# Patient Record
Sex: MALE | Race: WHITE | NOT HISPANIC OR LATINO | URBAN - METROPOLITAN AREA
[De-identification: names, ages, dates, MRNs, and addresses within clinical notes are randomized per-mention and may not be internally consistent; named-entity substitution may affect disease eponyms.]

---

## 2022-10-10 ENCOUNTER — INPATIENT (INPATIENT)
Facility: HOSPITAL | Age: 67
LOS: 11 days | Discharge: NURSING, REHAB OR LONG-TERM CARE | DRG: 871 | End: 2022-10-22
Attending: HOSPITALIST | Admitting: STUDENT IN AN ORGANIZED HEALTH CARE EDUCATION/TRAINING PROGRAM
Payer: MEDICARE

## 2022-10-10 VITALS — HEIGHT: 69 IN | WEIGHT: 250 LBS

## 2022-10-10 DIAGNOSIS — A41.9 SEPSIS, UNSPECIFIED ORGANISM: ICD-10-CM

## 2022-10-10 DIAGNOSIS — N17.9 ACUTE KIDNEY FAILURE, UNSPECIFIED: ICD-10-CM

## 2022-10-10 DIAGNOSIS — Z96.652 PRESENCE OF LEFT ARTIFICIAL KNEE JOINT: Chronic | ICD-10-CM

## 2022-10-10 DIAGNOSIS — E87.29 OTHER ACIDOSIS: ICD-10-CM

## 2022-10-10 LAB
ALBUMIN SERPL ELPH-MCNC: 3.4 G/DL — SIGNIFICANT CHANGE UP (ref 3.3–5)
ALP SERPL-CCNC: 85 U/L — SIGNIFICANT CHANGE UP (ref 40–120)
ALT FLD-CCNC: 63 U/L — HIGH (ref 10–45)
ANION GAP SERPL CALC-SCNC: 21 MMOL/L — HIGH (ref 5–17)
ANION GAP SERPL CALC-SCNC: 26 MMOL/L — HIGH (ref 5–17)
APPEARANCE UR: CLEAR — SIGNIFICANT CHANGE UP
AST SERPL-CCNC: 93 U/L — HIGH (ref 10–40)
BACTERIA # UR AUTO: NEGATIVE — SIGNIFICANT CHANGE UP
BASE EXCESS BLDV CALC-SCNC: -12.3 MMOL/L — LOW (ref -2–3)
BASE EXCESS BLDV CALC-SCNC: -9.9 MMOL/L — LOW (ref -2–3)
BASOPHILS # BLD AUTO: 0.05 K/UL — SIGNIFICANT CHANGE UP (ref 0–0.2)
BASOPHILS NFR BLD AUTO: 0.3 % — SIGNIFICANT CHANGE UP (ref 0–2)
BILIRUB SERPL-MCNC: 1.2 MG/DL — SIGNIFICANT CHANGE UP (ref 0.2–1.2)
BILIRUB UR-MCNC: NEGATIVE — SIGNIFICANT CHANGE UP
BLOOD GAS VENOUS - CREATININE: SIGNIFICANT CHANGE UP MG/DL (ref 0.5–1.3)
BUN SERPL-MCNC: 114 MG/DL — HIGH (ref 7–23)
BUN SERPL-MCNC: 136 MG/DL — HIGH (ref 7–23)
CA-I SERPL-SCNC: 1.06 MMOL/L — LOW (ref 1.15–1.33)
CA-I SERPL-SCNC: 1.12 MMOL/L — LOW (ref 1.15–1.33)
CALCIUM SERPL-MCNC: 7.5 MG/DL — LOW (ref 8.4–10.5)
CALCIUM SERPL-MCNC: 8.9 MG/DL — SIGNIFICANT CHANGE UP (ref 8.4–10.5)
CHLORIDE BLDV-SCNC: 100 MMOL/L — SIGNIFICANT CHANGE UP (ref 96–108)
CHLORIDE BLDV-SCNC: 106 MMOL/L — SIGNIFICANT CHANGE UP (ref 96–108)
CHLORIDE SERPL-SCNC: 100 MMOL/L — SIGNIFICANT CHANGE UP (ref 96–108)
CHLORIDE SERPL-SCNC: 107 MMOL/L — SIGNIFICANT CHANGE UP (ref 96–108)
CO2 BLDV-SCNC: 15 MMOL/L — LOW (ref 22–26)
CO2 BLDV-SCNC: 17 MMOL/L — LOW (ref 22–26)
CO2 SERPL-SCNC: 12 MMOL/L — LOW (ref 22–31)
CO2 SERPL-SCNC: 13 MMOL/L — LOW (ref 22–31)
COLOR SPEC: YELLOW — SIGNIFICANT CHANGE UP
CREAT ?TM UR-MCNC: 112 MG/DL — SIGNIFICANT CHANGE UP
CREAT SERPL-MCNC: 5.25 MG/DL — HIGH (ref 0.5–1.3)
CREAT SERPL-MCNC: 6.5 MG/DL — HIGH (ref 0.5–1.3)
DIFF PNL FLD: ABNORMAL
EGFR: 11 ML/MIN/1.73M2 — LOW
EGFR: 9 ML/MIN/1.73M2 — LOW
EOSINOPHIL # BLD AUTO: 0.02 K/UL — SIGNIFICANT CHANGE UP (ref 0–0.5)
EOSINOPHIL NFR BLD AUTO: 0.1 % — SIGNIFICANT CHANGE UP (ref 0–6)
EPI CELLS # UR: 0 /HPF — SIGNIFICANT CHANGE UP
GAS PNL BLDV: 133 MMOL/L — LOW (ref 136–145)
GAS PNL BLDV: 134 MMOL/L — LOW (ref 136–145)
GAS PNL BLDV: SIGNIFICANT CHANGE UP
GLUCOSE BLDV-MCNC: 118 MG/DL — HIGH (ref 70–99)
GLUCOSE BLDV-MCNC: 92 MG/DL — SIGNIFICANT CHANGE UP (ref 70–99)
GLUCOSE SERPL-MCNC: 117 MG/DL — HIGH (ref 70–99)
GLUCOSE SERPL-MCNC: 93 MG/DL — SIGNIFICANT CHANGE UP (ref 70–99)
GLUCOSE UR QL: NEGATIVE — SIGNIFICANT CHANGE UP
HCO3 BLDV-SCNC: 14 MMOL/L — LOW (ref 22–29)
HCO3 BLDV-SCNC: 16 MMOL/L — LOW (ref 22–29)
HCT VFR BLD CALC: 38.6 % — LOW (ref 39–50)
HCT VFR BLDA CALC: 32 % — LOW (ref 39–51)
HCT VFR BLDA CALC: 37 % — LOW (ref 39–51)
HGB BLD CALC-MCNC: 10.7 G/DL — LOW (ref 12.6–17.4)
HGB BLD CALC-MCNC: 12.4 G/DL — LOW (ref 12.6–17.4)
HGB BLD-MCNC: 12 G/DL — LOW (ref 13–17)
HYALINE CASTS # UR AUTO: 0 /LPF — SIGNIFICANT CHANGE UP (ref 0–2)
IMM GRANULOCYTES NFR BLD AUTO: 2.7 % — HIGH (ref 0–0.9)
KETONES UR-MCNC: NEGATIVE — SIGNIFICANT CHANGE UP
LACTATE BLDV-MCNC: 3.7 MMOL/L — HIGH (ref 0.5–2)
LACTATE BLDV-MCNC: 4.2 MMOL/L — CRITICAL HIGH (ref 0.5–2)
LEUKOCYTE ESTERASE UR-ACNC: NEGATIVE — SIGNIFICANT CHANGE UP
LYMPHOCYTES # BLD AUTO: 1.17 K/UL — SIGNIFICANT CHANGE UP (ref 1–3.3)
LYMPHOCYTES # BLD AUTO: 6.2 % — LOW (ref 13–44)
MCHC RBC-ENTMCNC: 26.5 PG — LOW (ref 27–34)
MCHC RBC-ENTMCNC: 31.1 GM/DL — LOW (ref 32–36)
MCV RBC AUTO: 85.2 FL — SIGNIFICANT CHANGE UP (ref 80–100)
MONOCYTES # BLD AUTO: 0.88 K/UL — SIGNIFICANT CHANGE UP (ref 0–0.9)
MONOCYTES NFR BLD AUTO: 4.6 % — SIGNIFICANT CHANGE UP (ref 2–14)
NEUTROPHILS # BLD AUTO: 16.35 K/UL — HIGH (ref 1.8–7.4)
NEUTROPHILS NFR BLD AUTO: 86.1 % — HIGH (ref 43–77)
NITRITE UR-MCNC: NEGATIVE — SIGNIFICANT CHANGE UP
NRBC # BLD: 0 /100 WBCS — SIGNIFICANT CHANGE UP (ref 0–0)
OSMOLALITY UR: 478 MOS/KG — SIGNIFICANT CHANGE UP (ref 300–900)
PCO2 BLDV: 34 MMHG — LOW (ref 42–55)
PCO2 BLDV: 35 MMHG — LOW (ref 42–55)
PH BLDV: 7.23 — LOW (ref 7.32–7.43)
PH BLDV: 7.27 — LOW (ref 7.32–7.43)
PH UR: 5 — SIGNIFICANT CHANGE UP (ref 5–8)
PLATELET # BLD AUTO: 276 K/UL — SIGNIFICANT CHANGE UP (ref 150–400)
PO2 BLDV: 23 MMHG — LOW (ref 25–45)
PO2 BLDV: 29 MMHG — SIGNIFICANT CHANGE UP (ref 25–45)
POTASSIUM BLDV-SCNC: 4 MMOL/L — SIGNIFICANT CHANGE UP (ref 3.5–5.1)
POTASSIUM BLDV-SCNC: 4.7 MMOL/L — SIGNIFICANT CHANGE UP (ref 3.5–5.1)
POTASSIUM SERPL-MCNC: 4.1 MMOL/L — SIGNIFICANT CHANGE UP (ref 3.5–5.3)
POTASSIUM SERPL-MCNC: 4.8 MMOL/L — SIGNIFICANT CHANGE UP (ref 3.5–5.3)
POTASSIUM SERPL-SCNC: 4.1 MMOL/L — SIGNIFICANT CHANGE UP (ref 3.5–5.3)
POTASSIUM SERPL-SCNC: 4.8 MMOL/L — SIGNIFICANT CHANGE UP (ref 3.5–5.3)
PROT SERPL-MCNC: 6.3 G/DL — SIGNIFICANT CHANGE UP (ref 6–8.3)
PROT UR-MCNC: ABNORMAL
RAPID RVP RESULT: DETECTED
RBC # BLD: 4.53 M/UL — SIGNIFICANT CHANGE UP (ref 4.2–5.8)
RBC # FLD: 16.8 % — HIGH (ref 10.3–14.5)
RBC CASTS # UR COMP ASSIST: 1 /HPF — SIGNIFICANT CHANGE UP (ref 0–4)
SAO2 % BLDV: 24.4 % — LOW (ref 67–88)
SAO2 % BLDV: 36.3 % — LOW (ref 67–88)
SARS-COV-2 RNA SPEC QL NAA+PROBE: DETECTED
SODIUM SERPL-SCNC: 139 MMOL/L — SIGNIFICANT CHANGE UP (ref 135–145)
SODIUM SERPL-SCNC: 140 MMOL/L — SIGNIFICANT CHANGE UP (ref 135–145)
SODIUM UR-SCNC: 34 MMOL/L — SIGNIFICANT CHANGE UP
SP GR SPEC: 1.02 — SIGNIFICANT CHANGE UP (ref 1.01–1.02)
TROPONIN T, HIGH SENSITIVITY RESULT: 210 NG/L — HIGH (ref 0–51)
TROPONIN T, HIGH SENSITIVITY RESULT: 242 NG/L — HIGH (ref 0–51)
UROBILINOGEN FLD QL: NEGATIVE — SIGNIFICANT CHANGE UP
WBC # BLD: 18.99 K/UL — HIGH (ref 3.8–10.5)
WBC # FLD AUTO: 18.99 K/UL — HIGH (ref 3.8–10.5)
WBC UR QL: 3 /HPF — SIGNIFICANT CHANGE UP (ref 0–5)

## 2022-10-10 PROCEDURE — 70450 CT HEAD/BRAIN W/O DYE: CPT | Mod: 26,MA

## 2022-10-10 PROCEDURE — 71250 CT THORAX DX C-: CPT | Mod: 26,MA

## 2022-10-10 PROCEDURE — 74176 CT ABD & PELVIS W/O CONTRAST: CPT | Mod: 26,MA

## 2022-10-10 PROCEDURE — 93010 ELECTROCARDIOGRAM REPORT: CPT | Mod: 59

## 2022-10-10 PROCEDURE — 93010 ELECTROCARDIOGRAM REPORT: CPT

## 2022-10-10 PROCEDURE — 99291 CRITICAL CARE FIRST HOUR: CPT

## 2022-10-10 RX ORDER — SODIUM CHLORIDE 9 MG/ML
1000 INJECTION, SOLUTION INTRAVENOUS
Refills: 0 | Status: DISCONTINUED | OUTPATIENT
Start: 2022-10-10 | End: 2022-10-10

## 2022-10-10 RX ORDER — SODIUM BICARBONATE 1 MEQ/ML
0.13 SYRINGE (ML) INTRAVENOUS
Qty: 150 | Refills: 0 | Status: DISCONTINUED | OUTPATIENT
Start: 2022-10-10 | End: 2022-10-10

## 2022-10-10 RX ORDER — SODIUM CHLORIDE 9 MG/ML
1000 INJECTION INTRAMUSCULAR; INTRAVENOUS; SUBCUTANEOUS ONCE
Refills: 0 | Status: COMPLETED | OUTPATIENT
Start: 2022-10-10 | End: 2022-10-10

## 2022-10-10 RX ORDER — MIDODRINE HYDROCHLORIDE 2.5 MG/1
10 TABLET ORAL ONCE
Refills: 0 | Status: COMPLETED | OUTPATIENT
Start: 2022-10-10 | End: 2022-10-10

## 2022-10-10 RX ORDER — SODIUM BICARBONATE 1 MEQ/ML
0.13 SYRINGE (ML) INTRAVENOUS
Qty: 150 | Refills: 0 | Status: DISCONTINUED | OUTPATIENT
Start: 2022-10-10 | End: 2022-10-11

## 2022-10-10 RX ORDER — SODIUM CHLORIDE 9 MG/ML
500 INJECTION INTRAMUSCULAR; INTRAVENOUS; SUBCUTANEOUS ONCE
Refills: 0 | Status: COMPLETED | OUTPATIENT
Start: 2022-10-10 | End: 2022-10-10

## 2022-10-10 RX ORDER — PIPERACILLIN AND TAZOBACTAM 4; .5 G/20ML; G/20ML
3.38 INJECTION, POWDER, LYOPHILIZED, FOR SOLUTION INTRAVENOUS ONCE
Refills: 0 | Status: COMPLETED | OUTPATIENT
Start: 2022-10-10 | End: 2022-10-10

## 2022-10-10 RX ADMIN — PIPERACILLIN AND TAZOBACTAM 200 GRAM(S): 4; .5 INJECTION, POWDER, LYOPHILIZED, FOR SOLUTION INTRAVENOUS at 18:43

## 2022-10-10 RX ADMIN — MIDODRINE HYDROCHLORIDE 10 MILLIGRAM(S): 2.5 TABLET ORAL at 20:30

## 2022-10-10 RX ADMIN — Medication 100 MEQ/KG/HR: at 23:07

## 2022-10-10 RX ADMIN — SODIUM CHLORIDE 500 MILLILITER(S): 9 INJECTION INTRAMUSCULAR; INTRAVENOUS; SUBCUTANEOUS at 18:59

## 2022-10-10 RX ADMIN — SODIUM CHLORIDE 1000 MILLILITER(S): 9 INJECTION INTRAMUSCULAR; INTRAVENOUS; SUBCUTANEOUS at 17:30

## 2022-10-10 RX ADMIN — SODIUM CHLORIDE 1000 MILLILITER(S): 9 INJECTION INTRAMUSCULAR; INTRAVENOUS; SUBCUTANEOUS at 17:50

## 2022-10-10 RX ADMIN — SODIUM CHLORIDE 1000 MILLILITER(S): 9 INJECTION INTRAMUSCULAR; INTRAVENOUS; SUBCUTANEOUS at 20:31

## 2022-10-10 RX ADMIN — SODIUM CHLORIDE 100 MILLILITER(S): 9 INJECTION, SOLUTION INTRAVENOUS at 21:51

## 2022-10-10 NOTE — ED ADULT NURSE NOTE - OBJECTIVE STATEMENT
67y male BIBEMS complaining of AMS. PMHX HTN. EMS states he has been AMS for three day according to doctor. Pt is cool and clammy. Pt has swollen legs bilaterally. Labs was collected and sent to lab. 67y male BIBEMS complaining of AMS. PMHX HTN, HLD. EMS states he has been AMS for three day according to wife. Wife states he hasn't been himself. . Pt is cool and clammy. Pt has swollen legs bilaterally. Labs was collected and sent to lab. 67y male BIBEMS complaining of AMS. PMHX HTN, HLD. EMS states he has been AMS for three day according to wife. Wife states he hasn't been himself. . Pt is cool and clammy. Pt is altered. Pt has swollen legs bilaterally. Pt has redness at the sacrum. Labs was collected and sent to lab. Pt awaiting ct scan. Pt pending dispo.

## 2022-10-10 NOTE — ED PROVIDER NOTE - ATTENDING CONTRIBUTION TO CARE
attending Jewel: 67yM h/o HTN, HLD, DVT/PE on Xarelto BIBEMS from rehab for worsening AMS x several days. Reportedly being treated for UTI. In rehab after having had L knee arthroplasty several weeks ago. Exam as above. Will obtain ekg, place on tele, labs, rectal temp, IVF given hypotension on arrival, empiric abx for concern for sepsis, CTH for AMS on Xarelto, will require admission

## 2022-10-10 NOTE — CONSULT NOTE ADULT - ASSESSMENT
Patient presenting for probable SONAL unknown baseline creatinine improvd with foleyy insertion and IV fluids would continue fluid infusion.   Change saline to bicarbonate infusion.

## 2022-10-10 NOTE — CONSULT NOTE ADULT - PROBLEM SELECTOR RECOMMENDATION 9
unknown baseline creatinine, presenting from nursing home with creatinine of 6.5 which improved to 5.2, suspected multifactorial SONAL however likely predominately pre renal. Patient without evidence fo hydronephrosis on CT abdomen and pelvis  [] Patient UA bland with trace blood, Please send fr repeat however unlikely GN picture contributing to SONAL   [] Please send for Urine Electrolytes (sodium, Chloride, Urea, Creatinine)   []Please continue IV hydration, given persistence acidemia would switch Saline to Bicarbonate (3 amps)  in D5w, run at 100 cc hourly  [] please send for renal ultrasound for assessment for parenchymal disease  [] Will need to consider HD if renal failure continues to worsen. Monitor labs and urine output. Avoid NSAIDs, ACEI/ARBS, RCA and nephrotoxins. Dose medications as per eGFR.  [] Please continue to monitor I/Os (urine output)

## 2022-10-10 NOTE — ED PROVIDER NOTE - PHYSICAL EXAMINATION
Physical Exam:  Gen: tired appearing, awake alert   HEENT: ormal conjunctiva, oral mucosa moist  Lung: CTAB, no respiratory distress, no wheezes/rhonchi/rales B/L  CV: irregular, tachycardic  Abd: soft, NT, ND, no guarding, no rigidity, no rebound tenderness   MSK: no visible deformities, well healed L knee surgical scar  Skin: Warm, well perfused, no rash, b/l leg swelling  ~Thomas Ferris MD (PGY-3)

## 2022-10-10 NOTE — ED ADULT NURSE REASSESSMENT NOTE - NS ED NURSE REASSESS COMMENT FT1
Report taken from RN Pat, pt introduced to oncoming RN, A&Ox3, airway patent with spontaneous unlabored breathing.  Owusu draining dark zack/ brown urine. Pt currently at CT.

## 2022-10-10 NOTE — ED ADULT NURSE REASSESSMENT NOTE - NS ED NURSE REASSESS COMMENT FT1
2100 cc of urine drained from Owusu, family member at bedside. Pt A&Ox3, given warm blankets. No complaints at this time.

## 2022-10-10 NOTE — CONSULT NOTE ADULT - ATTENDING COMMENTS
pt seen at 10am 10/11/22  alla in setting of losartan/hctz/hypotension/covid19+  -?u retention component in ER  -cr decreased now uptrending but off IVF  -rec to continue IVF hydration with normal saline @100cc/hr   -hold off losartan/hctz  #met acidosis- start sodium bicarb tabs 1300mg po tid  #monitor bicarb trend and lactate and Ph- may need bicarb based ivf if downtrending bicarb  #CT: KIDNEYS/URETERS: Bilateral nonobstructive renal calculi measuring up to   1.4 cm on the left and 0.6 cm on the right. Bilateral renal cysts. No   hydronephrosis.  BLADDER: Decompressed with a Kennedy catheter.  #continue kennedy  #hypotension- IVF; monitor BP

## 2022-10-10 NOTE — ED ADULT NURSE NOTE - NSIMPLEMENTINTERV_GEN_ALL_ED
Implemented All Fall Risk Interventions:  [place] to call system. Call bell, personal items and telephone within reach. Instruct patient to call for assistance. Room bathroom lighting operational. Non-slip footwear when patient is off stretcher. Physically safe environment: no spills, clutter or unnecessary equipment. Stretcher in lowest position, wheels locked, appropriate side rails in place. Provide visual cue, wrist band, yellow gown, etc. Monitor gait and stability. Monitor for mental status changes and reorient to person, place, and time. Review medications for side effects contributing to fall risk. Reinforce activity limits and safety measures with patient and family.

## 2022-10-10 NOTE — ED ADULT NURSE REASSESSMENT NOTE - NS ED NURSE REASSESS COMMENT FT1
Sterile kennedy cath was performed with second RN Pat. Pt tolerated well. Pt output was 1000ml of urine dark yellow urine. Pt pending dispo.

## 2022-10-10 NOTE — ED PROVIDER NOTE - PROGRESS NOTE DETAILS
Barrington DAVIS (PGY-3)  nephro recs--no need for HD at this time. I spoke with hospitliast who accepted admission under non-tele bed. acute renal failure likely due to prerenal and postrenal etiologies

## 2022-10-10 NOTE — ED PROVIDER NOTE - CLINICAL SUMMARY MEDICAL DECISION MAKING FREE TEXT BOX
7M PMH HTN, HLD, DVT/PE on xarelto BIBEMS from the [facility] for AMS for the past 3 days with SOB. Tachycardic, hypotensive, rest of VSS. Lungs ctab, abd NT, B/L leg swelling  Likely infectious vs. metabolic etiology of symptoms. Will also eval for ACS as pt unable to give proper hx although less likely since ECG was nondiagnostic  Plan: sepsis/cardiac labs, pan CT scan, abx, reassess symptoms

## 2022-10-10 NOTE — ED ADULT NURSE NOTE - ED STAT RN HANDOFF DETAILS
Report given to NICK Hooper. MD aware of blood pressure 81/61- will give 500 cc fluid bolus. patient transported to CT scan; awaiting dispo.

## 2022-10-10 NOTE — CONSULT NOTE ADULT - SUBJECTIVE AND OBJECTIVE BOX
[facility] DIVISION OF KIDNEY DISEASES AND HYPERTENSION -- INITIAL CONSULT NOTE  --------------------------------------------------------------------------------    --------------------------------------------------------------------------------  HPI:    Patient had kennedy inserted with 2.1 liters of urine drained, received 2 liters of saline, creatinine improved from 6.5 to 5.25, bicarbonate maintained at 12 from 13 and urea down trended from 136 to 114. CT abdomen with no evidence of hydronephrosis.         PAST HISTORY  --------------------------------------------------------------------------------  PAST MEDICAL & SURGICAL HISTORY:  HTN (hypertension)      HLD (hyperlipidemia)      Pulmonary embolism      History of left knee replacement        FAMILY HISTORY:    PAST SOCIAL HISTORY:    ALLERGIES & MEDICATIONS  --------------------------------------------------------------------------------  Allergies    Allergy Status Unknown    Intolerances      Standing Inpatient Medications  lactated ringers. 1000 milliLiter(s) IV Continuous <Continuous>    PRN Inpatient Medications      REVIEW OF SYSTEMS  --------------------------------------------------------------------------------  Constitutional:No Fever,Chills , Fatigue , Weight change   HEENT:No Blurred vision,Eye Pain ,Headache, Runny nose,Sore Throat   Respiratory: No Cough, Wheezing ,Shortness of breath  Cardiovascular: No Chest Pain, Palpitations, DEY, PND, Orthopnea  Gastrointestinal:No Abdominal Pain, Diarrhea, Constipation, Hemorrhoids, Nausea,  Vomiting  Genitourinary: No Nocturia, Dysuria, Incontinence  Extremities: No Swelling, Joint Pain  Neurologic:No Focal deficit, Paresthesia,  Syncope  Lymphatic: No Swelling, Lymphadenopathy   Skin: No Rash, Ecchymoses, Wounds, Lesions  Psychiatry: No Depression ,  Suicidal/Homicidal Ideation, Anxiety, Sleep Disturbances        All other systems were reviewed and are negative, except as noted.    VITALS/PHYSICAL EXAM  --------------------------------------------------------------------------------  T(C): 36.7 (10-10-22 @ 21:00), Max: 36.7 (10-10-22 @ 21:00)  HR: 82 (10-10-22 @ 21:00) (82 - 104)  BP: 104/69 (10-10-22 @ 21:00) (75/55 - 120/86)  RR: 20 (10-10-22 @ 21:00) (19 - 25)  SpO2: 100% (10-10-22 @ 21:00) (99% - 100%)  Wt(kg): --    Height (cm): 175.3 (10-10-22 @ 17:05)  Weight (kg): 113.4 (10-10-22 @ 17:05)  BMI (kg/m2): 36.9 (10-10-22 @ 17:05)  BSA (m2): 2.27 (10-10-22 @ 17:05)  Daily Height in cm: 175.26 (10 Oct 2022 17:05)    Daily   I&O's Summary    10 Oct 2022 07:01  -  10 Oct 2022 21:41  --------------------------------------------------------  IN: 0 mL / OUT: 2100 mL / NET: -2100 mL          10-10-22 @ 07:01  -  10-10-22 @ 21:41  --------------------------------------------------------  IN: 0 mL / OUT: 2100 mL / NET: -2100 mL        Physical Exam:  Gen: NAD   HEENT: anicteric  Pulm: CTA B/L  CV: RRR, Normal S1 S2  Abd: soft, nontender, nondistended  CNS: AAO x 3, No asterixis  : No Umer  LE: Warm, no gabriel  Skin: Warm, without rashes  Vascular access: none        LABS/STUDIES  --------------------------------------------------------------------------------              12.0   18.99 >-----------<  276      [10-10-22 @ 18:08]              38.6     140  |  107  |  114  ----------------------------<  93      [10-10-22 @ 20:13]  4.1   |  12  |  5.25        Ca     7.5     [10-10-22 @ 20:13]    TPro  6.3  /  Alb  3.4  /  TBili  1.2  /  DBili  x   /  AST  93  /  ALT  63  /  AlkPhos  85  [10-10-22 @ 18:08]          Creatinine Trend:  SCr 5.25 [10-10 @ 20:13]  SCr 6.50 [10-10 @ 18:08]    Urinalysis - [10-10-22 @ 18:16]      Color Yellow / Appearance Clear / SG 1.017 / pH 5.0      Gluc Negative / Ketone Negative  / Bili Negative / Urobili Negative       Blood Trace / Protein Trace / Leuk Est Negative / Nitrite Negative      RBC 1 / WBC 3 / Hyaline 0 / Gran  / Sq Epi  / Non Sq Epi 0 / Bacteria Negative    Urine Creatinine 112      [10-10-22 @ 20:44]  Urine Sodium 34      [10-10-22 @ 20:44]  Urine Osmolality 478      [10-10-22 @ 20:44]            Radiology  --------------------------------------------------------------------------------    --------------------------------------------------------------------------------  Yoan Davis  [id number] [facility] DIVISION OF KIDNEY DISEASES AND HYPERTENSION -- INITIAL CONSULT NOTE  --------------------------------------------------------------------------------    --------------------------------------------------------------------------------  HPI:  THis is a 67 year old male with History of Hypertension, Hyperlipidemia, renal cancer s/p partial nephrectomy, DVT PE and a recent left knee arthroplasty in september of 2022. patient was brought in from [facility], as per patients wife patient has been more altered in the last 3 days . As per the wife due to his fluctuating mentation patient was diagnosed with a urinary tract infection. Patients denies chest pain, lightheadedness or fevers. Patient does however endorse feeling thirsty and feeling more fatigued than his baseline.       Patient had kennedy inserted with 2.1 liters of urine drained, received 2 liters of saline, creatinine improved from 6.5 to 5.25, bicarbonate maintained at 12 from 13 and urea down trended from 136 to 114. CT abdomen with no evidence of hydronephrosis. Patient was noted to be covid positive in the ED however he denies having dyspnea.       PAST HISTORY  --------------------------------------------------------------------------------  PAST MEDICAL & SURGICAL HISTORY:  HTN (hypertension)      HLD (hyperlipidemia)      Pulmonary embolism      History of left knee replacement        FAMILY HISTORY:    PAST SOCIAL HISTORY:    ALLERGIES & MEDICATIONS  --------------------------------------------------------------------------------  Allergies    Allergy Status Unknown    Intolerances      Standing Inpatient Medications  lactated ringers. 1000 milliLiter(s) IV Continuous <Continuous>    PRN Inpatient Medications      REVIEW OF SYSTEMS  --------------------------------------------------------------------------------          All other systems were reviewed and are negative, except as noted.    VITALS/PHYSICAL EXAM  --------------------------------------------------------------------------------  T(C): 36.7 (10-10-22 @ 21:00), Max: 36.7 (10-10-22 @ 21:00)  HR: 82 (10-10-22 @ 21:00) (82 - 104)  BP: 104/69 (10-10-22 @ 21:00) (75/55 - 120/86)  RR: 20 (10-10-22 @ 21:00) (19 - 25)  SpO2: 100% (10-10-22 @ 21:00) (99% - 100%)  Wt(kg): --    Height (cm): 175.3 (10-10-22 @ 17:05)  Weight (kg): 113.4 (10-10-22 @ 17:05)  BMI (kg/m2): 36.9 (10-10-22 @ 17:05)  BSA (m2): 2.27 (10-10-22 @ 17:05)  Daily Height in cm: 175.26 (10 Oct 2022 17:05)    Daily   I&O's Summary    10 Oct 2022 07:01  -  10 Oct 2022 21:41  --------------------------------------------------------  IN: 0 mL / OUT: 2100 mL / NET: -2100 mL          10-10-22 @ 07:01  -  10-10-22 @ 21:41  --------------------------------------------------------  IN: 0 mL / OUT: 2100 mL / NET: -2100 mL        Physical Exam:  Gen: NAD, Normocephalic    HEENT: anicteric  Pulm: CTA B/L, no respiratory distress   CV: RRR, Normal S1 S2  Abd: soft, nontender, nondistended  CNS: AAO x 3, No asterixis  : No Umer  LE: Warm, no edema  Skin: Warm, without rashes          LABS/STUDIES  --------------------------------------------------------------------------------              12.0   18.99 >-----------<  276      [10-10-22 @ 18:08]              38.6     140  |  107  |  114  ----------------------------<  93      [10-10-22 @ 20:13]  4.1   |  12  |  5.25        Ca     7.5     [10-10-22 @ 20:13]    TPro  6.3  /  Alb  3.4  /  TBili  1.2  /  DBili  x   /  AST  93  /  ALT  63  /  AlkPhos  85  [10-10-22 @ 18:08]          Creatinine Trend:  SCr 5.25 [10-10 @ 20:13]  SCr 6.50 [10-10 @ 18:08]    Urinalysis - [10-10-22 @ 18:16]      Color Yellow / Appearance Clear / SG 1.017 / pH 5.0      Gluc Negative / Ketone Negative  / Bili Negative / Urobili Negative       Blood Trace / Protein Trace / Leuk Est Negative / Nitrite Negative      RBC 1 / WBC 3 / Hyaline 0 / Gran  / Sq Epi  / Non Sq Epi 0 / Bacteria Negative    Urine Creatinine 112      [10-10-22 @ 20:44]  Urine Sodium 34      [10-10-22 @ 20:44]  Urine Osmolality 478      [10-10-22 @ 20:44]            Radiology  --------------------------------------------------------------------------------    --------------------------------------------------------------------------------  Yoan Davis  [phone]

## 2022-10-10 NOTE — ED PROVIDER NOTE - OBJECTIVE STATEMENT
67M PMH HTN, HLD, DVT/PE on xarelto BIBEMS from the [facility] for AMS for the past 3 days. Per wife she noticed pt has been getting more weak and confused. He also had a L knee arthroplasty a few weeks ago which has been healing well. Pt is awake but tired appearing and hypotensive. Pt currently being treated for UTI w/ unknown abx 67M PMH HTN, HLD, DVT/PE on xarelto BIBEMS from the [facility] for AMS for the past 3 days. Per wife she noticed pt has been getting more weak and confused. He also had a L knee arthroplasty a few weeks ago which has been healing well. Pt is awake but tired appearing and hypotensive. Pt currently being treated for UTI w/ unknown abx    pcp dr. raiza stanley

## 2022-10-10 NOTE — CONSULT NOTE ADULT - PROBLEM SELECTOR RECOMMENDATION 2
Patient Anion gap metabolic acidosis likely multifactorial, component of lactic acidosis which is improving, noted improvement in Anion gap following IV hydration. gap acidosis likely in the setting of SONAL  [] Please hold off on continued Saline infusion   [] Please Start D5 with Bicarbonate (3 ampules) and run at 100 cc hourly  [] Please Continue to monitor Renal function panel blood gas

## 2022-10-11 DIAGNOSIS — I49.9 CARDIAC ARRHYTHMIA, UNSPECIFIED: ICD-10-CM

## 2022-10-11 DIAGNOSIS — G93.49 OTHER ENCEPHALOPATHY: ICD-10-CM

## 2022-10-11 DIAGNOSIS — Z90.5 ACQUIRED ABSENCE OF KIDNEY: Chronic | ICD-10-CM

## 2022-10-11 DIAGNOSIS — E78.5 HYPERLIPIDEMIA, UNSPECIFIED: ICD-10-CM

## 2022-10-11 DIAGNOSIS — I10 ESSENTIAL (PRIMARY) HYPERTENSION: ICD-10-CM

## 2022-10-11 DIAGNOSIS — A41.9 SEPSIS, UNSPECIFIED ORGANISM: ICD-10-CM

## 2022-10-11 DIAGNOSIS — Z29.9 ENCOUNTER FOR PROPHYLACTIC MEASURES, UNSPECIFIED: ICD-10-CM

## 2022-10-11 LAB
ALBUMIN SERPL ELPH-MCNC: 2.6 G/DL — LOW (ref 3.3–5)
ALP SERPL-CCNC: 86 U/L — SIGNIFICANT CHANGE UP (ref 40–120)
ALT FLD-CCNC: 52 U/L — HIGH (ref 10–45)
ANION GAP SERPL CALC-SCNC: 20 MMOL/L — HIGH (ref 5–17)
ANION GAP SERPL CALC-SCNC: 26 MMOL/L — HIGH (ref 5–17)
AST SERPL-CCNC: 76 U/L — HIGH (ref 10–40)
BASE EXCESS BLDV CALC-SCNC: -9.8 MMOL/L — LOW (ref -2–3)
BILIRUB DIRECT SERPL-MCNC: 0.3 MG/DL — SIGNIFICANT CHANGE UP (ref 0–0.3)
BILIRUB INDIRECT FLD-MCNC: 0.8 MG/DL — SIGNIFICANT CHANGE UP (ref 0.2–1)
BILIRUB SERPL-MCNC: 1.1 MG/DL — SIGNIFICANT CHANGE UP (ref 0.2–1.2)
BUN SERPL-MCNC: 126 MG/DL — HIGH (ref 7–23)
BUN SERPL-MCNC: 131 MG/DL — HIGH (ref 7–23)
CA-I SERPL-SCNC: 1.02 MMOL/L — LOW (ref 1.15–1.33)
CALCIUM SERPL-MCNC: 8 MG/DL — LOW (ref 8.4–10.5)
CALCIUM SERPL-MCNC: 8 MG/DL — LOW (ref 8.4–10.5)
CHLORIDE BLDV-SCNC: 104 MMOL/L — SIGNIFICANT CHANGE UP (ref 96–108)
CHLORIDE SERPL-SCNC: 103 MMOL/L — SIGNIFICANT CHANGE UP (ref 96–108)
CHLORIDE SERPL-SCNC: 103 MMOL/L — SIGNIFICANT CHANGE UP (ref 96–108)
CO2 BLDV-SCNC: 15 MMOL/L — LOW (ref 22–26)
CO2 SERPL-SCNC: 10 MMOL/L — CRITICAL LOW (ref 22–31)
CO2 SERPL-SCNC: 16 MMOL/L — LOW (ref 22–31)
CREAT SERPL-MCNC: 5.71 MG/DL — HIGH (ref 0.5–1.3)
CREAT SERPL-MCNC: 6.26 MG/DL — HIGH (ref 0.5–1.3)
CULTURE RESULTS: NO GROWTH — SIGNIFICANT CHANGE UP
EGFR: 10 ML/MIN/1.73M2 — LOW
EGFR: 9 ML/MIN/1.73M2 — LOW
GAS PNL BLDV: 133 MMOL/L — LOW (ref 136–145)
GAS PNL BLDV: SIGNIFICANT CHANGE UP
GAS PNL BLDV: SIGNIFICANT CHANGE UP
GLUCOSE BLDV-MCNC: 82 MG/DL — SIGNIFICANT CHANGE UP (ref 70–99)
GLUCOSE SERPL-MCNC: 116 MG/DL — HIGH (ref 70–99)
GLUCOSE SERPL-MCNC: 71 MG/DL — SIGNIFICANT CHANGE UP (ref 70–99)
HCO3 BLDV-SCNC: 15 MMOL/L — LOW (ref 22–29)
HCT VFR BLDA CALC: 32 % — LOW (ref 39–51)
HGB BLD CALC-MCNC: 10.8 G/DL — LOW (ref 12.6–17.4)
INR BLD: 1.14 RATIO — SIGNIFICANT CHANGE UP (ref 0.88–1.16)
LACTATE BLDV-MCNC: 3.4 MMOL/L — HIGH (ref 0.5–2)
LACTATE SERPL-SCNC: 1.7 MMOL/L — SIGNIFICANT CHANGE UP (ref 0.5–2)
PCO2 BLDV: 27 MMHG — LOW (ref 42–55)
PH BLDV: 7.34 — SIGNIFICANT CHANGE UP (ref 7.32–7.43)
PO2 BLDV: 134 MMHG — HIGH (ref 25–45)
POTASSIUM BLDV-SCNC: 4.1 MMOL/L — SIGNIFICANT CHANGE UP (ref 3.5–5.1)
POTASSIUM SERPL-MCNC: 4.4 MMOL/L — SIGNIFICANT CHANGE UP (ref 3.5–5.3)
POTASSIUM SERPL-MCNC: 4.7 MMOL/L — SIGNIFICANT CHANGE UP (ref 3.5–5.3)
POTASSIUM SERPL-SCNC: 4.4 MMOL/L — SIGNIFICANT CHANGE UP (ref 3.5–5.3)
POTASSIUM SERPL-SCNC: 4.7 MMOL/L — SIGNIFICANT CHANGE UP (ref 3.5–5.3)
PROT SERPL-MCNC: 5.2 G/DL — LOW (ref 6–8.3)
PROTHROM AB SERPL-ACNC: 13.3 SEC — SIGNIFICANT CHANGE UP (ref 10.5–13.4)
SAO2 % BLDV: 99 % — HIGH (ref 67–88)
SODIUM SERPL-SCNC: 139 MMOL/L — SIGNIFICANT CHANGE UP (ref 135–145)
SODIUM SERPL-SCNC: 139 MMOL/L — SIGNIFICANT CHANGE UP (ref 135–145)
SPECIMEN SOURCE: SIGNIFICANT CHANGE UP
UUN UR-MCNC: 869 MG/DL — SIGNIFICANT CHANGE UP

## 2022-10-11 PROCEDURE — 99223 1ST HOSP IP/OBS HIGH 75: CPT | Mod: GC

## 2022-10-11 PROCEDURE — 99222 1ST HOSP IP/OBS MODERATE 55: CPT

## 2022-10-11 PROCEDURE — 76770 US EXAM ABDO BACK WALL COMP: CPT | Mod: 26

## 2022-10-11 PROCEDURE — 12345: CPT | Mod: NC,GC

## 2022-10-11 RX ORDER — AMLODIPINE BESYLATE 2.5 MG/1
10 TABLET ORAL DAILY
Refills: 0 | Status: DISCONTINUED | OUTPATIENT
Start: 2022-10-11 | End: 2022-10-11

## 2022-10-11 RX ORDER — LANOLIN ALCOHOL/MO/W.PET/CERES
3 CREAM (GRAM) TOPICAL AT BEDTIME
Refills: 0 | Status: DISCONTINUED | OUTPATIENT
Start: 2022-10-11 | End: 2022-10-22

## 2022-10-11 RX ORDER — INFLUENZA VIRUS VACCINE 15; 15; 15; 15 UG/.5ML; UG/.5ML; UG/.5ML; UG/.5ML
0.7 SUSPENSION INTRAMUSCULAR ONCE
Refills: 0 | Status: DISCONTINUED | OUTPATIENT
Start: 2022-10-11 | End: 2022-10-22

## 2022-10-11 RX ORDER — ALBUTEROL 90 UG/1
2 AEROSOL, METERED ORAL EVERY 6 HOURS
Refills: 0 | Status: DISCONTINUED | OUTPATIENT
Start: 2022-10-11 | End: 2022-10-22

## 2022-10-11 RX ORDER — RIVAROXABAN 15 MG-20MG
15 KIT ORAL
Refills: 0 | Status: DISCONTINUED | OUTPATIENT
Start: 2022-10-11 | End: 2022-10-12

## 2022-10-11 RX ORDER — REMDESIVIR 5 MG/ML
200 INJECTION INTRAVENOUS EVERY 24 HOURS
Refills: 0 | Status: DISCONTINUED | OUTPATIENT
Start: 2022-10-11 | End: 2022-10-11

## 2022-10-11 RX ORDER — ATENOLOL 25 MG/1
25 TABLET ORAL DAILY
Refills: 0 | Status: DISCONTINUED | OUTPATIENT
Start: 2022-10-11 | End: 2022-10-11

## 2022-10-11 RX ORDER — CEFTRIAXONE 500 MG/1
1000 INJECTION, POWDER, FOR SOLUTION INTRAMUSCULAR; INTRAVENOUS EVERY 24 HOURS
Refills: 0 | Status: COMPLETED | OUTPATIENT
Start: 2022-10-11 | End: 2022-10-13

## 2022-10-11 RX ORDER — LATANOPROST 0.05 MG/ML
1 SOLUTION/ DROPS OPHTHALMIC; TOPICAL AT BEDTIME
Refills: 0 | Status: DISCONTINUED | OUTPATIENT
Start: 2022-10-11 | End: 2022-10-22

## 2022-10-11 RX ORDER — FAMOTIDINE 10 MG/ML
20 INJECTION INTRAVENOUS DAILY
Refills: 0 | Status: DISCONTINUED | OUTPATIENT
Start: 2022-10-11 | End: 2022-10-11

## 2022-10-11 RX ORDER — SODIUM CHLORIDE 9 MG/ML
1000 INJECTION INTRAMUSCULAR; INTRAVENOUS; SUBCUTANEOUS
Refills: 0 | Status: DISCONTINUED | OUTPATIENT
Start: 2022-10-11 | End: 2022-10-11

## 2022-10-11 RX ORDER — TIOTROPIUM BROMIDE 18 UG/1
1 CAPSULE ORAL; RESPIRATORY (INHALATION) DAILY
Refills: 0 | Status: DISCONTINUED | OUTPATIENT
Start: 2022-10-11 | End: 2022-10-22

## 2022-10-11 RX ORDER — REMDESIVIR 5 MG/ML
INJECTION INTRAVENOUS
Refills: 0 | Status: DISCONTINUED | OUTPATIENT
Start: 2022-10-11 | End: 2022-10-11

## 2022-10-11 RX ORDER — CHLORHEXIDINE GLUCONATE 213 G/1000ML
1 SOLUTION TOPICAL DAILY
Refills: 0 | Status: DISCONTINUED | OUTPATIENT
Start: 2022-10-11 | End: 2022-10-22

## 2022-10-11 RX ORDER — TIMOLOL 0.5 %
1 DROPS OPHTHALMIC (EYE)
Refills: 0 | Status: DISCONTINUED | OUTPATIENT
Start: 2022-10-11 | End: 2022-10-22

## 2022-10-11 RX ORDER — HYDROCHLOROTHIAZIDE 25 MG
25 TABLET ORAL DAILY
Refills: 0 | Status: DISCONTINUED | OUTPATIENT
Start: 2022-10-11 | End: 2022-10-11

## 2022-10-11 RX ORDER — ACETAMINOPHEN 500 MG
650 TABLET ORAL EVERY 6 HOURS
Refills: 0 | Status: DISCONTINUED | OUTPATIENT
Start: 2022-10-11 | End: 2022-10-22

## 2022-10-11 RX ORDER — SODIUM CHLORIDE 9 MG/ML
1000 INJECTION, SOLUTION INTRAVENOUS
Refills: 0 | Status: DISCONTINUED | OUTPATIENT
Start: 2022-10-11 | End: 2022-10-12

## 2022-10-11 RX ORDER — SODIUM BICARBONATE 1 MEQ/ML
1300 SYRINGE (ML) INTRAVENOUS EVERY 8 HOURS
Refills: 0 | Status: DISCONTINUED | OUTPATIENT
Start: 2022-10-11 | End: 2022-10-11

## 2022-10-11 RX ADMIN — Medication 1 DROP(S): at 18:45

## 2022-10-11 RX ADMIN — CEFTRIAXONE 100 MILLIGRAM(S): 500 INJECTION, POWDER, FOR SOLUTION INTRAMUSCULAR; INTRAVENOUS at 18:45

## 2022-10-11 RX ADMIN — RIVAROXABAN 15 MILLIGRAM(S): KIT at 18:47

## 2022-10-11 RX ADMIN — CHLORHEXIDINE GLUCONATE 1 APPLICATION(S): 213 SOLUTION TOPICAL at 13:48

## 2022-10-11 RX ADMIN — SODIUM CHLORIDE 75 MILLILITER(S): 9 INJECTION, SOLUTION INTRAVENOUS at 13:47

## 2022-10-11 RX ADMIN — Medication 1 DROP(S): at 04:53

## 2022-10-11 NOTE — H&P ADULT - PROBLEM SELECTOR PLAN 5
- Per pt wife, h/o arrhythmia on Xarelto - does not know which arrhythmia  - Follows with Dr. Cristiano Botello (cardiology)  - Obtain collateral from OP cards in AM - Per pt wife, h/o arrhythmia on Xarelto - does not know which arrhythmia  - c/w Xarelto, however will decrease to 15 mg  - Follows with Dr. Cristiano Botello (cardiology)  - Obtain collateral from OP cards in AM

## 2022-10-11 NOTE — H&P ADULT - HISTORY OF PRESENT ILLNESS
67M PMH HTN, HLD, heart arrythmia, renal cancer s/p partial nephrectomy (~2017/18), DVT/PE iso renal cancer (~2017/18)on xarelto, s/p total L knee arthoplasty (9/16/22), asthma, BIBEMS from [facility] for AMS x3 days. Per wife, patient was d/c to [facility] after L knee arthroplasty, at which point patient went from being totally independent to totally dependent post-op (requiring lift in- and out-of-bed, was in diaper, etc.). States he had started to become very confused, was not feeling well, got UTI, became incoherent and arms started shaking, and appeared "as if he aged 30 years". At [facility] he is AAOx3. Patient currently being treated for UTI with unknown antibiotics. Patient reports tremors and feeling thirsty, otherwise denies F/C, N/V/D, abdominal pain, dysuria, hematuria, HA, dizziness, CP, SOB, sick contacts.    ED course: afebrile, , /86 initially, however later hypotensive to 70s/50s, initially tachypneic to 25 on NRB now on RA. WBC 19, Na 139, Cr 6.5, trops 242 --> 210, BNP 3661. UA negative. COVID+. CTH w/o hemorrhage or midline shift. CT chest w/o PNA.

## 2022-10-11 NOTE — H&P ADULT - PROBLEM SELECTOR PLAN 3
- Cr 6.50 on admission, unknown baseline improved to 5.25  - 3+ pitting BLE, per wife has been severe for ~4 years  - Follows Dr. Barrientos (Nephrology)  - On HCTZ at home, holding iso HoTN  - Owusu in place  - Nephro following, recs appreciated  >>suspected multifactorial SONAL however likely predominately pre renal. Patient without evidence fo hydronephrosis on CT abdomen and pelvis  - f/u repeat UA, however unlikely GN picture contributing to SONAL  - f/u urine lytes (sodium, Chloride, Urea, Creatinine)   >>UCr 112, Marta 34, Uosm 478  - s/p 3.5L NS  - per nephro recs, switched to Bicarbonate (3 amps) in D5W at 100 cc/hr   - f/u renal ultrasound  - Will need to consider HD if renal failure continues to worsen. Monitor labs and urine output.   - Avoid NSAIDs, ACEI/ARBS, RCA and nephrotoxins. Dose medications as per eGFR.  [] Please continue to monitor I/Os (urine output). - Cr 6.50 on admission, unknown baseline improved to 5.25  - 3+ pitting BLE, per wife has been severe for ~4 years  - Follows Dr. Barrientos (Nephrology)  - On HCTZ at home, holding iso HoTN  - Owusu in place  - Monitor UOP  - Nephro following, recs appreciated  >>suspected multifactorial SONAL however likely predominately pre renal. Patient without evidence fo hydronephrosis on CT abdomen and pelvis  - f/u repeat UA, however unlikely GN picture contributing to SONAL  - f/u urine lytes (sodium, Chloride, Urea, Creatinine)   >>UCr 112, Marta 34, Uosm 478  - s/p 3.5L NS  - per nephro recs, switched to Bicarbonate (3 amps) in D5W at 100 cc/hr   - f/u renal ultrasound  - Will need to consider HD if renal failure continues to worsen. Monitor labs and urine output.   - Avoid NSAIDs, ACEI/ARBS, RCA and nephrotoxins. Dose medications as per eGFR. - Cr 6.50 on admission, unknown baseline improved to 5.25  - 3+ pitting BLE, per wife has been severe for ~4 years  - Follows Dr. Barrientos (Nephrology)  - On HCTZ at home, holding iso HoTN  - Owusu in place  - Monitor UOP  - Nephro following, recs appreciated  >>suspected multifactorial SONAL however likely predominately pre renal. Patient without evidence fo hydronephrosis on CT abdomen and pelvis  - f/u repeat UA, however unlikely GN picture contributing to SONAL  - f/u urine lytes (sodium, Chloride, Urea, Creatinine)   >>UCr 112, Marta 34, Uosm 478  - s/p 3.5L NS  - per nephro recs, switched to Bicarbonate (3 amps) in D5W at 100 cc/hr   - f/u renal ultrasound  - Will need to consider HD if renal failure continues to worsen. Monitor labs and urine output.   - Avoid NSAIDs, ACEI/ARBS, RCA and nephrotoxins. Dose medications as per eGFR  - hold on diuresis while BPs soft  - Obtain collateral from OP nephro in AM

## 2022-10-11 NOTE — PROGRESS NOTE ADULT - PROBLEM SELECTOR PLAN 3
- Cr 6.50 on admission, unknown baseline improved to 5.25  - 3+ pitting BLE, per wife has been severe for ~4 years  - Follows Dr. Barrientos (Nephrology)  - On HCTZ at home, holding iso HoTN  - Owusu in place  - Monitor UOP  - Nephro following, recs appreciated  >>suspected multifactorial SONAL however likely predominately pre renal. Patient without evidence fo hydronephrosis on CT abdomen and pelvis  - f/u repeat UA, however unlikely GN picture contributing to SONAL  - f/u urine lytes (sodium, Chloride, Urea, Creatinine)   >>UCr 112, Marta 34, Uosm 478  - s/p 3.5L NS  - per nephro recs, switched to Bicarbonate (3 amps) in D5W at 100 cc/hr   - f/u renal ultrasound  - Will need to consider HD if renal failure continues to worsen. Monitor labs and urine output.   - Avoid NSAIDs, ACEI/ARBS, RCA and nephrotoxins. Dose medications as per eGFR  - hold on diuresis while BPs soft  - Obtain collateral from OP nephro in AM

## 2022-10-11 NOTE — PROGRESS NOTE ADULT - PROBLEM SELECTOR PLAN 5
- Per pt wife, h/o arrhythmia on Xarelto - does not know which arrhythmia  - c/w Xarelto, however will decrease to 15 mg for renal dosing (~CrCl 18)  - Follows with Dr. Cristiano Botello (cardiology)  - Obtain collateral from OP cards in AM

## 2022-10-11 NOTE — PROGRESS NOTE ADULT - PROBLEM SELECTOR PLAN 1
DDx includes toxic metabolic including uremic vs septic vs less likely structural  - 3 days AMS since hospital d/c to rehab following total L knee arthroplasty  - per pt wife, pt developed UTI at rehab, was started on abx (unknown which)  - AAOx3 at [facility]  - [facility] unremarkable  - Will treat sepsis as below  - Consider HD if worsening clinical status per nephro, which would improve uremia

## 2022-10-11 NOTE — PATIENT PROFILE ADULT - DO YOU LACK THE NECESSARY SUPPORT TO HELP YOU COPE WITH LIFE CHALLENGES?
"QUICK REFERENCE INFORMATION:  The ABCs of the Annual Wellness Visit    Welcome to Medicare Visit    HEALTH RISK ASSESSMENT    1957    Recent Hospitalizations:  No hospitalization(s) within the last year..      Current Medical Providers:  Patient Care Team:  Mckayla Hinton APRN as PCP - General (Nurse Practitioner)      Smoking Status:  Social History     Tobacco Use   Smoking Status Former Smoker   • Types: Cigarettes   • Start date:    • Last attempt to quit:    • Years since quittin.1   Smokeless Tobacco Never Used   Tobacco Comment    \"1 pack could last 2 weeks\"       Alcohol Consumption:  Social History     Substance and Sexual Activity   Alcohol Use No       Depression Screen:   PHQ-2/PHQ-9 Depression Screening 2019   Little interest or pleasure in doing things 1   Feeling down, depressed, or hopeless 0   Trouble falling or staying asleep, or sleeping too much 1   Feeling tired or having little energy 1   Poor appetite or overeating 1   Feeling bad about yourself - or that you are a failure or have let yourself or your family down 0   Trouble concentrating on things, such as reading the newspaper or watching television 2   Moving or speaking so slowly that other people could have noticed. Or the opposite - being so fidgety or restless that you have been moving around a lot more than usual 0   Thoughts that you would be better off dead, or of hurting yourself in some way 0   Total Score 6   If you checked off any problems, how difficult have these problems made it for you to do your work, take care of things at home, or get along with other people? Not difficult at all       Health Habits and Functional and Cognitive Screening:  Functional & Cognitive Status 2019   Do you have difficulty preparing food and eating? No   Do you have difficulty bathing yourself, getting dressed or grooming yourself? No   Do you have difficulty using the toilet? No   Do you have difficulty moving around " from place to place? Yes   Do you have trouble with steps or getting out of a bed or a chair? Yes   In the past year have you fallen or experienced a near fall? No   Current Diet Unhealthy Diet   Dental Exam Up to date   Eye Exam Up to date   Exercise (times per week) 3 times per week   Do you need help using the phone?  No   Are you deaf or do you have serious difficulty hearing?  No   Do you need help with transportation? No   Do you need help shopping? No   Do you need help preparing meals?  No   Do you need help with housework?  Yes   Do you need help with laundry? No   Do you need help taking your medications? No   Do you need help managing money? No   Do you ever drive or ride in a car without wearing a seat belt? No   Have you felt unusual stress, anger or loneliness in the last month? No   Who do you live with? Other   If you need help, do you have trouble finding someone available to you? No   Have you been bothered in the last four weeks by sexual problems? No   Do you have difficulty concentrating, remembering or making decisions? No           Does the patient have evidence of cognitive impairment? No    Aspirin use counseling? Taking ASA appropriately as indicated      Recent Lab Results:  CMP:  Lab Results   Component Value Date    BUN 13 09/21/2018    CREATININE 1.00 09/21/2018    EGFRIFAFRI 69 09/21/2018    BCR 13.0 09/21/2018     09/21/2018    K 4.3 09/21/2018    CO2 27.0 09/21/2018    CALCIUM 9.3 09/21/2018    ALBUMIN 4.38 09/21/2018    BILITOT 0.4 09/21/2018    ALKPHOS 83 09/21/2018    AST 30 09/21/2018    ALT 33 09/21/2018     Lipid Panel:  Lab Results   Component Value Date    CHOL 139 09/21/2018    TRIG 67 09/21/2018    HDL 61 (H) 09/21/2018     HbA1c:  Lab Results   Component Value Date    HGBA1C 7.00 (H) 09/21/2018       Visual Acuity:  No exam data present    Age-appropriate Screening Schedule:  Refer to the list below for future screening recommendations based on patient's age, sex  and/or medical conditions. Orders for these recommended tests are listed in the plan section. The patient has been provided with a written plan.    Health Maintenance   Topic Date Due   • URINE MICROALBUMIN  1957   • PNEUMOCOCCAL VACCINE (19-64 MEDIUM RISK) (1 of 1 - PPSV23) 09/23/1978   • ZOSTER VACCINE (1 of 2) 09/23/2009   • MAMMOGRAM  06/12/2017   • HEMOGLOBIN A1C  03/21/2019   • DIABETIC EYE EXAM  10/29/2019   • DIABETIC FOOT EXAM  02/05/2020   • PAP SMEAR  02/12/2022   • COLONOSCOPY  10/22/2028   • INFLUENZA VACCINE  Addressed   • TDAP/TD VACCINES  Discontinued        Subjective   History of Present Illness    Jayna Ambrocio is a 61 y.o. female an established patient presenting for a Welcome to Medicare Visit.     The following portions of the patient's history were reviewed and updated as appropriate: allergies, current medications, past family history, past medical history, past social history, past surgical history and problem list.    Outpatient Medications Prior to Visit   Medication Sig Dispense Refill   • amiodarone (PACERONE) 200 MG tablet Take 1 tablet by mouth Daily. 90 tablet 2   • aspirin 81 MG tablet Take 1 tablet by mouth Daily. 90 tablet 2   • atorvastatin (LIPITOR) 80 MG tablet Take 1 tablet by mouth Daily. 30 tablet 2   • B-D ULTRAFINE III SHORT PEN 31G X 8 MM misc Inject 1 unit marking on U-100 syringe as directed 2 (Two) Times a Day. 90 each 2   • clopidogrel (PLAVIX) 75 MG tablet Take 1 tablet by mouth Daily. 200001 90 tablet 2   • FREESTYLE LITE test strip Use as instructed 90 each 5   • furosemide (LASIX) 40 MG tablet Take 1 tablet by mouth Daily. 90 tablet 2   • Insulin Lispro Prot & Lispro (HUMALOG MIX 75/25 KWIKPEN) (75-25) 100 UNIT/ML suspension pen-injector Inject 20 Units under the skin into the appropriate area as directed 2 (Two) Times a Day. 5 pen 3   • isosorbide mononitrate (IMDUR) 60 MG 24 hr tablet Take 1 tablet by mouth Every Morning. 90 tablet 2   • methocarbamol  (ROBAXIN) 500 MG tablet TAKE 1 TABLET BY MOUTH THREE TIMES A DAY AS NEEDED FOR MUSCLE SPASM 90 tablet 0   • metoprolol tartrate (LOPRESSOR) 25 MG tablet Take 1 tablet by mouth Daily. Take 2 bid 60 tablet 0   • metoprolol tartrate (LOPRESSOR) 25 MG tablet TAKE 1 TABLET BY MOUTH TWO TIMES A DAY  60 tablet 0   • NOVOLOG MIX 70/30 FLEXPEN (70-30) 100 UNIT/ML suspension pen-injector injection 20 units subcutaneously before breakfast and dinner. Titrate as intructed, max of 50 units a day 3 mL 5   • pantoprazole (PROTONIX) 40 MG EC tablet TAKE 1 TABLET BY MOUTH ONE TIME A DAY  90 tablet 0   • sodium-potassium-magnesium sulfates (SUPREP BOWEL PREP KIT) 17.5-3.13-1.6 GM/180ML solution oral solution Dispense 1 kit. Follow instructions that were mailed to your home. If you didn't receive these call [phone]. 4 bottle 0   • traMADol (ULTRAM) 50 MG tablet Take 50 mg by mouth Every 4 (Four) Hours As Needed for Moderate Pain .     • traZODone (DESYREL) 50 MG tablet Take 1 tablet by mouth Every Night. 90 tablet 2     No facility-administered medications prior to visit.        Patient Active Problem List   Diagnosis   • Type 2 diabetes mellitus (CMS/[facility])   • Coronary artery disease   • Essential hypertension   • Acute renal insufficiency   • Postoperative anemia due to acute blood loss   • Insomnia   • Gastroesophageal reflux disease   • Moderate nonproliferative diabetic retinopathy without macular edema associated with type 2 diabetes mellitus (CMS/HCC)       Advance Care Planning:  has NO advance directive - information provided to the patient today    Identification of Risk Factors:  Risk factors include: weight  and unhealthy diet.    Review of Systems   Constitutional: Negative.    HENT: Negative.    Eyes: Negative.    Respiratory: Negative.    Cardiovascular: Negative.    Gastrointestinal: Negative.    Endocrine: Negative.    Genitourinary: Negative.    Musculoskeletal: Negative.    Skin: Negative.    Neurological:  Negative.    Psychiatric/Behavioral: Positive for sleep disturbance. Negative for confusion and decreased concentration. The patient is not nervous/anxious.        Compared to one year ago, the patient feels her physical health is the same.  Compared to one year ago, the patient feels her mental health is the same.    Objective    Physical Exam   Constitutional: She is oriented to person, place, and time. She appears well-developed and well-nourished. She is cooperative. No distress.   HENT:   Head: Normocephalic.   Right Ear: Tympanic membrane and external ear normal.   Left Ear: Tympanic membrane and external ear normal.   Nose: Nose normal. Right sinus exhibits no maxillary sinus tenderness and no frontal sinus tenderness. Left sinus exhibits no maxillary sinus tenderness and no frontal sinus tenderness.   Mouth/Throat: Oropharynx is clear and moist and mucous membranes are normal. No oropharyngeal exudate.   Eyes: Conjunctivae and EOM are normal. Pupils are equal, round, and reactive to light. No scleral icterus.   Neck: Normal range of motion. Neck supple. Carotid bruit is not present. No neck rigidity. No tracheal deviation present. No thyroid mass and no thyromegaly present.   Cardiovascular: Normal rate, regular rhythm, normal heart sounds and intact distal pulses. Exam reveals no gallop and no friction rub.   No murmur heard.  Pulmonary/Chest: Effort normal and breath sounds normal. No respiratory distress. She has no wheezes. She has no rales.   Abdominal: Soft. Normal appearance and bowel sounds are normal. She exhibits no distension and no mass. There is no hepatosplenomegaly. There is no tenderness. There is no rebound and no guarding. No hernia. Hernia confirmed negative in the right inguinal area and confirmed negative in the left inguinal area.   Genitourinary: No labial fusion. There is no rash, tenderness, lesion or injury on the right labia. There is no rash, tenderness, lesion or injury on the  "left labia. Cervix exhibits discharge. Cervix exhibits no motion tenderness and no friability. There is tenderness in the vagina. No erythema or bleeding in the vagina. No foreign body in the vagina.   Musculoskeletal: Normal range of motion. She exhibits no edema, tenderness or deformity.   ROM normal with all major joints   Lymphadenopathy:        Head (right side): No submental, no submandibular, no tonsillar, no preauricular, no posterior auricular and no occipital adenopathy present.        Head (left side): No submental, no submandibular, no tonsillar, no preauricular, no posterior auricular and no occipital adenopathy present.     She has no cervical adenopathy.        Right cervical: No superficial cervical, no deep cervical and no posterior cervical adenopathy present.       Left cervical: No superficial cervical, no deep cervical and no posterior cervical adenopathy present. No inguinal adenopathy noted on the right or left side.   Neurological: She is alert and oriented to person, place, and time. She displays no atrophy and no tremor. No cranial nerve deficit or sensory deficit. She exhibits normal muscle tone. Coordination and gait normal. GCS eye subscore is 4. GCS verbal subscore is 5. GCS motor subscore is 6.   Skin: Skin is warm and dry. Capillary refill takes 2 to 3 seconds. No rash noted. She is not diaphoretic. No cyanosis. Nails show no clubbing.   Psychiatric: She has a normal mood and affect. Her speech is normal and behavior is normal. Judgment and thought content normal. Cognition and memory are normal.   Nursing note and vitals reviewed.      Vitals:    02/12/19 0950   BP: 138/92   Pulse: 79   Resp: 18   SpO2: 100%   Weight: 97.5 kg (215 lb)   Height: 154.9 cm (60.98\")       Patient's Body mass index is 40.65 kg/m². BMI is above normal parameters. Recommendations include: educational material and nutrition counseling.      Procedure   Procedures       Assessment/Plan   Patient " Self-Management and Personalized Health Advice  The patient has been provided with information about: diet, exercise and weight management and preventive services including:   · Advance directive, Exercise counseling provided, Fall Risk assessment done, Nutrition counseling provided, Screening Pap smear and pelvic exam .    Visit Diagnoses:    ICD-10-CM ICD-9-CM   1. Medicare annual wellness visit, initial Z00.00 V70.0   2. Annual physical exam Z00.00 V70.0   3. Obesity, Class III, BMI 40-49.9 (morbid obesity) (CMS/[facility]) E66.01 278.01   4. Type 2 diabetes mellitus with other specified complication, with long-term current use of insulin (CMS/[facility]) E11.69 250.80    Z79.4 V58.67   5. Insomnia, unspecified type G47.00 780.52   6. Need for shingles vaccine Z23 V04.89   7. Encounter for Papanicolaou smear for cervical cancer screening Z12.4 V76.2   8. Need for pneumococcal vaccination Z23 V03.82     Mammogram scheduled for the 19th of this month.  UTD on colonoscopy and DM eye exam, foot exam. Pap today.    Reports trazodone does nothing for insomnia- even when she takes two.  Still not sleeping.    Counseled regarding: age-appropriate screening labs and tests, wearing seatbelt and sunscreen regularly  Discussed: regular exercise and diet changes to promote weight loss, seeing a dermatologist for annual skin exams      Orders Placed This Encounter   Procedures   • Pneumococcal Polysaccharide Vaccine 23-Valent (PPSV23) Greater Than or Equal To 1yo Subcutaneous / IM   • Microalbumin / Creatinine Urine Ratio - Urine, Clean Catch   • Comprehensive Metabolic Panel   • Hemoglobin A1c   • TSH   • Lipid Panel   • CBC Auto Differential   • Ambulatory Referral to Weight Management Program     Referral Priority:   Routine     Referral Type:   Health Education     Number of Visits Requested:   1   • CBC & Differential     Order Specific Question:   Manual Differential     Answer:   No       Outpatient Encounter Medications as of  2/12/2019   Medication Sig Dispense Refill   • amiodarone (PACERONE) 200 MG tablet Take 1 tablet by mouth Daily. 90 tablet 2   • aspirin 81 MG tablet Take 1 tablet by mouth Daily. 90 tablet 2   • atorvastatin (LIPITOR) 80 MG tablet Take 1 tablet by mouth Daily. 30 tablet 2   • B-D ULTRAFINE III SHORT PEN 31G X 8 MM misc Inject 1 unit marking on U-100 syringe as directed 2 (Two) Times a Day. 90 each 2   • clopidogrel (PLAVIX) 75 MG tablet Take 1 tablet by mouth Daily. 200001 90 tablet 2   • FREESTYLE LITE test strip Use as instructed 90 each 5   • furosemide (LASIX) 40 MG tablet Take 1 tablet by mouth Daily. 90 tablet 2   • hydrOXYzine (ATARAX) 25 MG tablet Take 1-2 tabs at night as needed for sleep 60 tablet 2   • Insulin Lispro Prot & Lispro (HUMALOG MIX 75/25 KWIKPEN) (75-25) 100 UNIT/ML suspension pen-injector Inject 20 Units under the skin into the appropriate area as directed 2 (Two) Times a Day. 5 pen 3   • isosorbide mononitrate (IMDUR) 60 MG 24 hr tablet Take 1 tablet by mouth Every Morning. 90 tablet 2   • methocarbamol (ROBAXIN) 500 MG tablet TAKE 1 TABLET BY MOUTH THREE TIMES A DAY AS NEEDED FOR MUSCLE SPASM 90 tablet 0   • metoprolol tartrate (LOPRESSOR) 25 MG tablet Take 1 tablet by mouth Daily. Take 2 bid 60 tablet 0   • metoprolol tartrate (LOPRESSOR) 25 MG tablet TAKE 1 TABLET BY MOUTH TWO TIMES A DAY  60 tablet 0   • NOVOLOG MIX 70/30 FLEXPEN (70-30) 100 UNIT/ML suspension pen-injector injection 20 units subcutaneously before breakfast and dinner. Titrate as intructed, max of 50 units a day 3 mL 5   • pantoprazole (PROTONIX) 40 MG EC tablet TAKE 1 TABLET BY MOUTH ONE TIME A DAY  90 tablet 0   • sodium-potassium-magnesium sulfates (SUPREP BOWEL PREP KIT) 17.5-3.13-1.6 GM/180ML solution oral solution Dispense 1 kit. Follow instructions that were mailed to your home. If you didn't receive these call [phone]. 2 bottle 0   • traMADol (ULTRAM) 50 MG tablet Take 50 mg by mouth Every 4 (Four) Hours  As Needed for Moderate Pain .     • Zoster Vac Recomb Adjuvanted (SHINGRIX) 50 MCG/0.5ML reconstituted suspension Inject 50 mcg into the appropriate muscle as directed by prescriber 1 (One) Time for 1 dose. 1 each 1   • [DISCONTINUED] traZODone (DESYREL) 50 MG tablet Take 1 tablet by mouth Every Night. 90 tablet 2     No facility-administered encounter medications on file as of 2/12/2019.        Reviewed use of high risk medication in the elderly: yes  Reviewed for potential of harmful drug interactions in the elderly: yes    Follow Up:  Return in about 3 months (around 5/12/2019) for Recheck.     An After Visit Summary and PPPS with all of these plans were given to the patient.          no

## 2022-10-11 NOTE — H&P ADULT - ATTENDING COMMENTS
67M PMH HTN, HLD, renal cancer s/p partial nephrectomy (~2017/18), DVT/PE iso renal cancer (~2017/18)on xarelto, s/p total L knee arthoplasty (9/16/22), asthma presenting with AMS. Pt currently being treated for UTI. Labs sig for BUN/Cr >20 with very elevated BUN noted. Also COViD+. Suspect Metabolic in s/o uremia vs infectious etiology in s/o COVID-19    -would start remdesivir as pt at high risk for severe COVID  -c/w ceftriaxone given treatment for UTI pending blood and urine cultures  -Renal following, recs appreciated  -urine lytes ordered, Renal US pending, if worsening Cr, renal to consider HD  -c/w D5w+bicarb at 100cc/hr per renal recs for metabolic acidosis  -can resume xarelto at original dose    Rest of plan per resident noted

## 2022-10-11 NOTE — H&P ADULT - NSHPLABSRESULTS_GEN_ALL_CORE
LABS:                12.0   18.99 )-----------( 276      ( 10 Oct 2022 18:08 )             38.6     10-10    140  |  107  |  114<H>  ----------------------------<  93  4.1   |  12<L>  |  5.25<H>    Ca    7.5<L>      10 Oct 2022 20:13    TPro  6.3  /  Alb  3.4  /  TBili  1.2  /  DBili  x   /  AST  93<H>  /  ALT  63<H>  /  AlkPhos  85  1010          Urinalysis Basic - ( 10 Oct 2022 18:16 )    Color: Yellow / Appearance: Clear / S.017 / pH: x  Gluc: x / Ketone: Negative  / Bili: Negative / Urobili: Negative   Blood: x / Protein: Trace / Nitrite: Negative   Leuk Esterase: Negative / RBC: 1 /hpf / WBC 3 /HPF   Sq Epi: x / Non Sq Epi: 0 /hpf / Bacteria: Negative    Creatinine, Random Urine: 112mg/dL (10.10.22 @ 20:44)  Sodium, Random Urine: 34mmol/L (10.10.22 @ 20:44)  Osmolality, Random Urine: 478 mos/kg (10.10.22 @ 20:44)     LIVER FUNCTIONS - ( 10 Oct 2022 18:08 )  Alb: 3.4 g/dL / Pro: 6.3 g/dL / ALK PHOS: 85 U/L / ALT: 63 U/L / AST: 93 U/L / GGT: x           Blood Gas Profile w/Lytes - Venous: Performed In Lab (10-10-22 @ 19:55)  Blood Gas Profile w/Lytes - Venous: Performed In Lab (10-10-22 @ 17:25)    Blood Gas Profile w/Lytes - Venous: Performed In Lab (10-10-22 @ 19:55)  Blood Gas Profile w/Lytes - Venous: Performed In Lab (10-10-22 @ 17:25)    I&O's Summary    10 Oct 2022 07:01  -  11 Oct 2022 01:28  --------------------------------------------------------  IN: 0 mL / OUT: 2100 mL / NET: -2100 mL     / Troponin T, High Sensitivity Result: 210 ng/L (10-10-22 @ 20:13)  Troponin T, High Sensitivity Result: 242 ng/L (10-10-22 @ 18:08)      CAPILLARY BLOOD GLUCOSE  POCT Blood Glucose.: 263 mg/dL (10 Oct 2022 17:03)    < from: CT Head No Cont (10.10.22 @ 19:37) >    IMPRESSION:    HEAD CT: Mild volume loss, microvascular disease, no acute hemorrhage or   midline shift.  Rounded periapical cystic focus adjacent to the right maxillary molar or   impacted wisdom tooth.    < end of copied text >      < from: CT Chest No Cont (10.10.22 @ 19:37) >    IMPRESSION:  No pneumonia  No evidence of acute intra-abdominal pathology.    < end of copied text >

## 2022-10-11 NOTE — CONSULT NOTE ADULT - SUBJECTIVE AND OBJECTIVE BOX
NEPHROLOGY CONSULTATION    CHIEF COMPLAINT: AMS    HPI:  Pt is 68 yo M w/PMH HTN, HLD, heart arrythmia, renal cancer s/p partial nephrectomy, DVT/PE on xarelto, s/p total L knee arthoplasty (22), asthma, [facility] 3 days. Patient was being treated for UTI with unknown antibiotics. No F/C, N/V/D, abdominal pain, dysuria, hematuria, HA, dizziness, CP, SOB, sick contacts. Noted to have SONAL/CKD. Was on Losartan and HCTZ prior to adm.    ROS:  as above    Allergies  NKDA    PAST MEDICAL & SURGICAL HISTORY:  HTN (hypertension)  HLD (hyperlipidemia)  Pulmonary embolism  Arrhythmia  Deep vein thrombosis (DVT)  Asthma  Renal cancer  s/p partial nephrectomy  History of left knee replacement  CKD    SOCIAL HISTORY:  negative    FAMILY HISTORY:  NC    MEDICATIONS  (STANDING):  cefTRIAXone   IVPB 1000 milliGRAM(s) IV Intermittent every 24 hours  chlorhexidine 2% Cloths 1 Application(s) Topical daily  dextrose 5% 1000 milliLiter(s) (75 mL/Hr) IV Continuous <Continuous>  influenza  Vaccine (HIGH DOSE) 0.7 milliLiter(s) IntraMuscular once  latanoprost 0.005% Ophthalmic Solution 1 Drop(s) Both EYES at bedtime  rivaroxaban 15 milliGRAM(s) Oral with dinner  timolol 0.5% Solution 1 Drop(s) Both EYES two times a day    Home Medications:  amLODIPine 10 mg oral tablet: 1 tab(s) orally once a day (11 Oct 2022 01:09)  atenolol 25 mg oral tablet: 1 tab(s) orally once a day (11 Oct 2022 01:)  Combivent Respimat 20 mcg-100 mcg/inh inhalation aerosol: 1 puff(s) inhaled 4 times a day, As Needed (11 Oct 2022 01:)  famotidine 20 mg oral tablet: 1 tab(s) orally once a day (11 Oct 2022 01:)  hydroCHLOROthiazide 25 mg oral tablet: 1 tab(s) orally once a day (11 Oct 2022 01:)  latanoprost 0.005% ophthalmic solution: 1 drop(s) to each affected eye once a day (in the evening) (11 Oct 2022 01:)  losartan 100 mg oral tablet: 1 tab(s) orally once a day (11 Oct 2022 01:09)  timolol hemihydrate 0.5% ophthalmic solution: 1 drop(s) to each affected eye 2 times a day to both eyes (11 Oct 2022 01:09)  Xarelto 20 mg oral tablet: 1 tab(s) orally once a day (in the evening) (11 Oct 2022 01:09)    Vital Signs Last 24 Hrs  T(C): 36.7 (10-11-22 @ 20:19), Max: 36.9 (10-11-22 @ 04:00)  T(F): 98 (10-11-22 @ 20:19), Max: 98.4 (10-11-22 @ 04:00)  HR: 86 (10-11-22 @ 20:19) (82 - 86)  BP: 100/61 (10-11-22 @ 20:19) (95/60 - 108/61)  RR: 20 (10-11-22 @ 20:19) (18 - 20)  SpO2: 97% (10-11-22 @ 20:19) (96% - 98%)    I&O's Detail    10 Oct 2022 07:01  -  11 Oct 2022 07:00  --------------------------------------------------------  OUT:    Voided (mL): 2100 mL  Total OUT: 2100 mL    s1s2  b/l air entry  soft, ND  tr edema    LABS:                        12.0   18.99 )-----------( 276      ( 10 Oct 2022 18:08 )             38.6     10-11    139  |  103  |  131<H>  ----------------------------<  116<H>  4.4   |  16<L>  |  5.71<H>    Ca    8.0<L>      11 Oct 2022 14:47    TPro  5.2<L>  /  Alb  2.6<L>  /  TBili  1.1  /  DBili  0.3  /  AST  76<H>  /  ALT  52<H>  /  AlkPhos  86  1011    Urinalysis Basic - ( 10 Oct 2022 18:16 )    Color: Yellow / Appearance: Clear / S.017 / pH: x  Gluc: x / Ketone: Negative  / Bili: Negative / Urobili: Negative   Blood: x / Protein: Trace / Nitrite: Negative   Leuk Esterase: Negative / RBC: 1 /hpf / WBC 3 /HPF   Sq Epi: x / Non Sq Epi: 0 /hpf / Bacteria: Negative    LIVER FUNCTIONS - ( 11 Oct 2022 07:29 )  Alb: 2.6 g/dL / Pro: 5.2 g/dL / ALK PHOS: 86 U/L / ALT: 52 U/L / AST: 76 U/L / GGT: x           Culture - Urine (collected 10 Oct 2022 18:16)  Source: Clean Catch Clean Catch (Midstream)  Final Report (11 Oct 2022 22:06):    No growth    Urinalysis Basic - ( 10 Oct 2022 18:16 )    Color: Yellow / Appearance: Clear / S.017 / pH: x  Gluc: x / Ketone: Negative  / Bili: Negative / Urobili: Negative   Blood: x / Protein: Trace / Nitrite: Negative   Leuk Esterase: Negative / RBC: 1 /hpf / WBC 3 /HPF   Sq Epi: x / Non Sq Epi: 0 /hpf / Bacteria: Negative    A/P:    S/p L TKA 22  Severe ATN on adm 10/10/22 (Cr 1.07 - 22)  Etiology not obvious (hypotensive/hemodynamic, was on ARB, HCTZ, may have gotten NSAID's)  Metab acidosis in setting of SONAL  Appears to be stabilizing (peak Cr 6.5 - 10/10/22)  IVF w/bicarb  Avoid nephrotoxins  Avoid hypotension  F/u BMP, UO  Will follow    [phone]

## 2022-10-11 NOTE — PROGRESS NOTE ADULT - SUBJECTIVE AND OBJECTIVE BOX
PROGRESS NOTE:     Patient is a 67y old  Male who presents with a chief complaint of AMS (11 Oct 2022 00:53)      SUBJECTIVE / OVERNIGHT EVENTS:  Admitted overnight. Seen and examined at bedside. Remains acidotic, bicarb 10.    ADDITIONAL REVIEW OF SYSTEMS:    MEDICATIONS  (STANDING):  cefTRIAXone   IVPB 1000 milliGRAM(s) IV Intermittent every 24 hours  chlorhexidine 2% Cloths 1 Application(s) Topical daily  dextrose 5% 1000 milliLiter(s) (75 mL/Hr) IV Continuous <Continuous>  influenza  Vaccine (HIGH DOSE) 0.7 milliLiter(s) IntraMuscular once  latanoprost 0.005% Ophthalmic Solution 1 Drop(s) Both EYES at bedtime  rivaroxaban 15 milliGRAM(s) Oral with dinner  timolol 0.5% Solution 1 Drop(s) Both EYES two times a day    MEDICATIONS  (PRN):  acetaminophen     Tablet .. 650 milliGRAM(s) Oral every 6 hours PRN Temp greater or equal to 38C (100.4F), Mild Pain (1 - 3)  ALBUTerol    90 MICROgram(s) HFA Inhaler 2 Puff(s) Inhalation every 6 hours PRN Shortness of Breath and/or Wheezing  melatonin 3 milliGRAM(s) Oral at bedtime PRN Insomnia  tiotropium 18 MICROgram(s) Capsule 1 Capsule(s) Inhalation daily PRN SOB/wheezing      CAPILLARY BLOOD GLUCOSE      POCT Blood Glucose.: 263 mg/dL (10 Oct 2022 17:03)    I&O's Summary    10 Oct 2022 07:01  -  11 Oct 2022 07:00  --------------------------------------------------------  IN: 0 mL / OUT: 2100 mL / NET: -2100 mL        PHYSICAL EXAM:  Vital Signs Last 24 Hrs  T(C): 36.9 (11 Oct 2022 12:36), Max: 36.9 (11 Oct 2022 04:00)  T(F): 98.4 (11 Oct 2022 12:36), Max: 98.4 (11 Oct 2022 04:00)  HR: 84 (11 Oct 2022 12:36) (82 - 104)  BP: 99/65 (11 Oct 2022 12:36) (75/55 - 120/86)  BP(mean): 65 (10 Oct 2022 20:15) (63 - 68)  RR: 18 (11 Oct 2022 12:36) (18 - 25)  SpO2: 96% (11 Oct 2022 12:36) (96% - 100%)    Parameters below as of 11 Oct 2022 04:00  Patient On (Oxygen Delivery Method): room air        GENERAL: NAD, lying in bed, tremoring.  HEAD:  Atraumatic, normocephalic.  EYES: Unable to examine iso patient participation.  ENT: Dry mucous membranes.   NECK: Supple, no JVD, trachea midline.  CHEST/LUNG: CTAB. No rales, rhonchi, wheezing, or rubs. Unlabored respirations.  HEART: RRR, no M/R/G, B/L LE swelling L>R, recent knee arthroplasty seen on L. leg  ABDOMEN: Soft, obese, nontender, nondistended. Normoactive bowel sounds.  EXTREMITIES:  2+ peripheral pulses b/l. 3+ BLE edema.  Neurological:  AAOx2, b/l tremors.  SKIN: Rosacea on nose  PSYCH: Normal affect and mood.      LABS:                        12.0   18.99 )-----------( 276      ( 10 Oct 2022 18:08 )             38.6     10-11    139  |  103  |  126<H>  ----------------------------<  71  4.7   |  10<LL>  |  6.26<H>    Ca    8.0<L>      11 Oct 2022 07:29    TPro  5.2<L>  /  Alb  2.6<L>  /  TBili  1.1  /  DBili  0.3  /  AST  76<H>  /  ALT  52<H>  /  AlkPhos  86  10-11    PT/INR - ( 11 Oct 2022 07:48 )   PT: 13.3 sec;   INR: 1.14 ratio               Urinalysis Basic - ( 10 Oct 2022 18:16 )    Color: Yellow / Appearance: Clear / S.017 / pH: x  Gluc: x / Ketone: Negative  / Bili: Negative / Urobili: Negative   Blood: x / Protein: Trace / Nitrite: Negative   Leuk Esterase: Negative / RBC: 1 /hpf / WBC 3 /HPF   Sq Epi: x / Non Sq Epi: 0 /hpf / Bacteria: Negative          RADIOLOGY & ADDITIONAL TESTS:  Results Reviewed:   Imaging Personally Reviewed:  Electrocardiogram Personally Reviewed:    COORDINATION OF CARE:  Care Discussed with Consultants/Other Providers [Y/N]:  Prior or Outpatient Records Reviewed [Y/N]:   PROGRESS NOTE:     Patient is a 67y old  Male who presents with a chief complaint of AMS (11 Oct 2022 00:53)      SUBJECTIVE / OVERNIGHT EVENTS:  Admitted overnight. Seen and examined at bedside. Remains acidotic, bicarb 10.    ADDITIONAL REVIEW OF SYSTEMS:    MEDICATIONS  (STANDING):  cefTRIAXone   IVPB 1000 milliGRAM(s) IV Intermittent every 24 hours  chlorhexidine 2% Cloths 1 Application(s) Topical daily  dextrose 5% 1000 milliLiter(s) (75 mL/Hr) IV Continuous <Continuous>  influenza  Vaccine (HIGH DOSE) 0.7 milliLiter(s) IntraMuscular once  latanoprost 0.005% Ophthalmic Solution 1 Drop(s) Both EYES at bedtime  rivaroxaban 15 milliGRAM(s) Oral with dinner  timolol 0.5% Solution 1 Drop(s) Both EYES two times a day    MEDICATIONS  (PRN):  acetaminophen     Tablet .. 650 milliGRAM(s) Oral every 6 hours PRN Temp greater or equal to 38C (100.4F), Mild Pain (1 - 3)  ALBUTerol    90 MICROgram(s) HFA Inhaler 2 Puff(s) Inhalation every 6 hours PRN Shortness of Breath and/or Wheezing  melatonin 3 milliGRAM(s) Oral at bedtime PRN Insomnia  tiotropium 18 MICROgram(s) Capsule 1 Capsule(s) Inhalation daily PRN SOB/wheezing      CAPILLARY BLOOD GLUCOSE      POCT Blood Glucose.: 263 mg/dL (10 Oct 2022 17:03)    I&O's Summary    10 Oct 2022 07:01  -  11 Oct 2022 07:00  --------------------------------------------------------  IN: 0 mL / OUT: 2100 mL / NET: -2100 mL        PHYSICAL EXAM:  Vital Signs Last 24 Hrs  T(C): 36.9 (11 Oct 2022 12:36), Max: 36.9 (11 Oct 2022 04:00)  T(F): 98.4 (11 Oct 2022 12:36), Max: 98.4 (11 Oct 2022 04:00)  HR: 84 (11 Oct 2022 12:36) (82 - 104)  BP: 99/65 (11 Oct 2022 12:36) (75/55 - 120/86)  BP(mean): 65 (10 Oct 2022 20:15) (63 - 68)  RR: 18 (11 Oct 2022 12:36) (18 - 25)  SpO2: 96% (11 Oct 2022 12:36) (96% - 100%)    Parameters below as of 11 Oct 2022 04:00  Patient On (Oxygen Delivery Method): room air        GENERAL: NAD, lying in bed, tremoring.  HEAD:  Atraumatic, normocephalic.  EYES: Unable to examine iso patient participation.  ENT: Dry mucous membranes.   NECK: Supple, no JVD, trachea midline.  CHEST/LUNG: CTAB. No rales, rhonchi, wheezing, or rubs. Unlabored respirations.  HEART: RRR, no M/R/G, B/L LE swelling L>R, recent knee arthroplasty seen on L. leg  ABDOMEN: Soft, obese, nontender, nondistended. Normoactive bowel sounds.  EXTREMITIES:  2+ peripheral pulses b/l. 3+ BLE edema.  Neurological:  AAOx2, b/l tremors.  SKIN: Rosacea on nose  PSYCH: Normal affect and mood.      LABS:                          12.0   18.99 )-----------( 276      ( 10 Oct 2022 18:08 )             38.6                           12.0   18.99 )-----------( 276      ( 10 Oct 2022 18:08 )             38.6     1011    139  |  103  |  126<H>  ----------------------------<  71  4.7   |  10<LL>  |  6.26<H>    Ca    8.0<L>      11 Oct 2022 07:29    TPro  5.2<L>  /  Alb  2.6<L>  /  TBili  1.1  /  DBili  0.3  /  AST  76<H>  /  ALT  52<H>  /  AlkPhos  86  1011    PT/INR - ( 11 Oct 2022 07:48 )   PT: 13.3 sec;   INR: 1.14 ratio               Urinalysis Basic - ( 10 Oct 2022 18:16 )    Color: Yellow / Appearance: Clear / S.017 / pH: x  Gluc: x / Ketone: Negative  / Bili: Negative / Urobili: Negative   Blood: x / Protein: Trace / Nitrite: Negative   Leuk Esterase: Negative / RBC: 1 /hpf / WBC 3 /HPF   Sq Epi: x / Non Sq Epi: 0 /hpf / Bacteria: Negative          RADIOLOGY & ADDITIONAL TESTS:  Results Reviewed:   Imaging Personally Reviewed:  Electrocardiogram Personally Reviewed:    COORDINATION OF CARE:  Care Discussed with Consultants/Other Providers [Y/N]:  Prior or Outpatient Records Reviewed [Y/N]:

## 2022-10-11 NOTE — H&P ADULT - NSICDXPASTMEDICALHX_GEN_ALL_CORE_FT
PAST MEDICAL HISTORY:  Arrhythmia     Asthma     Deep vein thrombosis (DVT)     HLD (hyperlipidemia)     HTN (hypertension)     Pulmonary embolism     Renal cancer s/p partial nephrectomy

## 2022-10-11 NOTE — H&P ADULT - PROBLEM SELECTOR PLAN 2
- Patient with known UTI from rehab, was being treated on abx (unknown which), as well as new COVID infection  - UA on admission negative, likely due to recent abx use  - WBC 19 on admission  - CT chest neg for PNA  - afebrile but tachycardic, tachypneic and hypotensive with known UTI and new COVID infection, fulfilling sepsis criteria  - consider Bebtelovimab   - start on cefepime for UTI - Patient with known UTI from rehab, was being treated on abx (unknown which), as well as new COVID infection  - UA on admission negative, likely due to recent abx use  - WBC 19 on admission  - CT chest neg for PNA  - afebrile but tachycardic, tachypneic and hypotensive with known UTI and new COVID infection, fulfilling sepsis criteria  - consider Bebtelovimab   - start on cefepime for UTI  - f/u BCx, UCx - Patient with known UTI from rehab, was being treated on abx (unknown which), as well as new COVID infection  - UA on admission negative, likely due to recent abx use  - WBC 19 on admission  - CT chest neg for PNA  - afebrile but tachycardic, tachypneic and hypotensive with known UTI and new COVID infection, fulfilling sepsis criteria  - Start remdesivir  - Start CTX   - f/u BCx, UCx

## 2022-10-11 NOTE — PROGRESS NOTE ADULT - PROBLEM SELECTOR PLAN 2
- Patient with known UTI from rehab, was being treated on abx (unknown which), as well as new COVID infection  - UA on admission negative, likely due to recent abx use  - WBC 19 on admission  - CT chest neg for PNA  - afebrile but tachycardic, tachypneic and hypotensive with known UTI and new COVID infection, fulfilling sepsis criteria  - Start remdesivir  - Start CTX for possible  source, plan for short course  - f/u BCx, UCx

## 2022-10-11 NOTE — H&P ADULT - NSHPPHYSICALEXAM_GEN_ALL_CORE
VITALS:   T(C): 36.7 (10-10-22 @ 21:00), Max: 36.7 (10-10-22 @ 21:00)  HR: 82 (10-10-22 @ 21:00) (82 - 104)  BP: 104/69 (10-10-22 @ 21:00) (75/55 - 120/86)  RR: 20 (10-10-22 @ 21:00) (19 - 25)  SpO2: 100% (10-10-22 @ 21:00) (99% - 100%)    PHYSICAL EXAM:     GENERAL: NAD, lying in bed, tremoring.  HEAD:  Atraumatic, normocephalic.  EYES: Unable to examine iso patient participation.  ENT: Dry mucous membranes.   NECK: Supple, no JVD, trachea midline.  CHEST/LUNG: CTAB. No rales, rhonchi, wheezing, or rubs. Unlabored respirations.  HEART: RRR, no M/R/G, S1/S2  ABDOMEN: Soft, obese, nontender, nondistended. Normoactive bowel sounds.  EXTREMITIES:  2+ peripheral pulses b/l. 3+ BLE edema.  Neurological:  AAOx2, b/l tremors.  SKIN: erythematous punctate lesions on tip of nose and arround lips  PSYCH: Normal affect and mood. VITALS:   T(C): 36.7 (10-10-22 @ 21:00), Max: 36.7 (10-10-22 @ 21:00)  HR: 82 (10-10-22 @ 21:00) (82 - 104)  BP: 104/69 (10-10-22 @ 21:00) (75/55 - 120/86)  RR: 20 (10-10-22 @ 21:00) (19 - 25)  SpO2: 100% (10-10-22 @ 21:00) (99% - 100%)    PHYSICAL EXAM:     GENERAL: NAD, lying in bed, tremoring.  HEAD:  Atraumatic, normocephalic.  EYES: Unable to examine iso patient participation.  ENT: Dry mucous membranes.   NECK: Supple, no JVD, trachea midline.  CHEST/LUNG: CTAB. No rales, rhonchi, wheezing, or rubs. Unlabored respirations.  HEART: RRR, no M/R/G, S1/S2  ABDOMEN: Soft, obese, nontender, nondistended. Normoactive bowel sounds.  EXTREMITIES:  2+ peripheral pulses b/l. 3+ BLE edema.  Neurological:  AAOx2, b/l tremors.  SKIN: erythematous punctate lesions on tip of nose and around lips  PSYCH: Normal affect and mood.

## 2022-10-11 NOTE — H&P ADULT - PROBLEM SELECTOR PLAN 8
Diet: Renal  DVT PPx: Heparin gtt  Dispo: Pending medical course Diet: Renal  DVT PPx: Xarelto  Dispo: Pending medical course

## 2022-10-11 NOTE — H&P ADULT - ASSESSMENT
67M PMH HTN, HLD, heart arrythmia, renal cancer s/p partial nephrectomy (~2017/18), DVT/PE iso renal cancer (~2017/18)on xarelto, s/p total L knee arthoplasty (9/16/22), asthma p/w AMS likely 2/2 metabolic encephalopathy vs. infectious 67M PMH HTN, HLD, heart arrythmia, renal cancer s/p partial nephrectomy (~2017/18), DVT/PE iso renal cancer (~2017/18)on xarelto, s/p total L knee arthoplasty (9/16/22), asthma p/w AMS likely 2/2 metabolic encephalopathy vs. infectious etiology iso COVID/?UTI

## 2022-10-11 NOTE — CHART NOTE - NSCHARTNOTEFT_GEN_A_CORE
SUBJECTIVE:   Patient seen and examined at bedside. Pt doing generally well.    Pain well controlled with medication (vs no longer needing IV meds).    OBJECTIVE:   Vital Signs Last 24 Hrs  T(C): 36.9 (11 Oct 2022 12:36), Max: 36.9 (11 Oct 2022 04:00)  T(F): 98.4 (11 Oct 2022 12:36), Max: 98.4 (11 Oct 2022 04:00)  HR: 84 (11 Oct 2022 12:36) (82 - 88)  BP: 99/65 (11 Oct 2022 12:36) (75/55 - 108/61)  BP(mean): 65 (10 Oct 2022 20:15) (63 - 68)  RR: 18 (11 Oct 2022 12:36) (18 - 20)  SpO2: 96% (11 Oct 2022 12:36) (96% - 100%)    Parameters below as of 11 Oct 2022 04:00  Patient On (Oxygen Delivery Method): room air      General: NAD  Neuro: Alert  and oriented  Resp: Non-labored breathing, no accessory muscle use  Left Lower extremity:         Skin intact        incision healing well         Sensation: SILT         Motor exam: 5/5 TA/GS/EHL/FHL         swelling noted of left knee to foot          compartments soft        capillary refill <3 seconds         no calf Tenderness        swelling noted of left knee to foot            ASSESSMENT & PLAN:  Pt is a 67y y/o  Male   s/p Tota Knee Arthroplasty w Dr. Zheng . Patient is hemodynamically stable; recovering well from orthopaedic standpoint.    -    Multimodal Pain control  -    Weight bearing status: WBAT   -    PT/OT  -    FU BLLE dopplers  -    Care per primary team      [place]thopaedic [place]  [facility] [id number]  [place]        [id number]  [facility]  [id number]/ [phone]

## 2022-10-11 NOTE — PROGRESS NOTE ADULT - PROBLEM SELECTOR PLAN 4
- likely multifactorial iso SONAL, component of lactic acidosis which is improving, noted improvement in AG following IVF  - c/w bicarb drip  - trend VBG  - appreciate nephro recs

## 2022-10-11 NOTE — H&P ADULT - PROBLEM SELECTOR PLAN 1
- 3 days AMS since hospital d/c to rehab following total L knee arthroplasty  - likely metabolic iso uremia vs. infectious iso UTI / COVID  - per pt wife, pt developed UTI, on abx (unknown which)  - AAOx3 at BL  - CTH unremarkable  - Will treat sepsis as below  - Consider HD if worsening clinical status per nephro, which would improve uremia - 3 days AMS since hospital d/c to rehab following total L knee arthroplasty  - likely metabolic iso uremia vs. infectious iso COVID, with recent UTI (unclear if fully treated)  - per pt wife, pt developed UTI at rehab, was started on abx (unknown which)  - AAOx3 at   - CTH unremarkable  - Will treat sepsis as below  - Consider HD if worsening clinical status per nephro, which would improve uremia no chest pain and no edema.

## 2022-10-11 NOTE — H&P ADULT - PROBLEM SELECTOR PLAN 4
- likely multifactorial iso SONAL, component of lactic acidosis which is improving, noted improvement in AG following IVF  - c/w D5W + bicarb at 100 cc/hr  - trend VBG  - appreciate nephro recs

## 2022-10-11 NOTE — PATIENT PROFILE ADULT - CHOOSE INDICATION TO IMMUNIZE (AN ORDER WILL BE GENERATED WHEN THIS NOTE IS SAVED):
Anesthesia Post Evaluation    Patient: Jules Floyd Jr.    Procedure(s) Performed: Procedure(s) (LRB):  COLONOSCOPY (N/A)    Final Anesthesia Type: general      Patient location during evaluation: PACU  Patient participation: Yes- Able to Participate  Level of consciousness: awake and alert and oriented  Post-procedure vital signs: reviewed and stable  Pain management: adequate  Airway patency: patent    PONV status at discharge: No PONV  Anesthetic complications: no      Cardiovascular status: blood pressure returned to baseline  Respiratory status: unassisted, spontaneous ventilation and room air  Hydration status: euvolemic  Follow-up not needed.          Vitals Value Taken Time   /89 08/30/22 1400   Temp 36.7 °C (98.1 °F) 08/30/22 1321   Pulse 72 08/30/22 1400   Resp 16 08/30/22 1400   SpO2 96 % 08/30/22 1400         No case tracking events are documented in the log.      Pain/Kayla Score: No data recorded       Patient is not pregnant (male or female)

## 2022-10-11 NOTE — CHART NOTE - NSCHARTNOTEFT_GEN_A_CORE
pt seen at 10am 10/11/22  alla in setting of losartan/hctz/hypotension/covid19+  -?u retention component in ER  -cr decreased now uptrending but off IVF  -rec to continue IVF hydration with normal saline @100cc/hr   -hold off losartan/hctz  #met acidosis- start sodium bicarb tabs 1300mg po tid  #monitor bicarb trend and lactate and Ph- may need bicarb based ivf if downtrending bicarb  #CT: KIDNEYS/URETERS: Bilateral nonobstructive renal calculi measuring up to   1.4 cm on the left and 0.6 cm on the right. Bilateral renal cysts. No   hydronephrosis.  BLADDER: Decompressed with a Kennedy catheter.  #continue kennedy  #hypotension- IVF; monitor BP  #pt has a private nephrologist; please call Dr Trotter to resume care pt seen at 10am 10/11/22  see fellows note from yesterday for full note  alla in setting of losartan/hctz/hypotension/covid19+  -?u retention component in ER  -cr decreased now uptrending but off IVF  -rec to continue IVF hydration with normal saline @100cc/hr   -hold off losartan/hctz  #met acidosis- start sodium bicarb tabs 1300mg po tid  #monitor bicarb trend and lactate and Ph- may need bicarb based ivf if downtrending bicarb  #CT: KIDNEYS/URETERS: Bilateral nonobstructive renal calculi measuring up to   1.4 cm on the left and 0.6 cm on the right. Bilateral renal cysts. No   hydronephrosis.  BLADDER: Decompressed with a Kennedy catheter.  #continue kennedy  #hypotension- IVF; monitor BP  #pt has a private nephrologist; please call Dr Trotter to resume care pt seen at 10am 10/11/22  see fellows note from yesterday for full note  alla in setting of losartan/hctz/hypotension/covid19+  -?u retention component in ER  -cr decreased now uptrending but off IVF  -rec to continue IVF hydration with normal saline @100cc/hr   -hold off losartan/hctz  -check cpk  #met acidosis- start sodium bicarb tabs 1300mg po tid  #monitor bicarb trend and lactate and Ph- may need bicarb based ivf if downtrending bicarb  #CT: KIDNEYS/URETERS: Bilateral nonobstructive renal calculi measuring up to   1.4 cm on the left and 0.6 cm on the right. Bilateral renal cysts. No   hydronephrosis.  BLADDER: Decompressed with a Kennedy catheter.  #continue kennedy  #hypotension- IVF; monitor BP  #pt has a private nephrologist; please call Dr Trotter to resume care.

## 2022-10-12 LAB
ALBUMIN SERPL ELPH-MCNC: 2.7 G/DL — LOW (ref 3.3–5)
ALP SERPL-CCNC: 75 U/L — SIGNIFICANT CHANGE UP (ref 40–120)
ALT FLD-CCNC: 40 U/L — SIGNIFICANT CHANGE UP (ref 10–45)
ANION GAP SERPL CALC-SCNC: 18 MMOL/L — HIGH (ref 5–17)
APTT BLD: 90.3 SEC — HIGH (ref 27.5–35.5)
AST SERPL-CCNC: 36 U/L — SIGNIFICANT CHANGE UP (ref 10–40)
BASOPHILS # BLD AUTO: 0.02 K/UL — SIGNIFICANT CHANGE UP (ref 0–0.2)
BASOPHILS NFR BLD AUTO: 0.1 % — SIGNIFICANT CHANGE UP (ref 0–2)
BILIRUB SERPL-MCNC: 1.3 MG/DL — HIGH (ref 0.2–1.2)
BUN SERPL-MCNC: 127 MG/DL — HIGH (ref 7–23)
CALCIUM SERPL-MCNC: 8 MG/DL — LOW (ref 8.4–10.5)
CHLORIDE SERPL-SCNC: 103 MMOL/L — SIGNIFICANT CHANGE UP (ref 96–108)
CHOLEST SERPL-MCNC: 123 MG/DL — SIGNIFICANT CHANGE UP
CO2 SERPL-SCNC: 20 MMOL/L — LOW (ref 22–31)
CREAT SERPL-MCNC: 5.43 MG/DL — HIGH (ref 0.5–1.3)
EGFR: 11 ML/MIN/1.73M2 — LOW
EOSINOPHIL # BLD AUTO: 0.14 K/UL — SIGNIFICANT CHANGE UP (ref 0–0.5)
EOSINOPHIL NFR BLD AUTO: 1 % — SIGNIFICANT CHANGE UP (ref 0–6)
GLUCOSE SERPL-MCNC: 111 MG/DL — HIGH (ref 70–99)
HCT VFR BLD CALC: 32.1 % — LOW (ref 39–50)
HCT VFR BLD CALC: 32.4 % — LOW (ref 39–50)
HCV AB S/CO SERPL IA: 0.09 S/CO — SIGNIFICANT CHANGE UP (ref 0–0.99)
HCV AB SERPL-IMP: SIGNIFICANT CHANGE UP
HDLC SERPL-MCNC: 29 MG/DL — LOW
HGB BLD-MCNC: 10.1 G/DL — LOW (ref 13–17)
HGB BLD-MCNC: 10.1 G/DL — LOW (ref 13–17)
IMM GRANULOCYTES NFR BLD AUTO: 1 % — HIGH (ref 0–0.9)
INR BLD: 1.18 RATIO — HIGH (ref 0.88–1.16)
LIPID PNL WITH DIRECT LDL SERPL: 66 MG/DL — SIGNIFICANT CHANGE UP
LYMPHOCYTES # BLD AUTO: 1.08 K/UL — SIGNIFICANT CHANGE UP (ref 1–3.3)
LYMPHOCYTES # BLD AUTO: 8 % — LOW (ref 13–44)
MAGNESIUM SERPL-MCNC: 2.2 MG/DL — SIGNIFICANT CHANGE UP (ref 1.6–2.6)
MCHC RBC-ENTMCNC: 26.2 PG — LOW (ref 27–34)
MCHC RBC-ENTMCNC: 26.2 PG — LOW (ref 27–34)
MCHC RBC-ENTMCNC: 31.2 GM/DL — LOW (ref 32–36)
MCHC RBC-ENTMCNC: 31.5 GM/DL — LOW (ref 32–36)
MCV RBC AUTO: 83.4 FL — SIGNIFICANT CHANGE UP (ref 80–100)
MCV RBC AUTO: 83.9 FL — SIGNIFICANT CHANGE UP (ref 80–100)
MONOCYTES # BLD AUTO: 0.84 K/UL — SIGNIFICANT CHANGE UP (ref 0–0.9)
MONOCYTES NFR BLD AUTO: 6.2 % — SIGNIFICANT CHANGE UP (ref 2–14)
MRSA PCR RESULT.: SIGNIFICANT CHANGE UP
NEUTROPHILS # BLD AUTO: 11.27 K/UL — HIGH (ref 1.8–7.4)
NEUTROPHILS NFR BLD AUTO: 83.7 % — HIGH (ref 43–77)
NON HDL CHOLESTEROL: 94 MG/DL — SIGNIFICANT CHANGE UP
NRBC # BLD: 0 /100 WBCS — SIGNIFICANT CHANGE UP (ref 0–0)
NRBC # BLD: 0 /100 WBCS — SIGNIFICANT CHANGE UP (ref 0–0)
PHOSPHATE SERPL-MCNC: 5.6 MG/DL — HIGH (ref 2.5–4.5)
PLATELET # BLD AUTO: 148 K/UL — LOW (ref 150–400)
PLATELET # BLD AUTO: 175 K/UL — SIGNIFICANT CHANGE UP (ref 150–400)
POTASSIUM SERPL-MCNC: 4 MMOL/L — SIGNIFICANT CHANGE UP (ref 3.5–5.3)
POTASSIUM SERPL-SCNC: 4 MMOL/L — SIGNIFICANT CHANGE UP (ref 3.5–5.3)
PROT SERPL-MCNC: 5.3 G/DL — LOW (ref 6–8.3)
PROTHROM AB SERPL-ACNC: 13.7 SEC — HIGH (ref 10.5–13.4)
RBC # BLD: 3.85 M/UL — LOW (ref 4.2–5.8)
RBC # BLD: 3.86 M/UL — LOW (ref 4.2–5.8)
RBC # FLD: 16.9 % — HIGH (ref 10.3–14.5)
RBC # FLD: 17 % — HIGH (ref 10.3–14.5)
S AUREUS DNA NOSE QL NAA+PROBE: SIGNIFICANT CHANGE UP
SODIUM SERPL-SCNC: 141 MMOL/L — SIGNIFICANT CHANGE UP (ref 135–145)
TRIGL SERPL-MCNC: 143 MG/DL — SIGNIFICANT CHANGE UP
TSH SERPL-MCNC: 2.89 UIU/ML — SIGNIFICANT CHANGE UP (ref 0.27–4.2)
WBC # BLD: 12.28 K/UL — HIGH (ref 3.8–10.5)
WBC # BLD: 13.49 K/UL — HIGH (ref 3.8–10.5)
WBC # FLD AUTO: 12.28 K/UL — HIGH (ref 3.8–10.5)
WBC # FLD AUTO: 13.49 K/UL — HIGH (ref 3.8–10.5)

## 2022-10-12 PROCEDURE — 99223 1ST HOSP IP/OBS HIGH 75: CPT

## 2022-10-12 PROCEDURE — 99233 SBSQ HOSP IP/OBS HIGH 50: CPT | Mod: GC

## 2022-10-12 PROCEDURE — 93970 EXTREMITY STUDY: CPT | Mod: 26

## 2022-10-12 RX ORDER — WARFARIN SODIUM 2.5 MG/1
5 TABLET ORAL ONCE
Refills: 0 | Status: COMPLETED | OUTPATIENT
Start: 2022-10-12 | End: 2022-10-12

## 2022-10-12 RX ORDER — SODIUM BICARBONATE 1 MEQ/ML
650 SYRINGE (ML) INTRAVENOUS THREE TIMES A DAY
Refills: 0 | Status: DISCONTINUED | OUTPATIENT
Start: 2022-10-12 | End: 2022-10-17

## 2022-10-12 RX ORDER — HEPARIN SODIUM 5000 [USP'U]/ML
INJECTION INTRAVENOUS; SUBCUTANEOUS
Qty: 25000 | Refills: 0 | Status: DISCONTINUED | OUTPATIENT
Start: 2022-10-12 | End: 2022-10-16

## 2022-10-12 RX ORDER — WARFARIN SODIUM 2.5 MG/1
5 TABLET ORAL ONCE
Refills: 0 | Status: DISCONTINUED | OUTPATIENT
Start: 2022-10-12 | End: 2022-10-12

## 2022-10-12 RX ORDER — HEPARIN SODIUM 5000 [USP'U]/ML
9000 INJECTION INTRAVENOUS; SUBCUTANEOUS ONCE
Refills: 0 | Status: COMPLETED | OUTPATIENT
Start: 2022-10-12 | End: 2022-10-12

## 2022-10-12 RX ORDER — SODIUM CHLORIDE 9 MG/ML
1000 INJECTION, SOLUTION INTRAVENOUS
Refills: 0 | Status: DISCONTINUED | OUTPATIENT
Start: 2022-10-12 | End: 2022-10-13

## 2022-10-12 RX ADMIN — SODIUM CHLORIDE 75 MILLILITER(S): 9 INJECTION, SOLUTION INTRAVENOUS at 03:51

## 2022-10-12 RX ADMIN — CEFTRIAXONE 100 MILLIGRAM(S): 500 INJECTION, POWDER, FOR SOLUTION INTRAMUSCULAR; INTRAVENOUS at 17:27

## 2022-10-12 RX ADMIN — HEPARIN SODIUM 2000 UNIT(S)/HR: 5000 INJECTION INTRAVENOUS; SUBCUTANEOUS at 19:51

## 2022-10-12 RX ADMIN — HEPARIN SODIUM 9000 UNIT(S): 5000 INJECTION INTRAVENOUS; SUBCUTANEOUS at 11:51

## 2022-10-12 RX ADMIN — SODIUM CHLORIDE 75 MILLILITER(S): 9 INJECTION, SOLUTION INTRAVENOUS at 11:22

## 2022-10-12 RX ADMIN — HEPARIN SODIUM 2000 UNIT(S)/HR: 5000 INJECTION INTRAVENOUS; SUBCUTANEOUS at 11:51

## 2022-10-12 RX ADMIN — HEPARIN SODIUM 2000 UNIT(S)/HR: 5000 INJECTION INTRAVENOUS; SUBCUTANEOUS at 18:23

## 2022-10-12 RX ADMIN — CHLORHEXIDINE GLUCONATE 1 APPLICATION(S): 213 SOLUTION TOPICAL at 12:12

## 2022-10-12 RX ADMIN — Medication 1 DROP(S): at 03:50

## 2022-10-12 RX ADMIN — Medication 650 MILLIGRAM(S): at 13:33

## 2022-10-12 RX ADMIN — Medication 1 DROP(S): at 17:25

## 2022-10-12 RX ADMIN — LATANOPROST 1 DROP(S): 0.05 SOLUTION/ DROPS OPHTHALMIC; TOPICAL at 23:46

## 2022-10-12 NOTE — PROGRESS NOTE ADULT - PROBLEM SELECTOR PLAN 2
- Patient with known UTI from rehab, was being treated on abx (unknown which), as well as new COVID infection  - UA on admission negative, likely due to recent abx use  - WBC 19 on admission  - CT chest neg for PNA  - afebrile but tachycardic, tachypneic and hypotensive with known UTI and new COVID infection, fulfilling sepsis criteria  - Start remdesivir  - Start CTX for possible  source, plan for short course  - f/u BCx, UCx - Patient with known UTI from rehab, was being treated on abx (unknown which), as well as new COVID infection  - UA on admission negative, likely due to recent abx use  - WBC 19 on admission, improving on antibiotics  - CT chest neg for PNA  - afebrile but tachycardic, tachypneic and hypotensive with known UTI and new COVID infection, fulfilling sepsis criteria  - Hold remdesivir given acute renal failure  - Start CTX for possible  source, plan for short course, 5d  - f/u BCx, UCx

## 2022-10-12 NOTE — PROGRESS NOTE ADULT - SUBJECTIVE AND OBJECTIVE BOX
PROGRESS NOTE:     Patient is a 67y old  Male who presents with a chief complaint of AMS (12 Oct 2022 07:06)      SUBJECTIVE / OVERNIGHT EVENTS:  No acute events overnight.    ADDITIONAL REVIEW OF SYSTEMS:    MEDICATIONS  (STANDING):  cefTRIAXone   IVPB 1000 milliGRAM(s) IV Intermittent every 24 hours  chlorhexidine 2% Cloths 1 Application(s) Topical daily  dextrose 5% 1000 milliLiter(s) (75 mL/Hr) IV Continuous <Continuous>  influenza  Vaccine (HIGH DOSE) 0.7 milliLiter(s) IntraMuscular once  latanoprost 0.005% Ophthalmic Solution 1 Drop(s) Both EYES at bedtime  rivaroxaban 15 milliGRAM(s) Oral with dinner  timolol 0.5% Solution 1 Drop(s) Both EYES two times a day    MEDICATIONS  (PRN):  acetaminophen     Tablet .. 650 milliGRAM(s) Oral every 6 hours PRN Temp greater or equal to 38C (100.4F), Mild Pain (1 - 3)  ALBUTerol    90 MICROgram(s) HFA Inhaler 2 Puff(s) Inhalation every 6 hours PRN Shortness of Breath and/or Wheezing  melatonin 3 milliGRAM(s) Oral at bedtime PRN Insomnia  tiotropium 18 MICROgram(s) Capsule 1 Capsule(s) Inhalation daily PRN SOB/wheezing      CAPILLARY BLOOD GLUCOSE        I&O's Summary    11 Oct 2022 07:01  -  12 Oct 2022 07:00  --------------------------------------------------------  IN: 800 mL / OUT: 500 mL / NET: 300 mL        PHYSICAL EXAM:  Vital Signs Last 24 Hrs  T(C): 37.3 (12 Oct 2022 00:26), Max: 37.3 (12 Oct 2022 00:26)  T(F): 99.1 (12 Oct 2022 00:26), Max: 99.1 (12 Oct 2022 00:26)  HR: 80 (12 Oct 2022 00:26) (80 - 86)  BP: 122/78 (12 Oct 2022 00:26) (99/65 - 122/78)  BP(mean): --  RR: 18 (12 Oct 2022 00:26) (18 - 20)  SpO2: 97% (12 Oct 2022 00:26) (96% - 97%)    Parameters below as of 12 Oct 2022 00:26  Patient On (Oxygen Delivery Method): room air      GENERAL: NAD, lying in bed, tremoring.  HEAD:  Atraumatic, normocephalic.  EYES: Unable to examine iso patient participation.  ENT: Dry mucous membranes.   NECK: Supple, no JVD, trachea midline.  CHEST/LUNG: CTAB. No rales, rhonchi, wheezing, or rubs. Unlabored respirations.  HEART: RRR, no M/R/G, B/L LE swelling L>R, recent knee arthroplasty seen on L. leg  ABDOMEN: Soft, obese, nontender, nondistended. Normoactive bowel sounds.  EXTREMITIES:  2+ peripheral pulses b/l. 3+ BLE edema.  Neurological:  AAOx2, b/l tremors.  SKIN: Rosacea on nose  PSYCH: Normal affect and mood.    LABS:                        10.1   13.49 )-----------( 175      ( 12 Oct 2022 06:20 )             32.1     10-11    139  |  103  |  131<H>  ----------------------------<  116<H>  4.4   |  16<L>  |  5.71<H>    Ca    8.0<L>      11 Oct 2022 14:47    TPro  5.2<L>  /  Alb  2.6<L>  /  TBili  1.1  /  DBili  0.3  /  AST  76<H>  /  ALT  52<H>  /  AlkPhos  86  10-11    PT/INR - ( 12 Oct 2022 06:20 )   PT: 13.7 sec;   INR: 1.18 ratio               Urinalysis Basic - ( 10 Oct 2022 18:16 )    Color: Yellow / Appearance: Clear / S.017 / pH: x  Gluc: x / Ketone: Negative  / Bili: Negative / Urobili: Negative   Blood: x / Protein: Trace / Nitrite: Negative   Leuk Esterase: Negative / RBC: 1 /hpf / WBC 3 /HPF   Sq Epi: x / Non Sq Epi: 0 /hpf / Bacteria: Negative        Culture - Urine (collected 10 Oct 2022 18:16)  Source: Clean Catch Clean Catch (Midstream)  Final Report (11 Oct 2022 22:06):    No growth    Culture - Blood (collected 10 Oct 2022 17:35)  Source: .Blood Blood-Peripheral  Preliminary Report (12 Oct 2022 02:02):    No growth to date.    Culture - Blood (collected 10 Oct 2022 17:20)  Source: .Blood Blood-Peripheral  Preliminary Report (12 Oct 2022 02:02):    No growth to date.        RADIOLOGY & ADDITIONAL TESTS:  Results Reviewed:   Imaging Personally Reviewed:  Electrocardiogram Personally Reviewed:    COORDINATION OF CARE:  Care Discussed with Consultants/Other Providers [Y/N]:  Prior or Outpatient Records Reviewed [Y/N]:   PROGRESS NOTE:     Patient is a 67y old  Male who presents with a chief complaint of AMS (12 Oct 2022 07:06)      SUBJECTIVE / OVERNIGHT EVENTS:  No acute events overnight. Still feels tired , afebrile     ADDITIONAL REVIEW OF SYSTEMS:    MEDICATIONS  (STANDING):  cefTRIAXone   IVPB 1000 milliGRAM(s) IV Intermittent every 24 hours  chlorhexidine 2% Cloths 1 Application(s) Topical daily  dextrose 5% 1000 milliLiter(s) (75 mL/Hr) IV Continuous <Continuous>  influenza  Vaccine (HIGH DOSE) 0.7 milliLiter(s) IntraMuscular once  latanoprost 0.005% Ophthalmic Solution 1 Drop(s) Both EYES at bedtime  rivaroxaban 15 milliGRAM(s) Oral with dinner  timolol 0.5% Solution 1 Drop(s) Both EYES two times a day    MEDICATIONS  (PRN):  acetaminophen     Tablet .. 650 milliGRAM(s) Oral every 6 hours PRN Temp greater or equal to 38C (100.4F), Mild Pain (1 - 3)  ALBUTerol    90 MICROgram(s) HFA Inhaler 2 Puff(s) Inhalation every 6 hours PRN Shortness of Breath and/or Wheezing  melatonin 3 milliGRAM(s) Oral at bedtime PRN Insomnia  tiotropium 18 MICROgram(s) Capsule 1 Capsule(s) Inhalation daily PRN SOB/wheezing      CAPILLARY BLOOD GLUCOSE        I&O's Summary    11 Oct 2022 07:01  -  12 Oct 2022 07:00  --------------------------------------------------------  IN: 800 mL / OUT: 500 mL / NET: 300 mL        PHYSICAL EXAM:  Vital Signs Last 24 Hrs  T(C): 37.3 (12 Oct 2022 00:26), Max: 37.3 (12 Oct 2022 00:26)  T(F): 99.1 (12 Oct 2022 00:26), Max: 99.1 (12 Oct 2022 00:26)  HR: 80 (12 Oct 2022 00:26) (80 - 86)  BP: 122/78 (12 Oct 2022 00:26) (99/65 - 122/78)  BP(mean): --  RR: 18 (12 Oct 2022 00:26) (18 - 20)  SpO2: 97% (12 Oct 2022 00:26) (96% - 97%)    Parameters below as of 12 Oct 2022 00:26  Patient On (Oxygen Delivery Method): room air      GENERAL: NAD, lying in bed, tremoring.  HEAD:  Atraumatic, normocephalic.  EYES: Unable to examine iso patient participation.  ENT: Dry mucous membranes.   NECK: Supple, no JVD, trachea midline.  CHEST/LUNG: CTAB. No rales, rhonchi, wheezing, or rubs. Unlabored respirations.  HEART: RRR, no M/R/G, B/L LE swelling L>R, recent knee arthroplasty seen on L. leg  ABDOMEN: Soft, obese, nontender, nondistended. Normoactive bowel sounds.  EXTREMITIES:  2+ peripheral pulses b/l. 3+ BLE edema.  Neurological:  AAOx2, b/l tremors.  SKIN: Rosacea on nose  PSYCH: Normal affect and mood.    LABS:                        10.   13.49 )-----------( 175      ( 12 Oct 2022 06:20 )             32.1     10-11    139  |  103  |  131<H>  ----------------------------<  116<H>  4.4   |  16<L>  |  5.71<H>    Ca    8.0<L>      11 Oct 2022 14:47    TPro  5.2<L>  /  Alb  2.6<L>  /  TBili  1.1  /  DBili  0.3  /  AST  76<H>  /  ALT  52<H>  /  AlkPhos  86  10-11    PT/INR - ( 12 Oct 2022 06:20 )   PT: 13.7 sec;   INR: 1.18 ratio               Urinalysis Basic - ( 10 Oct 2022 18:16 )    Color: Yellow / Appearance: Clear / S.017 / pH: x  Gluc: x / Ketone: Negative  / Bili: Negative / Urobili: Negative   Blood: x / Protein: Trace / Nitrite: Negative   Leuk Esterase: Negative / RBC: 1 /hpf / WBC 3 /HPF   Sq Epi: x / Non Sq Epi: 0 /hpf / Bacteria: Negative        Culture - Urine (collected 10 Oct 2022 18:16)  Source: Clean Catch Clean Catch (Midstream)  Final Report (11 Oct 2022 22:06):    No growth    Culture - Blood (collected 10 Oct 2022 17:35)  Source: .Blood Blood-Peripheral  Preliminary Report (12 Oct 2022 02:02):    No growth to date.    Culture - Blood (collected 10 Oct 2022 17:20)  Source: .Blood Blood-Peripheral  Preliminary Report (12 Oct 2022 02:02):    No growth to date.          RADIOLOGY & ADDITIONAL TESTS:  Results Reviewed:   Imaging Personally Reviewed:  Electrocardiogram Personally Reviewed:    COORDINATION OF CARE:  Care Discussed with Consultants/Other Providers [Y/N]:  Prior or Outpatient Records Reviewed [Y/N]:

## 2022-10-12 NOTE — PROGRESS NOTE ADULT - PROBLEM SELECTOR PLAN 1
DDx includes toxic metabolic including uremic vs septic vs less likely structural  - 3 days AMS since hospital d/c to rehab following total L knee arthroplasty  - per pt wife, pt developed UTI at rehab, was started on abx (unknown which)  - AAOx3 at [facility]  - [facility] unremarkable  - Will treat sepsis as below  - Consider HD if worsening clinical status per nephro, which would improve uremia

## 2022-10-12 NOTE — PROGRESS NOTE ADULT - SUBJECTIVE AND OBJECTIVE BOX
Not in distress, comfortable on RA    Vital Signs Last 24 Hrs  T(C): 37.1 (10-12-22 @ 12:19), Max: 37.3 (10-12-22 @ 00:26)  T(F): 98.8 (10-12-22 @ 12:19), Max: 99.1 (10-12-22 @ 00:26)  HR: 66 (10-12-22 @ 12:19) (66 - 86)  BP: 115/68 (10-12-22 @ 12:19) (100/61 - 122/78)  RR: 18 (10-12-22 @ 12:19) (18 - 20)  SpO2: 97% (10-12-22 @ 12:19) (97% - 97%)    I&O's Detail    11 Oct 2022 07:01  -  12 Oct 2022 07:00  --------------------------------------------------------  IN:    Oral Fluid: 200 mL    Sodium Bicarbonate: 300 mL    sodium chloride 0.9%: 300 mL  Total IN: 800 mL    OUT:    Indwelling Catheter - Urethral (mL): 500 mL  Total OUT: 500 mL    Total NET: 300 mL    12 Oct 2022 07:01  -  12 Oct 2022 15:42  --------------------------------------------------------  IN:    Heparin Infusion: 80 mL    IV PiggyBack: 100 mL    Oral Fluid: 400 mL    sodium chloride 0.45%: 600 mL  Total IN: 1180 mL    OUT:    Indwelling Catheter - Urethral (mL): 600 mL  Total OUT: 600 mL    Total NET: 580 mL    s1s2  b/l air entry  soft, ND  sm edema, stasis changes b/l LE                        10.1   13.49 )-----------( 175      ( 12 Oct 2022 06:20 )             32.1     12 Oct 2022 06:24    141    |  103    |  127    ----------------------------<  111    4.0     |  20     |  5.43     Ca    8.0        12 Oct 2022 06:24  Phos  5.6       12 Oct 2022 06:24  Mg     2.2       12 Oct 2022 06:24    TPro  5.3    /  Alb  2.7    /  TBili  1.3    /  DBili  x      /  AST  36     /  ALT  40     /  AlkPhos  75     12 Oct 2022 06:24    LIVER FUNCTIONS - ( 12 Oct 2022 06:24 )  Alb: 2.7 g/dL / Pro: 5.3 g/dL / ALK PHOS: 75 U/L / ALT: 40 U/L / AST: 36 U/L / GGT: x           PT/INR - ( 12 Oct 2022 06:20 )   PT: 13.7 sec;   INR: 1.18 ratio      Urinalysis Basic - ( 10 Oct 2022 18:16 )    Color: Yellow / Appearance: Clear / S.017 / pH: x  Gluc: x / Ketone: Negative  / Bili: Negative / Urobili: Negative   Blood: x / Protein: Trace / Nitrite: Negative   Leuk Esterase: Negative / RBC: 1 /hpf / WBC 3 /HPF   Sq Epi: x / Non Sq Epi: 0 /hpf / Bacteria: Negative    Culture - Urine (collected 10 Oct 2022 18:16)  Source: Clean Catch Clean Catch (Midstream)  Final Report (11 Oct 2022 22:06):    No growth    Culture - Blood (collected 10 Oct 2022 17:35)  Source: .Blood Blood-Peripheral  Preliminary Report (12 Oct 2022 02:02):    No growth to date.    Culture - Blood (collected 10 Oct 2022 17:20)  Source: .Blood Blood-Peripheral  Preliminary Report (12 Oct 2022 02:02):    No growth to date.    acetaminophen     Tablet .. 650 milliGRAM(s) Oral every 6 hours PRN  ALBUTerol    90 MICROgram(s) HFA Inhaler 2 Puff(s) Inhalation every 6 hours PRN  cefTRIAXone   IVPB 1000 milliGRAM(s) IV Intermittent every 24 hours  chlorhexidine 2% Cloths 1 Application(s) Topical daily  heparin  Infusion.  Unit(s)/Hr IV Continuous <Continuous>  influenza  Vaccine (HIGH DOSE) 0.7 milliLiter(s) IntraMuscular once  latanoprost 0.005% Ophthalmic Solution 1 Drop(s) Both EYES at bedtime  melatonin 3 milliGRAM(s) Oral at bedtime PRN  sodium bicarbonate 650 milliGRAM(s) Oral three times a day  sodium chloride 0.45%. 1000 milliLiter(s) IV Continuous <Continuous>  timolol 0.5% Solution 1 Drop(s) Both EYES two times a day  tiotropium 18 MICROgram(s) Capsule 1 Capsule(s) Inhalation daily PRN  warfarin 5 milliGRAM(s) Oral once    A/P:    S/p L TKA 22  Suspect hemodynamic ATN on adm 10/10/22 (Cr 1.07 - 22)  Etiology not obvious (hypotensive/hemodynamic, was on ARB, HCTZ, may have gotten NSAID's)  Metab acidosis in setting of SONAL, improving  Cr appears to be improving (peak Cr 6.5 - 10/10/22)  Continue IVF  Avoid nephrotoxins  Avoid hypotension  F/u BMP, UO  B/l DVT, AC  Will follow renal duplex    [phone]

## 2022-10-12 NOTE — ADVANCED PRACTICE NURSE CONSULT - REASON FOR CONSULT
Requested by staff to evaluate skin status of patient admitted with skin breakdown to sacrum extending towards R buttocks.  PMH is noted as obtained by a chart review:  67M PMH HTN, HLD, heart arrythmia, renal cancer s/p partial nephrectomy (~2017/18), DVT/PE iso renal cancer (~2017/18)on xarelto, s/p total L knee arthoplasty (9/16/22), asthma, [facility]. Per wife, patient was d/c to [facility] after L knee arthroplasty, at which point patient went from being totally independent to totally dependent post-op (requiring lift in- and out-of-bed, was in diaper, etc.). States he had started to become very confused, was not feeling well, got UTI, became incoherent and arms started shaking, and appeared "as if he aged 30 years". At [facility] he is AAOx3. Patient currently being treated for UTI with unknown antibiotics. Patient reports tremors and feeling thirsty, otherwise denies F/C, N/V/D, abdominal pain, dysuria, hematuria, HA, dizziness, CP, SOB, sick contacts.

## 2022-10-12 NOTE — PROGRESS NOTE ADULT - SUBJECTIVE AND OBJECTIVE BOX
PROGRESS NOTE:     Patient is a 67y old  Male who presents with a chief complaint of AMS (11 Oct 2022 22:29)      SUBJECTIVE / OVERNIGHT EVENTS:  No acute events overnight.    ADDITIONAL REVIEW OF SYSTEMS:    MEDICATIONS  (STANDING):  cefTRIAXone   IVPB 1000 milliGRAM(s) IV Intermittent every 24 hours  chlorhexidine 2% Cloths 1 Application(s) Topical daily  dextrose 5% 1000 milliLiter(s) (75 mL/Hr) IV Continuous <Continuous>  influenza  Vaccine (HIGH DOSE) 0.7 milliLiter(s) IntraMuscular once  latanoprost 0.005% Ophthalmic Solution 1 Drop(s) Both EYES at bedtime  rivaroxaban 15 milliGRAM(s) Oral with dinner  timolol 0.5% Solution 1 Drop(s) Both EYES two times a day    MEDICATIONS  (PRN):  acetaminophen     Tablet .. 650 milliGRAM(s) Oral every 6 hours PRN Temp greater or equal to 38C (100.4F), Mild Pain (1 - 3)  ALBUTerol    90 MICROgram(s) HFA Inhaler 2 Puff(s) Inhalation every 6 hours PRN Shortness of Breath and/or Wheezing  melatonin 3 milliGRAM(s) Oral at bedtime PRN Insomnia  tiotropium 18 MICROgram(s) Capsule 1 Capsule(s) Inhalation daily PRN SOB/wheezing      CAPILLARY BLOOD GLUCOSE        I&O's Summary    11 Oct 2022 07:01  -  12 Oct 2022 07:00  --------------------------------------------------------  IN: 800 mL / OUT: 500 mL / NET: 300 mL        PHYSICAL EXAM:  Vital Signs Last 24 Hrs  T(C): 37.3 (12 Oct 2022 00:26), Max: 37.3 (12 Oct 2022 00:26)  T(F): 99.1 (12 Oct 2022 00:26), Max: 99.1 (12 Oct 2022 00:26)  HR: 80 (12 Oct 2022 00:26) (80 - 86)  BP: 122/78 (12 Oct 2022 00:26) (99/65 - 122/78)  BP(mean): --  RR: 18 (12 Oct 2022 00:26) (18 - 20)  SpO2: 97% (12 Oct 2022 00:26) (96% - 97%)    Parameters below as of 12 Oct 2022 00:26  Patient On (Oxygen Delivery Method): room air        GENERAL: NAD, lying in bed comfortably  HEAD:  Atraumatic, Normocephalic  EYES: EOMI, PERRLA, conjunctiva and sclera clear  ENT: Moist mucous membranes  NECK: Supple, No JVD  CHEST/LUNG: Clear to auscultation bilaterally; No rales, rhonchi, wheezing, or rubs. Unlabored respirations  HEART: Regular rate and rhythm; No murmurs, rubs, or gallops  ABDOMEN: BSx4; Soft, nontender, nondistended  EXTREMITIES:  2+ Peripheral Pulses, brisk capillary refill. No clubbing, cyanosis, or edema  NERVOUS SYSTEM:  A&Ox3, no focal deficits   SKIN: No rashes or lesions    LABS:                        10.   13.49 )-----------( 175      ( 12 Oct 2022 06:20 )             32.1     10-    139  |  103  |  131<H>  ----------------------------<  116<H>  4.4   |  16<L>  |  5.71<H>    Ca    8.0<L>      11 Oct 2022 14:47    TPro  5.2<L>  /  Alb  2.6<L>  /  TBili  1.1  /  DBili  0.3  /  AST  76<H>  /  ALT  52<H>  /  AlkPhos  86  10-11    PT/INR - ( 12 Oct 2022 06:20 )   PT: 13.7 sec;   INR: 1.18 ratio               Urinalysis Basic - ( 10 Oct 2022 18:16 )    Color: Yellow / Appearance: Clear / S.017 / pH: x  Gluc: x / Ketone: Negative  / Bili: Negative / Urobili: Negative   Blood: x / Protein: Trace / Nitrite: Negative   Leuk Esterase: Negative / RBC: 1 /hpf / WBC 3 /HPF   Sq Epi: x / Non Sq Epi: 0 /hpf / Bacteria: Negative        Culture - Urine (collected 10 Oct 2022 18:16)  Source: Clean Catch Clean Catch (Midstream)  Final Report (11 Oct 2022 22:06):    No growth    Culture - Blood (collected 10 Oct 2022 17:35)  Source: .Blood Blood-Peripheral  Preliminary Report (12 Oct 2022 02:02):    No growth to date.    Culture - Blood (collected 10 Oct 2022 17:20)  Source: .Blood Blood-Peripheral  Preliminary Report (12 Oct 2022 02:02):    No growth to date.        RADIOLOGY & ADDITIONAL TESTS:  Results Reviewed:   Imaging Personally Reviewed:  Electrocardiogram Personally Reviewed:    COORDINATION OF CARE:  Care Discussed with Consultants/Other Providers [Y/N]:  Prior or Outpatient Records Reviewed [Y/N]:

## 2022-10-12 NOTE — PROGRESS NOTE ADULT - PROBLEM SELECTOR PLAN 3
- Cr 6.50 on admission, unknown baseline improved to 5.25  - 3+ pitting BLE, per wife has been severe for ~4 years  - Follows Dr. Barrientos (Nephrology)  - On HCTZ at home, holding iso HoTN  - Owusu in place  - Monitor UOP  - Nephro following, recs appreciated  >>suspected multifactorial SONAL however likely predominately pre renal. Patient without evidence fo hydronephrosis on CT abdomen and pelvis  - f/u repeat UA, however unlikely GN picture contributing to SONAL  - f/u urine lytes (sodium, Chloride, Urea, Creatinine)   >>UCr 112, Marta 34, Uosm 478  - s/p 3.5L NS  - per nephro recs, switched to Bicarbonate (3 amps) in D5W at 100 cc/hr   - f/u renal ultrasound  - Will need to consider HD if renal failure continues to worsen. Monitor labs and urine output.   - Avoid NSAIDs, ACEI/ARBS, RCA and nephrotoxins. Dose medications as per eGFR  - hold on diuresis while BPs soft  - Obtain collateral from OP nephro in AM - Cr 6.50 on admission, baseline 1.1 in HIE form 9/27/22  - 3+ pitting BLE, per wife has been severe for ~4 years  - Follows Dr. Barrientos (Nephrology)  - On HCTZ at home, holding iso HoTN  - Owusu in place, monitor UOP  - Nephro following, recs appreciated  - f/u repeat UA, however unlikely GN picture contributing to SONAL  - f/u urine lytes (sodium, Chloride, Urea, Creatinine)   - UCr 112, Marta 34, Uosm 478. FeNa 1.1% indicating intrinsic renal injury  - Nephro following (Dr. Brownlee)  - Renal sono unremarkable  - Will need to consider HD if renal failure continues to worsen. Monitor labs and urine output.   - Avoid NSAIDs, ACEI/ARBS, RCA and nephrotoxins. Dose medications as per eGFR  - F/U renal doppler for evaluation of renal vein thrombosis

## 2022-10-12 NOTE — PROGRESS NOTE ADULT - PROBLEM SELECTOR PLAN 1
DDx includes toxic metabolic including uremic vs septic vs less likely structural  - 3 days AMS since hospital d/c to rehab following total L knee arthroplasty  - per pt wife, pt developed UTI at rehab, was started on abx (unknown which)  - AAOx3 at [facility]  - [facility] unremarkable  - Will treat sepsis as below  - Consider HD if worsening clinical status per nephro, which would improve uremia

## 2022-10-12 NOTE — ADVANCED PRACTICE NURSE CONSULT - ASSESSMENT
Consult received & events noted to date. The pt was encountered [facility]- Mr. Merritt is alert but lethargic w/flat affect. Introduced self & role of CWOCN to pt and his son.  Pt is Covid + & airborne & contact precautions maintained. Pt is on an alternating air with low air loss support surface. Pt kennedy catheter in place & incontinent of stool (pericare provided earlier by staff RN). Pt requires total assist w/turning & repositioning & staff RN, [facility] at bedside to assist. On exam noted to sacrum extending to R buttocks is a wound with different skin changes noted.  To the sacral/bilateral buttocks is 6cm L x 7cm W x 0 deep area of intact, dusky maroon discoloration noted to skin suggesting that there may be a component of deep tissue injury. Within this area to R buttocks is a 2 cm L x 1 cm W x 0.1 D wound with 100% pale yellow fibrinous slough & scant serosanguineous drainage noted. No malodor, erythema, fluctuance noted. Findings suggest that this skin alteration appears to be an unstageable pressure injury present on admission. Will recommend  to continue to monitor & apply  Triad paste daily to lay down a protective coating to skin & to manage low levels of exudate while promoting autolytic debridement .  Skin warm, dry with increased moisture in intertriginous folds, and scattered areas of hyperpigmentation to b/l groin, scrotum & b/l ischium which can be MAD/IAD (moisture/incontinent associative dermatitis) with some component on deep tissue injury to b/l ischium. Will recommend use of Triad to groin, scrotum & b/l ischium.  Interdry to pannus to wick away moisture. Also noted LLE with hemosiderin staining, blanchable erythema & callous  to bilateral heels. Fungal elongated toe nails noted & will recommend Wound Care Podiatry to f/u. As pt was admitted with a pressure injury, will recommend a dietician consult.  Education was provided to pt/son regarding interventions to prevent pressure injuries including turning & repositioning every 2hr, use of seat cushion when OOB to ch, limiting time when in chair, mobility (as able), & nutrition. Pt & son express understanding using verbal teach back at this time.       Consult received & events noted to date. The pt was encountered [facility]- Mr. Merritt is alert but lethargic w/flat affect. Introduced self & role of CWOCN to pt and his son.  Pt is Covid + & airborne & contact precautions maintained. Pt is on an alternating air with low air loss support surface. Pt kennedy catheter in place & incontinent of stool (pericare provided earlier by staff RN). Pt requires total assist w/turning & repositioning & staff RN, [facility] at bedside to assist. On exam noted to sacrum extending to R buttocks is a wound with different skin changes noted.  To the sacral/bilateral buttocks is 6cm L x 7cm W x 0 deep area of intact, dusky maroon discoloration noted to skin suggesting that there may be a component of deep tissue injury. Within this area to R buttocks is a 2 cm L x 1.7 cm W x 0.1 D wound with 100% pale yellow fibrinous slough & scant serosanguineous drainage noted. No malodor, erythema, fluctuance noted. Findings suggest that this skin alteration appears to be an unstageable pressure injury present on admission. Will recommend  to continue to monitor & apply  Triad paste daily to lay down a protective coating to skin & to manage low levels of exudate while promoting autolytic debridement .  Skin warm, dry with increased moisture in intertriginous folds, and scattered areas of hyperpigmentation to b/l groin, scrotum & b/l ischium which can be MAD/IAD (moisture/incontinent associative dermatitis) with some component on deep tissue injury to b/l ischium. Will recommend use of Triad to groin, scrotum & b/l ischium.  Interdry to pannus to wick away moisture. Also noted LLE with hemosiderin staining, blanchable erythema & callous  to bilateral heels. Fungal elongated toe nails noted & will recommend Wound Care Podiatry to f/u. As pt was admitted with a pressure injury, will recommend a dietician consult.  Education was provided to pt/son regarding interventions to prevent pressure injuries including turning & repositioning every 2hr, use of seat cushion when OOB to ch, limiting time when in chair, mobility (as able), & nutrition. Pt & son express understanding using verbal teach back at this time.

## 2022-10-12 NOTE — CONSULT NOTE ADULT - NS ATTEND AMEND GEN_ALL_CORE FT
10/12/2022    Seen and examined.  The left leg is edematous, but he states his foot has always been swollen for a long time.  Motion and sensation are intact.    Duplex showed extensive proximal DVT  Creatinine prohibits use of IV contrast to evaluate further    No signs of phlegmasia.    Leg was wrapped with 2 layers of ace wrap    [place] [phone]

## 2022-10-12 NOTE — PROGRESS NOTE ADULT - SUBJECTIVE AND OBJECTIVE BOX
Patient is a 67y old  Male who presents with a chief complaint of AMS (12 Oct 2022 07:10)      HPI:  67M PMH HTN, HLD, heart arrythmia, renal cancer s/p partial nephrectomy (~2017/18), DVT/PE iso renal cancer (~2017/18)on xarelto, s/p total L knee arthoplasty (9/16/22), asthma, BIBEMS from [facility] for AMS x3 days. Per wife, patient was d/c to [facility] after L knee arthroplasty, at which point patient went from being totally independent to totally dependent post-op (requiring lift in- and out-of-bed, was in diaper, etc.). States he had started to become very confused, was not feeling well, got UTI, became incoherent and arms started shaking, and appeared "as if he aged 30 years". At [facility] he is AAOx3. Patient currently being treated for UTI with unknown antibiotics. Patient reports tremors and feeling thirsty, otherwise denies F/C, N/V/D, abdominal pain, dysuria, hematuria, HA, dizziness, CP, SOB, sick contacts.    ED course: afebrile, , /86 initially, however later hypotensive to 70s/50s, initially tachypneic to 25 on NRB now on RA. WBC 19, Na 139, Cr 6.5, trops 242 --> 210, BNP 3661. UA negative. COVID+. CTH w/o hemorrhage or midline shift. CT chest w/o PNA. (11 Oct 2022 00:53)      PAST MEDICAL & SURGICAL HISTORY:  HTN (hypertension)      HLD (hyperlipidemia)      Pulmonary embolism      Arrhythmia      Deep vein thrombosis (DVT)      Asthma      Renal cancer  s/p partial nephrectomy      History of left knee replacement      History of partial nephrectomy          MEDICATIONS  (STANDING):  cefTRIAXone   IVPB 1000 milliGRAM(s) IV Intermittent every 24 hours  chlorhexidine 2% Cloths 1 Application(s) Topical daily  heparin  Infusion.  Unit(s)/Hr (20 mL/Hr) IV Continuous <Continuous>  influenza  Vaccine (HIGH DOSE) 0.7 milliLiter(s) IntraMuscular once  latanoprost 0.005% Ophthalmic Solution 1 Drop(s) Both EYES at bedtime  sodium bicarbonate 650 milliGRAM(s) Oral three times a day  sodium chloride 0.45%. 1000 milliLiter(s) (75 mL/Hr) IV Continuous <Continuous>  timolol 0.5% Solution 1 Drop(s) Both EYES two times a day    MEDICATIONS  (PRN):  acetaminophen     Tablet .. 650 milliGRAM(s) Oral every 6 hours PRN Temp greater or equal to 38C (100.4F), Mild Pain (1 - 3)  ALBUTerol    90 MICROgram(s) HFA Inhaler 2 Puff(s) Inhalation every 6 hours PRN Shortness of Breath and/or Wheezing  melatonin 3 milliGRAM(s) Oral at bedtime PRN Insomnia  tiotropium 18 MICROgram(s) Capsule 1 Capsule(s) Inhalation daily PRN SOB/wheezing      Allergies    Allergy Status Unknown    Intolerances        VITALS:    Vital Signs Last 24 Hrs  T(C): 37.1 (12 Oct 2022 12:19), Max: 37.3 (12 Oct 2022 00:26)  T(F): 98.8 (12 Oct 2022 12:19), Max: 99.1 (12 Oct 2022 00:26)  HR: 66 (12 Oct 2022 12:19) (66 - 86)  BP: 115/68 (12 Oct 2022 12:19) (100/61 - 122/78)  BP(mean): --  RR: 18 (12 Oct 2022 12:19) (18 - 20)  SpO2: 97% (12 Oct 2022 12:19) (97% - 97%)    Parameters below as of 12 Oct 2022 00:26  Patient On (Oxygen Delivery Method): room air        LABS:                          10.1   13.49 )-----------( 175      ( 12 Oct 2022 06:20 )             32.1       10-12    141  |  103  |  127<H>  ----------------------------<  111<H>  4.0   |  20<L>  |  5.43<H>    Ca    8.0<L>      12 Oct 2022 06:24  Phos  5.6     10-12  Mg     2.2     10-12    TPro  5.3<L>  /  Alb  2.7<L>  /  TBili  1.3<H>  /  DBili  x   /  AST  36  /  ALT  40  /  AlkPhos  75  10-12      CAPILLARY BLOOD GLUCOSE          PT/INR - ( 12 Oct 2022 06:20 )   PT: 13.7 sec;   INR: 1.18 ratio             LOWER EXTREMITY PHYSICAL EXAM:    Vascular: DP/PT 0/4, B/L, CFT <3 seconds B/L, Temperature gradient warm, B/L.   Neuro: Epicritic sensation hypersensitive to the level of toes, B/L.  Musculoskeletal/Ortho: contracted hammer toes 2,3,4,5 both feet  Skin: thick, mycotic dystrophic discolored nails with pain both feet

## 2022-10-12 NOTE — ADVANCED PRACTICE NURSE CONSULT - RECOMMEDATIONS
Will recommend:  1. Topical therapy- sacral/bilateral buttocks- cleanse w/incontinent cleanser, pat dry & apply Triad paste twice daily & prn soiling episodes; With episodes of incontinence only remove soiled layer of Triad, then reinforce with thin layer.  2. Incontinent management - incontinent cleanser, pads, pericare BID, apply Triad to b/l groin, scrotum & b/l ishium  3. Interdry AG- to pannus (skin fold) change every 3 days and prn if soiled  4. Maintain on an alternating air with low air loss surface  5. Turn & reposition every 2 hr; Use positioning pillow to turn and reposition, soft pillow between bony prominences; continue measures to decrease friction/shear/pressure  6. Elevate heels; continue with Complete Cair air fluidized boots; ensure that the soles of the feet are not resting on the foot board of the bed  7. Nutrition optimization.  8. Apply "Sween 24 once a day moisturizer" daily to b/l lower extremities & heels, avoid areas in between toes.  9 Podiatry Wound Care to evaluate toe nails.  Discussed treatment plan w/staff RNGenesis at bedside, pt & son.

## 2022-10-12 NOTE — CONSULT NOTE ADULT - SUBJECTIVE AND OBJECTIVE BOX
Vascular Cardiology Consult Note    DIRECT PROVIDER SERVICE LINE/CONSULT #: [phone]             OFFICE [phone]    CC:  AMS    HPI:  66 y/o M with PMHX HTN, HLD, Renal CA s/p partial nephrectomy, h/o DVT/PE on Xarelto, s/p total L knee arthoplasty (9/16/22), Asthma, who presented from Rehab with AMS. Being treated for UTI. Noted to have SONAL to Cr. 6.5. Troponin T 242 to 210. Currently hemodynamically stable on RA. LE venous duplex showed  acute, occlusive DVT affecting the right external iliac, common femoral, deep femoral, femoral and popliteal veins. The right great saphenous vein is thrombosed. There is acute, occlusive DVT affecting the left external iliac, common femoral, deep femoral, femoral and popliteal veins above the knee and the left posterior tibial peroneal trunk. Patient currently without C/P or SOB. L LE edema more than R LE. Motor and sensation intact. Vascular cardiology consulted.     Allergies  Allergy Status Unknown    MEDICATIONS:  heparin  Infusion.  Unit(s)/Hr IV Continuous <Continuous>  warfarin 5 milliGRAM(s) Oral once  cefTRIAXone   IVPB 1000 milliGRAM(s) IV Intermittent every 24 hours  ALBUTerol    90 MICROgram(s) HFA Inhaler 2 Puff(s) Inhalation every 6 hours PRN  tiotropium 18 MICROgram(s) Capsule 1 Capsule(s) Inhalation daily PRN  acetaminophen     Tablet .. 650 milliGRAM(s) Oral every 6 hours PRN  melatonin 3 milliGRAM(s) Oral at bedtime PRN  chlorhexidine 2% Cloths 1 Application(s) Topical daily  influenza  Vaccine (HIGH DOSE) 0.7 milliLiter(s) IntraMuscular once  latanoprost 0.005% Ophthalmic Solution 1 Drop(s) Both EYES at bedtime  sodium bicarbonate 650 milliGRAM(s) Oral three times a day  sodium chloride 0.45%. 1000 milliLiter(s) IV Continuous <Continuous>  timolol 0.5% Solution 1 Drop(s) Both EYES two times a day    PAST MEDICAL & SURGICAL HISTORY:  HTN (hypertension)  HLD (hyperlipidemia)  Pulmonary embolism  Arrhythmia  Deep vein thrombosis (DVT)  Asthma  Renal cancer  s/p partial nephrectomy  History of left knee replacement  History of partial nephrectomy    FAMILY HISTORY: see HPI      SOCIAL HISTORY:  unchanged    REVIEW OF SYSTEMS:  CONSTITUTIONAL: No fever  EYES: No eye pain  ENT:  No throat pain  NECK: No pain   RESPIRATORY: No current SOB  CARDIOVASCULAR: No current C/P  GASTROINTESTINAL: No abdominal pain  GENITOURINARY: No hematuria  NEUROLOGICAL: History of AMS  SKIN: No LE ulcers  LYMPH Nodes: No enlarged glands noted  ENDOCRINE: No heat or cold intolerance noted  MUSCULOSKELETAL: LE edema  PSYCHIATRIC: No depression, anxiety  HEME/LYMPH: No  bleeding gums  ALLERGY AND IMMUNOLOGIC: No hives    [ x] All others negative	    PHYSICAL EXAM:  T(C): 37.1 (10-12-22 @ 12:19), Max: 37.3 (10-12-22 @ 00:26)  HR: 66 (10-12-22 @ 12:19) (66 - 86)  BP: 115/68 (10-12-22 @ 12:19) (100/61 - 122/78)  RR: 18 (10-12-22 @ 12:19) (18 - 20)  SpO2: 97% (10-12-22 @ 12:19) (97% - 97%)  I&O's Summary    11 Oct 2022 07:01  -  12 Oct 2022 07:00  --------------------------------------------------------  IN: 800 mL / OUT: 500 mL / NET: 300 mL    12 Oct 2022 07:01  -  12 Oct 2022 17:09  --------------------------------------------------------  IN: 1180 mL / OUT: 600 mL / NET: 580 m    Appearance: NAD	  HEENT:  NC/AT  Cardiovascular: RRR, S1 and S2  Respiratory: CTA B/L   Psychiatry:  Alert and Awake  Gastrointestinal:  Soft, Non-tender, + BS	  Skin: No rashes, No ecchymoses, No cyanosis	  Neurologic: no focal deficit noted    Extremities: L greater than R LE edema  motor strength and sensation intact  no LE wounds     LABS:	 	    CBC Full  -  ( 12 Oct 2022 06:20 )  WBC Count : 13.49 K/uL  Hemoglobin : 10.1 g/dL  Hematocrit : 32.1 %  Platelet Count - Automated : 175 K/uL  Mean Cell Volume : 83.4 fl  Mean Cell Hemoglobin : 26.2 pg  Mean Cell Hemoglobin Concentration : 31.5 gm/dL  Auto Neutrophil # : 11.27 K/uL  Auto Lymphocyte # : 1.08 K/uL  Auto Monocyte # : 0.84 K/uL  Auto Eosinophil # : 0.14 K/uL  Auto Basophil # : 0.02 K/uL  Auto Neutrophil % : 83.7 %  Auto Lymphocyte % : 8.0 %  Auto Monocyte % : 6.2 %  Auto Eosinophil % : 1.0 %  Auto Basophil % : 0.1 %    10-12    141  |  103  |  127<H>  ----------------------------<  111<H>  4.0   |  20<L>  |  5.43<H>  10-11    139  |  103  |  131<H>  ----------------------------<  116<H>  4.4   |  16<L>  |  5.71<H>    Ca    8.0<L>      12 Oct 2022 06:24  Ca    8.0<L>      11 Oct 2022 14:47  Phos  5.6     10-12  Mg     2.2     10-12    TPro  5.3<L>  /  Alb  2.7<L>  /  TBili  1.3<H>  /  DBili  x   /  AST  36  /  ALT  40  /  AlkPhos  75  10-12  TPro  5.2<L>  /  Alb  2.6<L>  /  TBili  1.1  /  DBili  0.3  /  AST  76<H>  /  ALT  52<H>  /  AlkPhos  86  10-11      Assessment:  1. Bilateral Proximal DVT  - Currently hemodynamically stable  2. History of prior VTE  3. Renal CA s/p partial nephrectomy  4. SONAL  5. HTN / HLD  6. COVID-19     Plan:  1. Recommend Heparin gtt to Coumadin bridge in setting of renal disease / SONAL and COVID-19.  2. No evidence of phlegmasia. Motor strength and sensation intact.  3. Routine echocardiogram to assess RV.   4. Left lower extremity wrapped in ace wrap. Left lower extremity elevation.   5. Appreciate excellent care by Dr. Wesley. Appreciate Renal.      Thank you  BETTINA Pendleton, MS, [facility]  Vascular Cardiology Service    Please call with any questions:   DIRECT SERVICE NUMBER: [phone]  Office [phone]

## 2022-10-13 DIAGNOSIS — I82.409 ACUTE EMBOLISM AND THROMBOSIS OF UNSPECIFIED DEEP VEINS OF UNSPECIFIED LOWER EXTREMITY: ICD-10-CM

## 2022-10-13 LAB
ALBUMIN SERPL ELPH-MCNC: 2.4 G/DL — LOW (ref 3.3–5)
ALP SERPL-CCNC: 75 U/L — SIGNIFICANT CHANGE UP (ref 40–120)
ALT FLD-CCNC: 29 U/L — SIGNIFICANT CHANGE UP (ref 10–45)
ANION GAP SERPL CALC-SCNC: 17 MMOL/L — SIGNIFICANT CHANGE UP (ref 5–17)
APTT BLD: 63.4 SEC — HIGH (ref 27.5–35.5)
APTT BLD: 65.9 SEC — HIGH (ref 27.5–35.5)
AST SERPL-CCNC: 20 U/L — SIGNIFICANT CHANGE UP (ref 10–40)
BASOPHILS # BLD AUTO: 0.02 K/UL — SIGNIFICANT CHANGE UP (ref 0–0.2)
BASOPHILS NFR BLD AUTO: 0.2 % — SIGNIFICANT CHANGE UP (ref 0–2)
BILIRUB SERPL-MCNC: 1 MG/DL — SIGNIFICANT CHANGE UP (ref 0.2–1.2)
BUN SERPL-MCNC: 118 MG/DL — HIGH (ref 7–23)
CALCIUM SERPL-MCNC: 7.5 MG/DL — LOW (ref 8.4–10.5)
CHLORIDE SERPL-SCNC: 102 MMOL/L — SIGNIFICANT CHANGE UP (ref 96–108)
CK SERPL-CCNC: 62 U/L — SIGNIFICANT CHANGE UP (ref 30–200)
CO2 SERPL-SCNC: 21 MMOL/L — LOW (ref 22–31)
CREAT SERPL-MCNC: 4.05 MG/DL — HIGH (ref 0.5–1.3)
EGFR: 16 ML/MIN/1.73M2 — LOW
EOSINOPHIL # BLD AUTO: 0.25 K/UL — SIGNIFICANT CHANGE UP (ref 0–0.5)
EOSINOPHIL NFR BLD AUTO: 2.3 % — SIGNIFICANT CHANGE UP (ref 0–6)
GLUCOSE SERPL-MCNC: 106 MG/DL — HIGH (ref 70–99)
HCT VFR BLD CALC: 31.6 % — LOW (ref 39–50)
HGB BLD-MCNC: 9.9 G/DL — LOW (ref 13–17)
IMM GRANULOCYTES NFR BLD AUTO: 0.9 % — SIGNIFICANT CHANGE UP (ref 0–0.9)
INR BLD: 1.13 RATIO — SIGNIFICANT CHANGE UP (ref 0.88–1.16)
LDH SERPL L TO P-CCNC: 330 U/L — HIGH (ref 50–242)
LYMPHOCYTES # BLD AUTO: 1.13 K/UL — SIGNIFICANT CHANGE UP (ref 1–3.3)
LYMPHOCYTES # BLD AUTO: 10.5 % — LOW (ref 13–44)
MAGNESIUM SERPL-MCNC: 2 MG/DL — SIGNIFICANT CHANGE UP (ref 1.6–2.6)
MCHC RBC-ENTMCNC: 26.3 PG — LOW (ref 27–34)
MCHC RBC-ENTMCNC: 31.3 GM/DL — LOW (ref 32–36)
MCV RBC AUTO: 83.8 FL — SIGNIFICANT CHANGE UP (ref 80–100)
MONOCYTES # BLD AUTO: 0.76 K/UL — SIGNIFICANT CHANGE UP (ref 0–0.9)
MONOCYTES NFR BLD AUTO: 7 % — SIGNIFICANT CHANGE UP (ref 2–14)
NEUTROPHILS # BLD AUTO: 8.53 K/UL — HIGH (ref 1.8–7.4)
NEUTROPHILS NFR BLD AUTO: 79.1 % — HIGH (ref 43–77)
NRBC # BLD: 0 /100 WBCS — SIGNIFICANT CHANGE UP (ref 0–0)
PHOSPHATE SERPL-MCNC: 4.7 MG/DL — HIGH (ref 2.5–4.5)
PLATELET # BLD AUTO: 172 K/UL — SIGNIFICANT CHANGE UP (ref 150–400)
POTASSIUM SERPL-MCNC: 3.6 MMOL/L — SIGNIFICANT CHANGE UP (ref 3.5–5.3)
POTASSIUM SERPL-SCNC: 3.6 MMOL/L — SIGNIFICANT CHANGE UP (ref 3.5–5.3)
PROT SERPL-MCNC: 5 G/DL — LOW (ref 6–8.3)
PROTHROM AB SERPL-ACNC: 13 SEC — SIGNIFICANT CHANGE UP (ref 10.5–13.4)
RBC # BLD: 3.77 M/UL — LOW (ref 4.2–5.8)
RBC # FLD: 16.9 % — HIGH (ref 10.3–14.5)
SODIUM SERPL-SCNC: 140 MMOL/L — SIGNIFICANT CHANGE UP (ref 135–145)
URATE SERPL-MCNC: 13.3 MG/DL — HIGH (ref 3.4–8.8)
WBC # BLD: 10.79 K/UL — HIGH (ref 3.8–10.5)
WBC # FLD AUTO: 10.79 K/UL — HIGH (ref 3.8–10.5)

## 2022-10-13 PROCEDURE — 93975 VASCULAR STUDY: CPT | Mod: 26

## 2022-10-13 PROCEDURE — 99233 SBSQ HOSP IP/OBS HIGH 50: CPT | Mod: GC

## 2022-10-13 RX ORDER — WARFARIN SODIUM 2.5 MG/1
5 TABLET ORAL ONCE
Refills: 0 | Status: DISCONTINUED | OUTPATIENT
Start: 2022-10-13 | End: 2022-10-13

## 2022-10-13 RX ORDER — SODIUM CHLORIDE 9 MG/ML
1000 INJECTION, SOLUTION INTRAVENOUS
Refills: 0 | Status: DISCONTINUED | OUTPATIENT
Start: 2022-10-13 | End: 2022-10-15

## 2022-10-13 RX ORDER — OXYCODONE HYDROCHLORIDE 5 MG/1
2.5 TABLET ORAL EVERY 6 HOURS
Refills: 0 | Status: DISCONTINUED | OUTPATIENT
Start: 2022-10-13 | End: 2022-10-14

## 2022-10-13 RX ORDER — ALLOPURINOL 300 MG
100 TABLET ORAL DAILY
Refills: 0 | Status: DISCONTINUED | OUTPATIENT
Start: 2022-10-13 | End: 2022-10-22

## 2022-10-13 RX ORDER — WARFARIN SODIUM 2.5 MG/1
5 TABLET ORAL ONCE
Refills: 0 | Status: DISCONTINUED | OUTPATIENT
Start: 2022-10-13 | End: 2022-10-14

## 2022-10-13 RX ORDER — POTASSIUM CHLORIDE 20 MEQ
20 PACKET (EA) ORAL ONCE
Refills: 0 | Status: COMPLETED | OUTPATIENT
Start: 2022-10-13 | End: 2022-10-13

## 2022-10-13 RX ORDER — NYSTATIN CREAM 100000 [USP'U]/G
1 CREAM TOPICAL
Refills: 0 | Status: DISCONTINUED | OUTPATIENT
Start: 2022-10-13 | End: 2022-10-22

## 2022-10-13 RX ORDER — OXYCODONE HYDROCHLORIDE 5 MG/1
2.5 TABLET ORAL ONCE
Refills: 0 | Status: DISCONTINUED | OUTPATIENT
Start: 2022-10-13 | End: 2022-10-19

## 2022-10-13 RX ADMIN — LATANOPROST 1 DROP(S): 0.05 SOLUTION/ DROPS OPHTHALMIC; TOPICAL at 22:58

## 2022-10-13 RX ADMIN — WARFARIN SODIUM 5 MILLIGRAM(S): 2.5 TABLET ORAL at 00:34

## 2022-10-13 RX ADMIN — Medication 650 MILLIGRAM(S): at 07:04

## 2022-10-13 RX ADMIN — CHLORHEXIDINE GLUCONATE 1 APPLICATION(S): 213 SOLUTION TOPICAL at 13:17

## 2022-10-13 RX ADMIN — Medication 650 MILLIGRAM(S): at 23:41

## 2022-10-13 RX ADMIN — Medication 650 MILLIGRAM(S): at 15:36

## 2022-10-13 RX ADMIN — NYSTATIN CREAM 1 APPLICATION(S): 100000 CREAM TOPICAL at 19:15

## 2022-10-13 RX ADMIN — Medication 650 MILLIGRAM(S): at 13:12

## 2022-10-13 RX ADMIN — HEPARIN SODIUM 2000 UNIT(S)/HR: 5000 INJECTION INTRAVENOUS; SUBCUTANEOUS at 00:34

## 2022-10-13 RX ADMIN — Medication 650 MILLIGRAM(S): at 16:06

## 2022-10-13 RX ADMIN — Medication 1 DROP(S): at 19:15

## 2022-10-13 RX ADMIN — OXYCODONE HYDROCHLORIDE 2.5 MILLIGRAM(S): 5 TABLET ORAL at 19:19

## 2022-10-13 RX ADMIN — Medication 650 MILLIGRAM(S): at 00:33

## 2022-10-13 RX ADMIN — HEPARIN SODIUM 2000 UNIT(S)/HR: 5000 INJECTION INTRAVENOUS; SUBCUTANEOUS at 19:58

## 2022-10-13 RX ADMIN — Medication 20 MILLIEQUIVALENT(S): at 10:57

## 2022-10-13 RX ADMIN — CEFTRIAXONE 100 MILLIGRAM(S): 500 INJECTION, POWDER, FOR SOLUTION INTRAMUSCULAR; INTRAVENOUS at 19:14

## 2022-10-13 RX ADMIN — HEPARIN SODIUM 2000 UNIT(S)/HR: 5000 INJECTION INTRAVENOUS; SUBCUTANEOUS at 02:32

## 2022-10-13 RX ADMIN — WARFARIN SODIUM 5 MILLIGRAM(S): 2.5 TABLET ORAL at 23:41

## 2022-10-13 RX ADMIN — Medication 1 DROP(S): at 06:58

## 2022-10-13 RX ADMIN — HEPARIN SODIUM 2000 UNIT(S)/HR: 5000 INJECTION INTRAVENOUS; SUBCUTANEOUS at 13:51

## 2022-10-13 NOTE — PROVIDER CONTACT NOTE (OTHER) - RECOMMENDATIONS
as per provider , pt needs to drink water  , if pt is swallowing , provider asked to encourage the pt to drink water

## 2022-10-13 NOTE — PROGRESS NOTE ADULT - PROBLEM SELECTOR PLAN 5
- likely multifactorial iso SONAL, component of lactic acidosis which is improving, noted improvement in AG following IVF  - c/w bicarb tabs and IV hydration  - appreciate nephro recs

## 2022-10-13 NOTE — PROGRESS NOTE ADULT - SUBJECTIVE AND OBJECTIVE BOX
PROGRESS NOTE:     Patient is a 67y old  Male who presents with a chief complaint of AMS (12 Oct 2022 17:09)      SUBJECTIVE / OVERNIGHT EVENTS:  No acute events overnight. Extensive B/L DVTs found on duplex yesterday. Pending renal doppler. Continues to make good urine via kennedy.    ADDITIONAL REVIEW OF SYSTEMS:    MEDICATIONS  (STANDING):  cefTRIAXone   IVPB 1000 milliGRAM(s) IV Intermittent every 24 hours  chlorhexidine 2% Cloths 1 Application(s) Topical daily  heparin  Infusion.  Unit(s)/Hr (20 mL/Hr) IV Continuous <Continuous>  influenza  Vaccine (HIGH DOSE) 0.7 milliLiter(s) IntraMuscular once  latanoprost 0.005% Ophthalmic Solution 1 Drop(s) Both EYES at bedtime  sodium bicarbonate 650 milliGRAM(s) Oral three times a day  sodium chloride 0.45%. 1000 milliLiter(s) (75 mL/Hr) IV Continuous <Continuous>  timolol 0.5% Solution 1 Drop(s) Both EYES two times a day    MEDICATIONS  (PRN):  acetaminophen     Tablet .. 650 milliGRAM(s) Oral every 6 hours PRN Temp greater or equal to 38C (100.4F), Mild Pain (1 - 3)  ALBUTerol    90 MICROgram(s) HFA Inhaler 2 Puff(s) Inhalation every 6 hours PRN Shortness of Breath and/or Wheezing  melatonin 3 milliGRAM(s) Oral at bedtime PRN Insomnia  tiotropium 18 MICROgram(s) Capsule 1 Capsule(s) Inhalation daily PRN SOB/wheezing      CAPILLARY BLOOD GLUCOSE        I&O's Summary    11 Oct 2022 07:01  -  12 Oct 2022 07:00  --------------------------------------------------------  IN: 800 mL / OUT: 500 mL / NET: 300 mL    12 Oct 2022 07:01  -  13 Oct 2022 06:51  --------------------------------------------------------  IN: 2925 mL / OUT: 1200 mL / NET: 1725 mL        PHYSICAL EXAM:  Vital Signs Last 24 Hrs  T(C): 37.3 (13 Oct 2022 01:47), Max: 37.3 (13 Oct 2022 01:47)  T(F): 99.1 (13 Oct 2022 01:47), Max: 99.1 (13 Oct 2022 01:47)  HR: 83 (13 Oct 2022 01:47) (66 - 83)  BP: 112/68 (13 Oct 2022 01:47) (106/65 - 116/70)  BP(mean): --  RR: 18 (13 Oct 2022 01:47) (18 - 18)  SpO2: 97% (13 Oct 2022 01:47) (97% - 97%)    Parameters below as of 13 Oct 2022 01:47  Patient On (Oxygen Delivery Method): room air    GENERAL: NAD, lying in bed, tremoring.  HEAD:  Atraumatic, normocephalic.  EYES: Unable to examine iso patient participation.  ENT: Dry mucous membranes.   NECK: Supple, no JVD, trachea midline.  CHEST/LUNG: CTAB. No rales, rhonchi, wheezing, or rubs. Unlabored respirations.  HEART: RRR, no M/R/G, B/L LE swelling L>R, recent knee arthroplasty seen on L. leg  ABDOMEN: Soft, obese, nontender, nondistended. Normoactive bowel sounds.  EXTREMITIES:  2+ peripheral pulses b/l. 3+ BLE edema.  Neurological:  AAOx2, b/l tremors.  SKIN: Rosacea on nose  PSYCH: Normal affect and mood.    LABS:                        10.1   12.28 )-----------( 148      ( 12 Oct 2022 18:03 )             32.4     10-12    141  |  103  |  127<H>  ----------------------------<  111<H>  4.0   |  20<L>  |  5.43<H>    Ca    8.0<L>      12 Oct 2022 06:24  Phos  5.6     10-12  Mg     2.2     10-12    TPro  5.3<L>  /  Alb  2.7<L>  /  TBili  1.3<H>  /  DBili  x   /  AST  36  /  ALT  40  /  AlkPhos  75  10-12    PT/INR - ( 12 Oct 2022 06:20 )   PT: 13.7 sec;   INR: 1.18 ratio         PTT - ( 13 Oct 2022 01:51 )  PTT:65.9 sec          Culture - Urine (collected 10 Oct 2022 18:16)  Source: Clean Catch Clean Catch (Midstream)  Final Report (11 Oct 2022 22:06):    No growth    Culture - Blood (collected 10 Oct 2022 17:35)  Source: .Blood Blood-Peripheral  Preliminary Report (12 Oct 2022 02:02):    No growth to date.    Culture - Blood (collected 10 Oct 2022 17:20)  Source: .Blood Blood-Peripheral  Preliminary Report (12 Oct 2022 02:02):    No growth to date.        RADIOLOGY & ADDITIONAL TESTS:  Results Reviewed:   Imaging Personally Reviewed:  Electrocardiogram Personally Reviewed:    COORDINATION OF CARE:  Care Discussed with Consultants/Other Providers [Y/N]:  Prior or Outpatient Records Reviewed [Y/N]:   PROGRESS NOTE:     Patient is a 67y old  Male who presents with a chief complaint of AMS (12 Oct 2022 17:09)      SUBJECTIVE / OVERNIGHT EVENTS:  No acute events overnight. Extensive B/L DVTs found on duplex yesterday. Pending renal doppler. Continues to make good urine via kennedy.    ADDITIONAL REVIEW OF SYSTEMS:    MEDICATIONS  (STANDING):  cefTRIAXone   IVPB 1000 milliGRAM(s) IV Intermittent every 24 hours  chlorhexidine 2% Cloths 1 Application(s) Topical daily  heparin  Infusion.  Unit(s)/Hr (20 mL/Hr) IV Continuous <Continuous>  influenza  Vaccine (HIGH DOSE) 0.7 milliLiter(s) IntraMuscular once  latanoprost 0.005% Ophthalmic Solution 1 Drop(s) Both EYES at bedtime  sodium bicarbonate 650 milliGRAM(s) Oral three times a day  sodium chloride 0.45%. 1000 milliLiter(s) (75 mL/Hr) IV Continuous <Continuous>  timolol 0.5% Solution 1 Drop(s) Both EYES two times a day    MEDICATIONS  (PRN):  acetaminophen     Tablet .. 650 milliGRAM(s) Oral every 6 hours PRN Temp greater or equal to 38C (100.4F), Mild Pain (1 - 3)  ALBUTerol    90 MICROgram(s) HFA Inhaler 2 Puff(s) Inhalation every 6 hours PRN Shortness of Breath and/or Wheezing  melatonin 3 milliGRAM(s) Oral at bedtime PRN Insomnia  tiotropium 18 MICROgram(s) Capsule 1 Capsule(s) Inhalation daily PRN SOB/wheezing      CAPILLARY BLOOD GLUCOSE        I&O's Summary    11 Oct 2022 07:01  -  12 Oct 2022 07:00  --------------------------------------------------------  IN: 800 mL / OUT: 500 mL / NET: 300 mL    12 Oct 2022 07:01  -  13 Oct 2022 06:51  --------------------------------------------------------  IN: 2925 mL / OUT: 1200 mL / NET: 1725 mL        PHYSICAL EXAM:  Vital Signs Last 24 Hrs  T(C): 37.3 (13 Oct 2022 01:47), Max: 37.3 (13 Oct 2022 01:47)  T(F): 99.1 (13 Oct 2022 01:47), Max: 99.1 (13 Oct 2022 01:47)  HR: 83 (13 Oct 2022 01:47) (66 - 83)  BP: 112/68 (13 Oct 2022 01:47) (106/65 - 116/70)  BP(mean): --  RR: 18 (13 Oct 2022 01:47) (18 - 18)  SpO2: 97% (13 Oct 2022 01:47) (97% - 97%)    Parameters below as of 13 Oct 2022 01:47  Patient On (Oxygen Delivery Method): room air    GENERAL: NAD  EYES: Unable to examine iso patient participation.  ENT: Dry mucous membranes.   NECK: Supple, no JVD, trachea midline.  CHEST/LUNG: CTAB. No rales, rhonchi, wheezing, or rubs. Unlabored respirations.  HEART: RRR, no M/R/G, B/L LE swelling L>R, recent knee arthroplasty seen on L. leg  ABDOMEN: Soft, obese, nontender, nondistended. Normoactive bowel sounds.  EXTREMITIES:  2+ peripheral pulses b/l. 3+ BLE edema.  Neurological:  AAOx2, b/l tremors.  SKIN: Rosacea on nose  PSYCH: Normal affect and mood.    LABS:                        10.1   12.28 )-----------( 148      ( 12 Oct 2022 18:03 )             32.4     10-12    141  |  103  |  127<H>  ----------------------------<  111<H>  4.0   |  20<L>  |  5.43<H>    Ca    8.0<L>      12 Oct 2022 06:24  Phos  5.6     10-12  Mg     2.2     10-12    TPro  5.3<L>  /  Alb  2.7<L>  /  TBili  1.3<H>  /  DBili  x   /  AST  36  /  ALT  40  /  AlkPhos  75  10-12    PT/INR - ( 12 Oct 2022 06:20 )   PT: 13.7 sec;   INR: 1.18 ratio         PTT - ( 13 Oct 2022 01:51 )  PTT:65.9 sec          Culture - Urine (collected 10 Oct 2022 18:16)  Source: Clean Catch Clean Catch (Midstream)  Final Report (11 Oct 2022 22:06):    No growth    Culture - Blood (collected 10 Oct 2022 17:35)  Source: .Blood Blood-Peripheral  Preliminary Report (12 Oct 2022 02:02):    No growth to date.    Culture - Blood (collected 10 Oct 2022 17:20)  Source: .Blood Blood-Peripheral  Preliminary Report (12 Oct 2022 02:02):    No growth to date.        RADIOLOGY & ADDITIONAL TESTS:  Results Reviewed:   Imaging Personally Reviewed:  Electrocardiogram Personally Reviewed:    COORDINATION OF CARE:  Care Discussed with Consultants/Other Providers [Y/N]:  Prior or Outpatient Records Reviewed [Y/N]:

## 2022-10-13 NOTE — PROGRESS NOTE ADULT - PROBLEM SELECTOR PLAN 1
DDx includes toxic metabolic including uremic vs septic vs less likely structural  - 3 days AMS since hospital d/c to rehab following total L knee arthroplasty  - per pt wife, pt developed UTI at rehab, was started on abx (unknown which)  - AAOx3 at [facility]  - [facility] unremarkable  - Will treat sepsis as below  - Consider HD if worsening clinical status per nephro, which would improve uremia

## 2022-10-13 NOTE — PROGRESS NOTE ADULT - SUBJECTIVE AND OBJECTIVE BOX
Not in distress, comfortable on RA, denies CP, SOB, N/V, good UO    Vital Signs Last 24 Hrs  T(C): 36.6 (10-13-22 @ 11:57), Max: 37.3 (10-13-22 @ 01:47)  T(F): 97.9 (10-13-22 @ 11:57), Max: 99.1 (10-13-22 @ 01:47)  HR: 76 (10-13-22 @ 11:57) (60 - 83)  BP: 91/61 (10-13-22 @ 11:57) (91/61 - 112/68)  RR: 18 (10-13-22 @ 11:57) (18 - 18)  SpO2: 99% (10-13-22 @ 11:57) (96% - 99%)    I&O's Detail    12 Oct 2022 07:01  -  13 Oct 2022 07:00  --------------------------------------------------------  IN:    Heparin Infusion: 380 mL    IV PiggyBack: 150 mL    Oral Fluid: 670 mL    sodium chloride 0.45%: 1725 mL  Total IN: 2925 mL    OUT:    Indwelling Catheter - Urethral (mL): 1200 mL  Total OUT: 1200 mL    Total NET: 1725 mL    13 Oct 2022 07:01  -  13 Oct 2022 16:10  --------------------------------------------------------  IN:    Oral Fluid: 400 mL  Total IN: 400 mL    OUT:    Indwelling Catheter - Urethral (mL): 1650 mL  Total OUT: 1650 mL    Total NET: -1250 mL    s1s2  b/l air entry  soft, ND  sm edema, stasis changes b/l LE                                 9.9    10.79 )-----------( 172      ( 13 Oct 2022 07:04 )             31.6     13 Oct 2022 06:59    140    |  102    |  118    ----------------------------<  106    3.6     |  21     |  4.05     Ca    7.5        13 Oct 2022 06:59  Phos  4.7       13 Oct 2022 06:59  Mg     2.0       13 Oct 2022 06:59    TPro  5.0    /  Alb  2.4    /  TBili  1.0    /  DBili  x      /  AST  20     /  ALT  29     /  AlkPhos  75     13 Oct 2022 06:59    LIVER FUNCTIONS - ( 13 Oct 2022 06:59 )  Alb: 2.4 g/dL / Pro: 5.0 g/dL / ALK PHOS: 75 U/L / ALT: 29 U/L / AST: 20 U/L / GGT: x           PT/INR - ( 13 Oct 2022 07:05 )   PT: 13.0 sec;   INR: 1.13 ratio      Culture - Urine (collected 10 Oct 2022 18:16)  Source: Clean Catch Clean Catch (Midstream)  Final Report (11 Oct 2022 22:06):    No growth    Culture - Blood (collected 10 Oct 2022 17:35)  Source: .Blood Blood-Peripheral  Preliminary Report (12 Oct 2022 02:02):    No growth to date.    Culture - Blood (collected 10 Oct 2022 17:20)  Source: .Blood Blood-Peripheral  Preliminary Report (12 Oct 2022 02:02):    No growth to date.    acetaminophen     Tablet .. 650 milliGRAM(s) Oral every 6 hours PRN  ALBUTerol    90 MICROgram(s) HFA Inhaler 2 Puff(s) Inhalation every 6 hours PRN  allopurinol 100 milliGRAM(s) Oral daily  cefTRIAXone   IVPB 1000 milliGRAM(s) IV Intermittent every 24 hours  chlorhexidine 2% Cloths 1 Application(s) Topical daily  heparin  Infusion.  Unit(s)/Hr IV Continuous <Continuous>  influenza  Vaccine (HIGH DOSE) 0.7 milliLiter(s) IntraMuscular once  latanoprost 0.005% Ophthalmic Solution 1 Drop(s) Both EYES at bedtime  melatonin 3 milliGRAM(s) Oral at bedtime PRN  nystatin Powder 1 Application(s) Topical two times a day  sodium bicarbonate 650 milliGRAM(s) Oral three times a day  sodium chloride 0.45%. 1000 milliLiter(s) IV Continuous <Continuous>  timolol 0.5% Solution 1 Drop(s) Both EYES two times a day  tiotropium 18 MICROgram(s) Capsule 1 Capsule(s) Inhalation daily PRN  warfarin 5 milliGRAM(s) Oral once    A/P:    S/p L TKA 9/16/22  Severe ATN on adm 10/10/22 (Cr 1.07 - 9/27/22)  Etiology not obvious (hypotensive/hemodynamic, was on ARB, HCTZ, may have gotten NSAID's)  Cr is improving (peak Cr 6.5 - 10/10/22)  Continue IVF  Avoid nephrotoxins  Avoid hypotension  F/u BMP, UO  B/l LE DVT, AC  Will follow    [phone]

## 2022-10-13 NOTE — PROVIDER CONTACT NOTE (OTHER) - ASSESSMENT
PT A&Ox3, pt denies any acute distress, pt denies any SOB , pt denies any chest pain, pt able to verbalize name, , place , BP 89/61, Temp 98.2 HR 82 RR 17 Oxygen 95 % on room air

## 2022-10-13 NOTE — PROVIDER CONTACT NOTE (OTHER) - ACTION/TREATMENT ORDERED:
provider aware. as per provider no fluid therapy maintenance indicated for a pt with sepsis, continue 0.45% NS as ordered, blood cx repeat not indicated as pt is afebrile, continue to monitor pt

## 2022-10-13 NOTE — PROGRESS NOTE ADULT - PROBLEM SELECTOR PLAN 3
Duplex 10/12 with acute bilateral DVTs in lower extremity. Likely provoked in setting of recent surgery and venous stasis being in bed. Surprisingly developed on Xarelto.  -AC to heparin gtt with plans for warfarin bridge as outpt  -Appreciate vascular cardiology evaluation  -Check renal dopplers to r/o renal vein thrombosis  -Elevate extremities

## 2022-10-13 NOTE — PROGRESS NOTE ADULT - PROBLEM SELECTOR PLAN 2
- Patient with known UTI from rehab, was being treated on abx (unknown which), as well as new COVID infection  - UA on admission negative, likely due to recent abx use  - WBC 19 on admission, improving on antibiotics  - CT chest neg for PNA  - afebrile but tachycardic, tachypneic and hypotensive with known UTI and new COVID infection, fulfilling sepsis criteria  - Hold remdesivir given acute renal failure  - Start CTX for possible  source, plan for short course, 5d  - f/u BCx, UCx

## 2022-10-13 NOTE — PROGRESS NOTE ADULT - PROBLEM SELECTOR PLAN 4
- Cr 6.50 on admission, baseline 1.1 in HIE form 9/27/22  - 3+ pitting BLE, per wife has been severe for ~4 years  - Follows Dr. Barrientos (Nephrology)  - On HCTZ at home, holding iso HoTN  - Owusu in place, monitor UOP  - Nephro following, recs appreciated  - f/u repeat UA, however unlikely GN picture contributing to SONAL  - UCr 112, Marta 34, Uosm 478. FeNa 1.1% indicating intrinsic renal injury  - Nephro following (Dr. Brownlee)  - Renal sono unremarkable  - Will need to consider HD if renal failure continues to worsen. Monitor labs and urine output.   - Avoid NSAIDs, ACEI/ARBS, RCA and nephrotoxins. Dose medications as per eGFR  - F/U renal doppler for evaluation of renal vein thrombosis

## 2022-10-14 LAB
ALBUMIN SERPL ELPH-MCNC: 2.3 G/DL — LOW (ref 3.3–5)
ALP SERPL-CCNC: 74 U/L — SIGNIFICANT CHANGE UP (ref 40–120)
ALT FLD-CCNC: 23 U/L — SIGNIFICANT CHANGE UP (ref 10–45)
ANION GAP SERPL CALC-SCNC: 18 MMOL/L — HIGH (ref 5–17)
APTT BLD: 58.9 SEC — HIGH (ref 27.5–35.5)
AST SERPL-CCNC: 20 U/L — SIGNIFICANT CHANGE UP (ref 10–40)
BASOPHILS # BLD AUTO: 0.04 K/UL — SIGNIFICANT CHANGE UP (ref 0–0.2)
BASOPHILS NFR BLD AUTO: 0.4 % — SIGNIFICANT CHANGE UP (ref 0–2)
BILIRUB SERPL-MCNC: 1 MG/DL — SIGNIFICANT CHANGE UP (ref 0.2–1.2)
BUN SERPL-MCNC: 104 MG/DL — HIGH (ref 7–23)
CALCIUM SERPL-MCNC: 7.8 MG/DL — LOW (ref 8.4–10.5)
CHLORIDE SERPL-SCNC: 102 MMOL/L — SIGNIFICANT CHANGE UP (ref 96–108)
CO2 SERPL-SCNC: 20 MMOL/L — LOW (ref 22–31)
CREAT SERPL-MCNC: 3.07 MG/DL — HIGH (ref 0.5–1.3)
EGFR: 22 ML/MIN/1.73M2 — LOW
EOSINOPHIL # BLD AUTO: 0.35 K/UL — SIGNIFICANT CHANGE UP (ref 0–0.5)
EOSINOPHIL NFR BLD AUTO: 3.3 % — SIGNIFICANT CHANGE UP (ref 0–6)
GLUCOSE SERPL-MCNC: 94 MG/DL — SIGNIFICANT CHANGE UP (ref 70–99)
HCT VFR BLD CALC: 33.4 % — LOW (ref 39–50)
HGB BLD-MCNC: 10.4 G/DL — LOW (ref 13–17)
IMM GRANULOCYTES NFR BLD AUTO: 0.9 % — SIGNIFICANT CHANGE UP (ref 0–0.9)
INR BLD: 1.13 RATIO — SIGNIFICANT CHANGE UP (ref 0.88–1.16)
LYMPHOCYTES # BLD AUTO: 1.41 K/UL — SIGNIFICANT CHANGE UP (ref 1–3.3)
LYMPHOCYTES # BLD AUTO: 13.4 % — SIGNIFICANT CHANGE UP (ref 13–44)
MAGNESIUM SERPL-MCNC: 1.9 MG/DL — SIGNIFICANT CHANGE UP (ref 1.6–2.6)
MCHC RBC-ENTMCNC: 26.8 PG — LOW (ref 27–34)
MCHC RBC-ENTMCNC: 31.1 GM/DL — LOW (ref 32–36)
MCV RBC AUTO: 86.1 FL — SIGNIFICANT CHANGE UP (ref 80–100)
MONOCYTES # BLD AUTO: 0.72 K/UL — SIGNIFICANT CHANGE UP (ref 0–0.9)
MONOCYTES NFR BLD AUTO: 6.8 % — SIGNIFICANT CHANGE UP (ref 2–14)
NEUTROPHILS # BLD AUTO: 7.92 K/UL — HIGH (ref 1.8–7.4)
NEUTROPHILS NFR BLD AUTO: 75.2 % — SIGNIFICANT CHANGE UP (ref 43–77)
NRBC # BLD: 0 /100 WBCS — SIGNIFICANT CHANGE UP (ref 0–0)
PHOSPHATE SERPL-MCNC: 4.4 MG/DL — SIGNIFICANT CHANGE UP (ref 2.5–4.5)
PLATELET # BLD AUTO: 173 K/UL — SIGNIFICANT CHANGE UP (ref 150–400)
POTASSIUM SERPL-MCNC: 3.7 MMOL/L — SIGNIFICANT CHANGE UP (ref 3.5–5.3)
POTASSIUM SERPL-SCNC: 3.7 MMOL/L — SIGNIFICANT CHANGE UP (ref 3.5–5.3)
PROT SERPL-MCNC: 5.2 G/DL — LOW (ref 6–8.3)
PROTHROM AB SERPL-ACNC: 13 SEC — SIGNIFICANT CHANGE UP (ref 10.5–13.4)
RBC # BLD: 3.88 M/UL — LOW (ref 4.2–5.8)
RBC # FLD: 17.2 % — HIGH (ref 10.3–14.5)
SODIUM SERPL-SCNC: 140 MMOL/L — SIGNIFICANT CHANGE UP (ref 135–145)
URATE SERPL-MCNC: 12.1 MG/DL — HIGH (ref 3.4–8.8)
WBC # BLD: 10.54 K/UL — HIGH (ref 3.8–10.5)
WBC # FLD AUTO: 10.54 K/UL — HIGH (ref 3.8–10.5)

## 2022-10-14 PROCEDURE — 99233 SBSQ HOSP IP/OBS HIGH 50: CPT | Mod: GC

## 2022-10-14 RX ORDER — WARFARIN SODIUM 2.5 MG/1
5 TABLET ORAL ONCE
Refills: 0 | Status: DISCONTINUED | OUTPATIENT
Start: 2022-10-14 | End: 2022-10-14

## 2022-10-14 RX ORDER — WARFARIN SODIUM 2.5 MG/1
7.5 TABLET ORAL ONCE
Refills: 0 | Status: COMPLETED | OUTPATIENT
Start: 2022-10-14 | End: 2022-10-14

## 2022-10-14 RX ORDER — OXYCODONE HYDROCHLORIDE 5 MG/1
5 TABLET ORAL ONCE
Refills: 0 | Status: DISCONTINUED | OUTPATIENT
Start: 2022-10-14 | End: 2022-10-14

## 2022-10-14 RX ORDER — POTASSIUM CHLORIDE 20 MEQ
40 PACKET (EA) ORAL ONCE
Refills: 0 | Status: COMPLETED | OUTPATIENT
Start: 2022-10-14 | End: 2022-10-14

## 2022-10-14 RX ORDER — OXYCODONE HYDROCHLORIDE 5 MG/1
5 TABLET ORAL EVERY 8 HOURS
Refills: 0 | Status: DISCONTINUED | OUTPATIENT
Start: 2022-10-14 | End: 2022-10-17

## 2022-10-14 RX ORDER — MIDODRINE HYDROCHLORIDE 2.5 MG/1
5 TABLET ORAL THREE TIMES A DAY
Refills: 0 | Status: DISCONTINUED | OUTPATIENT
Start: 2022-10-14 | End: 2022-10-20

## 2022-10-14 RX ORDER — CEFTRIAXONE 500 MG/1
1000 INJECTION, POWDER, FOR SOLUTION INTRAMUSCULAR; INTRAVENOUS ONCE
Refills: 0 | Status: COMPLETED | OUTPATIENT
Start: 2022-10-14 | End: 2022-10-14

## 2022-10-14 RX ADMIN — Medication 650 MILLIGRAM(S): at 13:41

## 2022-10-14 RX ADMIN — Medication 1 DROP(S): at 17:20

## 2022-10-14 RX ADMIN — OXYCODONE HYDROCHLORIDE 5 MILLIGRAM(S): 5 TABLET ORAL at 17:37

## 2022-10-14 RX ADMIN — Medication 1 TABLET(S): at 16:33

## 2022-10-14 RX ADMIN — HEPARIN SODIUM 2000 UNIT(S)/HR: 5000 INJECTION INTRAVENOUS; SUBCUTANEOUS at 16:33

## 2022-10-14 RX ADMIN — LATANOPROST 1 DROP(S): 0.05 SOLUTION/ DROPS OPHTHALMIC; TOPICAL at 22:21

## 2022-10-14 RX ADMIN — WARFARIN SODIUM 7.5 MILLIGRAM(S): 2.5 TABLET ORAL at 23:24

## 2022-10-14 RX ADMIN — OXYCODONE HYDROCHLORIDE 5 MILLIGRAM(S): 5 TABLET ORAL at 16:34

## 2022-10-14 RX ADMIN — Medication 40 MILLIEQUIVALENT(S): at 09:50

## 2022-10-14 RX ADMIN — OXYCODONE HYDROCHLORIDE 2.5 MILLIGRAM(S): 5 TABLET ORAL at 10:49

## 2022-10-14 RX ADMIN — Medication 650 MILLIGRAM(S): at 07:07

## 2022-10-14 RX ADMIN — Medication 1 DROP(S): at 06:58

## 2022-10-14 RX ADMIN — OXYCODONE HYDROCHLORIDE 2.5 MILLIGRAM(S): 5 TABLET ORAL at 09:49

## 2022-10-14 RX ADMIN — CEFTRIAXONE 100 MILLIGRAM(S): 500 INJECTION, POWDER, FOR SOLUTION INTRAMUSCULAR; INTRAVENOUS at 16:33

## 2022-10-14 RX ADMIN — NYSTATIN CREAM 1 APPLICATION(S): 100000 CREAM TOPICAL at 17:20

## 2022-10-14 RX ADMIN — CHLORHEXIDINE GLUCONATE 1 APPLICATION(S): 213 SOLUTION TOPICAL at 12:15

## 2022-10-14 RX ADMIN — NYSTATIN CREAM 1 APPLICATION(S): 100000 CREAM TOPICAL at 06:58

## 2022-10-14 RX ADMIN — MIDODRINE HYDROCHLORIDE 5 MILLIGRAM(S): 2.5 TABLET ORAL at 16:33

## 2022-10-14 RX ADMIN — OXYCODONE HYDROCHLORIDE 5 MILLIGRAM(S): 5 TABLET ORAL at 13:41

## 2022-10-14 RX ADMIN — OXYCODONE HYDROCHLORIDE 5 MILLIGRAM(S): 5 TABLET ORAL at 14:41

## 2022-10-14 RX ADMIN — HEPARIN SODIUM 2000 UNIT(S)/HR: 5000 INJECTION INTRAVENOUS; SUBCUTANEOUS at 08:18

## 2022-10-14 RX ADMIN — Medication 650 MILLIGRAM(S): at 23:24

## 2022-10-14 RX ADMIN — HEPARIN SODIUM 2000 UNIT(S)/HR: 5000 INJECTION INTRAVENOUS; SUBCUTANEOUS at 00:34

## 2022-10-14 NOTE — PROGRESS NOTE ADULT - SUBJECTIVE AND OBJECTIVE BOX
PROGRESS NOTE:     Patient is a 67y old  Male who presents with a chief complaint of AMS (13 Oct 2022 16:09)      SUBJECTIVE / OVERNIGHT EVENTS:  No acute events overnight. Reports LLE pain improved with oxycodone. Last BM yesterday. No signs or symptoms of bleeding. + thirsty    ADDITIONAL REVIEW OF SYSTEMS:    MEDICATIONS  (STANDING):  allopurinol 100 milliGRAM(s) Oral daily  chlorhexidine 2% Cloths 1 Application(s) Topical daily  heparin  Infusion.  Unit(s)/Hr (20 mL/Hr) IV Continuous <Continuous>  influenza  Vaccine (HIGH DOSE) 0.7 milliLiter(s) IntraMuscular once  latanoprost 0.005% Ophthalmic Solution 1 Drop(s) Both EYES at bedtime  nystatin Powder 1 Application(s) Topical two times a day  oxyCODONE    IR 2.5 milliGRAM(s) Oral once  potassium chloride    Tablet ER 40 milliEquivalent(s) Oral once  sodium bicarbonate 650 milliGRAM(s) Oral three times a day  sodium chloride 0.45%. 1000 milliLiter(s) (100 mL/Hr) IV Continuous <Continuous>  timolol 0.5% Solution 1 Drop(s) Both EYES two times a day  warfarin 5 milliGRAM(s) Oral once  warfarin 5 milliGRAM(s) Oral once    MEDICATIONS  (PRN):  acetaminophen     Tablet .. 650 milliGRAM(s) Oral every 6 hours PRN Temp greater or equal to 38C (100.4F), Mild Pain (1 - 3)  ALBUTerol    90 MICROgram(s) HFA Inhaler 2 Puff(s) Inhalation every 6 hours PRN Shortness of Breath and/or Wheezing  melatonin 3 milliGRAM(s) Oral at bedtime PRN Insomnia  oxyCODONE    IR 2.5 milliGRAM(s) Oral every 6 hours PRN Moderate Pain (4 - 6)  tiotropium 18 MICROgram(s) Capsule 1 Capsule(s) Inhalation daily PRN SOB/wheezing      CAPILLARY BLOOD GLUCOSE        I&O's Summary    13 Oct 2022 07:01  -  14 Oct 2022 07:00  --------------------------------------------------------  IN: 790 mL / OUT: 2050 mL / NET: -1260 mL        PHYSICAL EXAM:  Vital Signs Last 24 Hrs  T(C): 36.8 (14 Oct 2022 06:57), Max: 36.9 (14 Oct 2022 00:04)  T(F): 98.3 (14 Oct 2022 06:57), Max: 98.4 (14 Oct 2022 00:04)  HR: 81 (14 Oct 2022 06:57) (72 - 97)  BP: 109/76 (14 Oct 2022 06:57) (89/61 - 113/78)  BP(mean): --  RR: 18 (14 Oct 2022 06:57) (17 - 19)  SpO2: 97% (14 Oct 2022 06:57) (95% - 99%)    Parameters below as of 14 Oct 2022 06:57  Patient On (Oxygen Delivery Method): room air        GENERAL: NAD  ENT: Dry mucous membranes.   NECK: Supple, no JVD, trachea midline.  CHEST/LUNG: CTAB. No rales, rhonchi, wheezing, or rubs. Unlabored respirations.  HEART: RRR, no M/R/G, B/L LE swelling L>R, recent knee arthroplasty seen on L. leg  ABDOMEN: Soft, obese, nontender, nondistended. Normoactive bowel sounds.  EXTREMITIES:  2+ peripheral pulses b/l. B/L edema L>R significantly improved from admission  Neurological:  AAOx3, b/l tremors.  SKIN: Rosacea on nose  PSYCH: Normal affect and mood.      LABS:                        10.4   10.54 )-----------( 173      ( 14 Oct 2022 07:37 )             33.4     10-14    140  |  102  |  104<H>  ----------------------------<  94  3.7   |  20<L>  |  3.07<H>    Ca    7.8<L>      14 Oct 2022 07:40  Phos  4.4     10-14  Mg     1.9     10-14    TPro  5.2<L>  /  Alb  2.3<L>  /  TBili  1.0  /  DBili  x   /  AST  20  /  ALT  23  /  AlkPhos  74  10-14    PT/INR - ( 14 Oct 2022 07:38 )   PT: 13.0 sec;   INR: 1.13 ratio         PTT - ( 14 Oct 2022 07:38 )  PTT:58.9 sec  CARDIAC MARKERS ( 13 Oct 2022 06:59 )  x     / x     / 62 U/L / x     / x                RADIOLOGY & ADDITIONAL TESTS:  Results Reviewed:   Imaging Personally Reviewed:  Electrocardiogram Personally Reviewed:    COORDINATION OF CARE:  Care Discussed with Consultants/Other Providers [Y/N]:  Prior or Outpatient Records Reviewed [Y/N]:

## 2022-10-14 NOTE — DIETITIAN INITIAL EVALUATION ADULT - REASON INDICATOR FOR ASSESSMENT
Pressure Ulcer.   H&P  "67M PMH HTN, HLD, heart arrythmia, renal cancer s/p partial nephrectomy (~2017/18), DVT/PE iso renal cancer (~2017/18)on xarelto, s/p total L knee arthoplasty (9/16/22), asthma p/w AMS likely 2/2 metabolic encephalopathy vs. infectious etiology iso COVID/?UTI"

## 2022-10-14 NOTE — PROGRESS NOTE ADULT - PROBLEM SELECTOR PLAN 3
Duplex 10/12 with acute bilateral DVTs in lower extremity. Likely provoked in setting of recent surgery and venous stasis being in bed. Surprisingly developed on Xarelto.  -AC to heparin gtt with plans for warfarin bridge as outpt  -Appreciate vascular cardiology evaluation  -Check renal dopplers to r/o renal vein thrombosis  -Elevate extremities Duplex 10/12 with acute bilateral DVTs in lower extremity. Likely provoked in setting of recent surgery and venous stasis being in bed. Surprisingly developed on Xarelto.  -AC to heparin gtt with plans for warfarin bridge as outpt  -Appreciate vascular cardiology evaluation  -Check renal dopplers to r/o renal vein thrombosis  -Elevate extremities.

## 2022-10-14 NOTE — PROGRESS NOTE ADULT - PROBLEM SELECTOR PLAN 1
Likely uremic vs toxic metabolic in setting of Covid/UTI. Improving with improving renal function  - 3 days AMS since hospital d/c to rehab following total L knee arthroplasty  - per pt wife, pt developed UTI at rehab, was started on abx (unknown which)  - AAOx3 at [facility]  - CTH unremarkable  - S/p treatment of sepsis as below

## 2022-10-14 NOTE — PHYSICAL THERAPY INITIAL EVALUATION ADULT - RANGE OF MOTION EXAMINATION, REHAB EVAL
L hand noted to be fisted, Bilat knees limited approx 1/4 available ROM. hips as well./bilateral upper extremity ROM was WFL (within functional limits)

## 2022-10-14 NOTE — PROGRESS NOTE ADULT - SUBJECTIVE AND OBJECTIVE BOX
Not in distress, comfortable on RA, denies CP, SOB, N/V, good UO    Vital Signs Last 24 Hrs  T(C): 36.7 (10-14-22 @ 12:55), Max: 36.9 (10-14-22 @ 00:04)  T(F): 98.1 (10-14-22 @ 12:55), Max: 98.4 (10-14-22 @ 00:04)  HR: 97 (10-14-22 @ 12:55) (72 - 99)  BP: 97/67 (10-14-22 @ 12:55) (89/61 - 113/78)  RR: 17 (10-14-22 @ 12:55) (17 - 19)  SpO2: 98% (10-14-22 @ 12:55) (95% - 98%)    I&O's Detail    13 Oct 2022 07:01  -  14 Oct 2022 07:00  --------------------------------------------------------  IN:    Heparin Infusion: 240 mL    Oral Fluid: 550 mL  Total IN: 790 mL    OUT:    Indwelling Catheter - Urethral (mL): 1650 mL    Voided (mL): 400 mL  Total OUT: 2050 mL    Total NET: -1260 mL    14 Oct 2022 07:01  -  14 Oct 2022 14:24  --------------------------------------------------------  IN:    Oral Fluid: 200 mL  Total IN: 200 mL    OUT:    Indwelling Catheter - Urethral (mL): 1150 mL  Total OUT: 1150 mL    Total NET: -950 mL    s1s2  b/l air entry  soft, ND  sm edema, stasis changes b/l LE                                         10.4   10.54 )-----------( 173      ( 14 Oct 2022 07:37 )             33.4     14 Oct 2022 07:40    140    |  102    |  104    ----------------------------<  94     3.7     |  20     |  3.07     Ca    7.8        14 Oct 2022 07:40  Phos  4.4       14 Oct 2022 07:40  Mg     1.9       14 Oct 2022 07:40    TPro  5.2    /  Alb  2.3    /  TBili  1.0    /  DBili  x      /  AST  20     /  ALT  23     /  AlkPhos  74     14 Oct 2022 07:40    LIVER FUNCTIONS - ( 14 Oct 2022 07:40 )  Alb: 2.3 g/dL / Pro: 5.2 g/dL / ALK PHOS: 74 U/L / ALT: 23 U/L / AST: 20 U/L / GGT: x           PT/INR - ( 14 Oct 2022 07:38 )   PT: 13.0 sec;   INR: 1.13 ratio      CARDIAC MARKERS ( 13 Oct 2022 06:59 )  x     / x     / 62 U/L / x     / x        acetaminophen     Tablet .. 650 milliGRAM(s) Oral every 6 hours PRN  ALBUTerol    90 MICROgram(s) HFA Inhaler 2 Puff(s) Inhalation every 6 hours PRN  allopurinol 100 milliGRAM(s) Oral daily  cefTRIAXone   IVPB 1000 milliGRAM(s) IV Intermittent once  chlorhexidine 2% Cloths 1 Application(s) Topical daily  heparin  Infusion.  Unit(s)/Hr IV Continuous <Continuous>  influenza  Vaccine (HIGH DOSE) 0.7 milliLiter(s) IntraMuscular once  latanoprost 0.005% Ophthalmic Solution 1 Drop(s) Both EYES at bedtime  melatonin 3 milliGRAM(s) Oral at bedtime PRN  midodrine. 5 milliGRAM(s) Oral three times a day  Nephro-cj 1 Tablet(s) Oral every 24 hours  nystatin Powder 1 Application(s) Topical two times a day  oxyCODONE    IR 2.5 milliGRAM(s) Oral every 6 hours PRN  oxyCODONE    IR 2.5 milliGRAM(s) Oral once  sodium bicarbonate 650 milliGRAM(s) Oral three times a day  sodium chloride 0.45%. 1000 milliLiter(s) IV Continuous <Continuous>  timolol 0.5% Solution 1 Drop(s) Both EYES two times a day  tiotropium 18 MICROgram(s) Capsule 1 Capsule(s) Inhalation daily PRN  warfarin 5 milliGRAM(s) Oral once  warfarin 7.5 milliGRAM(s) Oral once    A/P:    S/p L TKA 9/16/22  Severe ATN on adm 10/10/22 (Cr 1.07 - 9/27/22)  Etiology not obvious (hypotensive/hemodynamic, was on ARB, HCTZ, may have gotten NSAID's)  Cr is improving, good UO (peak Cr 6.5 - 10/10/22)  Continue IVF  Avoid nephrotoxins  Avoid hypotension  Midodrine added  F/u BMP, UO  B/l LE DVT, AC  Will follow    [phone]

## 2022-10-14 NOTE — DIETITIAN INITIAL EVALUATION ADULT - PERTINENT LABORATORY DATA
10-14    140  |  102  |  104<H>  ----------------------------<  94  3.7   |  20<L>  |  3.07<H>    Ca    7.8<L>      14 Oct 2022 07:40  Phos  4.4     10-14  Mg     1.9     10-14    TPro  5.2<L>  /  Alb  2.3<L>  /  TBili  1.0  /  DBili  x   /  AST  20  /  ALT  23  /  AlkPhos  74  10-14

## 2022-10-14 NOTE — DIETITIAN INITIAL EVALUATION ADULT - PERTINENT MEDS FT
MEDICATIONS  (STANDING):  allopurinol 100 milliGRAM(s) Oral daily  cefTRIAXone   IVPB 1000 milliGRAM(s) IV Intermittent once  chlorhexidine 2% Cloths 1 Application(s) Topical daily  heparin  Infusion.  Unit(s)/Hr (20 mL/Hr) IV Continuous <Continuous>  influenza  Vaccine (HIGH DOSE) 0.7 milliLiter(s) IntraMuscular once  latanoprost 0.005% Ophthalmic Solution 1 Drop(s) Both EYES at bedtime  midodrine. 5 milliGRAM(s) Oral three times a day  nystatin Powder 1 Application(s) Topical two times a day  oxyCODONE    IR 5 milliGRAM(s) Oral once  oxyCODONE    IR 2.5 milliGRAM(s) Oral once  sodium bicarbonate 650 milliGRAM(s) Oral three times a day  sodium chloride 0.45%. 1000 milliLiter(s) (100 mL/Hr) IV Continuous <Continuous>  timolol 0.5% Solution 1 Drop(s) Both EYES two times a day  warfarin 7.5 milliGRAM(s) Oral once  warfarin 5 milliGRAM(s) Oral once    MEDICATIONS  (PRN):  acetaminophen     Tablet .. 650 milliGRAM(s) Oral every 6 hours PRN Temp greater or equal to 38C (100.4F), Mild Pain (1 - 3)  ALBUTerol    90 MICROgram(s) HFA Inhaler 2 Puff(s) Inhalation every 6 hours PRN Shortness of Breath and/or Wheezing  melatonin 3 milliGRAM(s) Oral at bedtime PRN Insomnia  oxyCODONE    IR 2.5 milliGRAM(s) Oral every 6 hours PRN Moderate Pain (4 - 6)  tiotropium 18 MICROgram(s) Capsule 1 Capsule(s) Inhalation daily PRN SOB/wheezing

## 2022-10-14 NOTE — DIETITIAN INITIAL EVALUATION ADULT - NS FNS REASON FOR WEIGHT CHANG
PROCEDURE:   XR Chest AP portable 

 

CLINICAL INDICATION:  Possible aspiration

 

TECHNIQUE:   An AP portable radiograph of the chest was submitted. 

 

COMPARISON:   02/08/2017 

 

FINDINGS:

 

Support Hardware: The NG tube remains in place.

 

Cardiovascular: The cardiovascular silhouette appears unremarkable.

 

Lung Fields: A suboptimal inspiration compresses lung parenchyma.  Discoid atelectasis is again seen
 at the right lower lung zone.

 

Pleural Spaces: No pneumothorax or pleural effusion is identified.

 

Osseous Structures: The osseous structures appear intact.

 

Soft Tissues: Subcutaneous air is again seen within the right lateral soft tissues.  There is again 
suggestion of pneumoperitoneum although this is decreased from the previous.  An endovascular stent 
projects in the left axilla.

 

IMPRESSION: 

1.  NG tubes remains in place

2.  Discoid atelectasis is again seen within the right mid and lower lung zones.

3.  Subcutaneous air is again seen in the right lateral soft tissues but has decreased.

4.  Decreased intraperitoneal air.

5.  An endovascular stent projects to the left axilla.

_____________________________________________ 

Physician Sha           Date    Time 

Electronically viewed and signed by Physician Sha on 02/09/2017 11:38 

 

D:  02/09/2017 11:38  T:  02/09/2017 11:38

/ decreased po intake

## 2022-10-14 NOTE — PHYSICAL THERAPY INITIAL EVALUATION ADULT - ADDITIONAL COMMENTS
as per pt: PTA pt was at rehab center and was functioning as OOB with lobito lift. Pt states he was ambulatory prior to his TKA, however since then his mobility has been limited and was in rehab and mostly non ambulatory. Prior to that pt states was living in home.

## 2022-10-14 NOTE — DIETITIAN INITIAL EVALUATION ADULT - ORAL INTAKE PTA/DIET HISTORY
Patient admitted from rehab with 3 days AMS. He was living there since TKA 9/16/2022. Per pt wife he had AMS Patient admitted from rehab with 3 days AMS. He was living there since TKA 9/16/2022. Patient visited at bedside noted with poor appetite, did not wish to eat lunch, consuming usually 26-50% of meals.   Patient states he has lost weight since his knee surgery in rehab (~18lbs) because of poor appetite and he wants to get "better quicker "  than he is. Pt noted with healing surgical wound L knee and edematous Anish foot and ankle. Discussed role of protein in healing upper buttocks/ coccyx   pressure ulcer. Pt offered Nepro for additional protein , he agreed to 1x daily. Renal diet reviewed.

## 2022-10-14 NOTE — PHYSICAL THERAPY INITIAL EVALUATION ADULT - ACTIVE RANGE OF MOTION EXAMINATION, REHAB EVAL
L hand noted to be fisted, Bilat knees limited approx 1/4 available ROM. hips as well./bilateral upper extremity Active ROM was WFL (within functional limits)

## 2022-10-14 NOTE — PHYSICAL THERAPY INITIAL EVALUATION ADULT - MANUAL MUSCLE TESTING RESULTS, REHAB EVAL
L hand noted to be fisted, RUE WNL. bilate LE's approx 2+/5 unable to SLR and or heelslide/grossly assessed due to

## 2022-10-14 NOTE — DIETITIAN INITIAL EVALUATION ADULT - NS FNS DIET ORDER
Diet, Renal Restrictions:   For patients receiving Renal Replacement - No Protein Restr, No Conc K, No Conc Phos, Low Sodium  Supplement Feeding Modality:  Oral  Nepro Cans or Servings Per Day:  1       Frequency:  Three Times a day (10-14-22 @ 12:03)

## 2022-10-14 NOTE — PHYSICAL THERAPY INITIAL EVALUATION ADULT - PERTINENT HX OF CURRENT PROBLEM, REHAB EVAL
67M PMH HTN, HLD, heart arrythmia, renal cancer s/p partial nephrectomy (~2017/18), DVT/PE iso renal cancer (~2017/18)on xarelto, s/p total L knee arthoplasty (9/16/22), asthma p/w AMS likely 2/2 metabolic encephalopathy vs. infectious etiology iso COVID/?UTI    CTH unremarkable  CT chest neg for PNA  Duplex 10/12 with acute bilateral DVTs in lower extremity. Likely provoked in setting of recent surgery and venous stasis being in bed.

## 2022-10-14 NOTE — PROGRESS NOTE ADULT - PROBLEM SELECTOR PLAN 2
RESOLVED  - Patient with known UTI from rehab, was being treated on abx (unknown which), as well as new COVID infection  - UA on admission negative, likely due to recent abx use  - WBC 19 on admission, improving on antibiotics  - CT chest neg for PNA  - afebrile but tachycardic, tachypneic and hypotensive with known UTI and new COVID infection, fulfilling sepsis criteria  - Hold remdesivir given acute renal failure  - S/p short course of CTX  - Blood and urine Cx NGTD

## 2022-10-15 LAB
ALBUMIN SERPL ELPH-MCNC: 2.6 G/DL — LOW (ref 3.3–5)
ALP SERPL-CCNC: 75 U/L — SIGNIFICANT CHANGE UP (ref 40–120)
ALT FLD-CCNC: 19 U/L — SIGNIFICANT CHANGE UP (ref 10–45)
ANION GAP SERPL CALC-SCNC: 15 MMOL/L — SIGNIFICANT CHANGE UP (ref 5–17)
APTT BLD: 53.4 SEC — HIGH (ref 27.5–35.5)
AST SERPL-CCNC: 16 U/L — SIGNIFICANT CHANGE UP (ref 10–40)
BASOPHILS # BLD AUTO: 0.02 K/UL — SIGNIFICANT CHANGE UP (ref 0–0.2)
BASOPHILS NFR BLD AUTO: 0.2 % — SIGNIFICANT CHANGE UP (ref 0–2)
BILIRUB SERPL-MCNC: 0.9 MG/DL — SIGNIFICANT CHANGE UP (ref 0.2–1.2)
BUN SERPL-MCNC: 94 MG/DL — HIGH (ref 7–23)
CALCIUM SERPL-MCNC: 8.4 MG/DL — SIGNIFICANT CHANGE UP (ref 8.4–10.5)
CHLORIDE SERPL-SCNC: 101 MMOL/L — SIGNIFICANT CHANGE UP (ref 96–108)
CO2 SERPL-SCNC: 23 MMOL/L — SIGNIFICANT CHANGE UP (ref 22–31)
CREAT SERPL-MCNC: 2.48 MG/DL — HIGH (ref 0.5–1.3)
EGFR: 28 ML/MIN/1.73M2 — LOW
EOSINOPHIL # BLD AUTO: 0.25 K/UL — SIGNIFICANT CHANGE UP (ref 0–0.5)
EOSINOPHIL NFR BLD AUTO: 2.4 % — SIGNIFICANT CHANGE UP (ref 0–6)
GLUCOSE SERPL-MCNC: 109 MG/DL — HIGH (ref 70–99)
HCT VFR BLD CALC: 31.7 % — LOW (ref 39–50)
HGB BLD-MCNC: 10 G/DL — LOW (ref 13–17)
IMM GRANULOCYTES NFR BLD AUTO: 0.8 % — SIGNIFICANT CHANGE UP (ref 0–0.9)
INR BLD: 1.44 RATIO — HIGH (ref 0.88–1.16)
INR BLD: 1.44 RATIO — HIGH (ref 0.88–1.16)
LYMPHOCYTES # BLD AUTO: 1.45 K/UL — SIGNIFICANT CHANGE UP (ref 1–3.3)
LYMPHOCYTES # BLD AUTO: 13.8 % — SIGNIFICANT CHANGE UP (ref 13–44)
MAGNESIUM SERPL-MCNC: 1.7 MG/DL — SIGNIFICANT CHANGE UP (ref 1.6–2.6)
MCHC RBC-ENTMCNC: 26.3 PG — LOW (ref 27–34)
MCHC RBC-ENTMCNC: 31.5 GM/DL — LOW (ref 32–36)
MCV RBC AUTO: 83.4 FL — SIGNIFICANT CHANGE UP (ref 80–100)
MONOCYTES # BLD AUTO: 0.89 K/UL — SIGNIFICANT CHANGE UP (ref 0–0.9)
MONOCYTES NFR BLD AUTO: 8.5 % — SIGNIFICANT CHANGE UP (ref 2–14)
NEUTROPHILS # BLD AUTO: 7.8 K/UL — HIGH (ref 1.8–7.4)
NEUTROPHILS NFR BLD AUTO: 74.3 % — SIGNIFICANT CHANGE UP (ref 43–77)
NRBC # BLD: 0 /100 WBCS — SIGNIFICANT CHANGE UP (ref 0–0)
PHOSPHATE SERPL-MCNC: 3.6 MG/DL — SIGNIFICANT CHANGE UP (ref 2.5–4.5)
PLATELET # BLD AUTO: 195 K/UL — SIGNIFICANT CHANGE UP (ref 150–400)
POTASSIUM SERPL-MCNC: 3.9 MMOL/L — SIGNIFICANT CHANGE UP (ref 3.5–5.3)
POTASSIUM SERPL-SCNC: 3.9 MMOL/L — SIGNIFICANT CHANGE UP (ref 3.5–5.3)
PROT SERPL-MCNC: 5.5 G/DL — LOW (ref 6–8.3)
PROTHROM AB SERPL-ACNC: 16.8 SEC — HIGH (ref 10.5–13.4)
PROTHROM AB SERPL-ACNC: 16.8 SEC — HIGH (ref 10.5–13.4)
RBC # BLD: 3.8 M/UL — LOW (ref 4.2–5.8)
RBC # FLD: 17 % — HIGH (ref 10.3–14.5)
SODIUM SERPL-SCNC: 139 MMOL/L — SIGNIFICANT CHANGE UP (ref 135–145)
WBC # BLD: 10.49 K/UL — SIGNIFICANT CHANGE UP (ref 3.8–10.5)
WBC # FLD AUTO: 10.49 K/UL — SIGNIFICANT CHANGE UP (ref 3.8–10.5)

## 2022-10-15 PROCEDURE — 99233 SBSQ HOSP IP/OBS HIGH 50: CPT | Mod: GC

## 2022-10-15 RX ORDER — WARFARIN SODIUM 2.5 MG/1
5 TABLET ORAL ONCE
Refills: 0 | Status: COMPLETED | OUTPATIENT
Start: 2022-10-15 | End: 2022-10-15

## 2022-10-15 RX ORDER — SODIUM CHLORIDE 9 MG/ML
1000 INJECTION, SOLUTION INTRAVENOUS
Refills: 0 | Status: DISCONTINUED | OUTPATIENT
Start: 2022-10-15 | End: 2022-10-17

## 2022-10-15 RX ADMIN — SODIUM CHLORIDE 75 MILLILITER(S): 9 INJECTION, SOLUTION INTRAVENOUS at 21:12

## 2022-10-15 RX ADMIN — Medication 1 TABLET(S): at 17:58

## 2022-10-15 RX ADMIN — NYSTATIN CREAM 1 APPLICATION(S): 100000 CREAM TOPICAL at 17:58

## 2022-10-15 RX ADMIN — NYSTATIN CREAM 1 APPLICATION(S): 100000 CREAM TOPICAL at 05:29

## 2022-10-15 RX ADMIN — OXYCODONE HYDROCHLORIDE 5 MILLIGRAM(S): 5 TABLET ORAL at 06:10

## 2022-10-15 RX ADMIN — WARFARIN SODIUM 5 MILLIGRAM(S): 2.5 TABLET ORAL at 21:14

## 2022-10-15 RX ADMIN — HEPARIN SODIUM 2000 UNIT(S)/HR: 5000 INJECTION INTRAVENOUS; SUBCUTANEOUS at 07:23

## 2022-10-15 RX ADMIN — CHLORHEXIDINE GLUCONATE 1 APPLICATION(S): 213 SOLUTION TOPICAL at 12:34

## 2022-10-15 RX ADMIN — HEPARIN SODIUM 2000 UNIT(S)/HR: 5000 INJECTION INTRAVENOUS; SUBCUTANEOUS at 19:00

## 2022-10-15 RX ADMIN — LATANOPROST 1 DROP(S): 0.05 SOLUTION/ DROPS OPHTHALMIC; TOPICAL at 21:14

## 2022-10-15 RX ADMIN — HEPARIN SODIUM 2000 UNIT(S)/HR: 5000 INJECTION INTRAVENOUS; SUBCUTANEOUS at 09:02

## 2022-10-15 RX ADMIN — Medication 650 MILLIGRAM(S): at 21:13

## 2022-10-15 RX ADMIN — OXYCODONE HYDROCHLORIDE 5 MILLIGRAM(S): 5 TABLET ORAL at 05:36

## 2022-10-15 RX ADMIN — Medication 1 DROP(S): at 17:57

## 2022-10-15 RX ADMIN — Medication 1 DROP(S): at 05:30

## 2022-10-15 RX ADMIN — MIDODRINE HYDROCHLORIDE 5 MILLIGRAM(S): 2.5 TABLET ORAL at 12:34

## 2022-10-15 RX ADMIN — Medication 650 MILLIGRAM(S): at 05:29

## 2022-10-15 RX ADMIN — Medication 650 MILLIGRAM(S): at 14:29

## 2022-10-15 RX ADMIN — Medication 100 MILLIGRAM(S): at 12:34

## 2022-10-15 RX ADMIN — MIDODRINE HYDROCHLORIDE 5 MILLIGRAM(S): 2.5 TABLET ORAL at 05:36

## 2022-10-15 RX ADMIN — SODIUM CHLORIDE 100 MILLILITER(S): 9 INJECTION, SOLUTION INTRAVENOUS at 12:34

## 2022-10-15 NOTE — PROGRESS NOTE ADULT - PROBLEM SELECTOR PLAN 3
Duplex 10/12 with acute bilateral DVTs in lower extremity. Likely provoked in setting of recent surgery and venous stasis being in bed. Surprisingly developed on Xarelto.  -AC to heparin gtt with plans for warfarin bridge   -Appreciate vascular cardiology evaluation  -Renal dopplers without evidence of renal vein thrombosis  -Elevate extremities.

## 2022-10-15 NOTE — PROGRESS NOTE ADULT - PROBLEM SELECTOR PLAN 8
Diet: Renal  DVT PPx: Heparin gtt with warfarin bridge  Dispo: Pending medical course, MELANIE per PT recs

## 2022-10-15 NOTE — PROGRESS NOTE ADULT - SUBJECTIVE AND OBJECTIVE BOX
PROGRESS NOTE:     Patient is a 67y old  Male who presents with a chief complaint of AMS (14 Oct 2022 14:23)      SUBJECTIVE / OVERNIGHT EVENTS:  No acute events overnight. Continues with good UoP. Complaining of intermittent pain in lower extremities.    ADDITIONAL REVIEW OF SYSTEMS:    MEDICATIONS  (STANDING):  allopurinol 100 milliGRAM(s) Oral daily  chlorhexidine 2% Cloths 1 Application(s) Topical daily  heparin  Infusion.  Unit(s)/Hr (20 mL/Hr) IV Continuous <Continuous>  influenza  Vaccine (HIGH DOSE) 0.7 milliLiter(s) IntraMuscular once  latanoprost 0.005% Ophthalmic Solution 1 Drop(s) Both EYES at bedtime  midodrine. 5 milliGRAM(s) Oral three times a day  Nephro-cj 1 Tablet(s) Oral every 24 hours  nystatin Powder 1 Application(s) Topical two times a day  oxyCODONE    IR 2.5 milliGRAM(s) Oral once  sodium bicarbonate 650 milliGRAM(s) Oral three times a day  sodium chloride 0.45%. 1000 milliLiter(s) (100 mL/Hr) IV Continuous <Continuous>  timolol 0.5% Solution 1 Drop(s) Both EYES two times a day    MEDICATIONS  (PRN):  acetaminophen     Tablet .. 650 milliGRAM(s) Oral every 6 hours PRN Temp greater or equal to 38C (100.4F), Mild Pain (1 - 3)  ALBUTerol    90 MICROgram(s) HFA Inhaler 2 Puff(s) Inhalation every 6 hours PRN Shortness of Breath and/or Wheezing  melatonin 3 milliGRAM(s) Oral at bedtime PRN Insomnia  oxyCODONE    IR 5 milliGRAM(s) Oral every 8 hours PRN Severe Pain (7 - 10)  oxyCODONE    IR 2.5 milliGRAM(s) Oral every 6 hours PRN Moderate Pain (4 - 6)  tiotropium 18 MICROgram(s) Capsule 1 Capsule(s) Inhalation daily PRN SOB/wheezing      CAPILLARY BLOOD GLUCOSE        I&O's Summary    13 Oct 2022 07:01  -  14 Oct 2022 07:00  --------------------------------------------------------  IN: 790 mL / OUT: 2050 mL / NET: -1260 mL    14 Oct 2022 07:01  -  15 Oct 2022 06:44  --------------------------------------------------------  IN: 1400 mL / OUT: 2350 mL / NET: -950 mL        PHYSICAL EXAM:  Vital Signs Last 24 Hrs  T(C): 37.1 (14 Oct 2022 20:40), Max: 37.1 (14 Oct 2022 20:40)  T(F): 98.7 (14 Oct 2022 20:40), Max: 98.7 (14 Oct 2022 20:40)  HR: 90 (14 Oct 2022 20:40) (81 - 99)  BP: 121/82 (14 Oct 2022 20:40) (95/68 - 121/82)  BP(mean): --  RR: 18 (14 Oct 2022 20:40) (17 - 18)  SpO2: 95% (14 Oct 2022 20:40) (95% - 98%)    Parameters below as of 14 Oct 2022 20:40  Patient On (Oxygen Delivery Method): room air    GENERAL: NAD  ENT: Dry mucous membranes.   NECK: Supple, no JVD, trachea midline.  CHEST/LUNG: CTAB. No rales, rhonchi, wheezing, or rubs. Unlabored respirations.  HEART: RRR, no M/R/G, B/L LE swelling L>R, recent knee arthroplasty seen on L. leg  ABDOMEN: Soft, obese, nontender, nondistended. Normoactive bowel sounds.  EXTREMITIES:  2+ peripheral pulses b/l. B/L edema L>R significantly improved from admission  Neurological:  AAOx3, b/l tremors.  SKIN: Rosacea on nose  PSYCH: Normal affect and mood.    LABS:                        10.4   10.54 )-----------( 173      ( 14 Oct 2022 07:37 )             33.4     10-14    140  |  102  |  104<H>  ----------------------------<  94  3.7   |  20<L>  |  3.07<H>    Ca    7.8<L>      14 Oct 2022 07:40  Phos  4.4     10-14  Mg     1.9     10-14    TPro  5.2<L>  /  Alb  2.3<L>  /  TBili  1.0  /  DBili  x   /  AST  20  /  ALT  23  /  AlkPhos  74  10-14    PT/INR - ( 14 Oct 2022 07:38 )   PT: 13.0 sec;   INR: 1.13 ratio         PTT - ( 14 Oct 2022 07:38 )  PTT:58.9 sec  CARDIAC MARKERS ( 13 Oct 2022 06:59 )  x     / x     / 62 U/L / x     / x          RADIOLOGY & ADDITIONAL TESTS:  Results Reviewed:   Imaging Personally Reviewed:  Electrocardiogram Personally Reviewed:    COORDINATION OF CARE:  Care Discussed with Consultants/Other Providers [Y/N]:  Prior or Outpatient Records Reviewed [Y/N]:

## 2022-10-15 NOTE — PROGRESS NOTE ADULT - PROBLEM SELECTOR PLAN 4
Cr 6.50 on admission, baseline 1.1 in HIE form 9/27/22. Improving with IVF.  - 3+ pitting BLE, per wife has been severe for ~4 years  - Follows Dr. Barrientos (Nephrology)  - On HCTZ at home, holding iso HoTN  - Owusu in place, monitor UOP, plan for TOV 10/16  - Nephro following, recs appreciated  - f/u repeat UA, however unlikely GN picture contributing to SONAL  - UCr 112, Marta 34, Uosm 478. FeNa 1.1% indicating intrinsic renal injury  - Nephro following (Dr. Brownlee)  - Renal sono unremarkable, doppler negative for RVT  - Avoid NSAIDs, ACEI/ARBS, RCA and nephrotoxins. Dose medications as per eGFR

## 2022-10-15 NOTE — PROGRESS NOTE ADULT - SUBJECTIVE AND OBJECTIVE BOX
Resting, comfortable on RA    Vital Signs Last 24 Hrs  T(C): 36.8 (10-15-22 @ 12:53), Max: 37.1 (10-14-22 @ 20:40)  T(F): 98.3 (10-15-22 @ 12:53), Max: 98.8 (10-15-22 @ 06:43)  HR: 92 (10-15-22 @ 12:53) (64 - 92)  BP: 123/83 (10-15-22 @ 12:53) (104/68 - 123/83)  RR: 18 (10-15-22 @ 12:53) (18 - 18)  SpO2: 95% (10-15-22 @ 12:53) (95% - 97%)    I&O's Detail    14 Oct 2022 07:01  -  15 Oct 2022 07:00  --------------------------------------------------------  IN:    Oral Fluid: 1400 mL  Total IN: 1400 mL    OUT:    Indwelling Catheter - Urethral (mL): 3400 mL  Total OUT: 3400 mL    Total NET: -2000 mL    15 Oct 2022 07:01  -  15 Oct 2022 16:46  --------------------------------------------------------  IN:    Heparin Infusion: 100 mL    sodium chloride 0.45%: 375 mL  Total IN: 475 mL    OUT:    Indwelling Catheter - Urethral (mL): 175 mL  Total OUT: 175 mL    Total NET: 300 mL    s1s2  b/l air entry  soft, ND  sm edema, stasis changes b/l LE                                                  10.0   10.49 )-----------( 195      ( 15 Oct 2022 07:39 )             31.7     15 Oct 2022 07:35    139    |  101    |  94     ----------------------------<  109    3.9     |  23     |  2.48     Ca    8.4        15 Oct 2022 07:35  Phos  3.6       15 Oct 2022 07:35  Mg     1.7       15 Oct 2022 07:35    TPro  5.5    /  Alb  2.6    /  TBili  0.9    /  DBili  x      /  AST  16     /  ALT  19     /  AlkPhos  75     15 Oct 2022 07:35    LIVER FUNCTIONS - ( 15 Oct 2022 07:35 )  Alb: 2.6 g/dL / Pro: 5.5 g/dL / ALK PHOS: 75 U/L / ALT: 19 U/L / AST: 16 U/L / GGT: x           PT/INR - ( 15 Oct 2022 07:39 )   PT: 16.8 sec;   INR: 1.44 ratio      acetaminophen     Tablet .. 650 milliGRAM(s) Oral every 6 hours PRN  ALBUTerol    90 MICROgram(s) HFA Inhaler 2 Puff(s) Inhalation every 6 hours PRN  allopurinol 100 milliGRAM(s) Oral daily  chlorhexidine 2% Cloths 1 Application(s) Topical daily  heparin  Infusion.  Unit(s)/Hr IV Continuous <Continuous>  influenza  Vaccine (HIGH DOSE) 0.7 milliLiter(s) IntraMuscular once  latanoprost 0.005% Ophthalmic Solution 1 Drop(s) Both EYES at bedtime  melatonin 3 milliGRAM(s) Oral at bedtime PRN  midodrine. 5 milliGRAM(s) Oral three times a day  Nephro-cj 1 Tablet(s) Oral every 24 hours  nystatin Powder 1 Application(s) Topical two times a day  oxyCODONE    IR 5 milliGRAM(s) Oral every 8 hours PRN  oxyCODONE    IR 2.5 milliGRAM(s) Oral every 6 hours PRN  oxyCODONE    IR 2.5 milliGRAM(s) Oral once  sodium bicarbonate 650 milliGRAM(s) Oral three times a day  sodium chloride 0.45%. 1000 milliLiter(s) IV Continuous <Continuous>  timolol 0.5% Solution 1 Drop(s) Both EYES two times a day  tiotropium 18 MICROgram(s) Capsule 1 Capsule(s) Inhalation daily PRN  warfarin 5 milliGRAM(s) Oral once    A/P:    S/p L TKA 9/16/22  Severe SONAL on adm 10/10/22 (peak Cr 6.5 - 10/10/22, prior Cr 1.07 - 9/27/22)  Suspect hypotensive/hemodynamic ATN, was on ARB, HCTZ, may have gotten NSAID's  Cr is improving, good UO   Continue IVF as ordered  Avoid nephrotoxins  Avoid hypotension  Midodrine added  F/u BMP, UO  B/l LE DVT, AC  Will follow    [phone]

## 2022-10-16 ENCOUNTER — TRANSCRIPTION ENCOUNTER (OUTPATIENT)
Age: 67
End: 2022-10-16

## 2022-10-16 LAB
ALBUMIN SERPL ELPH-MCNC: 2.6 G/DL — LOW (ref 3.3–5)
ALP SERPL-CCNC: 78 U/L — SIGNIFICANT CHANGE UP (ref 40–120)
ALT FLD-CCNC: 16 U/L — SIGNIFICANT CHANGE UP (ref 10–45)
ANION GAP SERPL CALC-SCNC: 13 MMOL/L — SIGNIFICANT CHANGE UP (ref 5–17)
AST SERPL-CCNC: 15 U/L — SIGNIFICANT CHANGE UP (ref 10–40)
BILIRUB SERPL-MCNC: 0.9 MG/DL — SIGNIFICANT CHANGE UP (ref 0.2–1.2)
BUN SERPL-MCNC: 76 MG/DL — HIGH (ref 7–23)
CALCIUM SERPL-MCNC: 8.7 MG/DL — SIGNIFICANT CHANGE UP (ref 8.4–10.5)
CHLORIDE SERPL-SCNC: 101 MMOL/L — SIGNIFICANT CHANGE UP (ref 96–108)
CO2 SERPL-SCNC: 25 MMOL/L — SIGNIFICANT CHANGE UP (ref 22–31)
CREAT SERPL-MCNC: 2.24 MG/DL — HIGH (ref 0.5–1.3)
CULTURE RESULTS: SIGNIFICANT CHANGE UP
CULTURE RESULTS: SIGNIFICANT CHANGE UP
EGFR: 32 ML/MIN/1.73M2 — LOW
GLUCOSE SERPL-MCNC: 98 MG/DL — SIGNIFICANT CHANGE UP (ref 70–99)
HCT VFR BLD CALC: 31.2 % — LOW (ref 39–50)
HGB BLD-MCNC: 9.8 G/DL — LOW (ref 13–17)
INR BLD: 1.87 RATIO — HIGH (ref 0.88–1.16)
INR BLD: 2.03 RATIO — HIGH (ref 0.88–1.16)
MAGNESIUM SERPL-MCNC: 1.7 MG/DL — SIGNIFICANT CHANGE UP (ref 1.6–2.6)
MCHC RBC-ENTMCNC: 26.7 PG — LOW (ref 27–34)
MCHC RBC-ENTMCNC: 31.4 GM/DL — LOW (ref 32–36)
MCV RBC AUTO: 85 FL — SIGNIFICANT CHANGE UP (ref 80–100)
NRBC # BLD: 0 /100 WBCS — SIGNIFICANT CHANGE UP (ref 0–0)
PHOSPHATE SERPL-MCNC: 3.7 MG/DL — SIGNIFICANT CHANGE UP (ref 2.5–4.5)
PLATELET # BLD AUTO: 192 K/UL — SIGNIFICANT CHANGE UP (ref 150–400)
POTASSIUM SERPL-MCNC: 3.9 MMOL/L — SIGNIFICANT CHANGE UP (ref 3.5–5.3)
POTASSIUM SERPL-SCNC: 3.9 MMOL/L — SIGNIFICANT CHANGE UP (ref 3.5–5.3)
PROT SERPL-MCNC: 5.5 G/DL — LOW (ref 6–8.3)
PROTHROM AB SERPL-ACNC: 21.8 SEC — HIGH (ref 10.5–13.4)
PROTHROM AB SERPL-ACNC: 23.5 SEC — HIGH (ref 10.5–13.4)
RBC # BLD: 3.67 M/UL — LOW (ref 4.2–5.8)
RBC # FLD: 16.8 % — HIGH (ref 10.3–14.5)
SODIUM SERPL-SCNC: 139 MMOL/L — SIGNIFICANT CHANGE UP (ref 135–145)
SPECIMEN SOURCE: SIGNIFICANT CHANGE UP
SPECIMEN SOURCE: SIGNIFICANT CHANGE UP
WBC # BLD: 10.91 K/UL — HIGH (ref 3.8–10.5)
WBC # FLD AUTO: 10.91 K/UL — HIGH (ref 3.8–10.5)

## 2022-10-16 PROCEDURE — 99233 SBSQ HOSP IP/OBS HIGH 50: CPT | Mod: GC

## 2022-10-16 RX ORDER — WARFARIN SODIUM 2.5 MG/1
5 TABLET ORAL ONCE
Refills: 0 | Status: COMPLETED | OUTPATIENT
Start: 2022-10-16 | End: 2022-10-16

## 2022-10-16 RX ORDER — MAGNESIUM SULFATE 500 MG/ML
2 VIAL (ML) INJECTION ONCE
Refills: 0 | Status: COMPLETED | OUTPATIENT
Start: 2022-10-16 | End: 2022-10-16

## 2022-10-16 RX ADMIN — MIDODRINE HYDROCHLORIDE 5 MILLIGRAM(S): 2.5 TABLET ORAL at 11:54

## 2022-10-16 RX ADMIN — Medication 1 TABLET(S): at 15:47

## 2022-10-16 RX ADMIN — Medication 25 GRAM(S): at 10:40

## 2022-10-16 RX ADMIN — OXYCODONE HYDROCHLORIDE 5 MILLIGRAM(S): 5 TABLET ORAL at 14:11

## 2022-10-16 RX ADMIN — Medication 1 DROP(S): at 05:01

## 2022-10-16 RX ADMIN — NYSTATIN CREAM 1 APPLICATION(S): 100000 CREAM TOPICAL at 17:18

## 2022-10-16 RX ADMIN — HEPARIN SODIUM 2000 UNIT(S)/HR: 5000 INJECTION INTRAVENOUS; SUBCUTANEOUS at 09:05

## 2022-10-16 RX ADMIN — LATANOPROST 1 DROP(S): 0.05 SOLUTION/ DROPS OPHTHALMIC; TOPICAL at 21:32

## 2022-10-16 RX ADMIN — Medication 3 MILLIGRAM(S): at 05:01

## 2022-10-16 RX ADMIN — Medication 650 MILLIGRAM(S): at 05:01

## 2022-10-16 RX ADMIN — Medication 650 MILLIGRAM(S): at 22:34

## 2022-10-16 RX ADMIN — Medication 1 DROP(S): at 17:17

## 2022-10-16 RX ADMIN — Medication 100 MILLIGRAM(S): at 11:54

## 2022-10-16 RX ADMIN — NYSTATIN CREAM 1 APPLICATION(S): 100000 CREAM TOPICAL at 05:05

## 2022-10-16 RX ADMIN — WARFARIN SODIUM 5 MILLIGRAM(S): 2.5 TABLET ORAL at 22:34

## 2022-10-16 RX ADMIN — SODIUM CHLORIDE 75 MILLILITER(S): 9 INJECTION, SOLUTION INTRAVENOUS at 15:47

## 2022-10-16 RX ADMIN — OXYCODONE HYDROCHLORIDE 5 MILLIGRAM(S): 5 TABLET ORAL at 13:10

## 2022-10-16 RX ADMIN — Medication 650 MILLIGRAM(S): at 13:10

## 2022-10-16 RX ADMIN — CHLORHEXIDINE GLUCONATE 1 APPLICATION(S): 213 SOLUTION TOPICAL at 11:54

## 2022-10-16 RX ADMIN — MIDODRINE HYDROCHLORIDE 5 MILLIGRAM(S): 2.5 TABLET ORAL at 05:05

## 2022-10-16 NOTE — PROGRESS NOTE ADULT - PROBLEM SELECTOR PLAN 4
Cr 6.50 on admission, baseline 1.1 in HIE form 9/27/22. Improving with IVF.  - 3+ pitting BLE, per wife has been severe for ~4 years  - Follows Dr. Barrientos (Nephrology)  - On HCTZ at home, holding iso HoTN  - Owusu in place, monitor UOP, plan for TOV 10/16  - Nephro following, recs appreciated  - f/u repeat UA, however unlikely GN picture contributing to SONAL  - UCr 112, Marta 34, Uosm 478. FeNa 1.1% indicating intrinsic renal injury  - Nephro following (Dr. Brownlee)  - Renal sono unremarkable, doppler negative for RVT  - Avoid NSAIDs, ACEI/ARBS, RCA and nephrotoxins. Dose medications as per eGFR  - Continue 1/2 NS and sodium bicarb gtt for now Cr 6.50 on admission, baseline 1.1 in HIE form 9/27/22. Improving with IVF.  - 3+ pitting BLE, per wife has been severe for ~4 years  - Follows Dr. Barrientos (Nephrology)  - On HCTZ at home, holding iso HoTN  - Owusu in place, monitor UOP, plan for TOV 10/16  - Nephro following, recs appreciated  - f/u repeat UA, however unlikely GN picture contributing to SONAL  - UCr 112, Marta 34, Uosm 478. FeNa 1.1% indicating intrinsic renal injury  - Nephro following (Dr. Brownlee)  - Renal sono unremarkable, doppler negative for RVT  - Avoid NSAIDs, ACEI/ARBS, RCA and nephrotoxins. Dose medications as per eGFR  - Continue 1/2 NS and sodium bicarb gtt for now  - TOV today Cr 6.50 on admission, baseline 1.1 in HIE form 9/27/22. Improving with IVF.  - 3+ pitting BLE, per wife has been severe for ~4 years  - Follows Dr. Barrientos (Nephrology)  - On HCTZ at home, holding iso HoTN  - Kennedy in place, monitor UOP, plan for TOV 10/16  - Nephro following, recs appreciated  - f/u repeat UA, however unlikely GN picture contributing to SONAL  - UCr 112, Marta 34, Uosm 478. FeNa 1.1% indicating intrinsic renal injury  - Nephro following (Dr. Brownlee)  - Renal sono unremarkable, doppler negative for RVT  - Avoid NSAIDs, ACEI/ARBS, RCA and nephrotoxins. Dose medications as per eGFR  - Continue 1/2 NS and sodium bicarb gtt for now  - TOV today, will remove kennedy

## 2022-10-16 NOTE — PROGRESS NOTE ADULT - SUBJECTIVE AND OBJECTIVE BOX
Susan Christian MD  PGY 2 Department of Internal Medicine  Pager: [phone] ([facility]) /[phone] ([facility])       Patient is a 67y old  Male who presents with a chief complaint of AMS (15 Oct 2022 16:45)      SUBJECTIVE / OVERNIGHT EVENTS: Pt seen and examined. No acute overnight events. Denies fevers, chills, CP, SOB, Abdominal pain, N/V, Constipation, Diarrhea    Pending TOV     MEDICATIONS  (STANDING):  allopurinol 100 milliGRAM(s) Oral daily  chlorhexidine 2% Cloths 1 Application(s) Topical daily  heparin  Infusion.  Unit(s)/Hr (20 mL/Hr) IV Continuous <Continuous>  influenza  Vaccine (HIGH DOSE) 0.7 milliLiter(s) IntraMuscular once  latanoprost 0.005% Ophthalmic Solution 1 Drop(s) Both EYES at bedtime  midodrine. 5 milliGRAM(s) Oral three times a day  Nephro-cj 1 Tablet(s) Oral every 24 hours  nystatin Powder 1 Application(s) Topical two times a day  oxyCODONE    IR 2.5 milliGRAM(s) Oral once  sodium bicarbonate 650 milliGRAM(s) Oral three times a day  sodium chloride 0.45%. 1000 milliLiter(s) (75 mL/Hr) IV Continuous <Continuous>  timolol 0.5% Solution 1 Drop(s) Both EYES two times a day  warfarin 5 milliGRAM(s) Oral once    MEDICATIONS  (PRN):  acetaminophen     Tablet .. 650 milliGRAM(s) Oral every 6 hours PRN Temp greater or equal to 38C (100.4F), Mild Pain (1 - 3)  ALBUTerol    90 MICROgram(s) HFA Inhaler 2 Puff(s) Inhalation every 6 hours PRN Shortness of Breath and/or Wheezing  melatonin 3 milliGRAM(s) Oral at bedtime PRN Insomnia  oxyCODONE    IR 5 milliGRAM(s) Oral every 8 hours PRN Severe Pain (7 - 10)  oxyCODONE    IR 2.5 milliGRAM(s) Oral every 6 hours PRN Moderate Pain (4 - 6)  tiotropium 18 MICROgram(s) Capsule 1 Capsule(s) Inhalation daily PRN SOB/wheezing      I&O's Summary    15 Oct 2022 07:01  -  16 Oct 2022 07:00  --------------------------------------------------------  IN: 655 mL / OUT: 2625 mL / NET: -1970 mL        Vital Signs Last 24 Hrs  T(C): 36.6 (16 Oct 2022 05:10), Max: 36.8 (15 Oct 2022 12:53)  T(F): 97.9 (16 Oct 2022 05:10), Max: 98.3 (15 Oct 2022 12:53)  HR: 84 (16 Oct 2022 05:10) (64 - 92)  BP: 100/64 (16 Oct 2022 05:10) (100/64 - 123/83)  BP(mean): --  RR: 18 (16 Oct 2022 05:10) (16 - 18)  SpO2: 96% (16 Oct 2022 05:10) (95% - 98%)    Parameters below as of 16 Oct 2022 05:10  Patient On (Oxygen Delivery Method): room air        CAPILLARY BLOOD GLUCOSE          PHYSICAL EXAM:  GENERAL: NAD  ENT: Dry mucous membranes.   NECK: Supple, no JVD, trachea midline.  CHEST/LUNG: CTAB. No rales, rhonchi, wheezing, or rubs. Unlabored respirations.  HEART: RRR, no M/R/G, B/L LE swelling L>R, recent knee arthroplasty seen on L. leg  ABDOMEN: Soft, obese, nontender, nondistended. Normoactive bowel sounds.  EXTREMITIES:  2+ peripheral pulses b/l. B/L edema L>R significantly improved from admission  Neurological:  AAOx3, b/l tremors.  SKIN: Rosacea on nose  PSYCH: Normal affect and mood.       LABS:                        10.0   10.49 )-----------( 195      ( 15 Oct 2022 07:39 )             31.7     Auto Eosinophil # 0.25  / Auto Eosinophil % 2.4   / Auto Neutrophil # 7.80  / Auto Neutrophil % 74.3  / BANDS % x        10-15    139  |  101  |  94<H>  ----------------------------<  109<H>  3.9   |  23  |  2.48<H>    Ca    8.4      15 Oct 2022 07:35  Mg     1.7     10-15  Phos  3.6     10-15  TPro  5.5<L>  /  Alb  2.6<L>  /  TBili  0.9  /  DBili  x   /  AST  16  /  ALT  19  /  AlkPhos  75  10-15    PT/INR - ( 16 Oct 2022 01:45 )   PT: 21.8 sec;   INR: 1.87 ratio         PTT - ( 15 Oct 2022 07:39 )  PTT:53.4 sec        Lactate, Blood: 1.7 mmol/L (10-11 @ 14:47)   Susan Christian MD  PGY 2 Department of Internal Medicine  Pager: [phone] ([facility]) /[phone] ([facility])       Patient is a 67y old  Male who presents with a chief complaint of AMS (15 Oct 2022 16:45)      SUBJECTIVE / OVERNIGHT EVENTS: Pt seen and examined. No acute overnight events. Patient with tremor of right hand, chronic. Endorses pain of legs bilaterally         MEDICATIONS  (STANDING):  allopurinol 100 milliGRAM(s) Oral daily  chlorhexidine 2% Cloths 1 Application(s) Topical daily  heparin  Infusion.  Unit(s)/Hr (20 mL/Hr) IV Continuous <Continuous>  influenza  Vaccine (HIGH DOSE) 0.7 milliLiter(s) IntraMuscular once  latanoprost 0.005% Ophthalmic Solution 1 Drop(s) Both EYES at bedtime  midodrine. 5 milliGRAM(s) Oral three times a day  Nephro-cj 1 Tablet(s) Oral every 24 hours  nystatin Powder 1 Application(s) Topical two times a day  oxyCODONE    IR 2.5 milliGRAM(s) Oral once  sodium bicarbonate 650 milliGRAM(s) Oral three times a day  sodium chloride 0.45%. 1000 milliLiter(s) (75 mL/Hr) IV Continuous <Continuous>  timolol 0.5% Solution 1 Drop(s) Both EYES two times a day  warfarin 5 milliGRAM(s) Oral once    MEDICATIONS  (PRN):  acetaminophen     Tablet .. 650 milliGRAM(s) Oral every 6 hours PRN Temp greater or equal to 38C (100.4F), Mild Pain (1 - 3)  ALBUTerol    90 MICROgram(s) HFA Inhaler 2 Puff(s) Inhalation every 6 hours PRN Shortness of Breath and/or Wheezing  melatonin 3 milliGRAM(s) Oral at bedtime PRN Insomnia  oxyCODONE    IR 5 milliGRAM(s) Oral every 8 hours PRN Severe Pain (7 - 10)  oxyCODONE    IR 2.5 milliGRAM(s) Oral every 6 hours PRN Moderate Pain (4 - 6)  tiotropium 18 MICROgram(s) Capsule 1 Capsule(s) Inhalation daily PRN SOB/wheezing      I&O's Summary    15 Oct 2022 07:01  -  16 Oct 2022 07:00  --------------------------------------------------------  IN: 655 mL / OUT: 2625 mL / NET: -1970 mL        Vital Signs Last 24 Hrs  T(C): 36.6 (16 Oct 2022 05:10), Max: 36.8 (15 Oct 2022 12:53)  T(F): 97.9 (16 Oct 2022 05:10), Max: 98.3 (15 Oct 2022 12:53)  HR: 84 (16 Oct 2022 05:10) (64 - 92)  BP: 100/64 (16 Oct 2022 05:10) (100/64 - 123/83)  BP(mean): --  RR: 18 (16 Oct 2022 05:10) (16 - 18)  SpO2: 96% (16 Oct 2022 05:10) (95% - 98%)    Parameters below as of 16 Oct 2022 05:10  Patient On (Oxygen Delivery Method): room air        CAPILLARY BLOOD GLUCOSE          PHYSICAL EXAM:  GENERAL: NAD  ENT: Dry mucous membranes.   CHEST/LUNG: CTAB. . Unlabored respirations.  HEART: RRR, no M/R/G, B/L LE swelling L>R, recent knee arthroplasty seen on L. leg  ABDOMEN: Soft, obese, nontender, nondistended. Normoactive bowel sounds.  EXTREMITIES:  2+ peripheral pulses b/l. B/L edema L>R significantly improved from admission  Neurological:  AAOx3, b/l tremors.  SKIN: Rosacea on nose  PSYCH: Normal affect and mood.       LABS:                        10.0   10.49 )-----------( 195      ( 15 Oct 2022 07:39 )             31.7     Auto Eosinophil # 0.25  / Auto Eosinophil % 2.4   / Auto Neutrophil # 7.80  / Auto Neutrophil % 74.3  / BANDS % x        10-15    139  |  101  |  94<H>  ----------------------------<  109<H>  3.9   |  23  |  2.48<H>    Ca    8.4      15 Oct 2022 07:35  Mg     1.7     10-15  Phos  3.6     10-15  TPro  5.5<L>  /  Alb  2.6<L>  /  TBili  0.9  /  DBili  x   /  AST  16  /  ALT  19  /  AlkPhos  75  10-15    PT/INR - ( 16 Oct 2022 01:45 )   PT: 21.8 sec;   INR: 1.87 ratio         PTT - ( 15 Oct 2022 07:39 )  PTT:53.4 sec        Lactate, Blood: 1.7 mmol/L (10-11 @ 14:47)

## 2022-10-16 NOTE — DISCHARGE NOTE PROVIDER - NSDCFUADDINST_GEN_ALL_CORE_FT
-Follow with your Primary care Physician in 1 week to review your hospital course  -Bring all discharge documents and prescriptions with you at the time of your appointments   -You may return to the Emergency department for any worsening or return of your symptoms   -Labs for coumadin dosing  -Follow with your Primary care Physician in 1 week to review your hospital course  -Bring all discharge documents and prescriptions with you at the time of your appointments   -You may return to the Emergency department for any worsening or return of your symptoms

## 2022-10-16 NOTE — PROGRESS NOTE ADULT - PROBLEM SELECTOR PLAN 3
Duplex 10/12 with acute bilateral DVTs in lower extremity. Likely provoked in setting of recent surgery and venous stasis being in bed. Surprisingly developed on Xarelto.  -AC to heparin gtt with plans for warfarin bridge - coumadin 5mg today   -Appreciate vascular cardiology evaluation  -Renal dopplers without evidence of renal vein thrombosis  -Elevate extremities.

## 2022-10-16 NOTE — PROGRESS NOTE ADULT - PROBLEM SELECTOR PLAN 8
Diet: Renal  DVT PPx: Heparin gtt with warfarin bridge  Dispo: Pending medical course, [facility] per PT recs Diet: Renal  DVT PPx: s/p Heparin gtt with warfarin bridge; will give warfarin 5mg today   Dispo: Pending medical course, [facility] per PT recs

## 2022-10-16 NOTE — DISCHARGE NOTE PROVIDER - NSDCCAREPROVSEEN_GEN_ALL_CORE_FT
[facility] Internal Medicine, Team 8  Dr. Brownlee (Nephrology)  [facility] Vascular Cardiology

## 2022-10-16 NOTE — DISCHARGE NOTE PROVIDER - NSDCFUADDAPPT_GEN_ALL_CORE_FT
APPTS ARE READY TO BE MADE: [x ] YES    Best Family or Patient Contact (if needed):    Additional Information about above appointments (if needed):    1: You will need monthly Owusu Changes and daily Owusu care for infection prevention   2: You will need daily INR level for coumadin dosing   3: Call for Orthopedic follow up in approx 3 weeks with Dr Zheng   4. Can follow with vascular cardiology as outpatient in 2 weeks     Other comments or requests:    APPTS ARE READY TO BE MADE: [x ] YES    Best Family or Patient Contact (if needed):    Additional Information about above appointments (if needed):    1: You will need monthly Owusu Changes and daily Owusu care for infection prevention   2: You will need daily INR level for coumadin dosing   3: Call for Orthopedic follow up in approx 3 weeks with Dr Zheng   4. Can follow with vascular cardiology as outpatient in 2 weeks     Other comments or requests:   Patient was discharged to Rehab.

## 2022-10-16 NOTE — DISCHARGE NOTE PROVIDER - CARE PROVIDER_API CALL
Luke Zheng (MD)  Orthopaedic Surgery  [address], [address]  [place] [address]  Phone: [phone]  Fax: [phone]  Follow Up Time:     Alejandro Coelho (DO)  Cardiovascular Disease; Internal Medicine; Nuclear Cardiology  [address] [place] [address]  Phone: [phone]  Fax: [phone]  Follow Up Time:

## 2022-10-16 NOTE — DISCHARGE NOTE PROVIDER - HOSPITAL COURSE
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 Per wife, patient was d/c to [facility] after L knee arthroplasty, at which point patient went from being totally independent to totally dependent post-op (requiring lift in- and out-of-bed, was in diaper, etc.). States he had started to become very confused, was not feeling well, got UTI, became incoherent and arms started shaking, and appeared "as if he aged 30 years". At BL he is AAOx3. Patient currently being treated for UTI with unknown antibiotics. Patient reports tremors and feeling thirsty, otherwise denies F/C, N/V/D, abdominal pain, dysuria, hematuria, HA, dizziness, CP, SOB, sick contacts.    ED course: afebrile, , /86 initially, however later hypotensive to 70s/50s, initially tachypneic to 25 on NRB now on RA. WBC 19, Na 139, Cr 6.5, trops 242 --> 210, BNP 3661. UA negative. COVID+. CTH w/o hemorrhage or midline shift. CT chest w/o PNA. (11 Oct 2022 00:53)    Hospital Course:  Owusu catheter placed. Patient was started on sodium bicarbonate drip. Metabolic acidosis and renal function improved. Bicarb gtt was transitied to 1/2 normal saline. Nephrology was consulted and provided recommendations for acute renal failure. Patient's mental status improved as his uremia improved. VA duplex of lower extremities showed new acute bilateral DVTs. Xarelto stopped, heparin gtt commenced with warfarin bridge. Heparin stopped once PT/INR therapeutic. Vascular Cardiology was consulted and assisted with management of VTE. Owusu catheter was removed 10/16 and patient voided spontaneously without issue. PT evaluated patient and recommended he return to subacute rehabilitation.   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 Per wife, patient was d/c to [facility] after L knee arthroplasty, at which point patient went from being totally independent to totally dependent post-op (requiring lift in- and out-of-bed, was in diaper, etc.). States he had started to become very confused, was not feeling well, got UTI, became incoherent and arms started shaking, and appeared "as if he aged 30 years". At BL he is AAOx3. Patient currently being treated for UTI with unknown antibiotics. Patient reports tremors and feeling thirsty, otherwise denies F/C, N/V/D, abdominal pain, dysuria, hematuria, HA, dizziness, CP, SOB, sick contacts.    ED course: afebrile, , /86 initially, however later hypotensive to 70s/50s, initially tachypneic to 25 on NRB now on RA. WBC 19, Na 139, Cr 6.5, trops 242 --> 210, BNP 3661. UA negative. COVID+. CTH w/o hemorrhage or midline shift. CT chest w/o PNA. (11 Oct 2022 00:53)    Hospital Course:  Kennedy catheter placed. Patient was started on sodium bicarbonate drip. Metabolic acidosis and renal function improved. Bicarb gtt was transitied to 1/2 normal saline. Nephrology was consulted and provided recommendations for acute renal failure. Patient's mental status improved as his uremia improved. VA duplex of lower extremities showed new acute bilateral DVTs. Xarelto stopped, heparin gtt commenced with warfarin bridge. Heparin stopped once PT/INR therapeutic. Vascular Cardiology was consulted and assisted with management of VTE. Kennedy catheter was removed 10/16. He did not void spontaneously. Straight cath with difficult insertion. Urology consulted for assistance and placed 14F indwelling catheter. He will be discharged with kennedy. PT evaluated patient and recommended he return to subacute rehabilitation.   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 Per wife, patient was d/c to [facility] after L knee arthroplasty, at which point patient went from being totally independent to totally dependent post-op (requiring lift in- and out-of-bed, was in diaper, etc.). States he had started to become very confused, was not feeling well, got UTI, became incoherent and arms started shaking, and appeared "as if he aged 30 years". At BL he is AAOx3. Patient currently being treated for UTI with unknown antibiotics. Patient reports tremors and feeling thirsty, otherwise denies F/C, N/V/D, abdominal pain, dysuria, hematuria, HA, dizziness, CP, SOB, sick contacts.    ED course: afebrile, , /86 initially, however later hypotensive to 70s/50s, initially tachypneic to 25 on NRB now on RA. WBC 19, Na 139, Cr 6.5, trops 242 --> 210, BNP 3661. UA negative. COVID+. CTH w/o hemorrhage or midline shift. CT chest w/o PNA. (11 Oct 2022 00:53)    Hospital Course:  Kennedy catheter placed. Patient was started on sodium bicarbonate drip. Metabolic acidosis and renal function improved. Bicarb gtt was transitied to 1/2 normal saline. Nephrology was consulted and provided recommendations for acute renal failure. Patient's mental status improved as his uremia improved. VA duplex of lower extremities showed new acute bilateral DVTs. Xarelto stopped, heparin gtt commenced with warfarin bridge. Heparin stopped once PT/INR therapeutic. Vascular Cardiology was consulted and assisted with management of VTE. Kennedy catheter was removed 10/16. He did not void spontaneously. Straight cath with difficult insertion. Urology consulted for assistance and placed 14F indwelling catheter. He will be discharged with kennedy. PT evaluated patient and recommended he return to subacute rehabilitation. 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 Per wife, patient was d/c to [facility] after L knee arthroplasty, at which point patient went from being totally independent to totally dependent post-op (requiring lift in- and out-of-bed, was in diaper, etc.). States he had started to become very confused, was not feeling well, got UTI, became incoherent and arms started shaking, and appeared "as if he aged 30 years". At BL he is AAOx3. Patient currently being treated for UTI with unknown antibiotics. Patient reports tremors and feeling thirsty, otherwise denies F/C, N/V/D, abdominal pain, dysuria, hematuria, HA, dizziness, CP, SOB, sick contacts.    ED course: afebrile, , /86 initially, however later hypotensive to 70s/50s, initially tachypneic to 25 on NRB now on RA. WBC 19, Na 139, Cr 6.5, trops 242 --> 210, BNP 3661. UA negative. COVID+. CTH w/o hemorrhage or midline shift. CT chest w/o PNA. (11 Oct 2022 00:53)    Hospital Course:  Kennedy catheter placed. Patient was started on sodium bicarbonate drip. Metabolic acidosis and renal function improved. Bicarb gtt was transitied to 1/2 normal saline. Nephrology was consulted and provided recommendations for acute renal failure. Patient's mental status improved as his uremia improved. VA duplex of lower extremities showed new acute bilateral DVTs. Xarelto stopped, heparin gtt commenced with warfarin bridge. Heparin stopped once PT/INR therapeutic. Vascular Cardiology was consulted and assisted with management of VTE. Kennedy catheter was removed 10/16. He did not void spontaneously. Straight cath with difficult insertion. Urology consulted for assistance and placed 14F indwelling catheter. He will be discharged with kennedy. PT evaluated patient and recommended he return to subacute rehabilitation. To continue with coumadin and follow daily INR levels in Rehab    Discharge/Dispo/Med rec discussed with attending Dr. Munoz. Patient medically cleared and stable for discharge to [facility] at ___ with outpatient follow up with PCP,_Orthopedic, and Vascular Cards in 2 weeks after discharge or to arrange in Rehab.

## 2022-10-16 NOTE — DISCHARGE NOTE PROVIDER - CARE PROVIDERS DIRECT ADDRESSES
,[email or web address]

## 2022-10-16 NOTE — DISCHARGE NOTE PROVIDER - NSDCCPCAREPLAN_GEN_ALL_CORE_FT
PRINCIPAL DISCHARGE DIAGNOSIS  Diagnosis: Acute renal failure  Assessment and Plan of Treatment: You presented to the hospital with confusion and elevated kidney numbers. Your confusion was likely due to your kidney injury and the high levels of nitrogen waste products in your blood. As you received IV fluids and your kidney numbers and levels of waste products in your blood improved your confusion improved as well. It is very important that you avoid taking NSAIDs (such as ibuprofen, naproxen, meloxicam, etc.) as they may act negatively on your kidney. Please follow-up with your Nephrologist and PCP upon discharge in order to continue to assess your kidney function.      SECONDARY DISCHARGE DIAGNOSES  Diagnosis: 2019 novel coronavirus disease (COVID-19)  Assessment and Plan of Treatment: You were found to have Covid-19. You did not require oxygen while you were hospitalized. You did not qualify for inpatient therapies for covid due to your kidney injury and the fact you did not require oxygen. Please see your doctor or return to the hospital if you have uncontrolled shortness of breath or fevers.    Diagnosis: DVT of lower limb, acute  Assessment and Plan of Treatment: Your legs were swollen when you came to the hospital. You had an ultrasound of both legs and were found to have acute new blood clots in both of your legs. The clots developed despite you being on your home blood thinner. You were switched to a new blood thinner during hospitalization (one called warfarin). Please continue to take this medication as prescribed. This medication requires close blood monitoring to assess how 'thin' your blood is. Please make sure to follow-up with your PCP and Vascular Cardiology (Dr. Coelho) for close follow-up. Warfarin is a blood thinner and may make you prone to bleeding. If you experience a fall, head trauma or uncontrolled bleeding please go to the hospital for evaluation.     PRINCIPAL DISCHARGE DIAGNOSIS  Diagnosis: Acute renal failure  Assessment and Plan of Treatment: You presented to the hospital with confusion and elevated kidney numbers. Your confusion was likely due to your kidney injury and the high levels of nitrogen waste products in your blood. As you received IV fluids and your kidney numbers and levels of waste products in your blood improved your confusion improved as well. It is very important that you avoid taking NSAIDs (such as ibuprofen, naproxen, meloxicam, etc.) as they may act negatively on your kidney. Please follow-up with your Nephrologist and PCP upon discharge in order to continue to assess your kidney function. You will be discharged with the kennedy catheter due to difficulties peeing on your own.      SECONDARY DISCHARGE DIAGNOSES  Diagnosis: 2019 novel coronavirus disease (COVID-19)  Assessment and Plan of Treatment: You were found to have Covid-19. You did not require oxygen while you were hospitalized. You did not qualify for inpatient therapies for covid due to your kidney injury and the fact you did not require oxygen. Please see your doctor or return to the hospital if you have uncontrolled shortness of breath or fevers.    Diagnosis: DVT of lower limb, acute  Assessment and Plan of Treatment: Your legs were swollen when you came to the hospital. You had an ultrasound of both legs and were found to have acute new blood clots in both of your legs. The clots developed despite you being on your home blood thinner. You were switched to a new blood thinner during hospitalization (one called warfarin). Please continue to take this medication as prescribed. This medication requires close blood monitoring to assess how 'thin' your blood is. Please make sure to follow-up with your PCP and Vascular Cardiology (Dr. Coelho) for close follow-up. Warfarin is a blood thinner and may make you prone to bleeding. If you experience a fall, head trauma or uncontrolled bleeding please go to the hospital for evaluation.

## 2022-10-16 NOTE — DISCHARGE NOTE PROVIDER - NSDCMRMEDTOKEN_GEN_ALL_CORE_FT
amLODIPine 10 mg oral tablet: 1 tab(s) orally once a day  atenolol 25 mg oral tablet: 1 tab(s) orally once a day  Combivent Respimat 20 mcg-100 mcg/inh inhalation aerosol: 1 puff(s) inhaled 4 times a day, As Needed  famotidine 20 mg oral tablet: 1 tab(s) orally once a day  hydroCHLOROthiazide 25 mg oral tablet: 1 tab(s) orally once a day  latanoprost 0.005% ophthalmic solution: 1 drop(s) to each affected eye once a day (in the evening)  losartan 100 mg oral tablet: 1 tab(s) orally once a day  timolol hemihydrate 0.5% ophthalmic solution: 1 drop(s) to each affected eye 2 times a day to both eyes  Xarelto 20 mg oral tablet: 1 tab(s) orally once a day (in the evening)   allopurinol 100 mg oral tablet: 1 tab(s) orally once a day  atenolol 25 mg oral tablet: 1 tab(s) orally once a day  Combivent Respimat 20 mcg-100 mcg/inh inhalation aerosol: 1 puff(s) inhaled 4 times a day, As Needed  famotidine 20 mg oral tablet: 1 tab(s) orally once a day  latanoprost 0.005% ophthalmic solution: 1 drop(s) to each affected eye once a day (in the evening)  losartan 100 mg oral tablet: 1 tab(s) orally once a day  Multiple Vitamins oral tablet: 1 tab(s) orally once a day  timolol hemihydrate 0.5% ophthalmic solution: 1 drop(s) to each affected eye 2 times a day to both eyes   allopurinol 100 mg oral tablet: 1 tab(s) orally once a day  atenolol 25 mg oral tablet: 1 tab(s) orally once a day  Combivent Respimat 20 mcg-100 mcg/inh inhalation aerosol: 1 puff(s) inhaled 4 times a day, As Needed  Coumadin 5 mg oral tablet: 1 tab(s) orally once a day  famotidine 20 mg oral tablet: 1 tab(s) orally once a day  latanoprost 0.005% ophthalmic solution: 1 drop(s) to each affected eye once a day (in the evening)  losartan 100 mg oral tablet: 1 tab(s) orally once a day  Multiple Vitamins oral tablet: 1 tab(s) orally once a day  timolol hemihydrate 0.5% ophthalmic solution: 1 drop(s) to each affected eye 2 times a day to both eyes   20-May-2022 23:36:47

## 2022-10-16 NOTE — DISCHARGE NOTE PROVIDER - NSDCCPTREATMENT_GEN_ALL_CORE_FT
PRINCIPAL PROCEDURE  Procedure: CT scan abdomen  Findings and Treatment: FINDINGS:  CHEST:  LUNGS AND LARGE AIRWAYS: Patent central airways. Trace bibasilar   dependent atelectasis.  PLEURA: No pleural effusion.  VESSELS: Within normal limits.  HEART: Heart size is enlarged. No pericardial effusion. Coronary artery calcifications.  MEDIASTINUM AND ALINA: No lymphadenopathy.  CHEST WALL AND LOWER NECK: Within normal limits.  Evaluation of the abdominal and pelvic viscera and vasculature is limited without the use of IV contrast, as well as streak artifact from the patient's upper extremities at his sides.  ABDOMEN AND PELVIS:  LIVER: Within normal limits.  BILE DUCTS: Normal caliber.  GALLBLADDER: Cholelithiasis.  SPLEEN: Within normal limits.  PANCREAS: Within normal limits.  ADRENALS: Mildly thickened left adrenal gland.  KIDNEYS/URETERS: Bilateral nonobstructive renal calculi measuring up to 1.4 cm on the left and 0.6 cm on the right. Bilateral renal cysts. No hydronephrosis.  BLADDER: Decompressed with a Owusu catheter.  REPRODUCTIVE ORGANS: Prostate is normal in size.  BOWEL: Small hiatal hernia. No bowel obstruction. Colonic diverticulosis without diverticulitis. Rectum is distended by a small amount of stool.   Appendix is normal.  PERITONEUM: No ascites.  VESSELS: Atherosclerotic changes.  RETROPERITONEUM/LYMPH NODES: No lymphadenopathy.  ABDOMINAL WALL: Within normal limits.  BONES: Degenerative changes.  IMPRESSION:  No pneumonia  No evidence of acute intra-abdominal pathology.  < from: CT Abdomen and Pelvis No Cont (10.10.22 @ 19:37) >        SECONDARY PROCEDURE  Procedure: Doppler ultrasound of vein of lower extremity  Findings and Treatment: Impression:  There is acute, occlusive DVT affecting the right external iliac, common femoral, deep femoral, femoral and popliteal veins.  The right great saphenous vein is thrombosed.  There is acute, occlusive DVT affecting the left external iliac, common   femoral, deep femoral, femoral and popliteal veins above the knee and the left posterior tibial peroneal trunk.  < from: [facility] Duplex Lower Ext Vein Scan, Bilat (10.12.22 @ 12:06) >

## 2022-10-16 NOTE — PROGRESS NOTE ADULT - PROBLEM SELECTOR PLAN 2
RESOLVED  - Patient with known UTI from rehab, was being treated on abx (unknown which), as well as new COVID infection  - UA on admission negative, likely due to recent abx use  - WBC 19 on admission, improving on antibiotics  - CT chest neg for PNA  - afebrile but tachycardic, tachypneic and hypotensive with known UTI and new COVID infection, fulfilling sepsis criteria  - Hold remdesivir given acute renal failure  - S/p short course of CTX (5d)  - Blood and urine Cx NGTD  - COVID- on RA

## 2022-10-17 LAB
ALBUMIN SERPL ELPH-MCNC: 2.9 G/DL — LOW (ref 3.3–5)
ALP SERPL-CCNC: 89 U/L — SIGNIFICANT CHANGE UP (ref 40–120)
ALT FLD-CCNC: 15 U/L — SIGNIFICANT CHANGE UP (ref 10–45)
ANION GAP SERPL CALC-SCNC: 13 MMOL/L — SIGNIFICANT CHANGE UP (ref 5–17)
AST SERPL-CCNC: 15 U/L — SIGNIFICANT CHANGE UP (ref 10–40)
BASOPHILS # BLD AUTO: 0.02 K/UL — SIGNIFICANT CHANGE UP (ref 0–0.2)
BASOPHILS NFR BLD AUTO: 0.2 % — SIGNIFICANT CHANGE UP (ref 0–2)
BILIRUB SERPL-MCNC: 0.9 MG/DL — SIGNIFICANT CHANGE UP (ref 0.2–1.2)
BUN SERPL-MCNC: 68 MG/DL — HIGH (ref 7–23)
CALCIUM SERPL-MCNC: 8.9 MG/DL — SIGNIFICANT CHANGE UP (ref 8.4–10.5)
CHLORIDE SERPL-SCNC: 101 MMOL/L — SIGNIFICANT CHANGE UP (ref 96–108)
CO2 SERPL-SCNC: 25 MMOL/L — SIGNIFICANT CHANGE UP (ref 22–31)
CREAT SERPL-MCNC: 2.04 MG/DL — HIGH (ref 0.5–1.3)
EGFR: 36 ML/MIN/1.73M2 — LOW
EOSINOPHIL # BLD AUTO: 0.25 K/UL — SIGNIFICANT CHANGE UP (ref 0–0.5)
EOSINOPHIL NFR BLD AUTO: 2.5 % — SIGNIFICANT CHANGE UP (ref 0–6)
GLUCOSE SERPL-MCNC: 97 MG/DL — SIGNIFICANT CHANGE UP (ref 70–99)
HCT VFR BLD CALC: 34.6 % — LOW (ref 39–50)
HGB BLD-MCNC: 10.5 G/DL — LOW (ref 13–17)
IMM GRANULOCYTES NFR BLD AUTO: 0.7 % — SIGNIFICANT CHANGE UP (ref 0–0.9)
INR BLD: 2.02 RATIO — HIGH (ref 0.88–1.16)
LYMPHOCYTES # BLD AUTO: 1.29 K/UL — SIGNIFICANT CHANGE UP (ref 1–3.3)
LYMPHOCYTES # BLD AUTO: 12.9 % — LOW (ref 13–44)
MAGNESIUM SERPL-MCNC: 2.1 MG/DL — SIGNIFICANT CHANGE UP (ref 1.6–2.6)
MCHC RBC-ENTMCNC: 26.3 PG — LOW (ref 27–34)
MCHC RBC-ENTMCNC: 30.3 GM/DL — LOW (ref 32–36)
MCV RBC AUTO: 86.5 FL — SIGNIFICANT CHANGE UP (ref 80–100)
MONOCYTES # BLD AUTO: 0.83 K/UL — SIGNIFICANT CHANGE UP (ref 0–0.9)
MONOCYTES NFR BLD AUTO: 8.3 % — SIGNIFICANT CHANGE UP (ref 2–14)
NEUTROPHILS # BLD AUTO: 7.55 K/UL — HIGH (ref 1.8–7.4)
NEUTROPHILS NFR BLD AUTO: 75.4 % — SIGNIFICANT CHANGE UP (ref 43–77)
NRBC # BLD: 0 /100 WBCS — SIGNIFICANT CHANGE UP (ref 0–0)
PHOSPHATE SERPL-MCNC: 3.2 MG/DL — SIGNIFICANT CHANGE UP (ref 2.5–4.5)
PLATELET # BLD AUTO: 216 K/UL — SIGNIFICANT CHANGE UP (ref 150–400)
POTASSIUM SERPL-MCNC: 3.8 MMOL/L — SIGNIFICANT CHANGE UP (ref 3.5–5.3)
POTASSIUM SERPL-SCNC: 3.8 MMOL/L — SIGNIFICANT CHANGE UP (ref 3.5–5.3)
PROT SERPL-MCNC: 6.1 G/DL — SIGNIFICANT CHANGE UP (ref 6–8.3)
PROTHROM AB SERPL-ACNC: 23.6 SEC — HIGH (ref 10.5–13.4)
RBC # BLD: 4 M/UL — LOW (ref 4.2–5.8)
RBC # FLD: 16.7 % — HIGH (ref 10.3–14.5)
SARS-COV-2 RNA SPEC QL NAA+PROBE: DETECTED
SODIUM SERPL-SCNC: 139 MMOL/L — SIGNIFICANT CHANGE UP (ref 135–145)
WBC # BLD: 10.01 K/UL — SIGNIFICANT CHANGE UP (ref 3.8–10.5)
WBC # FLD AUTO: 10.01 K/UL — SIGNIFICANT CHANGE UP (ref 3.8–10.5)

## 2022-10-17 PROCEDURE — 51703 INSERT BLADDER CATH COMPLEX: CPT

## 2022-10-17 PROCEDURE — 99233 SBSQ HOSP IP/OBS HIGH 50: CPT | Mod: GC

## 2022-10-17 RX ORDER — OXYCODONE HYDROCHLORIDE 5 MG/1
5 TABLET ORAL EVERY 6 HOURS
Refills: 0 | Status: DISCONTINUED | OUTPATIENT
Start: 2022-10-17 | End: 2022-10-22

## 2022-10-17 RX ORDER — SODIUM CHLORIDE 9 MG/ML
1000 INJECTION, SOLUTION INTRAVENOUS
Refills: 0 | Status: DISCONTINUED | OUTPATIENT
Start: 2022-10-17 | End: 2022-10-19

## 2022-10-17 RX ORDER — WARFARIN SODIUM 2.5 MG/1
5 TABLET ORAL ONCE
Refills: 0 | Status: DISCONTINUED | OUTPATIENT
Start: 2022-10-17 | End: 2022-10-17

## 2022-10-17 RX ORDER — WARFARIN SODIUM 2.5 MG/1
7.5 TABLET ORAL ONCE
Refills: 0 | Status: COMPLETED | OUTPATIENT
Start: 2022-10-17 | End: 2022-10-17

## 2022-10-17 RX ORDER — OXYCODONE HYDROCHLORIDE 5 MG/1
5 TABLET ORAL ONCE
Refills: 0 | Status: DISCONTINUED | OUTPATIENT
Start: 2022-10-17 | End: 2022-10-17

## 2022-10-17 RX ADMIN — OXYCODONE HYDROCHLORIDE 5 MILLIGRAM(S): 5 TABLET ORAL at 12:13

## 2022-10-17 RX ADMIN — OXYCODONE HYDROCHLORIDE 5 MILLIGRAM(S): 5 TABLET ORAL at 06:59

## 2022-10-17 RX ADMIN — Medication 100 MILLIGRAM(S): at 11:12

## 2022-10-17 RX ADMIN — SODIUM CHLORIDE 75 MILLILITER(S): 9 INJECTION, SOLUTION INTRAVENOUS at 05:10

## 2022-10-17 RX ADMIN — NYSTATIN CREAM 1 APPLICATION(S): 100000 CREAM TOPICAL at 17:24

## 2022-10-17 RX ADMIN — Medication 1 DROP(S): at 05:09

## 2022-10-17 RX ADMIN — Medication 650 MILLIGRAM(S): at 05:10

## 2022-10-17 RX ADMIN — Medication 1 TABLET(S): at 17:24

## 2022-10-17 RX ADMIN — OXYCODONE HYDROCHLORIDE 5 MILLIGRAM(S): 5 TABLET ORAL at 11:13

## 2022-10-17 RX ADMIN — CHLORHEXIDINE GLUCONATE 1 APPLICATION(S): 213 SOLUTION TOPICAL at 11:13

## 2022-10-17 RX ADMIN — MIDODRINE HYDROCHLORIDE 5 MILLIGRAM(S): 2.5 TABLET ORAL at 11:12

## 2022-10-17 RX ADMIN — WARFARIN SODIUM 7.5 MILLIGRAM(S): 2.5 TABLET ORAL at 23:48

## 2022-10-17 RX ADMIN — MIDODRINE HYDROCHLORIDE 5 MILLIGRAM(S): 2.5 TABLET ORAL at 05:10

## 2022-10-17 RX ADMIN — Medication 1 DROP(S): at 17:24

## 2022-10-17 RX ADMIN — NYSTATIN CREAM 1 APPLICATION(S): 100000 CREAM TOPICAL at 05:10

## 2022-10-17 RX ADMIN — LATANOPROST 1 DROP(S): 0.05 SOLUTION/ DROPS OPHTHALMIC; TOPICAL at 22:56

## 2022-10-17 NOTE — PROGRESS NOTE ADULT - SUBJECTIVE AND OBJECTIVE BOX
Resting, comfortable on RA    Vital Signs Last 24 Hrs  T(C): 36.7 (10-17-22 @ 12:22), Max: 36.8 (10-17-22 @ 05:02)  T(F): 98 (10-17-22 @ 12:22), Max: 98.2 (10-17-22 @ 05:02)  HR: 97 (10-17-22 @ 12:22) (82 - 97)  BP: 102/66 (10-17-22 @ 12:22) (102/66 - 111/69)  RR: 18 (10-17-22 @ 12:22) (18 - 18)  SpO2: 97% (10-17-22 @ 12:22) (94% - 100%)    I&O's Detail    16 Oct 2022 07:01  -  17 Oct 2022 07:00  --------------------------------------------------------  IN:    Heparin Infusion: 60 mL    IV PiggyBack: 50 mL    Oral Fluid: 480 mL    sodium chloride 0.45%: 375 mL  Total IN: 965 mL    OUT:    Indwelling Catheter - Urethral (mL): 500 mL    Voided (mL): 0 mL  Total OUT: 500 mL    Total NET: 465 mL    17 Oct 2022 07:01  -  17 Oct 2022 18:14  --------------------------------------------------------  OUT:    Indwelling Catheter - Urethral (mL): 1200 mL  Total OUT: 1200 mL    s1s2  b/l air entry  soft, ND  sm edema, stasis changes b/l LE                                                         10.5   10.01 )-----------( 216      ( 17 Oct 2022 07:16 )             34.6     17 Oct 2022 07:23    139    |  101    |  68     ----------------------------<  97     3.8     |  25     |  2.04     Ca    8.9        17 Oct 2022 07:23  Phos  3.2       17 Oct 2022 07:23  Mg     2.1       17 Oct 2022 07:23    TPro  6.1    /  Alb  2.9    /  TBili  0.9    /  DBili  x      /  AST  15     /  ALT  15     /  AlkPhos  89     17 Oct 2022 07:23    LIVER FUNCTIONS - ( 17 Oct 2022 07:23 )  Alb: 2.9 g/dL / Pro: 6.1 g/dL / ALK PHOS: 89 U/L / ALT: 15 U/L / AST: 15 U/L / GGT: x           PT/INR - ( 17 Oct 2022 07:18 )   PT: 23.6 sec;   INR: 2.02 ratio      acetaminophen     Tablet .. 650 milliGRAM(s) Oral every 6 hours PRN  ALBUTerol    90 MICROgram(s) HFA Inhaler 2 Puff(s) Inhalation every 6 hours PRN  allopurinol 100 milliGRAM(s) Oral daily  chlorhexidine 2% Cloths 1 Application(s) Topical daily  influenza  Vaccine (HIGH DOSE) 0.7 milliLiter(s) IntraMuscular once  latanoprost 0.005% Ophthalmic Solution 1 Drop(s) Both EYES at bedtime  melatonin 3 milliGRAM(s) Oral at bedtime PRN  midodrine. 5 milliGRAM(s) Oral three times a day  Nephro-cj 1 Tablet(s) Oral every 24 hours  nystatin Powder 1 Application(s) Topical two times a day  oxyCODONE    IR 2.5 milliGRAM(s) Oral every 6 hours PRN  oxyCODONE    IR 2.5 milliGRAM(s) Oral once  oxyCODONE    IR 5 milliGRAM(s) Oral every 6 hours PRN  sodium chloride 0.45%. 1000 milliLiter(s) IV Continuous <Continuous>  timolol 0.5% Solution 1 Drop(s) Both EYES two times a day  tiotropium 18 MICROgram(s) Capsule 1 Capsule(s) Inhalation daily PRN  warfarin 7.5 milliGRAM(s) Oral once    A/P:    S/p L TKA 9/16/22  Severe SONAL on adm 10/10/22 (peak Cr 6.5 - 10/10/22, prior Cr 1.07 - 9/27/22)  Suspect hypotensive/hemodynamic ATN, was on ARB, HCTZ, may have gotten NSAID's  Cr is improving, good UO   Continue IVF as ordered  Avoid nephrotoxins  Avoid hypotension  Midodrine added  F/u BMP, UO  B/l LE DVT, AC  Will follow    [phone]

## 2022-10-17 NOTE — PROGRESS NOTE ADULT - PROBLEM SELECTOR PLAN 2
Cr 6.50 on admission, baseline 1.1 in HIE form 9/27/22. Improving with IVF.  - 3+ pitting BLE, per wife has been severe for ~4 years  - Follows Dr. Barrientos (Nephrology)  - On HCTZ at home, holding iso HoTN  - Owusu in place, monitor UOP, plan for TOV 10/16  - Nephro following, recs appreciated  - f/u repeat UA, however unlikely GN picture contributing to SONAL  - UCr 112, Marta 34, Uosm 478. FeNa 1.1% indicating intrinsic renal injury  - Nephro following (Dr. Brownlee)  - Renal sono unremarkable, doppler negative for RVT  - Avoid NSAIDs, ACEI/ARBS, RCA and nephrotoxins. Dose medications as per eGFR  - Continue 1/2 NS and sodium bicarb gtt for now  - TOV started 10/16 Cr 6.50 on admission, baseline 1.1 in HIE form 9/27/22. Improving with IVF.  - 3+ pitting BLE, per wife has been severe for ~4 years  - Follows Dr. Barrientos (Nephrology)  - On HCTZ at home, holding iso HoTN  - Kennedy in place, monitor UOP, plan for TOV 10/16  - Nephro following, recs appreciated  - f/u repeat UA, however unlikely GN picture contributing to SONAL  - UCr 112, Marta 34, Uosm 478. FeNa 1.1% indicating intrinsic renal injury  - Nephro following (Dr. Brownlee)  - Renal sono unremarkable, doppler negative for RVT  - Avoid NSAIDs, ACEI/ARBS, RCA and nephrotoxins. Dose medications as per eGFR  - Continue 1/2 NS and sodium bicarb tabs  - TOV started 10/16, failed, will replace kennedy Cr 6.50 on admission, baseline 1.1 in HIE form 9/27/22. Improving with IVF.  - 3+ pitting BLE, per wife has been severe for ~4 years  - Follows Dr. Barrientos (Nephrology)  - On HCTZ at home, holding iso HoTN  - Kennedy in place, monitor UOP, plan for TOV 10/16.  - Nephro following, recs appreciated  - f/u repeat UA, however unlikely GN picture contributing to SONAL  - UCr 112, Marta 34, Uosm 478. FeNa 1.1% indicating intrinsic renal injury  - Nephro following (Dr. Brownlee)  - Renal sono unremarkable, doppler negative for RVT  - Avoid NSAIDs, ACEI/ARBS, RCA and nephrotoxins. Dose medications as per eGFR  - Continue 1/2 NS and sodium bicarb tabs  - TOV started 10/16, failed, will replace kennedy

## 2022-10-17 NOTE — PROGRESS NOTE ADULT - PROBLEM SELECTOR PLAN 1
Duplex 10/12 with acute bilateral DVTs in lower extremity. Likely provoked in setting of recent surgery and venous stasis being in bed. Surprisingly developed on Xarelto.  -AC to heparin gtt with plans for warfarin bridge - coumadin 5mg today   -Appreciate vascular cardiology evaluation  -Renal dopplers without evidence of renal vein thrombosis  -Elevate extremities. Duplex 10/12 with acute bilateral DVTs in lower extremity. Likely provoked in setting of recent surgery and venous stasis being in bed. Surprisingly developed on Xarelto.  -Heparin to coumadin bridge.   -Appreciate vascular cardiology evaluation  -Renal dopplers without evidence of renal vein thrombosis  -Elevate extremities.

## 2022-10-17 NOTE — GOALS OF CARE CONVERSATION - ADVANCED CARE PLANNING - AGENT'S NAME
Kimber Merritt (spouse) [phone] Alejandro Castrejon (son) [phone] Lamine Castrejon (son) [phone]

## 2022-10-17 NOTE — GOALS OF CARE CONVERSATION - ADVANCED CARE PLANNING - CONVERSATION DETAILS
Due to capacity and dx. of AMS spoke to patient's spouse  regarding advance directives. Introduced self and the role of the PCE, verbalise understanding. Patient's wife  expressed that she thinks they  had previously completed a  health care proxy form. Spouse expressed that they have designated her Kimber Merritt (spouse) [phone] as the agent and alternates as  Alejandro Castrejon (son) [phone] Lamine Castrejon (son) [phone] . Ffamily will provide copy of health care proxy form to medical team Spouse aware  that if they are unable to locate HCP, she  would be considered surrogates decision maker and be able to make decision.    CPR , intubation, artificial nutrition  procedures and possible complications explained. Spouse reports that they would like all necessary measures taken to save patients life. Patient wishes to receive CPR/Intubation as well as artificial nutrition. Spouse aware patient will remain Full Code. Explained hospital base Full Code policy, verbalise understanding.     Pts spouse  instructed to provide a copy of HCP  for medical records.  Contact information for further needs provided.        Maria Fernanda Cleaning  MSN -ED RN OCN     [phone]

## 2022-10-17 NOTE — PROCEDURE NOTE - ADDITIONAL PROCEDURE DETAILS
Called for consult for difficult Owusu placement for buried/micro penis. Multiple attempts by Primary team. Owusu required for urinary retention, PVR ~1L per primary team. Placed 14Fr silicone, drained clear, yellow urine.

## 2022-10-17 NOTE — CHART NOTE - NSCHARTNOTEFT_GEN_A_CORE
ADS Transfer NOTE    Transfer from: TEAMS to [facility]    Hospital Course  67M PMH HTN, HLD, heart arrythmia, renal cancer s/p partial nephrectomy (~2017/18), DVT/PE iso renal cancer (~2017/18)on xarelto, s/p total L knee arthoplasty (9/16/22), asthma, BIBEMS from [facility] for AMS x3 days. Per wife, patient was d/c to [facility] after L knee arthroplasty, at which point patient went from being totally independent to totally dependent post-op (requiring lift in- and out-of-bed, was in diaper, etc.). States he had started to become very confused, was not feeling well, got UTI, became incoherent and arms started shaking, and appeared "as if he aged 30 years". At [facility] he is AAOx3. Patient currently being treated for UTI with unknown antibiotics. Patient reports tremors and feeling thirsty, otherwise denies F/C, N/V/D, abdominal pain, dysuria, hematuria, HA, dizziness, CP, SOB, sick contacts.    ED course: afebrile, , /86 initially, however later hypotensive to 70s/50s, initially tachypneic to 25 on NRB now on RA. WBC 19, Na 139, Cr 6.5, trops 242 --> 210, BNP 3661. UA negative. COVID+. CTH w/o hemorrhage or midline shift. CT chest w/o PNA. (11 Oct 2022 00:53)    Hospital Course:  Owusu catheter placed. Patient was started on sodium bicarbonate drip. Metabolic acidosis and renal function improved. Bicarb gtt was transitied to 1/2 normal saline. Nephrology was consulted and provided recommendations for acute renal failure. Patient's mental status improved as his uremia improved. VA duplex of lower extremities showed new acute bilateral DVTs. Xarelto stopped, heparin gtt commenced with warfarin bridge. Heparin stopped once PT/INR therapeutic. Vascular Cardiology was consulted and assisted with management of VTE. Owusu catheter was removed 10/16, however TOV failed. He will be discharged with Owusu catheter. Owusu Catheter with difficult insertion, requiring Urology assistance. PT evaluated patient and recommended he return to subacute rehabilitation.       For Follow-Up:  [] Dose INR Daily  [] F/u with CM in regards to [facility] acceptance  [] F/U repeat COVID  [] Trend serum Cr daily, IVF per Nephro (Dr. Brownlee)  [X] Failed TOV, will be going to [facility] with Owusu        Vital Signs Last 24 Hrs  T(C): 36.7 (17 Oct 2022 12:22), Max: 36.8 (17 Oct 2022 05:02)  T(F): 98 (17 Oct 2022 12:22), Max: 98.2 (17 Oct 2022 05:02)  HR: 97 (17 Oct 2022 12:22) (82 - 97)  BP: 102/66 (17 Oct 2022 12:22) (102/66 - 111/69)  BP(mean): --  RR: 18 (17 Oct 2022 12:22) (18 - 18)  SpO2: 97% (17 Oct 2022 12:22) (94% - 100%)    Parameters below as of 17 Oct 2022 12:22  Patient On (Oxygen Delivery Method): room air      I&O's Summary    16 Oct 2022 07:01  -  17 Oct 2022 07:00  --------------------------------------------------------  IN: 965 mL / OUT: 500 mL / NET: 465 mL    17 Oct 2022 07:01  -  17 Oct 2022 18:20  --------------------------------------------------------  IN: 0 mL / OUT: 1200 mL / NET: -1200 mL          MEDICATIONS  (STANDING):  allopurinol 100 milliGRAM(s) Oral daily  chlorhexidine 2% Cloths 1 Application(s) Topical daily  influenza  Vaccine (HIGH DOSE) 0.7 milliLiter(s) IntraMuscular once  latanoprost 0.005% Ophthalmic Solution 1 Drop(s) Both EYES at bedtime  midodrine. 5 milliGRAM(s) Oral three times a day  Nephro-cj 1 Tablet(s) Oral every 24 hours  nystatin Powder 1 Application(s) Topical two times a day  oxyCODONE    IR 2.5 milliGRAM(s) Oral once  sodium chloride 0.45%. 1000 milliLiter(s) (50 mL/Hr) IV Continuous <Continuous>  timolol 0.5% Solution 1 Drop(s) Both EYES two times a day  warfarin 7.5 milliGRAM(s) Oral once    MEDICATIONS  (PRN):  acetaminophen     Tablet .. 650 milliGRAM(s) Oral every 6 hours PRN Temp greater or equal to 38C (100.4F), Mild Pain (1 - 3)  ALBUTerol    90 MICROgram(s) HFA Inhaler 2 Puff(s) Inhalation every 6 hours PRN Shortness of Breath and/or Wheezing  melatonin 3 milliGRAM(s) Oral at bedtime PRN Insomnia  oxyCODONE    IR 2.5 milliGRAM(s) Oral every 6 hours PRN Moderate Pain (4 - 6)  oxyCODONE    IR 5 milliGRAM(s) Oral every 6 hours PRN Severe Pain (7 - 10)  tiotropium 18 MICROgram(s) Capsule 1 Capsule(s) Inhalation daily PRN SOB/wheezing        LABS                                            10.5                  Neurophils% (auto):   75.4   (10-17 @ 07:16):    10.01)-----------(216          Lymphocytes% (auto):  12.9                                          34.6                   Eosinphils% (auto):   2.5      Manual%: Neutrophils x    ; Lymphocytes x    ; Eosinophils x    ; Bands%: x    ; Blasts x                                    139    |  101    |  68                  Calcium: 8.9   / iCa: x      (10-17 @ 07:23)    ----------------------------<  97        Magnesium: 2.1                              3.8     |  25     |  2.04             Phosphorous: 3.2      TPro  6.1    /  Alb  2.9    /  TBili  0.9    /  DBili  x      /  AST  15     /  ALT  15     /  AlkPhos  89     17 Oct 2022 07:23    ( 10-17 @ 07:18 )   PT: 23.6 sec;   INR: 2.02 ratio  aPTT: x          Royce West MD  PGY-3 ADS Transfer NOTE    Transfer from: TEAMS to [facility]    Hospital Course  67M PMH HTN, HLD, heart arrythmia, renal cancer s/p partial nephrectomy (~2017/18), DVT/PE iso renal cancer (~2017/18)on xarelto, s/p total L knee arthoplasty (9/16/22), asthma, BIBEMS from [facility] for AMS x3 days. Per wife, patient was d/c to [facility] after L knee arthroplasty, at which point patient went from being totally independent to totally dependent post-op (requiring lift in- and out-of-bed, was in diaper, etc.). States he had started to become very confused, was not feeling well, got UTI, became incoherent and arms started shaking, and appeared "as if he aged 30 years". At [facility] he is AAOx3. Patient currently being treated for UTI with unknown antibiotics. Patient reports tremors and feeling thirsty, otherwise denies F/C, N/V/D, abdominal pain, dysuria, hematuria, HA, dizziness, CP, SOB, sick contacts.    ED course: afebrile, , /86 initially, however later hypotensive to 70s/50s, initially tachypneic to 25 on NRB now on RA. WBC 19, Na 139, Cr 6.5, trops 242 --> 210, BNP 3661. UA negative. COVID+. CTH w/o hemorrhage or midline shift. CT chest w/o PNA. (11 Oct 2022 00:53)    Hospital Course:  Owusu catheter placed. Patient was started on sodium bicarbonate drip. Metabolic acidosis and renal function improved. Bicarb gtt was transitied to 1/2 normal saline. Nephrology was consulted and provided recommendations for acute renal failure. Patient's mental status improved as his uremia improved. VA duplex of lower extremities showed new acute bilateral DVTs. Xarelto stopped, heparin gtt commenced with warfarin bridge. Heparin stopped once PT/INR therapeutic. Vascular Cardiology was consulted and assisted with management of VTE. Owusu catheter was removed 10/16, however TOV failed. He will be discharged with Owusu catheter. Owusu Catheter with difficult insertion, requiring Urology assistance. PT evaluated patient and recommended he return to subacute rehabilitation.       For Follow-Up:  [] Dose coumadin daily (5 or 7.5mg) based on INR  [] F/u with CM in regards to [facility] acceptance  [] F/U repeat COVID  [] Trend serum Cr daily, IVF per Nephro (Dr. Brownlee)  [X] Failed TOV, will be going to [facility] with Owusu        Vital Signs Last 24 Hrs  T(C): 36.7 (17 Oct 2022 12:22), Max: 36.8 (17 Oct 2022 05:02)  T(F): 98 (17 Oct 2022 12:22), Max: 98.2 (17 Oct 2022 05:02)  HR: 97 (17 Oct 2022 12:22) (82 - 97)  BP: 102/66 (17 Oct 2022 12:22) (102/66 - 111/69)  BP(mean): --  RR: 18 (17 Oct 2022 12:22) (18 - 18)  SpO2: 97% (17 Oct 2022 12:22) (94% - 100%)    Parameters below as of 17 Oct 2022 12:22  Patient On (Oxygen Delivery Method): room air      I&O's Summary    16 Oct 2022 07:01  -  17 Oct 2022 07:00  --------------------------------------------------------  IN: 965 mL / OUT: 500 mL / NET: 465 mL    17 Oct 2022 07:01  -  17 Oct 2022 18:20  --------------------------------------------------------  IN: 0 mL / OUT: 1200 mL / NET: -1200 mL          MEDICATIONS  (STANDING):  allopurinol 100 milliGRAM(s) Oral daily  chlorhexidine 2% Cloths 1 Application(s) Topical daily  influenza  Vaccine (HIGH DOSE) 0.7 milliLiter(s) IntraMuscular once  latanoprost 0.005% Ophthalmic Solution 1 Drop(s) Both EYES at bedtime  midodrine. 5 milliGRAM(s) Oral three times a day  Nephro-cj 1 Tablet(s) Oral every 24 hours  nystatin Powder 1 Application(s) Topical two times a day  oxyCODONE    IR 2.5 milliGRAM(s) Oral once  sodium chloride 0.45%. 1000 milliLiter(s) (50 mL/Hr) IV Continuous <Continuous>  timolol 0.5% Solution 1 Drop(s) Both EYES two times a day  warfarin 7.5 milliGRAM(s) Oral once    MEDICATIONS  (PRN):  acetaminophen     Tablet .. 650 milliGRAM(s) Oral every 6 hours PRN Temp greater or equal to 38C (100.4F), Mild Pain (1 - 3)  ALBUTerol    90 MICROgram(s) HFA Inhaler 2 Puff(s) Inhalation every 6 hours PRN Shortness of Breath and/or Wheezing  melatonin 3 milliGRAM(s) Oral at bedtime PRN Insomnia  oxyCODONE    IR 2.5 milliGRAM(s) Oral every 6 hours PRN Moderate Pain (4 - 6)  oxyCODONE    IR 5 milliGRAM(s) Oral every 6 hours PRN Severe Pain (7 - 10)  tiotropium 18 MICROgram(s) Capsule 1 Capsule(s) Inhalation daily PRN SOB/wheezing        LABS                                            10.5                  Neurophils% (auto):   75.4   (10-17 @ 07:16):    10.01)-----------(216          Lymphocytes% (auto):  12.9                                          34.6                   Eosinphils% (auto):   2.5      Manual%: Neutrophils x    ; Lymphocytes x    ; Eosinophils x    ; Bands%: x    ; Blasts x                                    139    |  101    |  68                  Calcium: 8.9   / iCa: x      (10-17 @ 07:23)    ----------------------------<  97        Magnesium: 2.1                              3.8     |  25     |  2.04             Phosphorous: 3.2      TPro  6.1    /  Alb  2.9    /  TBili  0.9    /  DBili  x      /  AST  15     /  ALT  15     /  AlkPhos  89     17 Oct 2022 07:23    ( 10-17 @ 07:18 )   PT: 23.6 sec;   INR: 2.02 ratio  aPTT: x          Royce West MD  PGY-3

## 2022-10-17 NOTE — PROGRESS NOTE ADULT - PROBLEM SELECTOR PLAN 3
Likely uremic vs toxic metabolic in setting of Covid/UTI. Improving with improving renal function  - 3 days AMS since hospital d/c to rehab following total L knee arthroplasty  - per pt wife, pt developed UTI at rehab, was started on abx (unknown which)  - AAOx3 at [facility]  - CTH unremarkable  - S/p treatment of sepsis as below Likely uremic vs toxic metabolic in setting of Covid/UTI. Improving with improving renal function  - 3 days AMS since hospital d/c to rehab following total L knee arthroplasty  - per pt wife, pt developed UTI at rehab, was started on abx (unknown which)  - AAOx3 at [facility]  - CTH unremarkable  - S/p treatment of sepsis as below.

## 2022-10-17 NOTE — PROGRESS NOTE ADULT - SUBJECTIVE AND OBJECTIVE BOX
PROGRESS NOTE:     Patient is a 67y old  Male who presents with a chief complaint of AMS (16 Oct 2022 18:44)      SUBJECTIVE / OVERNIGHT EVENTS:  Trial of void started yesterday, no spontaneous urine. Straight cath with XXX removed.    ADDITIONAL REVIEW OF SYSTEMS:    MEDICATIONS  (STANDING):  allopurinol 100 milliGRAM(s) Oral daily  chlorhexidine 2% Cloths 1 Application(s) Topical daily  influenza  Vaccine (HIGH DOSE) 0.7 milliLiter(s) IntraMuscular once  latanoprost 0.005% Ophthalmic Solution 1 Drop(s) Both EYES at bedtime  midodrine. 5 milliGRAM(s) Oral three times a day  Nephro-cj 1 Tablet(s) Oral every 24 hours  nystatin Powder 1 Application(s) Topical two times a day  oxyCODONE    IR 2.5 milliGRAM(s) Oral once  sodium bicarbonate 650 milliGRAM(s) Oral three times a day  sodium chloride 0.45%. 1000 milliLiter(s) (75 mL/Hr) IV Continuous <Continuous>  timolol 0.5% Solution 1 Drop(s) Both EYES two times a day    MEDICATIONS  (PRN):  acetaminophen     Tablet .. 650 milliGRAM(s) Oral every 6 hours PRN Temp greater or equal to 38C (100.4F), Mild Pain (1 - 3)  ALBUTerol    90 MICROgram(s) HFA Inhaler 2 Puff(s) Inhalation every 6 hours PRN Shortness of Breath and/or Wheezing  melatonin 3 milliGRAM(s) Oral at bedtime PRN Insomnia  oxyCODONE    IR 5 milliGRAM(s) Oral every 8 hours PRN Severe Pain (7 - 10)  oxyCODONE    IR 2.5 milliGRAM(s) Oral every 6 hours PRN Moderate Pain (4 - 6)  tiotropium 18 MICROgram(s) Capsule 1 Capsule(s) Inhalation daily PRN SOB/wheezing      CAPILLARY BLOOD GLUCOSE        I&O's Summary    15 Oct 2022 07:01  -  16 Oct 2022 07:00  --------------------------------------------------------  IN: 655 mL / OUT: 2625 mL / NET: -1970 mL    16 Oct 2022 07:01  -  17 Oct 2022 06:56  --------------------------------------------------------  IN: 485 mL / OUT: 500 mL / NET: -15 mL        PHYSICAL EXAM:  Vital Signs Last 24 Hrs  T(C): 36.7 (16 Oct 2022 21:02), Max: 36.7 (16 Oct 2022 16:56)  T(F): 98.1 (16 Oct 2022 21:02), Max: 98.1 (16 Oct 2022 16:56)  HR: 98 (16 Oct 2022 16:56) (91 - 98)  BP: 102/71 (16 Oct 2022 21:02) (102/71 - 114/71)  BP(mean): --  RR: 18 (16 Oct 2022 21:02) (18 - 18)  SpO2: 94% (16 Oct 2022 21:02) (94% - 96%)    Parameters below as of 16 Oct 2022 21:02  Patient On (Oxygen Delivery Method): room air      Physical Exam-  GENERAL: NAD  ENT: Dry mucous membranes.   CHEST/LUNG: CTAB. . Unlabored respirations.  HEART: RRR, no M/R/G, B/L LE swelling L>R, recent knee arthroplasty seen on L. leg  ABDOMEN: Soft, obese, nontender, nondistended. Normoactive bowel sounds.  EXTREMITIES:  2+ peripheral pulses b/l. B/L edema L>R significantly improved from admission  Neurological:  AAOx3, b/l tremors.  SKIN: Rosacea on nose  PSYCH: Normal affect and mood.    LABS:                        9.8    10.91 )-----------( 192      ( 16 Oct 2022 08:25 )             31.2     10-16    139  |  101  |  76<H>  ----------------------------<  98  3.9   |  25  |  2.24<H>    Ca    8.7      16 Oct 2022 08:25  Phos  3.7     10-16  Mg     1.7     10-16    TPro  5.5<L>  /  Alb  2.6<L>  /  TBili  0.9  /  DBili  x   /  AST  15  /  ALT  16  /  AlkPhos  78  10-16    PT/INR - ( 16 Oct 2022 08:25 )   PT: 23.5 sec;   INR: 2.03 ratio         PTT - ( 15 Oct 2022 07:39 )  PTT:53.4 sec            RADIOLOGY & ADDITIONAL TESTS:  Results Reviewed:   Imaging Personally Reviewed:  Electrocardiogram Personally Reviewed:    COORDINATION OF CARE:  Care Discussed with Consultants/Other Providers [Y/N]:  Prior or Outpatient Records Reviewed [Y/N]:   PROGRESS NOTE:     Patient is a 67y old  Male who presents with a chief complaint of AMS (16 Oct 2022 18:44)      SUBJECTIVE / OVERNIGHT EVENTS:  Trial of void started yesterday, no spontaneous urine. Difficult to straight cath. Urology consulted for assistance.    ADDITIONAL REVIEW OF SYSTEMS:    MEDICATIONS  (STANDING):  allopurinol 100 milliGRAM(s) Oral daily  chlorhexidine 2% Cloths 1 Application(s) Topical daily  influenza  Vaccine (HIGH DOSE) 0.7 milliLiter(s) IntraMuscular once  latanoprost 0.005% Ophthalmic Solution 1 Drop(s) Both EYES at bedtime  midodrine. 5 milliGRAM(s) Oral three times a day  Nephro-cj 1 Tablet(s) Oral every 24 hours  nystatin Powder 1 Application(s) Topical two times a day  oxyCODONE    IR 2.5 milliGRAM(s) Oral once  sodium bicarbonate 650 milliGRAM(s) Oral three times a day  sodium chloride 0.45%. 1000 milliLiter(s) (75 mL/Hr) IV Continuous <Continuous>  timolol 0.5% Solution 1 Drop(s) Both EYES two times a day    MEDICATIONS  (PRN):  acetaminophen     Tablet .. 650 milliGRAM(s) Oral every 6 hours PRN Temp greater or equal to 38C (100.4F), Mild Pain (1 - 3)  ALBUTerol    90 MICROgram(s) HFA Inhaler 2 Puff(s) Inhalation every 6 hours PRN Shortness of Breath and/or Wheezing  melatonin 3 milliGRAM(s) Oral at bedtime PRN Insomnia  oxyCODONE    IR 5 milliGRAM(s) Oral every 8 hours PRN Severe Pain (7 - 10)  oxyCODONE    IR 2.5 milliGRAM(s) Oral every 6 hours PRN Moderate Pain (4 - 6)  tiotropium 18 MICROgram(s) Capsule 1 Capsule(s) Inhalation daily PRN SOB/wheezing      CAPILLARY BLOOD GLUCOSE        I&O's Summary    15 Oct 2022 07:01  -  16 Oct 2022 07:00  --------------------------------------------------------  IN: 655 mL / OUT: 2625 mL / NET: -1970 mL    16 Oct 2022 07:01  -  17 Oct 2022 06:56  --------------------------------------------------------  IN: 485 mL / OUT: 500 mL / NET: -15 mL        PHYSICAL EXAM:  Vital Signs Last 24 Hrs  T(C): 36.7 (16 Oct 2022 21:02), Max: 36.7 (16 Oct 2022 16:56)  T(F): 98.1 (16 Oct 2022 21:02), Max: 98.1 (16 Oct 2022 16:56)  HR: 98 (16 Oct 2022 16:56) (91 - 98)  BP: 102/71 (16 Oct 2022 21:02) (102/71 - 114/71)  BP(mean): --  RR: 18 (16 Oct 2022 21:02) (18 - 18)  SpO2: 94% (16 Oct 2022 21:02) (94% - 96%)    Parameters below as of 16 Oct 2022 21:02  Patient On (Oxygen Delivery Method): room air      Physical Exam-  GENERAL: NAD  ENT: Dry mucous membranes.   CHEST/LUNG: CTAB. . Unlabored respirations.  HEART: RRR, no M/R/G, B/L LE swelling L>R, recent knee arthroplasty seen on L. leg  ABDOMEN: Soft, obese, nontender, nondistended. Normoactive bowel sounds.  EXTREMITIES:  2+ peripheral pulses b/l. B/L edema L>R slightly improved from admission  Neurological:  AAOx3, b/l tremors.  SKIN: Rosacea on nose  PSYCH: Normal affect and mood.    LABS:                        9.8    10.91 )-----------( 192      ( 16 Oct 2022 08:25 )             31.2     10-16    139  |  101  |  76<H>  ----------------------------<  98  3.9   |  25  |  2.24<H>    Ca    8.7      16 Oct 2022 08:25  Phos  3.7     10-16  Mg     1.7     10-16    TPro  5.5<L>  /  Alb  2.6<L>  /  TBili  0.9  /  DBili  x   /  AST  15  /  ALT  16  /  AlkPhos  78  10-16    PT/INR - ( 16 Oct 2022 08:25 )   PT: 23.5 sec;   INR: 2.03 ratio         PTT - ( 15 Oct 2022 07:39 )  PTT:53.4 sec            RADIOLOGY & ADDITIONAL TESTS:  Results Reviewed:   Imaging Personally Reviewed:  Electrocardiogram Personally Reviewed:    COORDINATION OF CARE:  Care Discussed with Consultants/Other Providers [Y/N]:  Prior or Outpatient Records Reviewed [Y/N]:

## 2022-10-17 NOTE — PROCEDURE NOTE - NSASSISTBY_GEN_A_CORE
Patient calling to speak with a nurse about how much medication she has left. Luz starts her medications on Thursday and wants to know how much we need to order.     [id number]  
Reviewed medications and reordered needed meds.    
Dulce Mendez/PA

## 2022-10-17 NOTE — PROGRESS NOTE ADULT - ATTENDING COMMENTS
Patient seen and examined   Labs and vitals are reviewed   A/P- 66 Y/O/M with PMHx of  HTN, HLD, renal cancer, DVT/PE on Xarelto, s/p total L knee arthoplasty (9/16/22), asthma admitted with likely metabolic encephalopathy due SONAL/ uremia/ metabolic acidosis  and  COVID-19/ recurrent DVTs    improvement in mental state   SONAL/ ATN- creatinine is trending down   continue IVF  with Bicarb  c/w ceftriaxone for h/o UTI   Leukocytosis - decreasing trend  LE edema - ultrasound with LE DVTs was on Xarelto will start heparin gtt and consider vascular cardiology   Recurrent DVTs - continue heparin gtt and coumadin  monitor INR and PTT
Patient seen and examined   Labs and vitals are reviewed   A/P- 68 Y/O/M with PMHx of  HTN, HLD, renal cancer, DVT/PE on Xarelto, s/p total L knee arthoplasty (9/16/22), asthma admitted with likely metabolic encephalopathy due SONAL/ uremia/ metabolic acidosis  and  COVID-19  Hold Remdesivir due to SONAL   IVF   Bicarb tab  c/w ceftriaxone for h/o  UTI   Leukocytosis - multifactorial will monitor   renal note appreciated
Patient seen and examined   Labs and vitals are reviewed   A/P- 68 Y/O/M with PMHx of  HTN, HLD, renal cancer, DVT/PE on Xarelto, s/p total L knee arthoplasty (9/16/22), asthma admitted with likely metabolic encephalopathy due SONAL/ uremia/ metabolic acidosis  and  COVID-19/ recurrent DVTs    improvement in mental state   SONAL/ ATN- creatinine is trending down and progressing well - continue IVF  with Bicarb  c/w ceftriaxone for h/o UTI to complete 5 days and Leukocytosis improving   LE edema - ultrasound with LE DVTs was on Xarelto on  heparin gtt and dose coumadin and monitor INR   COVID 19 - no hypoxia   d/w patient family on phone at bedside
Patient seen and examined   Labs and vitals are reviewed   A/P- 68 Y/O/M with PMHx of  HTN, HLD, renal cancer, DVT/PE on Xarelto, s/p total L knee arthoplasty (9/16/22), asthma admitted with likely metabolic encephalopathy due SONAL/ uremia/ metabolic acidosis  and  COVID-19/ recurrent DVTs    improvement in mental state   SONAL/ ATN- creatinine is trending down and progressing well - continue IVF  with Bicarb  c/w ceftriaxone for h/o UTI to complete 5 days and Leukocytosis improving   LE edema - ultrasound with LE DVTs was on Xarelto on  heparin gtt and dose coumadin and monitor INR   COVID 19 - no hypoxia   D/w team in detail
Patient seen and examined, NAD  Labs and vitals are reviewed   A/P- 68 Y/O/M with PMHx of  HTN, HLD, renal cancer, DVT/PE on Xarelto, s/p total L knee arthoplasty (9/16/22), asthma admitted with likely metabolic encephalopathy due SONAL/ uremia/ metabolic acidosis  and  COVID-19/ recurrent DVTs    improvement in mental state   SONAL/ ATN- creatinine is trending down and progressing well - continue IVF with Bicarb PO TID  Completed ceftriaxone for h/o UTI for 5 days  LE edema - ultrasound with LE DVTs was on Xarelto on  heparin gtt and dose coumadin and monitor INR 2.03 today  COVID 19 - no hypoxia   D/w team in detail  dcp?
Patient seen and examined   Labs and vitals are reviewed   A/P- 68 Y/O/M with PMHx of  HTN, HLD, renal cancer, DVT/PE on Xarelto, s/p total L knee arthoplasty (9/16/22), asthma admitted with likely metabolic encephalopathy due SONAL/ uremia/ metabolic acidosis  and  COVID-19  slight improvement in mental state   SONAL/ ATN- IVF  with Bicarb- cehck renal artery doppler  c/w ceftriaxone for h/o UTI   Leukocytosis - decreasing trend multifactorial will monitor   LE edema - ultrasound with LE DVTs was on Xarelto will start heparin gtt and consider vascular cardiology   d/w patient's son
Patient seen and examined  no events   failed TOV   Owusu replaced     A/P- 68 Y/O/M with PMHx of  HTN, HLD, renal cancer, DVT/PE on Xarelto, s/p total L knee arthoplasty (9/16/22), asthma admitted with likely metabolic encephalopathy due SONAL/ uremia/ metabolic acidosis  and  COVID-19/ recurrent DVTs    improvement in mental state   SONAL/ ATN-  Progressing well - will continue IVF with Bicarb PO TID  LE edema - stable   COVID 19 - no hypoxia   Dose coumadin f/u INR  consider taper midodrine and if BP stabilizes then [id number] FLOMAX   TOV trial after anbulation   PT- [facility]

## 2022-10-17 NOTE — PROGRESS NOTE ADULT - PROBLEM SELECTOR PLAN 8
Diet: Renal  DVT PPx: s/p Heparin gtt with warfarin bridge; will give warfarin 5mg today   Dispo: Pending medical course, [facility] per PT recs Diet: Renal  DVT PPx: s/p Heparin gtt with warfarin bridge; will give warfarin 7.5mg today   Dispo: Pending medical course, [facility] per PT recs

## 2022-10-18 LAB
ALBUMIN SERPL ELPH-MCNC: 2.7 G/DL — LOW (ref 3.3–5)
ALP SERPL-CCNC: 82 U/L — SIGNIFICANT CHANGE UP (ref 40–120)
ALT FLD-CCNC: 14 U/L — SIGNIFICANT CHANGE UP (ref 10–45)
ANION GAP SERPL CALC-SCNC: 15 MMOL/L — SIGNIFICANT CHANGE UP (ref 5–17)
AST SERPL-CCNC: 19 U/L — SIGNIFICANT CHANGE UP (ref 10–40)
BASOPHILS # BLD AUTO: 0.02 K/UL — SIGNIFICANT CHANGE UP (ref 0–0.2)
BASOPHILS NFR BLD AUTO: 0.2 % — SIGNIFICANT CHANGE UP (ref 0–2)
BILIRUB SERPL-MCNC: 0.8 MG/DL — SIGNIFICANT CHANGE UP (ref 0.2–1.2)
BUN SERPL-MCNC: 58 MG/DL — HIGH (ref 7–23)
CALCIUM SERPL-MCNC: 8.8 MG/DL — SIGNIFICANT CHANGE UP (ref 8.4–10.5)
CHLORIDE SERPL-SCNC: 101 MMOL/L — SIGNIFICANT CHANGE UP (ref 96–108)
CO2 SERPL-SCNC: 23 MMOL/L — SIGNIFICANT CHANGE UP (ref 22–31)
CORTICOSTEROID BINDING GLOBULIN RESULT: 1.2 MG/DL — LOW
CORTIS F/TOTAL MFR SERPL: 53 % — SIGNIFICANT CHANGE UP
CORTIS SERPL-MCNC: 17 UG/DL — SIGNIFICANT CHANGE UP
CORTISOL, FREE RESULT: 9.1 UG/DL — HIGH
CREAT SERPL-MCNC: 1.84 MG/DL — HIGH (ref 0.5–1.3)
EGFR: 40 ML/MIN/1.73M2 — LOW
EOSINOPHIL # BLD AUTO: 0.29 K/UL — SIGNIFICANT CHANGE UP (ref 0–0.5)
EOSINOPHIL NFR BLD AUTO: 3.4 % — SIGNIFICANT CHANGE UP (ref 0–6)
GLUCOSE SERPL-MCNC: 91 MG/DL — SIGNIFICANT CHANGE UP (ref 70–99)
HCT VFR BLD CALC: 33.1 % — LOW (ref 39–50)
HGB BLD-MCNC: 10.3 G/DL — LOW (ref 13–17)
IMM GRANULOCYTES NFR BLD AUTO: 0.6 % — SIGNIFICANT CHANGE UP (ref 0–0.9)
INR BLD: 2.75 RATIO — HIGH (ref 0.88–1.16)
LYMPHOCYTES # BLD AUTO: 1.13 K/UL — SIGNIFICANT CHANGE UP (ref 1–3.3)
LYMPHOCYTES # BLD AUTO: 13.2 % — SIGNIFICANT CHANGE UP (ref 13–44)
MAGNESIUM SERPL-MCNC: 1.9 MG/DL — SIGNIFICANT CHANGE UP (ref 1.6–2.6)
MCHC RBC-ENTMCNC: 26.3 PG — LOW (ref 27–34)
MCHC RBC-ENTMCNC: 31.1 GM/DL — LOW (ref 32–36)
MCV RBC AUTO: 84.4 FL — SIGNIFICANT CHANGE UP (ref 80–100)
MONOCYTES # BLD AUTO: 0.78 K/UL — SIGNIFICANT CHANGE UP (ref 0–0.9)
MONOCYTES NFR BLD AUTO: 9.1 % — SIGNIFICANT CHANGE UP (ref 2–14)
NEUTROPHILS # BLD AUTO: 6.31 K/UL — SIGNIFICANT CHANGE UP (ref 1.8–7.4)
NEUTROPHILS NFR BLD AUTO: 73.5 % — SIGNIFICANT CHANGE UP (ref 43–77)
NRBC # BLD: 0 /100 WBCS — SIGNIFICANT CHANGE UP (ref 0–0)
PHOSPHATE SERPL-MCNC: 3.2 MG/DL — SIGNIFICANT CHANGE UP (ref 2.5–4.5)
PLATELET # BLD AUTO: 227 K/UL — SIGNIFICANT CHANGE UP (ref 150–400)
POTASSIUM SERPL-MCNC: 4 MMOL/L — SIGNIFICANT CHANGE UP (ref 3.5–5.3)
POTASSIUM SERPL-SCNC: 4 MMOL/L — SIGNIFICANT CHANGE UP (ref 3.5–5.3)
PROT SERPL-MCNC: 5.6 G/DL — LOW (ref 6–8.3)
PROTHROM AB SERPL-ACNC: 32.2 SEC — HIGH (ref 10.5–13.4)
RBC # BLD: 3.92 M/UL — LOW (ref 4.2–5.8)
RBC # FLD: 16.6 % — HIGH (ref 10.3–14.5)
SODIUM SERPL-SCNC: 139 MMOL/L — SIGNIFICANT CHANGE UP (ref 135–145)
WBC # BLD: 8.58 K/UL — SIGNIFICANT CHANGE UP (ref 3.8–10.5)
WBC # FLD AUTO: 8.58 K/UL — SIGNIFICANT CHANGE UP (ref 3.8–10.5)

## 2022-10-18 PROCEDURE — 99233 SBSQ HOSP IP/OBS HIGH 50: CPT

## 2022-10-18 RX ORDER — WARFARIN SODIUM 2.5 MG/1
2.5 TABLET ORAL ONCE
Refills: 0 | Status: COMPLETED | OUTPATIENT
Start: 2022-10-18 | End: 2022-10-18

## 2022-10-18 RX ADMIN — LATANOPROST 1 DROP(S): 0.05 SOLUTION/ DROPS OPHTHALMIC; TOPICAL at 22:11

## 2022-10-18 RX ADMIN — MIDODRINE HYDROCHLORIDE 5 MILLIGRAM(S): 2.5 TABLET ORAL at 13:02

## 2022-10-18 RX ADMIN — CHLORHEXIDINE GLUCONATE 1 APPLICATION(S): 213 SOLUTION TOPICAL at 13:02

## 2022-10-18 RX ADMIN — OXYCODONE HYDROCHLORIDE 5 MILLIGRAM(S): 5 TABLET ORAL at 13:00

## 2022-10-18 RX ADMIN — WARFARIN SODIUM 2.5 MILLIGRAM(S): 2.5 TABLET ORAL at 21:44

## 2022-10-18 RX ADMIN — MIDODRINE HYDROCHLORIDE 5 MILLIGRAM(S): 2.5 TABLET ORAL at 17:31

## 2022-10-18 RX ADMIN — MIDODRINE HYDROCHLORIDE 5 MILLIGRAM(S): 2.5 TABLET ORAL at 05:27

## 2022-10-18 RX ADMIN — SODIUM CHLORIDE 50 MILLILITER(S): 9 INJECTION, SOLUTION INTRAVENOUS at 01:53

## 2022-10-18 RX ADMIN — NYSTATIN CREAM 1 APPLICATION(S): 100000 CREAM TOPICAL at 05:27

## 2022-10-18 RX ADMIN — OXYCODONE HYDROCHLORIDE 5 MILLIGRAM(S): 5 TABLET ORAL at 14:00

## 2022-10-18 RX ADMIN — Medication 1 DROP(S): at 05:27

## 2022-10-18 RX ADMIN — NYSTATIN CREAM 1 APPLICATION(S): 100000 CREAM TOPICAL at 17:31

## 2022-10-18 RX ADMIN — Medication 100 MILLIGRAM(S): at 13:02

## 2022-10-18 RX ADMIN — Medication 1 TABLET(S): at 17:32

## 2022-10-18 RX ADMIN — Medication 1 DROP(S): at 17:33

## 2022-10-18 NOTE — PROGRESS NOTE ADULT - SUBJECTIVE AND OBJECTIVE BOX
Resting, comfortable on RA    Vital Signs Last 24 Hrs  T(C): 36.3 (10-18-22 @ 11:27), Max: 36.7 (10-17-22 @ 20:57)  T(F): 97.4 (10-18-22 @ 11:27), Max: 98.1 (10-17-22 @ 20:57)  HR: 102 (10-18-22 @ 11:27) (88 - 102)  BP: 101/68 (10-18-22 @ 11:27) (101/68 - 110/71)  RR: 18 (10-18-22 @ 11:27) (18 - 18)  SpO2: 95% (10-18-22 @ 11:27) (95% - 97%)    I&O's Detail    17 Oct 2022 07:01  -  18 Oct 2022 07:00  --------------------------------------------------------  IN:    Oral Fluid: 600 mL    sodium chloride 0.45%: 600 mL  Total IN: 1200 mL    OUT:    Indwelling Catheter - Urethral (mL): 2100 mL  Total OUT: 2100 mL    Total NET: -900 mL    s1s2  b/l air entry  soft, ND  sm edema, stasis changes b/l LE                                                                10.3   8.58  )-----------( 227      ( 18 Oct 2022 06:27 )             33.1     18 Oct 2022 06:23    139    |  101    |  58     ----------------------------<  91     4.0     |  23     |  1.84     Ca    8.8        18 Oct 2022 06:23  Phos  3.2       18 Oct 2022 06:23  Mg     1.9       18 Oct 2022 06:23    TPro  5.6    /  Alb  2.7    /  TBili  0.8    /  DBili  x      /  AST  19     /  ALT  14     /  AlkPhos  82     18 Oct 2022 06:23    LIVER FUNCTIONS - ( 18 Oct 2022 06:23 )  Alb: 2.7 g/dL / Pro: 5.6 g/dL / ALK PHOS: 82 U/L / ALT: 14 U/L / AST: 19 U/L / GGT: x           PT/INR - ( 18 Oct 2022 06:27 )   PT: 32.2 sec;   INR: 2.75 ratio      acetaminophen     Tablet .. 650 milliGRAM(s) Oral every 6 hours PRN  ALBUTerol    90 MICROgram(s) HFA Inhaler 2 Puff(s) Inhalation every 6 hours PRN  allopurinol 100 milliGRAM(s) Oral daily  chlorhexidine 2% Cloths 1 Application(s) Topical daily  influenza  Vaccine (HIGH DOSE) 0.7 milliLiter(s) IntraMuscular once  latanoprost 0.005% Ophthalmic Solution 1 Drop(s) Both EYES at bedtime  melatonin 3 milliGRAM(s) Oral at bedtime PRN  midodrine. 5 milliGRAM(s) Oral three times a day  Nephro-cj 1 Tablet(s) Oral every 24 hours  nystatin Powder 1 Application(s) Topical two times a day  oxyCODONE    IR 2.5 milliGRAM(s) Oral every 6 hours PRN  oxyCODONE    IR 2.5 milliGRAM(s) Oral once  oxyCODONE    IR 5 milliGRAM(s) Oral every 6 hours PRN  sodium chloride 0.45%. 1000 milliLiter(s) IV Continuous <Continuous>  timolol 0.5% Solution 1 Drop(s) Both EYES two times a day  tiotropium 18 MICROgram(s) Capsule 1 Capsule(s) Inhalation daily PRN  warfarin 2.5 milliGRAM(s) Oral once    A/P:    S/p L TKA 9/16/22  Severe SONAL on adm (peak Cr 6.5 - 10/10/22, prior Cr 1.07 - 9/27/22)  Suspect hypotensive/hemodynamic ATN, was on ARB, HCTZ, may have gotten NSAID's  Cr is improving, good UO   Avoid nephrotoxins  Avoid hypotension  Avoid BP lowering meds  F/u BMP, UO  B/l LE DVT, AC  MELANIE    [phone]

## 2022-10-18 NOTE — PROGRESS NOTE ADULT - PROBLEM SELECTOR PLAN 2
- Cr 6.50 on admission, baseline 1.1 in HIE form 9/27/22. Improving with IVF.  - 3+ pitting BLE, per wife has been severe for ~4 years  - Follows Dr. Barrientos (Nephrology)  - On HCTZ at home, holding iso HoTN  - Kennedy in place, monitor UOP, plan for TOV 10/16.  - Nephro following, recs appreciated  - f/u repeat UA, however unlikely GN picture contributing to SONAL  - UCr 112, Marta 34, Uosm 478. FeNa 1.1% indicating intrinsic renal injury  - Nephro following (Dr. Brownlee)  - Renal sono unremarkable, doppler negative for RVT  - Avoid NSAIDs, ACEI/ARBS, RCA and nephrotoxins. Dose medications as per eGFR  - Continue 1/2 NS and sodium bicarb tabs  - TOV started 10/16, failed, will replace kennedy

## 2022-10-18 NOTE — PROGRESS NOTE ADULT - PROBLEM SELECTOR PLAN 8
Diet: Renal  DVT PPx: s/p Heparin gtt with warfarin bridge; will give warfarin 7.5mg today   Dispo: MELANIE per PT recs

## 2022-10-18 NOTE — PROGRESS NOTE ADULT - PROBLEM SELECTOR PLAN 1
-Duplex 10/12 with acute bilateral DVTs in lower extremity. Likely provoked in setting of recent surgery and venous stasis being in bed. Surprisingly developed on Xarelto.  -Heparin to coumadin bridge.   -Appreciate vascular cardiology evaluation  -Renal dopplers without evidence of renal vein thrombosis  -Elevate extremities.  -c/w coumadin 2.5mg qd

## 2022-10-18 NOTE — PROGRESS NOTE ADULT - SUBJECTIVE AND OBJECTIVE BOX
Patient is a 67y old  Male who presents with a chief complaint of AMS (17 Oct 2022 18:13)      SUBJECTIVE / OVERNIGHT EVENTS: appears comfortable     MEDICATIONS  (STANDING):  allopurinol 100 milliGRAM(s) Oral daily  chlorhexidine 2% Cloths 1 Application(s) Topical daily  influenza  Vaccine (HIGH DOSE) 0.7 milliLiter(s) IntraMuscular once  latanoprost 0.005% Ophthalmic Solution 1 Drop(s) Both EYES at bedtime  midodrine. 5 milliGRAM(s) Oral three times a day  Nephro-cj 1 Tablet(s) Oral every 24 hours  nystatin Powder 1 Application(s) Topical two times a day  oxyCODONE    IR 2.5 milliGRAM(s) Oral once  sodium chloride 0.45%. 1000 milliLiter(s) (50 mL/Hr) IV Continuous <Continuous>  timolol 0.5% Solution 1 Drop(s) Both EYES two times a day  warfarin 2.5 milliGRAM(s) Oral once    MEDICATIONS  (PRN):  acetaminophen     Tablet .. 650 milliGRAM(s) Oral every 6 hours PRN Temp greater or equal to 38C (100.4F), Mild Pain (1 - 3)  ALBUTerol    90 MICROgram(s) HFA Inhaler 2 Puff(s) Inhalation every 6 hours PRN Shortness of Breath and/or Wheezing  melatonin 3 milliGRAM(s) Oral at bedtime PRN Insomnia  oxyCODONE    IR 2.5 milliGRAM(s) Oral every 6 hours PRN Moderate Pain (4 - 6)  oxyCODONE    IR 5 milliGRAM(s) Oral every 6 hours PRN Severe Pain (7 - 10)  tiotropium 18 MICROgram(s) Capsule 1 Capsule(s) Inhalation daily PRN SOB/wheezing        CAPILLARY BLOOD GLUCOSE        I&O's Summary    17 Oct 2022 07:01  -  18 Oct 2022 07:00  --------------------------------------------------------  IN: 1200 mL / OUT: 2100 mL / NET: -900 mL        PHYSICAL EXAM:  GENERAL: NAD  ENT: Dry mucous membranes.   CHEST/LUNG: CTAB. . Unlabored respirations.  HEART: RRR, no M/R/G, B/L LE swelling L>R, recent knee arthroplasty seen on L. leg  ABDOMEN: Soft, obese, nontender, nondistended. Normoactive bowel sounds.  EXTREMITIES:  2+ peripheral pulses b/l. B/L edema L>R slightly improved from admission  Neurological:  AAOx3, b/l tremors.  SKIN: Rosacea on nose  PSYCH: Normal affect and mood.    LABS:                        10.3   8.58  )-----------( 227      ( 18 Oct 2022 06:27 )             33.1     10-18    139  |  101  |  58<H>  ----------------------------<  91  4.0   |  23  |  1.84<H>    Ca    8.8      18 Oct 2022 06:23  Phos  3.2     10-18  Mg     1.9     10-18    TPro  5.6<L>  /  Alb  2.7<L>  /  TBili  0.8  /  DBili  x   /  AST  19  /  ALT  14  /  AlkPhos  82  10-18    PT/INR - ( 18 Oct 2022 06:27 )   PT: 32.2 sec;   INR: 2.75 ratio                   RADIOLOGY & ADDITIONAL TESTS:    Imaging Personally Reviewed:    Consultant(s) Notes Reviewed:      Care Discussed with Consultants/Other Providers:

## 2022-10-18 NOTE — PROGRESS NOTE ADULT - PROBLEM SELECTOR PLAN 3
- Likely uremic vs toxic metabolic in setting of Covid/UTI. Improving with improving renal function  - 3 days AMS since hospital d/c to rehab following total L knee arthroplasty  - per pt wife, pt developed UTI at rehab, was started on abx (unknown which)  - AAOx3 at BL  - CTH unremarkable  - S/p treatment of sepsis as below.

## 2022-10-19 LAB
ALBUMIN SERPL ELPH-MCNC: 2.3 G/DL — LOW (ref 3.3–5)
ALP SERPL-CCNC: 84 U/L — SIGNIFICANT CHANGE UP (ref 40–120)
ALT FLD-CCNC: 15 U/L — SIGNIFICANT CHANGE UP (ref 10–45)
ANION GAP SERPL CALC-SCNC: 15 MMOL/L — SIGNIFICANT CHANGE UP (ref 5–17)
APTT BLD: 37.3 SEC — HIGH (ref 27.5–35.5)
AST SERPL-CCNC: 24 U/L — SIGNIFICANT CHANGE UP (ref 10–40)
BASOPHILS # BLD AUTO: 0.02 K/UL — SIGNIFICANT CHANGE UP (ref 0–0.2)
BASOPHILS NFR BLD AUTO: 0.2 % — SIGNIFICANT CHANGE UP (ref 0–2)
BILIRUB SERPL-MCNC: 0.8 MG/DL — SIGNIFICANT CHANGE UP (ref 0.2–1.2)
BUN SERPL-MCNC: 50 MG/DL — HIGH (ref 7–23)
CALCIUM SERPL-MCNC: 8.5 MG/DL — SIGNIFICANT CHANGE UP (ref 8.4–10.5)
CHLORIDE SERPL-SCNC: 102 MMOL/L — SIGNIFICANT CHANGE UP (ref 96–108)
CO2 SERPL-SCNC: 22 MMOL/L — SIGNIFICANT CHANGE UP (ref 22–31)
CREAT SERPL-MCNC: 1.77 MG/DL — HIGH (ref 0.5–1.3)
EGFR: 42 ML/MIN/1.73M2 — LOW
EOSINOPHIL # BLD AUTO: 0.24 K/UL — SIGNIFICANT CHANGE UP (ref 0–0.5)
EOSINOPHIL NFR BLD AUTO: 2.5 % — SIGNIFICANT CHANGE UP (ref 0–6)
GLUCOSE SERPL-MCNC: 84 MG/DL — SIGNIFICANT CHANGE UP (ref 70–99)
HCT VFR BLD CALC: 32.8 % — LOW (ref 39–50)
HGB BLD-MCNC: 10.1 G/DL — LOW (ref 13–17)
IMM GRANULOCYTES NFR BLD AUTO: 0.7 % — SIGNIFICANT CHANGE UP (ref 0–0.9)
INR BLD: 3.27 RATIO — HIGH (ref 0.88–1.16)
LYMPHOCYTES # BLD AUTO: 1.25 K/UL — SIGNIFICANT CHANGE UP (ref 1–3.3)
LYMPHOCYTES # BLD AUTO: 13.1 % — SIGNIFICANT CHANGE UP (ref 13–44)
MAGNESIUM SERPL-MCNC: 1.9 MG/DL — SIGNIFICANT CHANGE UP (ref 1.6–2.6)
MCHC RBC-ENTMCNC: 26.2 PG — LOW (ref 27–34)
MCHC RBC-ENTMCNC: 30.8 GM/DL — LOW (ref 32–36)
MCV RBC AUTO: 85.2 FL — SIGNIFICANT CHANGE UP (ref 80–100)
MONOCYTES # BLD AUTO: 0.86 K/UL — SIGNIFICANT CHANGE UP (ref 0–0.9)
MONOCYTES NFR BLD AUTO: 9 % — SIGNIFICANT CHANGE UP (ref 2–14)
NEUTROPHILS # BLD AUTO: 7.09 K/UL — SIGNIFICANT CHANGE UP (ref 1.8–7.4)
NEUTROPHILS NFR BLD AUTO: 74.5 % — SIGNIFICANT CHANGE UP (ref 43–77)
NRBC # BLD: 0 /100 WBCS — SIGNIFICANT CHANGE UP (ref 0–0)
PHOSPHATE SERPL-MCNC: 2.7 MG/DL — SIGNIFICANT CHANGE UP (ref 2.5–4.5)
PLATELET # BLD AUTO: 260 K/UL — SIGNIFICANT CHANGE UP (ref 150–400)
POTASSIUM SERPL-MCNC: 4.5 MMOL/L — SIGNIFICANT CHANGE UP (ref 3.5–5.3)
POTASSIUM SERPL-SCNC: 4.5 MMOL/L — SIGNIFICANT CHANGE UP (ref 3.5–5.3)
PROT SERPL-MCNC: 5.6 G/DL — LOW (ref 6–8.3)
PROTHROM AB SERPL-ACNC: 38.4 SEC — HIGH (ref 10.5–13.4)
RBC # BLD: 3.85 M/UL — LOW (ref 4.2–5.8)
RBC # FLD: 16.5 % — HIGH (ref 10.3–14.5)
SODIUM SERPL-SCNC: 139 MMOL/L — SIGNIFICANT CHANGE UP (ref 135–145)
WBC # BLD: 9.53 K/UL — SIGNIFICANT CHANGE UP (ref 3.8–10.5)
WBC # FLD AUTO: 9.53 K/UL — SIGNIFICANT CHANGE UP (ref 3.8–10.5)

## 2022-10-19 PROCEDURE — 99233 SBSQ HOSP IP/OBS HIGH 50: CPT

## 2022-10-19 RX ORDER — SODIUM CHLORIDE 9 MG/ML
1000 INJECTION, SOLUTION INTRAVENOUS
Refills: 0 | Status: DISCONTINUED | OUTPATIENT
Start: 2022-10-19 | End: 2022-10-19

## 2022-10-19 RX ADMIN — CHLORHEXIDINE GLUCONATE 1 APPLICATION(S): 213 SOLUTION TOPICAL at 15:00

## 2022-10-19 RX ADMIN — NYSTATIN CREAM 1 APPLICATION(S): 100000 CREAM TOPICAL at 17:08

## 2022-10-19 RX ADMIN — Medication 1 DROP(S): at 05:01

## 2022-10-19 RX ADMIN — Medication 1 DROP(S): at 17:08

## 2022-10-19 RX ADMIN — LATANOPROST 1 DROP(S): 0.05 SOLUTION/ DROPS OPHTHALMIC; TOPICAL at 21:00

## 2022-10-19 RX ADMIN — NYSTATIN CREAM 1 APPLICATION(S): 100000 CREAM TOPICAL at 05:01

## 2022-10-19 RX ADMIN — Medication 1 TABLET(S): at 17:07

## 2022-10-19 RX ADMIN — SODIUM CHLORIDE 50 MILLILITER(S): 9 INJECTION, SOLUTION INTRAVENOUS at 15:01

## 2022-10-19 RX ADMIN — MIDODRINE HYDROCHLORIDE 5 MILLIGRAM(S): 2.5 TABLET ORAL at 13:16

## 2022-10-19 RX ADMIN — OXYCODONE HYDROCHLORIDE 2.5 MILLIGRAM(S): 5 TABLET ORAL at 15:01

## 2022-10-19 RX ADMIN — Medication 100 MILLIGRAM(S): at 13:17

## 2022-10-19 RX ADMIN — MIDODRINE HYDROCHLORIDE 5 MILLIGRAM(S): 2.5 TABLET ORAL at 17:07

## 2022-10-19 NOTE — PROGRESS NOTE ADULT - PROBLEM SELECTOR PLAN 2
- Cr 6.50 on admission, baseline 1.1 in HIE form 9/27/22. Improving with IVF.  - 3+ pitting BLE, per wife has been severe for ~4 years  - Follows Dr. Barrientos (Nephrology)  - On HCTZ at home, holding iso HoTN  - Kennedy in place, monitor UOP.  - Nephro following, recs appreciated  - UCr 112, Marta 34, Uosm 478. FeNa 1.1% indicating intrinsic renal injury  - Nephro following (Dr. Brownlee)  - Renal sono unremarkable, doppler negative for RVT  - Avoid NSAIDs, ACEI/ARBS, RCA and nephrotoxins. Dose medications as per eGFR  - Continue 1/2 NS and sodium bicarb tabs  - TOV started 10/16, failed, will replace kennedy

## 2022-10-19 NOTE — PROGRESS NOTE ADULT - PROBLEM SELECTOR PLAN 1
-Duplex 10/12 with acute bilateral DVTs in lower extremity. Likely provoked in setting of recent surgery and venous stasis being in bed. Surprisingly developed on Xarelto.  -Heparin to coumadin bridge.   -Appreciate vascular cardiology evaluation  -Renal dopplers without evidence of renal vein thrombosis  -Elevate extremities.  -hold coumadin tonight

## 2022-10-19 NOTE — PROGRESS NOTE ADULT - SUBJECTIVE AND OBJECTIVE BOX
Resting, comfortable on RA    Vital Signs Last 24 Hrs  T(C): 36.8 (10-19-22 @ 12:20), Max: 36.8 (10-19-22 @ 12:20)  T(F): 98.2 (10-19-22 @ 12:20), Max: 98.2 (10-19-22 @ 12:20)  HR: 97 (10-19-22 @ 12:20) (79 - 97)  BP: 105/78 (10-19-22 @ 12:20) (102/66 - 122/71)  RR: 19 (10-19-22 @ 12:20) (19 - 20)  SpO2: 96% (10-19-22 @ 12:20) (94% - 96%)    I&O's Detail    18 Oct 2022 07:01  -  19 Oct 2022 07:00  --------------------------------------------------------  IN:    Oral Fluid: 450 mL    sodium chloride 0.45%: 600 mL  Total IN: 1050 mL    OUT:    Indwelling Catheter - Urethral (mL): 800 mL  Total OUT: 800 mL    Total NET: 250 mL    19 Oct 2022 07:01  -  19 Oct 2022 15:11  --------------------------------------------------------  OUT:    Indwelling Catheter - Urethral (mL): 500 mL  Total OUT: 500 mL    s1s2  b/l air entry  soft, ND  sm edema, stasis changes b/l LE                                                                        10.1   9.53  )-----------( 260      ( 19 Oct 2022 07:21 )             32.8     19 Oct 2022 07:23    139    |  102    |  50     ----------------------------<  84     4.5     |  22     |  1.77     Ca    8.5        19 Oct 2022 07:23  Phos  2.7       19 Oct 2022 07:23  Mg     1.9       19 Oct 2022 07:23    TPro  5.6    /  Alb  2.3    /  TBili  0.8    /  DBili  x      /  AST  24     /  ALT  15     /  AlkPhos  84     19 Oct 2022 07:23    LIVER FUNCTIONS - ( 19 Oct 2022 07:23 )  Alb: 2.3 g/dL / Pro: 5.6 g/dL / ALK PHOS: 84 U/L / ALT: 15 U/L / AST: 24 U/L / GGT: x           PT/INR - ( 19 Oct 2022 07:22 )   PT: 38.4 sec;   INR: 3.27 ratio      acetaminophen     Tablet 650 milliGRAM(s) Oral every 6 hours PRN  ALBUTerol    90 MICROgram(s) HFA Inhaler 2 Puff(s) Inhalation every 6 hours PRN  allopurinol 100 milliGRAM(s) Oral daily  chlorhexidine 2% Cloths 1 Application(s) Topical daily  influenza  Vaccine (HIGH DOSE) 0.7 milliLiter(s) IntraMuscular once  latanoprost 0.005% Ophthalmic Solution 1 Drop(s) Both EYES at bedtime  melatonin 3 milliGRAM(s) Oral at bedtime PRN  midodrine. 5 milliGRAM(s) Oral three times a day  Nephro-cj 1 Tablet(s) Oral every 24 hours  nystatin Powder 1 Application(s) Topical two times a day  oxyCODONE    IR 5 milliGRAM(s) Oral every 6 hours PRN  oxyCODONE    IR 2.5 milliGRAM(s) Oral every 6 hours PRN  sodium chloride 0.45%. 1000 milliLiter(s) IV Continuous <Continuous>  timolol 0.5% Solution 1 Drop(s) Both EYES two times a day  tiotropium 18 MICROgram(s) Capsule 1 Capsule(s) Inhalation daily PRN    A/P:    S/p L TKA 9/16/22  Severe SONAL on adm (peak Cr 6.5 - 10/10/22, prior Cr 1.07 - 9/27/22)  Suspect hypotensive/hemodynamic ATN, was on ARB, HCTZ, may have gotten NSAID's  COVID positive   Cr is improving, good UO   Avoid nephrotoxins  Avoid hypotension  Avoid BP lowering meds  F/u BMP, UO  B/l LE DVT, AC  Will d/c IVF and f/u    [phone]

## 2022-10-19 NOTE — PROGRESS NOTE ADULT - SUBJECTIVE AND OBJECTIVE BOX
Patient is a 67y old  Male who presents with a chief complaint of AMS (18 Oct 2022 15:37)      SUBJECTIVE / OVERNIGHT EVENTS: feels ok, pain is ok, wants to leave    MEDICATIONS  (STANDING):  allopurinol 100 milliGRAM(s) Oral daily  chlorhexidine 2% Cloths 1 Application(s) Topical daily  influenza  Vaccine (HIGH DOSE) 0.7 milliLiter(s) IntraMuscular once  latanoprost 0.005% Ophthalmic Solution 1 Drop(s) Both EYES at bedtime  midodrine. 5 milliGRAM(s) Oral three times a day  Nephro-cj 1 Tablet(s) Oral every 24 hours  nystatin Powder 1 Application(s) Topical two times a day  oxyCODONE    IR 2.5 milliGRAM(s) Oral once  timolol 0.5% Solution 1 Drop(s) Both EYES two times a day    MEDICATIONS  (PRN):  acetaminophen     Tablet .. 650 milliGRAM(s) Oral every 6 hours PRN Temp greater or equal to 38C (100.4F), Mild Pain (1 - 3)  ALBUTerol    90 MICROgram(s) HFA Inhaler 2 Puff(s) Inhalation every 6 hours PRN Shortness of Breath and/or Wheezing  melatonin 3 milliGRAM(s) Oral at bedtime PRN Insomnia  oxyCODONE    IR 2.5 milliGRAM(s) Oral every 6 hours PRN Moderate Pain (4 - 6)  oxyCODONE    IR 5 milliGRAM(s) Oral every 6 hours PRN Severe Pain (7 - 10)  tiotropium 18 MICROgram(s) Capsule 1 Capsule(s) Inhalation daily PRN SOB/wheezing        CAPILLARY BLOOD GLUCOSE        I&O's Summary    18 Oct 2022 07:01  -  19 Oct 2022 07:00  --------------------------------------------------------  IN: 1050 mL / OUT: 800 mL / NET: 250 mL        PHYSICAL EXAM:  GENERAL: NAD, well-developed  HEAD:  Atraumatic, Normocephalic  EYES: EOMI, PERRLA, conjunctiva and sclera clear  NECK: Supple, No JVD  CHEST/LUNG: Clear to auscultation bilaterally; No wheeze  HEART: Regular rate and rhythm; No murmurs, rubs, or gallops  ABDOMEN: Soft, Nontender, Nondistended; Bowel sounds present  EXTREMITIES:  2+ Peripheral Pulses, No clubbing, cyanosis, or edema  PSYCH: AAOx3  NEUROLOGY: non-focal  SKIN: No rashes or lesions    LABS:                        10.1   9.53  )-----------( 260      ( 19 Oct 2022 07:21 )             32.8     10-19    139  |  102  |  50<H>  ----------------------------<  84  4.5   |  22  |  1.77<H>    Ca    8.5      19 Oct 2022 07:23  Phos  2.7     10-19  Mg     1.9     10-19    TPro  5.6<L>  /  Alb  2.3<L>  /  TBili  0.8  /  DBili  x   /  AST  24  /  ALT  15  /  AlkPhos  84  10-19    PT/INR - ( 19 Oct 2022 07:22 )   PT: 38.4 sec;   INR: 3.27 ratio         PTT - ( 19 Oct 2022 07:22 )  PTT:37.3 sec          RADIOLOGY & ADDITIONAL TESTS:    Imaging Personally Reviewed:    Consultant(s) Notes Reviewed:      Care Discussed with Consultants/Other Providers:   Patient is a 67y old  Male who presents with a chief complaint of AMS (18 Oct 2022 15:37)      SUBJECTIVE / OVERNIGHT EVENTS: feels ok, pain is ok, wants to leave    MEDICATIONS  (STANDING):  allopurinol 100 milliGRAM(s) Oral daily  chlorhexidine 2% Cloths 1 Application(s) Topical daily  influenza  Vaccine (HIGH DOSE) 0.7 milliLiter(s) IntraMuscular once  latanoprost 0.005% Ophthalmic Solution 1 Drop(s) Both EYES at bedtime  midodrine. 5 milliGRAM(s) Oral three times a day  Nephro-cj 1 Tablet(s) Oral every 24 hours  nystatin Powder 1 Application(s) Topical two times a day  oxyCODONE    IR 2.5 milliGRAM(s) Oral once  timolol 0.5% Solution 1 Drop(s) Both EYES two times a day    MEDICATIONS  (PRN):  acetaminophen     Tablet .. 650 milliGRAM(s) Oral every 6 hours PRN Temp greater or equal to 38C (100.4F), Mild Pain (1 - 3)  ALBUTerol    90 MICROgram(s) HFA Inhaler 2 Puff(s) Inhalation every 6 hours PRN Shortness of Breath and/or Wheezing  melatonin 3 milliGRAM(s) Oral at bedtime PRN Insomnia  oxyCODONE    IR 2.5 milliGRAM(s) Oral every 6 hours PRN Moderate Pain (4 - 6)  oxyCODONE    IR 5 milliGRAM(s) Oral every 6 hours PRN Severe Pain (7 - 10)  tiotropium 18 MICROgram(s) Capsule 1 Capsule(s) Inhalation daily PRN SOB/wheezing        CAPILLARY BLOOD GLUCOSE        I&O's Summary    18 Oct 2022 07:01  -  19 Oct 2022 07:00  --------------------------------------------------------  IN: 1050 mL / OUT: 800 mL / NET: 250 mL        PHYSICAL EXAM:  GENERAL: NAD  ENT: Dry mucous membranes.   CHEST/LUNG: CTAB.  HEART: RRR, no M/R/G, B/L LE swelling L>R, recent knee arthroplasty seen on L. leg  ABDOMEN: Soft, obese, nontender, nondistended. Normoactive bowel sounds.  EXTREMITIES:  2+ peripheral pulses b/l. B/L edema L>R   Neurological:  AAOx2-3  SKIN: Rosacea on nose  PSYCH: Normal affect and mood.    LABS:                        10.1   9.53  )-----------( 260      ( 19 Oct 2022 07:21 )             32.8     10-19    139  |  102  |  50<H>  ----------------------------<  84  4.5   |  22  |  1.77<H>    Ca    8.5      19 Oct 2022 07:23  Phos  2.7     10-19  Mg     1.9     10-19    TPro  5.6<L>  /  Alb  2.3<L>  /  TBili  0.8  /  DBili  x   /  AST  24  /  ALT  15  /  AlkPhos  84  10-19    PT/INR - ( 19 Oct 2022 07:22 )   PT: 38.4 sec;   INR: 3.27 ratio         PTT - ( 19 Oct 2022 07:22 )  PTT:37.3 sec          RADIOLOGY & ADDITIONAL TESTS:    Imaging Personally Reviewed:    Consultant(s) Notes Reviewed:      Care Discussed with Consultants/Other Providers:

## 2022-10-20 DIAGNOSIS — U07.1 COVID-19: ICD-10-CM

## 2022-10-20 LAB
ANION GAP SERPL CALC-SCNC: 11 MMOL/L — SIGNIFICANT CHANGE UP (ref 5–17)
APTT BLD: 39.1 SEC — HIGH (ref 27.5–35.5)
BUN SERPL-MCNC: 42 MG/DL — HIGH (ref 7–23)
CALCIUM SERPL-MCNC: 8.8 MG/DL — SIGNIFICANT CHANGE UP (ref 8.4–10.5)
CHLORIDE SERPL-SCNC: 103 MMOL/L — SIGNIFICANT CHANGE UP (ref 96–108)
CO2 SERPL-SCNC: 24 MMOL/L — SIGNIFICANT CHANGE UP (ref 22–31)
CREAT SERPL-MCNC: 1.72 MG/DL — HIGH (ref 0.5–1.3)
EGFR: 44 ML/MIN/1.73M2 — LOW
GLUCOSE SERPL-MCNC: 117 MG/DL — HIGH (ref 70–99)
HCT VFR BLD CALC: 33.1 % — LOW (ref 39–50)
HGB BLD-MCNC: 10.3 G/DL — LOW (ref 13–17)
INR BLD: 3.21 RATIO — HIGH (ref 0.88–1.16)
MCHC RBC-ENTMCNC: 25.9 PG — LOW (ref 27–34)
MCHC RBC-ENTMCNC: 31.1 GM/DL — LOW (ref 32–36)
MCV RBC AUTO: 83.4 FL — SIGNIFICANT CHANGE UP (ref 80–100)
NRBC # BLD: 0 /100 WBCS — SIGNIFICANT CHANGE UP (ref 0–0)
PLATELET # BLD AUTO: 331 K/UL — SIGNIFICANT CHANGE UP (ref 150–400)
POTASSIUM SERPL-MCNC: 4 MMOL/L — SIGNIFICANT CHANGE UP (ref 3.5–5.3)
POTASSIUM SERPL-SCNC: 4 MMOL/L — SIGNIFICANT CHANGE UP (ref 3.5–5.3)
PROTHROM AB SERPL-ACNC: 37.3 SEC — HIGH (ref 10.5–13.4)
RBC # BLD: 3.97 M/UL — LOW (ref 4.2–5.8)
RBC # FLD: 16.4 % — HIGH (ref 10.3–14.5)
SODIUM SERPL-SCNC: 138 MMOL/L — SIGNIFICANT CHANGE UP (ref 135–145)
WBC # BLD: 8.91 K/UL — SIGNIFICANT CHANGE UP (ref 3.8–10.5)
WBC # FLD AUTO: 8.91 K/UL — SIGNIFICANT CHANGE UP (ref 3.8–10.5)

## 2022-10-20 PROCEDURE — 99233 SBSQ HOSP IP/OBS HIGH 50: CPT

## 2022-10-20 PROCEDURE — 93306 TTE W/DOPPLER COMPLETE: CPT | Mod: 26

## 2022-10-20 RX ADMIN — OXYCODONE HYDROCHLORIDE 5 MILLIGRAM(S): 5 TABLET ORAL at 06:09

## 2022-10-20 RX ADMIN — LATANOPROST 1 DROP(S): 0.05 SOLUTION/ DROPS OPHTHALMIC; TOPICAL at 21:37

## 2022-10-20 RX ADMIN — OXYCODONE HYDROCHLORIDE 5 MILLIGRAM(S): 5 TABLET ORAL at 18:33

## 2022-10-20 RX ADMIN — NYSTATIN CREAM 1 APPLICATION(S): 100000 CREAM TOPICAL at 18:17

## 2022-10-20 RX ADMIN — Medication 1 DROP(S): at 05:05

## 2022-10-20 RX ADMIN — Medication 1 DROP(S): at 18:16

## 2022-10-20 RX ADMIN — Medication 1 TABLET(S): at 18:17

## 2022-10-20 RX ADMIN — NYSTATIN CREAM 1 APPLICATION(S): 100000 CREAM TOPICAL at 05:05

## 2022-10-20 RX ADMIN — CHLORHEXIDINE GLUCONATE 1 APPLICATION(S): 213 SOLUTION TOPICAL at 12:10

## 2022-10-20 RX ADMIN — OXYCODONE HYDROCHLORIDE 5 MILLIGRAM(S): 5 TABLET ORAL at 07:09

## 2022-10-20 RX ADMIN — Medication 100 MILLIGRAM(S): at 12:10

## 2022-10-20 RX ADMIN — OXYCODONE HYDROCHLORIDE 5 MILLIGRAM(S): 5 TABLET ORAL at 19:33

## 2022-10-20 NOTE — PROGRESS NOTE ADULT - SUBJECTIVE AND OBJECTIVE BOX
Resting, comfortable on RA    Vital Signs Last 24 Hrs  T(C): 36.4 (10-20-22 @ 12:16), Max: 37.3 (10-19-22 @ 20:18)  T(F): 97.5 (10-20-22 @ 12:16), Max: 99.2 (10-19-22 @ 20:18)  HR: 76 (10-20-22 @ 12:16) (76 - 91)  BP: 116/58 (10-20-22 @ 12:16) (106/73 - 128/69)  RR: 18 (10-20-22 @ 12:16) (18 - 20)  SpO2: 99% (10-20-22 @ 12:16) (95% - 99%)    I&O's Detail    19 Oct 2022 07:01  -  20 Oct 2022 07:00  --------------------------------------------------------  IN:    Oral Fluid: 570 mL  Total IN: 570 mL    OUT:    Indwelling Catheter - Urethral (mL): 1100 mL  Total OUT: 1100 mL    Total NET: -530 mL    s1s2  b/l air entry  soft, ND  edema, stasis changes b/l LE                                                                            10.3   8.91  )-----------( 331      ( 20 Oct 2022 10:58 )             33.1     20 Oct 2022 10:58    138    |  103    |  42     ----------------------------<  117    4.0     |  24     |  1.72     Ca    8.8        20 Oct 2022 10:58  Phos  2.7       19 Oct 2022 07:23  Mg     1.9       19 Oct 2022 07:23    TPro  5.6    /  Alb  2.3    /  TBili  0.8    /  DBili  x      /  AST  24     /  ALT  15     /  AlkPhos  84     19 Oct 2022 07:23    LIVER FUNCTIONS - ( 19 Oct 2022 07:23 )  Alb: 2.3 g/dL / Pro: 5.6 g/dL / ALK PHOS: 84 U/L / ALT: 15 U/L / AST: 24 U/L / GGT: x           PT/INR - ( 20 Oct 2022 10:59 )   PT: 37.3 sec;   INR: 3.21 ratio      acetaminophen     Tablet .. 650 milliGRAM(s) Oral every 6 hours PRN  ALBUTerol    90 MICROgram(s) HFA Inhaler 2 Puff(s) Inhalation every 6 hours PRN  allopurinol 100 milliGRAM(s) Oral daily  chlorhexidine 2% Cloths 1 Application(s) Topical daily  influenza  Vaccine (HIGH DOSE) 0.7 milliLiter(s) IntraMuscular once  latanoprost 0.005% Ophthalmic Solution 1 Drop(s) Both EYES at bedtime  melatonin 3 milliGRAM(s) Oral at bedtime PRN  midodrine. 5 milliGRAM(s) Oral three times a day  Nephro-cj 1 Tablet(s) Oral every 24 hours  nystatin Powder 1 Application(s) Topical two times a day  oxyCODONE    IR 2.5 milliGRAM(s) Oral every 6 hours PRN  oxyCODONE    IR 5 milliGRAM(s) Oral every 6 hours PRN  timolol 0.5% Solution 1 Drop(s) Both EYES two times a day  tiotropium 18 MICROgram(s) Capsule 1 Capsule(s) Inhalation daily PRN    A/P:    S/p L TKA 9/16/22  Severe SONAL on adm (peak Cr 6.5 - 10/10/22, prior Cr 1.07 - 9/27/22)  Suspect hypotensive/hemodynamic ATN, was on ARB, HCTZ, may have gotten NSAID's  COVID positive   B/l LE DVT, AC  Cr is improving  Avoid nephrotoxins  Avoid hypotension  Avoid BP lowering meds  F/u BMP, UO    [phone]

## 2022-10-20 NOTE — PROGRESS NOTE ADULT - SUBJECTIVE AND OBJECTIVE BOX
Patient is a 67y old  Male who presents with a chief complaint of AMS (19 Oct 2022 15:11)      SUBJECTIVE / OVERNIGHT EVENTS: appears comfortable no pain no cp, sob, abd pain     MEDICATIONS  (STANDING):  allopurinol 100 milliGRAM(s) Oral daily  chlorhexidine 2% Cloths 1 Application(s) Topical daily  influenza  Vaccine (HIGH DOSE) 0.7 milliLiter(s) IntraMuscular once  latanoprost 0.005% Ophthalmic Solution 1 Drop(s) Both EYES at bedtime  midodrine. 5 milliGRAM(s) Oral three times a day  Nephro-cj 1 Tablet(s) Oral every 24 hours  nystatin Powder 1 Application(s) Topical two times a day  timolol 0.5% Solution 1 Drop(s) Both EYES two times a day    MEDICATIONS  (PRN):  acetaminophen     Tablet .. 650 milliGRAM(s) Oral every 6 hours PRN Temp greater or equal to 38C (100.4F), Mild Pain (1 - 3)  ALBUTerol    90 MICROgram(s) HFA Inhaler 2 Puff(s) Inhalation every 6 hours PRN Shortness of Breath and/or Wheezing  melatonin 3 milliGRAM(s) Oral at bedtime PRN Insomnia  oxyCODONE    IR 5 milliGRAM(s) Oral every 6 hours PRN Severe Pain (7 - 10)  oxyCODONE    IR 2.5 milliGRAM(s) Oral every 6 hours PRN Moderate Pain (4 - 6)  tiotropium 18 MICROgram(s) Capsule 1 Capsule(s) Inhalation daily PRN SOB/wheezing        CAPILLARY BLOOD GLUCOSE        I&O's Summary    19 Oct 2022 07:01  -  20 Oct 2022 07:00  --------------------------------------------------------  IN: 570 mL / OUT: 1100 mL / NET: -530 mL    20 Oct 2022 07:01  -  20 Oct 2022 12:33  --------------------------------------------------------  IN: 120 mL / OUT: 0 mL / NET: 120 mL        PHYSICAL EXAM:  GENERAL: NAD, well-developed  HEAD:  Atraumatic, Normocephalic  EYES: EOMI, PERRLA, conjunctiva and sclera clear  NECK:  No JVD  CHEST/LUNG: Clear to auscultation bilaterally; No wheeze  HEART: Regular rate and rhythm; No murmurs, rubs, or gallops  ABDOMEN: Soft, Nontender, Nondistended; Bowel sounds present  EXTREMITIES:  2+ Peripheral Pulses, No clubbing, cyanosis, or edema  PSYCH: AAOx3      LABS:                        10.3   8.91  )-----------( 331      ( 20 Oct 2022 10:58 )             33.1     10-20    138  |  103  |  42<H>  ----------------------------<  117<H>  4.0   |  24  |  1.72<H>    Ca    8.8      20 Oct 2022 10:58  Phos  2.7     10-19  Mg     1.9     10-19    TPro  5.6<L>  /  Alb  2.3<L>  /  TBili  0.8  /  DBili  x   /  AST  24  /  ALT  15  /  AlkPhos  84  10-19    PT/INR - ( 20 Oct 2022 10:59 )   PT: 37.3 sec;   INR: 3.21 ratio         PTT - ( 20 Oct 2022 10:59 )  PTT:39.1 sec          RADIOLOGY & ADDITIONAL TESTS:    Imaging Personally Reviewed:    Consultant(s) Notes Reviewed:      Care Discussed with Consultants/Other Providers:

## 2022-10-20 NOTE — PROGRESS NOTE ADULT - PROBLEM SELECTOR PLAN 6
- On losartan 100 qD, atenolol 25 qD, amlo 10, HCTZ 25, at home  - Hold madeline Castellanos - likely multifactorial iso SONAL, component of lactic acidosis which is improving, noted improvement in AG following IVF  - c/w bicarb tabs and IV hydration  - appreciate nephro recs

## 2022-10-20 NOTE — PROGRESS NOTE ADULT - PROBLEM SELECTOR PLAN 7
- Not currently on medications  - LDL 66, HDL 29 - On losartan 100 qD, atenolol 25 qD, amlo 10, HCTZ 25, at home  - Hold madeline Castellanos

## 2022-10-20 NOTE — PROGRESS NOTE ADULT - PROBLEM SELECTOR PLAN 5
- likely multifactorial iso SONAL, component of lactic acidosis which is improving, noted improvement in AG following IVF  - c/w bicarb tabs and IV hydration  - appreciate nephro recs - sating 99% on room air   - asymptomatic from covid perspective

## 2022-10-20 NOTE — PROGRESS NOTE ADULT - PROBLEM SELECTOR PLAN 4
RESOLVED  - Patient with known UTI from rehab, was being treated on abx (unknown which), as well as new COVID infection  - UA on admission negative, likely due to recent abx use  - WBC 19 on admission, improving on antibiotics  - CT chest neg for PNA  - afebrile but tachycardic, tachypneic and hypotensive with known UTI and new COVID infection, fulfilling sepsis criteria  - Hold remdesivir given acute renal failure  - S/p short course of CTX (5d)  - Blood and urine Cx NGTD  - COVID- on RA RESOLVED  - Patient with known UTI from rehab, was being treated on abx (unknown which), as well as new COVID infection  - UA on admission negative, likely due to recent abx use  - WBC 19 on admission, improved s/p antibiotics  - CT chest neg for PNA  - afebrile but tachycardic, tachypneic and hypotensive with known UTI and new COVID infection, fulfilling sepsis criteria  - Hold remdesivir given acute renal failure  - S/p short course of CTX (5d)  - Blood and urine Cx NGTD  - COVID- on RA sating 99%  - asymptomatic from covid perspective

## 2022-10-20 NOTE — PROGRESS NOTE ADULT - PROBLEM SELECTOR PLAN 8
Diet: Renal  DVT PPx: s/p Heparin gtt with warfarin bridge; will give warfarin 7.5mg today   Dispo: [facility] per PT recs - Not currently on medications  - LDL 66, HDL 29

## 2022-10-21 VITALS
SYSTOLIC BLOOD PRESSURE: 115 MMHG | HEART RATE: 84 BPM | DIASTOLIC BLOOD PRESSURE: 76 MMHG | TEMPERATURE: 98 F | OXYGEN SATURATION: 99 % | RESPIRATION RATE: 18 BRPM

## 2022-10-21 LAB
ANION GAP SERPL CALC-SCNC: 11 MMOL/L — SIGNIFICANT CHANGE UP (ref 5–17)
BUN SERPL-MCNC: 43 MG/DL — HIGH (ref 7–23)
CALCIUM SERPL-MCNC: 8.6 MG/DL — SIGNIFICANT CHANGE UP (ref 8.4–10.5)
CHLORIDE SERPL-SCNC: 104 MMOL/L — SIGNIFICANT CHANGE UP (ref 96–108)
CO2 SERPL-SCNC: 24 MMOL/L — SIGNIFICANT CHANGE UP (ref 22–31)
CREAT SERPL-MCNC: 1.98 MG/DL — HIGH (ref 0.5–1.3)
EGFR: 37 ML/MIN/1.73M2 — LOW
GLUCOSE SERPL-MCNC: 116 MG/DL — HIGH (ref 70–99)
INR BLD: 2.56 RATIO — HIGH (ref 0.88–1.16)
POTASSIUM SERPL-MCNC: 4 MMOL/L — SIGNIFICANT CHANGE UP (ref 3.5–5.3)
POTASSIUM SERPL-SCNC: 4 MMOL/L — SIGNIFICANT CHANGE UP (ref 3.5–5.3)
PROTHROM AB SERPL-ACNC: 29.7 SEC — HIGH (ref 10.5–13.4)
SARS-COV-2 RNA SPEC QL NAA+PROBE: DETECTED
SODIUM SERPL-SCNC: 139 MMOL/L — SIGNIFICANT CHANGE UP (ref 135–145)

## 2022-10-21 PROCEDURE — 99233 SBSQ HOSP IP/OBS HIGH 50: CPT

## 2022-10-21 RX ORDER — WARFARIN SODIUM 2.5 MG/1
5 TABLET ORAL ONCE
Refills: 0 | Status: COMPLETED | OUTPATIENT
Start: 2022-10-21 | End: 2022-10-21

## 2022-10-21 RX ORDER — POLYETHYLENE GLYCOL 3350 17 G/17G
17 POWDER, FOR SOLUTION ORAL ONCE
Refills: 0 | Status: COMPLETED | OUTPATIENT
Start: 2022-10-21 | End: 2022-10-21

## 2022-10-21 RX ADMIN — NYSTATIN CREAM 1 APPLICATION(S): 100000 CREAM TOPICAL at 05:23

## 2022-10-21 RX ADMIN — OXYCODONE HYDROCHLORIDE 5 MILLIGRAM(S): 5 TABLET ORAL at 18:30

## 2022-10-21 RX ADMIN — POLYETHYLENE GLYCOL 3350 17 GRAM(S): 17 POWDER, FOR SOLUTION ORAL at 21:41

## 2022-10-21 RX ADMIN — LATANOPROST 1 DROP(S): 0.05 SOLUTION/ DROPS OPHTHALMIC; TOPICAL at 23:51

## 2022-10-21 RX ADMIN — WARFARIN SODIUM 5 MILLIGRAM(S): 2.5 TABLET ORAL at 21:42

## 2022-10-21 RX ADMIN — OXYCODONE HYDROCHLORIDE 5 MILLIGRAM(S): 5 TABLET ORAL at 05:24

## 2022-10-21 RX ADMIN — Medication 100 MILLIGRAM(S): at 13:46

## 2022-10-21 RX ADMIN — CHLORHEXIDINE GLUCONATE 1 APPLICATION(S): 213 SOLUTION TOPICAL at 13:46

## 2022-10-21 RX ADMIN — OXYCODONE HYDROCHLORIDE 5 MILLIGRAM(S): 5 TABLET ORAL at 05:54

## 2022-10-21 RX ADMIN — OXYCODONE HYDROCHLORIDE 5 MILLIGRAM(S): 5 TABLET ORAL at 19:30

## 2022-10-21 RX ADMIN — Medication 1 TABLET(S): at 17:39

## 2022-10-21 RX ADMIN — NYSTATIN CREAM 1 APPLICATION(S): 100000 CREAM TOPICAL at 17:40

## 2022-10-21 NOTE — PROGRESS NOTE ADULT - PROBLEM SELECTOR PLAN 1
-Duplex 10/12 with acute bilateral DVTs in lower extremity. Likely provoked in setting of recent surgery and venous stasis being in bed. Surprisingly developed on Xarelto.  -Heparin to coumadin bridge.   -Appreciate vascular cardiology evaluation  -Renal dopplers without evidence of renal vein thrombosis  -Elevate extremities.  -c/w coumadin dose 5mg tonight

## 2022-10-21 NOTE — PROGRESS NOTE ADULT - SUBJECTIVE AND OBJECTIVE BOX
NEPHROLOGY PROGRESS NOTE    CHIEF COMPLAINT:  SONAL/CKD    HPI:  Renal function stable    ROS:  no SOB    EXAM:  T(F): 97.9 (10-21-22 @ 05:03)  HR: 80 (10-21-22 @ 05:03)  BP: 116/82 (10-21-22 @ 05:03)  RR: 18 (10-21-22 @ 05:03)  SpO2: 96% (10-21-22 @ 05:03)    Conversant, in no apparent distress  Normal respiratory effort, lungs clear bilaterally  Heart RRR with no murmur, chronic looking peripheral edema         LABS                             10.3   8.91  )-----------( 331      ( 20 Oct 2022 10:58 )             33.1          10-21    139  |  104  |  43<H>  ----------------------------<  116<H>  4.0   |  24  |  1.98<H>    Ca    8.6      21 Oct 2022 06:59             Impression  S/p L TKA 9/16/22  Severe SONAL on adm (peak Cr 6.5 - 10/10/22, prior Cr 1.07 - 9/27/22)  Suspect hypotensive/hemodynamic ATN, was on ARB, HCTZ, may have gotten NSAID's  Reanl function has recovered significantly and is stable  COVID positive   B/l LE DVT, AC  No objection to dc to rehab from renal POV  Follow up with Dr. Trotter when gets out of rehab

## 2022-10-21 NOTE — PROGRESS NOTE ADULT - PROBLEM SELECTOR PLAN 4
RESOLVED  - Patient with known UTI from rehab, was being treated on abx (unknown which), as well as new COVID infection  - UA on admission negative, likely due to recent abx use  - WBC 19 on admission, improved s/p antibiotics  - CT chest neg for PNA  - afebrile but tachycardic, tachypneic and hypotensive with known UTI and new COVID infection, fulfilling sepsis criteria  - Hold remdesivir given acute renal failure  - S/p short course of CTX (5d)  - Blood and urine Cx NGTD  - COVID- on RA sating 99%  - asymptomatic from covid perspective

## 2022-10-21 NOTE — PROGRESS NOTE ADULT - SUBJECTIVE AND OBJECTIVE BOX
Patient is a 67y old  Male who presents with a chief complaint of AMS (20 Oct 2022 19:16)      SUBJECTIVE / OVERNIGHT EVENTS: feels ok, denies cp, sob    MEDICATIONS  (STANDING):  allopurinol 100 milliGRAM(s) Oral daily  chlorhexidine 2% Cloths 1 Application(s) Topical daily  influenza  Vaccine (HIGH DOSE) 0.7 milliLiter(s) IntraMuscular once  latanoprost 0.005% Ophthalmic Solution 1 Drop(s) Both EYES at bedtime  Nephro-cj 1 Tablet(s) Oral every 24 hours  nystatin Powder 1 Application(s) Topical two times a day  timolol 0.5% Solution 1 Drop(s) Both EYES two times a day  warfarin 5 milliGRAM(s) Oral once    MEDICATIONS  (PRN):  acetaminophen     Tablet .. 650 milliGRAM(s) Oral every 6 hours PRN Temp greater or equal to 38C (100.4F), Mild Pain (1 - 3)  ALBUTerol    90 MICROgram(s) HFA Inhaler 2 Puff(s) Inhalation every 6 hours PRN Shortness of Breath and/or Wheezing  melatonin 3 milliGRAM(s) Oral at bedtime PRN Insomnia  oxyCODONE    IR 5 milliGRAM(s) Oral every 6 hours PRN Severe Pain (7 - 10)  tiotropium 18 MICROgram(s) Capsule 1 Capsule(s) Inhalation daily PRN SOB/wheezing        CAPILLARY BLOOD GLUCOSE        I&O's Summary    20 Oct 2022 07:01  -  21 Oct 2022 07:00  --------------------------------------------------------  IN: 800 mL / OUT: 1200 mL / NET: -400 mL        PHYSICAL EXAM:  GENERAL: NAD, well-developed  HEAD:  Atraumatic, Normocephalic  EYES: conjunctiva and sclera clear  NECK:  No JVD  CHEST/LUNG: Clear to auscultation bilaterally; No wheeze  HEART: Regular rate and rhythm; No murmurs, rubs, or gallops  ABDOMEN: Soft, Nontender, Nondistended; Bowel sounds present  EXTREMITIES: +3le edema RLE, LLE ace wrap   PSYCH: AAOx3    LABS:                        10.3   8.91  )-----------( 331      ( 20 Oct 2022 10:58 )             33.1     10-21    139  |  104  |  43<H>  ----------------------------<  116<H>  4.0   |  24  |  1.98<H>    Ca    8.6      21 Oct 2022 06:59      PT/INR - ( 21 Oct 2022 07:15 )   PT: 29.7 sec;   INR: 2.56 ratio         PTT - ( 20 Oct 2022 10:59 )  PTT:39.1 sec          RADIOLOGY & ADDITIONAL TESTS:    Imaging Personally Reviewed:    Consultant(s) Notes Reviewed:      Care Discussed with Consultants/Other Providers:

## 2022-10-22 ENCOUNTER — TRANSCRIPTION ENCOUNTER (OUTPATIENT)
Age: 67
End: 2022-10-22

## 2022-10-22 LAB
APTT BLD: 31.1 SEC — SIGNIFICANT CHANGE UP (ref 27.5–35.5)
INR BLD: 1.99 RATIO — HIGH (ref 0.88–1.16)
PROTHROM AB SERPL-ACNC: 23 SEC — HIGH (ref 10.5–13.4)

## 2022-10-22 PROCEDURE — 83615 LACTATE (LD) (LDH) ENZYME: CPT

## 2022-10-22 PROCEDURE — 83880 ASSAY OF NATRIURETIC PEPTIDE: CPT

## 2022-10-22 PROCEDURE — 83735 ASSAY OF MAGNESIUM: CPT

## 2022-10-22 PROCEDURE — 82962 GLUCOSE BLOOD TEST: CPT

## 2022-10-22 PROCEDURE — U0003: CPT

## 2022-10-22 PROCEDURE — 83935 ASSAY OF URINE OSMOLALITY: CPT

## 2022-10-22 PROCEDURE — 93005 ELECTROCARDIOGRAM TRACING: CPT

## 2022-10-22 PROCEDURE — 82803 BLOOD GASES ANY COMBINATION: CPT

## 2022-10-22 PROCEDURE — 82533 TOTAL CORTISOL: CPT

## 2022-10-22 PROCEDURE — 84484 ASSAY OF TROPONIN QUANT: CPT

## 2022-10-22 PROCEDURE — 84132 ASSAY OF SERUM POTASSIUM: CPT

## 2022-10-22 PROCEDURE — 85730 THROMBOPLASTIN TIME PARTIAL: CPT

## 2022-10-22 PROCEDURE — 97163 PT EVAL HIGH COMPLEX 45 MIN: CPT

## 2022-10-22 PROCEDURE — 71250 CT THORAX DX C-: CPT | Mod: MA

## 2022-10-22 PROCEDURE — 84540 ASSAY OF URINE/UREA-N: CPT

## 2022-10-22 PROCEDURE — 85018 HEMOGLOBIN: CPT

## 2022-10-22 PROCEDURE — 86803 HEPATITIS C AB TEST: CPT

## 2022-10-22 PROCEDURE — 97110 THERAPEUTIC EXERCISES: CPT

## 2022-10-22 PROCEDURE — 74176 CT ABD & PELVIS W/O CONTRAST: CPT | Mod: MA

## 2022-10-22 PROCEDURE — 80053 COMPREHEN METABOLIC PANEL: CPT

## 2022-10-22 PROCEDURE — 94640 AIRWAY INHALATION TREATMENT: CPT

## 2022-10-22 PROCEDURE — 99285 EMERGENCY DEPT VISIT HI MDM: CPT

## 2022-10-22 PROCEDURE — 0225U NFCT DS DNA&RNA 21 SARSCOV2: CPT

## 2022-10-22 PROCEDURE — 82947 ASSAY GLUCOSE BLOOD QUANT: CPT

## 2022-10-22 PROCEDURE — 99233 SBSQ HOSP IP/OBS HIGH 50: CPT

## 2022-10-22 PROCEDURE — 80048 BASIC METABOLIC PNL TOTAL CA: CPT

## 2022-10-22 PROCEDURE — 82550 ASSAY OF CK (CPK): CPT

## 2022-10-22 PROCEDURE — 81001 URINALYSIS AUTO W/SCOPE: CPT

## 2022-10-22 PROCEDURE — 85027 COMPLETE CBC AUTOMATED: CPT

## 2022-10-22 PROCEDURE — 82435 ASSAY OF BLOOD CHLORIDE: CPT

## 2022-10-22 PROCEDURE — 93975 VASCULAR STUDY: CPT

## 2022-10-22 PROCEDURE — 82570 ASSAY OF URINE CREATININE: CPT

## 2022-10-22 PROCEDURE — 82565 ASSAY OF CREATININE: CPT

## 2022-10-22 PROCEDURE — 87640 STAPH A DNA AMP PROBE: CPT

## 2022-10-22 PROCEDURE — 76770 US EXAM ABDO BACK WALL COMP: CPT

## 2022-10-22 PROCEDURE — 84145 PROCALCITONIN (PCT): CPT

## 2022-10-22 PROCEDURE — 84295 ASSAY OF SERUM SODIUM: CPT

## 2022-10-22 PROCEDURE — 84100 ASSAY OF PHOSPHORUS: CPT

## 2022-10-22 PROCEDURE — 87641 MR-STAPH DNA AMP PROBE: CPT

## 2022-10-22 PROCEDURE — 85610 PROTHROMBIN TIME: CPT

## 2022-10-22 PROCEDURE — 85025 COMPLETE CBC W/AUTO DIFF WBC: CPT

## 2022-10-22 PROCEDURE — 70450 CT HEAD/BRAIN W/O DYE: CPT | Mod: MA

## 2022-10-22 PROCEDURE — 93970 EXTREMITY STUDY: CPT

## 2022-10-22 PROCEDURE — 84300 ASSAY OF URINE SODIUM: CPT

## 2022-10-22 PROCEDURE — 80076 HEPATIC FUNCTION PANEL: CPT

## 2022-10-22 PROCEDURE — 82330 ASSAY OF CALCIUM: CPT

## 2022-10-22 PROCEDURE — 87040 BLOOD CULTURE FOR BACTERIA: CPT

## 2022-10-22 PROCEDURE — 84443 ASSAY THYROID STIM HORMONE: CPT

## 2022-10-22 PROCEDURE — 97530 THERAPEUTIC ACTIVITIES: CPT

## 2022-10-22 PROCEDURE — 93306 TTE W/DOPPLER COMPLETE: CPT

## 2022-10-22 PROCEDURE — 87086 URINE CULTURE/COLONY COUNT: CPT

## 2022-10-22 PROCEDURE — 80061 LIPID PANEL: CPT

## 2022-10-22 PROCEDURE — 83605 ASSAY OF LACTIC ACID: CPT

## 2022-10-22 PROCEDURE — 82010 KETONE BODYS QUAN: CPT

## 2022-10-22 PROCEDURE — 96374 THER/PROPH/DIAG INJ IV PUSH: CPT

## 2022-10-22 PROCEDURE — 36415 COLL VENOUS BLD VENIPUNCTURE: CPT

## 2022-10-22 PROCEDURE — U0005: CPT

## 2022-10-22 PROCEDURE — 85014 HEMATOCRIT: CPT

## 2022-10-22 PROCEDURE — 84550 ASSAY OF BLOOD/URIC ACID: CPT

## 2022-10-22 RX ORDER — AMLODIPINE BESYLATE 2.5 MG/1
1 TABLET ORAL
Qty: 0 | Refills: 0 | DISCHARGE

## 2022-10-22 RX ORDER — ALLOPURINOL 300 MG
1 TABLET ORAL
Qty: 0 | Refills: 0 | DISCHARGE
Start: 2022-10-22

## 2022-10-22 RX ORDER — RIVAROXABAN 15 MG-20MG
1 KIT ORAL
Qty: 0 | Refills: 0 | DISCHARGE

## 2022-10-22 RX ADMIN — NYSTATIN CREAM 1 APPLICATION(S): 100000 CREAM TOPICAL at 06:17

## 2022-10-22 RX ADMIN — CHLORHEXIDINE GLUCONATE 1 APPLICATION(S): 213 SOLUTION TOPICAL at 11:48

## 2022-10-22 RX ADMIN — Medication 100 MILLIGRAM(S): at 11:49

## 2022-10-22 RX ADMIN — Medication 1 DROP(S): at 06:14

## 2022-10-22 RX ADMIN — TIOTROPIUM BROMIDE 1 CAPSULE(S): 18 CAPSULE ORAL; RESPIRATORY (INHALATION) at 11:49

## 2022-10-22 NOTE — PROGRESS NOTE ADULT - ASSESSMENT
67M PMH HTN, HLD, heart arrythmia, renal cancer s/p partial nephrectomy (~2017/18), DVT/PE iso renal cancer (~2017/18)on xarelto, s/p total L knee arthoplasty (9/16/22), asthma p/w AMS likely 2/2 metabolic encephalopathy vs. infectious etiology iso COVID/?UTI
Patient visited at bedside in [facility] spirits  patient resistant to foot evaluation  LOWER EXTREMITY PHYSICAL EXAM:    Vascular: DP/PT 0/4, B/L, CFT <3 seconds B/L, Temperature gradient warm, B/L.   Neuro: Epicritic sensation hypersensitive to the level of toes, B/L.  Musculoskeletal/Ortho: contracted hammer toes 2,3,4,5 both feet  Skin: thick, mycotic dystrophic discolored nails with pain both feet  no signs of ulcerations both feet patient wearing prevalon boots  debride all nails 1,2,3,4,5 both feet with sterile nail clipper  continue with prevalon boots to reduce ulcer formation both feet  reconsult podiatry as needed
67M PMH HTN, HLD, heart arrythmia, renal cancer s/p partial nephrectomy (~2017/18), DVT/PE iso renal cancer (~2017/18)on xarelto, s/p total L knee arthoplasty (9/16/22), asthma p/w AMS likely 2/2 metabolic encephalopathy vs. infectious etiology iso COVID/?UTI now course c/b SONAL improving with sodium bicarb and 1/2 NS, and new b/l DVT, being transitioned to coumadin 
67M PMH HTN, HLD, heart arrythmia, renal cancer s/p partial nephrectomy (~2017/18), DVT/PE iso renal cancer (~2017/18)on xarelto, s/p total L knee arthoplasty (9/16/22), asthma p/w AMS likely 2/2 metabolic encephalopathy vs. infectious etiology iso COVID/?UTI
67M PMH HTN, HLD, heart arrythmia, renal cancer s/p partial nephrectomy (~2017/18), DVT/PE iso renal cancer (~2017/18)on xarelto, s/p total L knee arthoplasty (9/16/22), asthma p/w AMS likely 2/2 metabolic encephalopathy vs. infectious etiology iso COVID/?UTI
67M PMH HTN, HLD, heart arrythmia, renal cancer s/p partial nephrectomy (~2017/18), DVT/PE iso renal cancer (~2017/18)on xarelto, s/p total L knee arthoplasty (9/16/22), asthma p/w AMS likely 2/2 metabolic encephalopathy vs. infectious etiology iso COVID/?UTI now course c/b SONAL improving with sodium bicarb and 1/2 NS, and new b/l DVT, transitioned to coumadin 
67M PMH HTN, HLD, heart arrythmia, renal cancer s/p partial nephrectomy (~2017/18), DVT/PE iso renal cancer (~2017/18)on xarelto, s/p total L knee arthoplasty (9/16/22), asthma p/w AMS likely 2/2 metabolic encephalopathy vs. infectious etiology iso COVID/?UTI now course c/b SONAL improving with sodium bicarb and 1/2 NS, and new b/l DVT, transitioned to coumadin 
67M PMH HTN, HLD, heart arrythmia, renal cancer s/p partial nephrectomy (~2017/18), DVT/PE iso renal cancer (~2017/18)on xarelto, s/p total L knee arthoplasty (9/16/22), asthma p/w AMS likely 2/2 metabolic encephalopathy vs. infectious etiology iso COVID/?UTI
67M PMH HTN, HLD, heart arrythmia, renal cancer s/p partial nephrectomy (~2017/18), DVT/PE iso renal cancer (~2017/18)on xarelto, s/p total L knee arthoplasty (9/16/22), asthma p/w AMS likely 2/2 metabolic encephalopathy vs. infectious etiology iso COVID/?UTI
67M PMH HTN, HLD, heart arrythmia, renal cancer s/p partial nephrectomy (~2017/18), DVT/PE iso renal cancer (~2017/18)on xarelto, s/p total L knee arthoplasty (9/16/22), asthma p/w AMS likely 2/2 metabolic encephalopathy vs. infectious etiology iso COVID/?UTI now course c/b SONAL improving with sodium bicarb and 1/2 NS, and new b/l DVT, transitioned to coumadin 
67M PMH HTN, HLD, heart arrythmia, renal cancer s/p partial nephrectomy (~2017/18), DVT/PE iso renal cancer (~2017/18)on xarelto, s/p total L knee arthoplasty (9/16/22), asthma p/w AMS likely 2/2 metabolic encephalopathy vs. infectious etiology iso COVID/?UTI now course c/b SONAL improving with sodium bicarb and 1/2 NS, and new b/l DVT, being transitioned to coumadin 
67M PMH HTN, HLD, heart arrythmia, renal cancer s/p partial nephrectomy (~2017/18), DVT/PE iso renal cancer (~2017/18)on xarelto, s/p total L knee arthoplasty (9/16/22), asthma p/w AMS likely 2/2 metabolic encephalopathy vs. infectious etiology iso COVID/?UTI
67M PMH HTN, HLD, heart arrythmia, renal cancer s/p partial nephrectomy (~2017/18), DVT/PE iso renal cancer (~2017/18)on xarelto, s/p total L knee arthoplasty (9/16/22), asthma p/w AMS likely 2/2 metabolic encephalopathy vs. infectious etiology iso COVID/?UTI now course c/b SONAL improving with sodium bicarb and 1/2 NS, and new b/l DVT, being transitioned to coumadin 
67M PMH HTN, HLD, heart arrythmia, renal cancer s/p partial nephrectomy (~2017/18), DVT/PE iso renal cancer (~2017/18)on xarelto, s/p total L knee arthoplasty (9/16/22), asthma p/w AMS likely 2/2 metabolic encephalopathy vs. infectious etiology iso COVID/?UTI now course c/b SONAL improving with sodium bicarb and 1/2 NS, and new b/l DVT, transitioned to coumadin

## 2022-10-22 NOTE — PROGRESS NOTE ADULT - PROBLEM SELECTOR PLAN 1
-Duplex 10/12 with acute bilateral DVTs in lower extremity. Likely provoked in setting of recent surgery and venous stasis being in bed. Surprisingly developed on Xarelto.   -Appreciate vascular cardiology evaluation  -Renal dopplers without evidence of renal vein thrombosis  -Elevate extremities.  -c/w coumadin dose 5mg tonight, further dosing at [facility]

## 2022-10-22 NOTE — PROGRESS NOTE ADULT - PROBLEM SELECTOR PLAN 3
- Likely uremic vs toxic metabolic in setting of Covid/UTI. Improving with improving renal function  - 3 days AMS since hospital d/c to rehab following total L knee arthroplasty  - per pt wife, pt developed UTI at rehab, was started on abx (unknown which)  - AAOx3 at [facility]  - CTH unremarkable  - S/p treatment of sepsis as below  - improved mental status

## 2022-10-22 NOTE — PROGRESS NOTE ADULT - SUBJECTIVE AND OBJECTIVE BOX
Patient is a 67y old  Male who presents with a chief complaint of AMS (20 Oct 2022 19:16)      SUBJECTIVE / OVERNIGHT EVENTS: denies symptoms, anxious to leave hospital for [facility] today    MEDICATIONS  (STANDING):  allopurinol 100 milliGRAM(s) Oral daily  chlorhexidine 2% Cloths 1 Application(s) Topical daily  influenza  Vaccine (HIGH DOSE) 0.7 milliLiter(s) IntraMuscular once  latanoprost 0.005% Ophthalmic Solution 1 Drop(s) Both EYES at bedtime  Nephro-cj 1 Tablet(s) Oral every 24 hours  nystatin Powder 1 Application(s) Topical two times a day  timolol 0.5% Solution 1 Drop(s) Both EYES two times a day      MEDICATIONS  (PRN):  acetaminophen     Tablet .. 650 milliGRAM(s) Oral every 6 hours PRN Temp greater or equal to 38C (100.4F), Mild Pain (1 - 3)  ALBUTerol    90 MICROgram(s) HFA Inhaler 2 Puff(s) Inhalation every 6 hours PRN Shortness of Breath and/or Wheezing  melatonin 3 milliGRAM(s) Oral at bedtime PRN Insomnia  oxyCODONE    IR 5 milliGRAM(s) Oral every 6 hours PRN Severe Pain (7 - 10)  tiotropium 18 MICROgram(s) Capsule 1 Capsule(s) Inhalation daily PRN SOB/wheezing    Vital Signs Last 24 Hrs  T(C): 36.7 (21 Oct 2022 20:51), Max: 36.9 (21 Oct 2022 13:00)  T(F): 98 (21 Oct 2022 20:51), Max: 98.4 (21 Oct 2022 13:00)  HR: 84 (21 Oct 2022 20:51) (79 - 84)  BP: 115/76 (21 Oct 2022 20:51) (106/74 - 115/76)  BP(mean): --  RR: 18 (21 Oct 2022 20:51) (18 - 18)  SpO2: 99% (21 Oct 2022 20:51) (99% - 99%)    Parameters below as of 21 Oct 2022 20:51  Patient On (Oxygen Delivery Method): room air    CONSTITUTIONAL: NAD, well-developed, well-groomed  EYES: conjunctiva and sclera clear  ENMT: normal  RESPIRATORY: normal respiratory effort; lungs are clear to auscultation bilaterally  CARDIOVASCULAR: S1S2, RRR  ABDOMEN: +BS, NTND  PSYCH: affect appropriate  NEUROLOGY: grossly normal  SKIN: no jaundice    LABS: no labs from this AM             10-21    139  |  104  |  43<H>  ----------------------------<  116<H>  4.0   |  24  |  1.98<H>    Ca    8.6      21 Oct 2022 06:59      RADIOLOGY & ADDITIONAL TESTS:    Imaging Personally Reviewed:    Consultant(s) Notes Reviewed:      Care Discussed with Consultants/Other Providers:

## 2022-10-22 NOTE — DISCHARGE NOTE NURSING/CASE MANAGEMENT/SOCIAL WORK - NSDCFUADDAPPT_GEN_ALL_CORE_FT
APPTS ARE READY TO BE MADE: [x ] YES    Best Family or Patient Contact (if needed):    Additional Information about above appointments (if needed):    1: You will need monthly Owusu Changes and daily Owusu care for infection prevention   2: You will need daily INR level for coumadin dosing   3: Call for Orthopedic follow up in approx 3 weeks with Dr Zheng   4. Can follow with vascular cardiology as outpatient in 2 weeks     Other comments or requests:

## 2022-10-22 NOTE — DISCHARGE NOTE NURSING/CASE MANAGEMENT/SOCIAL WORK - PATIENT PORTAL LINK FT
You can access the FollowMyHealth Patient Portal offered by [facility] by registering at the following website: [email or web address]. By joining [employer]’s FollowMyHealth portal, you will also be able to view your health information using other applications (apps) compatible with our system.

## 2022-10-22 NOTE — DISCHARGE NOTE NURSING/CASE MANAGEMENT/SOCIAL WORK - NSDCPEFALRISK_GEN_ALL_CORE
For information on Fall & Injury Prevention, visit: [email or web address] OR  [email or web address] OR  https://www.cdc.gov/steadi/patient.html

## 2022-10-22 NOTE — PROGRESS NOTE ADULT - PROBLEM SELECTOR PROBLEM 2
Sepsis
SONAL (acute kidney injury)
Sepsis
SONAL (acute kidney injury)
Sepsis
SONAL (acute kidney injury)
Sepsis

## 2022-10-22 NOTE — PROGRESS NOTE ADULT - PROBLEM/PLAN-3
Pt reported he feels \"completely better\"  
DISPLAY PLAN FREE TEXT

## 2022-10-22 NOTE — PROGRESS NOTE ADULT - PROBLEM SELECTOR PLAN 9
Diet: Renal  DVT PPx: s/p Heparin gtt with warfarin bridge; will give warfarin 7.5mg today   Dispo: [facility] per PT recs
Diet: Renal  DVT PPx: s/p Heparin gtt with warfarin bridge; will recommend warfarin 5mg today at [facility]  Dispo: [facility] per PT recs, d/c today  ______________  Flako Cortés MD  [facility] Medicine  [phone]
Diet: Renal  DVT PPx: s/p Heparin gtt with warfarin bridge; will give warfarin 7.5mg today   Dispo: [facility] per PT recs

## 2022-10-22 NOTE — PROGRESS NOTE ADULT - PROBLEM SELECTOR PLAN 7
- On losartan 100 qD, atenolol 25 qD, amlo 10, HCTZ 25, at home  - hold in setting of hypotension  - monitor as outpatient

## 2022-10-22 NOTE — PROGRESS NOTE ADULT - PROBLEM SELECTOR PLAN 4
RESOLVED  - Patient with known UTI from rehab, was being treated on abx (unknown which), as well as new COVID infection  - UA on admission negative, likely due to recent abx use  - WBC 19 on admission, improved s/p antibiotics  - CT chest neg for PNA  - afebrile but tachycardic, tachypneic and hypotensive with known UTI and new COVID infection, fulfilling sepsis criteria  - Held remdesivir given acute renal failure  - S/p short course of CTX (5d)  - Blood and urine Cx NGTD  - COVID- on RA sating 99%  - asymptomatic from COVID perspective

## 2022-10-22 NOTE — PROGRESS NOTE ADULT - PROBLEM SELECTOR PROBLEM 3
SONAL (acute kidney injury)
SONAL (acute kidney injury)
Other encephalopathy
SONAL (acute kidney injury)
Other encephalopathy
DVT of lower limb, acute
Other encephalopathy

## 2022-10-22 NOTE — PROGRESS NOTE ADULT - PROBLEM SELECTOR PROBLEM 4
Sepsis
SONAL (acute kidney injury)
High anion gap metabolic acidosis
Sepsis
SONAL (acute kidney injury)
High anion gap metabolic acidosis
High anion gap metabolic acidosis
SONAL (acute kidney injury)
SONAL (acute kidney injury)
Sepsis

## 2022-10-22 NOTE — PROGRESS NOTE ADULT - PROBLEM SELECTOR PROBLEM 1
Other encephalopathy
DVT of lower limb, acute
Other encephalopathy
DVT of lower limb, acute

## 2022-10-22 NOTE — PROGRESS NOTE ADULT - PROBLEM SELECTOR PLAN 2
- Cr 6.50 on admission, baseline 1.1 in HIE form 9/27/22. Improving with IVF.  - 3+ pitting BLE, per wife has been severe for ~4 years  - Follows Dr. Barrientos (Nephrology)  - On HCTZ at home, holding iso HoTN  - Kennedy in place, monitor UOP.  - Nephro following, recs appreciated  - UCr 112, Marta 34, Uosm 478. FeNa 1.1% indicating intrinsic renal injury  - Nephro following (Dr. Brownlee)  - Renal sono unremarkable, doppler negative for RVT  - Avoid NSAIDs, ACEI/ARBS, RCA and nephrotoxins. Dose medications as per eGFR  - Continue 1/2 NS and sodium bicarb tabs  - TOV started 10/16, failed, will replace kennedy  - improving SCr, monitor as outpatient

## 2022-10-24 PROBLEM — Z00.00 ENCOUNTER FOR PREVENTIVE HEALTH EXAMINATION: Status: ACTIVE | Noted: 2022-10-24

## 2022-11-04 ENCOUNTER — INPATIENT (INPATIENT)
Facility: HOSPITAL | Age: 67
LOS: 4 days | Discharge: NURSING, REHAB OR LONG-TERM CARE | DRG: 690 | End: 2022-11-09
Attending: HOSPITALIST | Admitting: HOSPITALIST
Payer: MEDICARE

## 2022-11-04 VITALS
DIASTOLIC BLOOD PRESSURE: 81 MMHG | RESPIRATION RATE: 18 BRPM | OXYGEN SATURATION: 96 % | HEART RATE: 97 BPM | TEMPERATURE: 98 F | HEIGHT: 69 IN | SYSTOLIC BLOOD PRESSURE: 136 MMHG

## 2022-11-04 DIAGNOSIS — Z90.5 ACQUIRED ABSENCE OF KIDNEY: Chronic | ICD-10-CM

## 2022-11-04 DIAGNOSIS — Z96.652 PRESENCE OF LEFT ARTIFICIAL KNEE JOINT: Chronic | ICD-10-CM

## 2022-11-04 PROCEDURE — 99285 EMERGENCY DEPT VISIT HI MDM: CPT | Mod: GC

## 2022-11-05 DIAGNOSIS — I82.409 ACUTE EMBOLISM AND THROMBOSIS OF UNSPECIFIED DEEP VEINS OF UNSPECIFIED LOWER EXTREMITY: ICD-10-CM

## 2022-11-05 DIAGNOSIS — D64.9 ANEMIA, UNSPECIFIED: ICD-10-CM

## 2022-11-05 DIAGNOSIS — R33.8 OTHER RETENTION OF URINE: ICD-10-CM

## 2022-11-05 DIAGNOSIS — I10 ESSENTIAL (PRIMARY) HYPERTENSION: ICD-10-CM

## 2022-11-05 DIAGNOSIS — I49.9 CARDIAC ARRHYTHMIA, UNSPECIFIED: ICD-10-CM

## 2022-11-05 DIAGNOSIS — R74.01 ELEVATION OF LEVELS OF LIVER TRANSAMINASE LEVELS: ICD-10-CM

## 2022-11-05 DIAGNOSIS — R82.90 UNSPECIFIED ABNORMAL FINDINGS IN URINE: ICD-10-CM

## 2022-11-05 DIAGNOSIS — N39.0 URINARY TRACT INFECTION, SITE NOT SPECIFIED: ICD-10-CM

## 2022-11-05 DIAGNOSIS — C64.9 MALIGNANT NEOPLASM OF UNSPECIFIED KIDNEY, EXCEPT RENAL PELVIS: ICD-10-CM

## 2022-11-05 PROBLEM — I26.99 OTHER PULMONARY EMBOLISM WITHOUT ACUTE COR PULMONALE: Chronic | Status: ACTIVE | Noted: 2022-10-10

## 2022-11-05 PROBLEM — J45.909 UNSPECIFIED ASTHMA, UNCOMPLICATED: Chronic | Status: ACTIVE | Noted: 2022-10-11

## 2022-11-05 PROBLEM — E78.5 HYPERLIPIDEMIA, UNSPECIFIED: Chronic | Status: ACTIVE | Noted: 2022-10-10

## 2022-11-05 LAB
ALBUMIN SERPL ELPH-MCNC: 2.5 G/DL — LOW (ref 3.3–5)
ALP SERPL-CCNC: 331 U/L — HIGH (ref 40–120)
ALT FLD-CCNC: 44 U/L — SIGNIFICANT CHANGE UP (ref 10–45)
ANION GAP SERPL CALC-SCNC: 10 MMOL/L — SIGNIFICANT CHANGE UP (ref 5–17)
APPEARANCE UR: ABNORMAL
AST SERPL-CCNC: 101 U/L — HIGH (ref 10–40)
BACTERIA # UR AUTO: ABNORMAL
BASOPHILS # BLD AUTO: 0.06 K/UL — SIGNIFICANT CHANGE UP (ref 0–0.2)
BASOPHILS NFR BLD AUTO: 0.6 % — SIGNIFICANT CHANGE UP (ref 0–2)
BILIRUB SERPL-MCNC: 1.7 MG/DL — HIGH (ref 0.2–1.2)
BILIRUB UR-MCNC: NEGATIVE — SIGNIFICANT CHANGE UP
BUN SERPL-MCNC: 32 MG/DL — HIGH (ref 7–23)
CALCIUM SERPL-MCNC: 8.4 MG/DL — SIGNIFICANT CHANGE UP (ref 8.4–10.5)
CHLORIDE SERPL-SCNC: 106 MMOL/L — SIGNIFICANT CHANGE UP (ref 96–108)
CO2 SERPL-SCNC: 23 MMOL/L — SIGNIFICANT CHANGE UP (ref 22–31)
COLOR SPEC: YELLOW — SIGNIFICANT CHANGE UP
CREAT SERPL-MCNC: 1.49 MG/DL — HIGH (ref 0.5–1.3)
DIFF PNL FLD: ABNORMAL
EGFR: 52 ML/MIN/1.73M2 — LOW
EOSINOPHIL # BLD AUTO: 0.09 K/UL — SIGNIFICANT CHANGE UP (ref 0–0.5)
EOSINOPHIL NFR BLD AUTO: 0.8 % — SIGNIFICANT CHANGE UP (ref 0–6)
EPI CELLS # UR: 0 /HPF — SIGNIFICANT CHANGE UP
GLUCOSE SERPL-MCNC: 102 MG/DL — HIGH (ref 70–99)
GLUCOSE UR QL: NEGATIVE — SIGNIFICANT CHANGE UP
HCT VFR BLD CALC: 30.8 % — LOW (ref 39–50)
HGB BLD-MCNC: 9.2 G/DL — LOW (ref 13–17)
HYALINE CASTS # UR AUTO: 1 /LPF — SIGNIFICANT CHANGE UP (ref 0–2)
IMM GRANULOCYTES NFR BLD AUTO: 0.9 % — SIGNIFICANT CHANGE UP (ref 0–0.9)
INR BLD: 2.86 RATIO — HIGH (ref 0.88–1.16)
KETONES UR-MCNC: NEGATIVE — SIGNIFICANT CHANGE UP
LEUKOCYTE ESTERASE UR-ACNC: ABNORMAL
LYMPHOCYTES # BLD AUTO: 1.15 K/UL — SIGNIFICANT CHANGE UP (ref 1–3.3)
LYMPHOCYTES # BLD AUTO: 10.8 % — LOW (ref 13–44)
MCHC RBC-ENTMCNC: 25.3 PG — LOW (ref 27–34)
MCHC RBC-ENTMCNC: 29.9 GM/DL — LOW (ref 32–36)
MCV RBC AUTO: 84.6 FL — SIGNIFICANT CHANGE UP (ref 80–100)
MONOCYTES # BLD AUTO: 0.64 K/UL — SIGNIFICANT CHANGE UP (ref 0–0.9)
MONOCYTES NFR BLD AUTO: 6 % — SIGNIFICANT CHANGE UP (ref 2–14)
NEUTROPHILS # BLD AUTO: 8.64 K/UL — HIGH (ref 1.8–7.4)
NEUTROPHILS NFR BLD AUTO: 80.9 % — HIGH (ref 43–77)
NITRITE UR-MCNC: NEGATIVE — SIGNIFICANT CHANGE UP
NRBC # BLD: 0 /100 WBCS — SIGNIFICANT CHANGE UP (ref 0–0)
PH UR: 6 — SIGNIFICANT CHANGE UP (ref 5–8)
PLATELET # BLD AUTO: 349 K/UL — SIGNIFICANT CHANGE UP (ref 150–400)
POTASSIUM SERPL-MCNC: 4 MMOL/L — SIGNIFICANT CHANGE UP (ref 3.5–5.3)
POTASSIUM SERPL-SCNC: 4 MMOL/L — SIGNIFICANT CHANGE UP (ref 3.5–5.3)
PROT SERPL-MCNC: 5.9 G/DL — LOW (ref 6–8.3)
PROT UR-MCNC: ABNORMAL
PROTHROM AB SERPL-ACNC: 33.5 SEC — HIGH (ref 10.5–13.4)
RBC # BLD: 3.64 M/UL — LOW (ref 4.2–5.8)
RBC # FLD: 15.9 % — HIGH (ref 10.3–14.5)
RBC CASTS # UR COMP ASSIST: 13 /HPF — HIGH (ref 0–4)
SARS-COV-2 RNA SPEC QL NAA+PROBE: SIGNIFICANT CHANGE UP
SODIUM SERPL-SCNC: 139 MMOL/L — SIGNIFICANT CHANGE UP (ref 135–145)
SP GR SPEC: 1.02 — SIGNIFICANT CHANGE UP (ref 1.01–1.02)
UROBILINOGEN FLD QL: NEGATIVE — SIGNIFICANT CHANGE UP
WBC # BLD: 10.68 K/UL — HIGH (ref 3.8–10.5)
WBC # FLD AUTO: 10.68 K/UL — HIGH (ref 3.8–10.5)
WBC UR QL: 401 /HPF — HIGH (ref 0–5)

## 2022-11-05 PROCEDURE — 99223 1ST HOSP IP/OBS HIGH 75: CPT

## 2022-11-05 PROCEDURE — 71045 X-RAY EXAM CHEST 1 VIEW: CPT | Mod: 26

## 2022-11-05 RX ORDER — LANOLIN ALCOHOL/MO/W.PET/CERES
3 CREAM (GRAM) TOPICAL AT BEDTIME
Refills: 0 | Status: DISCONTINUED | OUTPATIENT
Start: 2022-11-05 | End: 2022-11-09

## 2022-11-05 RX ORDER — ALLOPURINOL 300 MG
100 TABLET ORAL DAILY
Refills: 0 | Status: DISCONTINUED | OUTPATIENT
Start: 2022-11-05 | End: 2022-11-09

## 2022-11-05 RX ORDER — WARFARIN SODIUM 2.5 MG/1
2 TABLET ORAL ONCE
Refills: 0 | Status: COMPLETED | OUTPATIENT
Start: 2022-11-05 | End: 2022-11-05

## 2022-11-05 RX ORDER — MULTIVIT-MIN/FERROUS GLUCONATE 9 MG/15 ML
1 LIQUID (ML) ORAL DAILY
Refills: 0 | Status: DISCONTINUED | OUTPATIENT
Start: 2022-11-05 | End: 2022-11-09

## 2022-11-05 RX ORDER — ZINC SULFATE TAB 220 MG (50 MG ZINC EQUIVALENT) 220 (50 ZN) MG
220 TAB ORAL DAILY
Refills: 0 | Status: DISCONTINUED | OUTPATIENT
Start: 2022-11-05 | End: 2022-11-09

## 2022-11-05 RX ORDER — TIMOLOL 0.5 %
1 DROPS OPHTHALMIC (EYE)
Refills: 0 | Status: DISCONTINUED | OUTPATIENT
Start: 2022-11-05 | End: 2022-11-09

## 2022-11-05 RX ORDER — TIOTROPIUM BROMIDE 18 UG/1
1 CAPSULE ORAL; RESPIRATORY (INHALATION) DAILY
Refills: 0 | Status: DISCONTINUED | OUTPATIENT
Start: 2022-11-05 | End: 2022-11-09

## 2022-11-05 RX ORDER — ALBUTEROL 90 UG/1
2 AEROSOL, METERED ORAL EVERY 6 HOURS
Refills: 0 | Status: DISCONTINUED | OUTPATIENT
Start: 2022-11-05 | End: 2022-11-09

## 2022-11-05 RX ORDER — FOSFOMYCIN TROMETHAMINE 3 G/1
3 POWDER ORAL ONCE
Refills: 0 | Status: COMPLETED | OUTPATIENT
Start: 2022-11-05 | End: 2022-11-05

## 2022-11-05 RX ORDER — ACETAMINOPHEN 500 MG
650 TABLET ORAL EVERY 6 HOURS
Refills: 0 | Status: DISCONTINUED | OUTPATIENT
Start: 2022-11-05 | End: 2022-11-06

## 2022-11-05 RX ORDER — HYALURONIDASE (HUMAN RECOMBINANT) 150 [USP'U]/ML
150 INJECTION, SOLUTION SUBCUTANEOUS ONCE
Refills: 0 | Status: COMPLETED | OUTPATIENT
Start: 2022-11-05 | End: 2022-11-05

## 2022-11-05 RX ORDER — FAMOTIDINE 10 MG/ML
20 INJECTION INTRAVENOUS DAILY
Refills: 0 | Status: DISCONTINUED | OUTPATIENT
Start: 2022-11-05 | End: 2022-11-09

## 2022-11-05 RX ORDER — ONDANSETRON 8 MG/1
4 TABLET, FILM COATED ORAL EVERY 8 HOURS
Refills: 0 | Status: DISCONTINUED | OUTPATIENT
Start: 2022-11-05 | End: 2022-11-09

## 2022-11-05 RX ORDER — LATANOPROST 0.05 MG/ML
1 SOLUTION/ DROPS OPHTHALMIC; TOPICAL AT BEDTIME
Refills: 0 | Status: DISCONTINUED | OUTPATIENT
Start: 2022-11-05 | End: 2022-11-09

## 2022-11-05 RX ORDER — TIMOLOL 0.5 %
1 DROPS OPHTHALMIC (EYE)
Qty: 0 | Refills: 0 | DISCHARGE

## 2022-11-05 RX ORDER — CEFPODOXIME PROXETIL 100 MG
100 TABLET ORAL ONCE
Refills: 0 | Status: DISCONTINUED | OUTPATIENT
Start: 2022-11-05 | End: 2022-11-05

## 2022-11-05 RX ORDER — CEFTRIAXONE 500 MG/1
1000 INJECTION, POWDER, FOR SOLUTION INTRAMUSCULAR; INTRAVENOUS ONCE
Refills: 0 | Status: COMPLETED | OUTPATIENT
Start: 2022-11-05 | End: 2022-11-05

## 2022-11-05 RX ORDER — ATENOLOL 25 MG/1
25 TABLET ORAL DAILY
Refills: 0 | Status: DISCONTINUED | OUTPATIENT
Start: 2022-11-05 | End: 2022-11-09

## 2022-11-05 RX ORDER — WARFARIN SODIUM 2.5 MG/1
1 TABLET ORAL
Qty: 0 | Refills: 0 | DISCHARGE

## 2022-11-05 RX ADMIN — WARFARIN SODIUM 2 MILLIGRAM(S): 2.5 TABLET ORAL at 21:42

## 2022-11-05 RX ADMIN — Medication 30 MILLILITER(S): at 10:12

## 2022-11-05 RX ADMIN — CEFTRIAXONE 100 MILLIGRAM(S): 500 INJECTION, POWDER, FOR SOLUTION INTRAMUSCULAR; INTRAVENOUS at 10:12

## 2022-11-05 RX ADMIN — FOSFOMYCIN TROMETHAMINE 3 GRAM(S): 3 POWDER ORAL at 23:00

## 2022-11-05 NOTE — ED ADULT NURSE NOTE - AGENT'S NAME
symmetrical Kimber Merritt (spouse) [phone] Alejandro Castrejon (son) [phone] Lamine Castrejon (son) [phone]

## 2022-11-05 NOTE — H&P ADULT - NSHPLABSRESULTS_GEN_ALL_CORE
Lab results are reviewed by me : WBC = 10.68 H/H = 9.2  PLT = 349   Ua : abnormal Cr = 1.49 was 1.98 in October  COVID PCR Neg

## 2022-11-05 NOTE — ED PROVIDER NOTE - PHYSICAL EXAMINATION
GENERAL: no acute distress, non-toxic appearing  HEAD: normocephalic, atraumatic  HEENT: normal conjunctiva, oral mucosa moist, neck supple  CARDIAC: regular rate and rhythm, normal S1 and S2,  no appreciable murmurs  PULM: clear to ascultation bilaterally, no crackles, rales, rhonchi, or wheezing  GI: abdomen nondistended, soft, nontender, no guarding or rebound tenderness  : +suprapubic distension, ~350 cc on POCUS, no CVA tenderness, no suprapubic tenderness  NEURO: alert and oriented x 3, normal speech, moving all extremitie s  MSK: no visible deformities, no peripheral edema, calf tenderness/redness/swelling  SKIN: no visible rashes, dry, well-perfused  PSYCH: appropriate mood and affect

## 2022-11-05 NOTE — ED PROVIDER NOTE - CLINICAL SUMMARY MEDICAL DECISION MAKING FREE TEXT BOX
66 yo M with urinary retention after kennedy removal this morning. will attempt placing kennedy and get basic labs and UA to assess for infection.

## 2022-11-05 NOTE — INPATIENT CERTIFICATION FOR MEDICARE PATIENTS - PHYSICIAN CONCUR
I concur with the Admission Order and I certify that services are provided in accordance with Section 42 CFR § 412.3
Patient has no objection to blood transfusions.

## 2022-11-05 NOTE — ED ADULT NURSE REASSESSMENT NOTE - NS ED NURSE REASSESS COMMENT FT1
this nurse and another nurse attempted a IV 2x, an no success, provider was made aware and provider stated " I am going to have a Physician student try", pt is ax3, on room air, 300mL of urine output.

## 2022-11-05 NOTE — H&P ADULT - PROBLEM SELECTOR PLAN 6
Pt received Ceftriaxone in the ED   Pt was noted to have Enterococcus in the urine on 10/27/22 although CFU was <100K  , so will give one dose of FOSFOMYCIN 3 gram oral ( which has pretty broad coverage for UTI including enterococcus)  and  will f/u with UCX   Monitor for fever , WBC and hemodynamics

## 2022-11-05 NOTE — PROCEDURE NOTE - ADDITIONAL PROCEDURE DETAILS
provider called stating 2 RN's tried to place kennedy, got a 16F in but no urine output. Kennedy was removed by RN after. Bleeding around meatus from kennedy trauma. Using aseptic technique, area prepped in traditional sterile fashion, lidocaine urojet instilled into urethra, and 14F silicone catheter placed without resistance. Yellow color urine drained. 10 cc sterile water placed into balloon and kennedy catheter secured with stat lock. pt tolerated procedure well. plan for kennedy per primary team. please page with any acute  concerns or questions.  p: [phone]

## 2022-11-05 NOTE — ED PROVIDER NOTE - OBJECTIVE STATEMENT
68 yo M PMHx of HTN, HLD, heart arrythmia, renal cancer s/p partial nephrectomy (~2017/18), DVT/PE iso renal cancer (~2017/18)on xarelto, s/p total L knee arthoplasty (9/16/22), asthma presents with urinary retention. Owusu was discontinued since this morning and patient has had no output since then. Recently admitted for AMS. Patient has no pain, nausea, vomiting, fevers.

## 2022-11-05 NOTE — H&P ADULT - NEGATIVE ENMT SYMPTOMS
no dry mouth/no throat pain/no dysphagia no tinnitus/no vertigo/no sinus symptoms/no dry mouth/no throat pain/no dysphagia

## 2022-11-05 NOTE — H&P ADULT - ASSESSMENT
68 yo M PMHx of HTN, HLD, heart arrythmia, renal cancer s/p partial nephrectomy (~2017/18), DVT/PE iso renal cancer (~2017/18)was on xarelto and now on Coumadin from recent hosp stay,  s/p total L knee arthoplasty (9/16/22), asthma presents with urinary retention. Kennedy was discontinued since in morning of admission and patient has had no output since then. Patient has no pain, nausea, vomiting, fevers.  Pt received Ceftriaxone in the ED , kennedy was placed in the ED and admitted for possible UTI.

## 2022-11-05 NOTE — H&P ADULT - HISTORY OF PRESENT ILLNESS
66 yo M PMHx of HTN, HLD, heart arrythmia, renal cancer s/p partial nephrectomy (~2017/18), DVT/PE iso renal cancer (~2017/18)was on xarelto and now on Coumadin from recent hosp stay,  s/p total L knee arthoplasty (9/16/22), asthma presents with urinary retention. Kennedy was discontinued since in morning of admission and patient has had no output since then. Patient has no pain, nausea, vomiting, fevers.  recent hosp admission : 10/10- 10/22 for AMS.    ED :  Tem = 97.6  HR : 85-97  SPO2= 97  pt received Ceftriaxone in the ED , kennedy was placed in the ED       66 yo M PMHx of HTN, HLD, heart arrythmia, renal cancer s/p partial nephrectomy (~2017/18), DVT/PE iso renal cancer (~2017/18)was on xarelto and now on Coumadin from recent hosp stay,  s/p total L knee arthoplasty (9/16/22), asthma presents with urinary retention. Kennedy was discontinued since in morning of admission and patient has had no output since then. Patient has no pain, nausea, vomiting, fevers.  NO hematuria   recent hosp admission : 10/10- 10/22 for AMS.    ED :  Tem = 97.6  HR : 85-97  SPO2= 97  pt received Ceftriaxone in the ED , kennedy was placed in the ED

## 2022-11-05 NOTE — ED ADULT NURSE REASSESSMENT NOTE - NS ED NURSE REASSESS COMMENT FT1
pt changed, repositioned. RN overnight had difficult time obtaining IV access. MD at bedside @ this time obtaining USGIV, will send labs when access is obtained.

## 2022-11-05 NOTE — ED PROVIDER NOTE - ATTENDING CONTRIBUTION TO CARE
MD Cobb:  patient seen and evaluated personally.   I agree with the History & Physical,  Impression & Plan other than what was detailed in my note.  MD Cobb  67M PMH HTN, HLD, heart arrythmia, renal cancer s/p partial nephrectomy (~2017/18), DVT/PE iso renal cancer (~2017/18)on xarelto, s/p total L knee arthoplasty (9/16/22), asthma, BIBEMS recently admitted for alla, encephalopathy, had kennedy dc'd earlier today and has not urinated since, no c/o f/c n/v, abd pain, afebrile vitals stable, large habitus, pos sp ttp, increased bladder size on palpation, plan for kennedy, cbc, cmp.

## 2022-11-05 NOTE — ED ADULT NURSE NOTE - OBJECTIVE STATEMENT
THE PT IS A 67 Y.O. MALE C/O URINARY RETENTION. PT REPORTS HAVING A CATHETER DUE TO DYSURIA, AND PT STATES THE CATHETER WAS TAKEN OUT TODAY BUT HIS PROVIDER, AND THE PT REPORTS DYSURIA, W/ NO PAIN OR BLOOD.

## 2022-11-05 NOTE — H&P ADULT - PROBLEM SELECTOR PLAN 1
S/P kennedy replacement in the ED   Etiology is uknown   pt will need oupt Urology eval and follow up   Most likely will benefit from Flomax S/P kennedy replacement in the ED   Etiology is uknown   pt will need oupt Urology eval and follow up   Most likely will benefit from Flomax  Pt will most likely be dc with kennedy and oupt Urology eval

## 2022-11-05 NOTE — ED PROVIDER NOTE - PROGRESS NOTE DETAILS
Kennedy inserted by urology due to difficult kennedy insertion.     Raven Charles MD, PGY2 Patient pending labs for marry Charles MD, PGY2 Chris Sr MD PGY1: Pt signed out to me at 700. 68 yo M PMHx of HTN, HLD, heart arrythmia, renal cancer s/p partial nephrectomy (~2017/18), DVT/PE iso renal cancer (~2017/18)on xarelto, s/p total L knee arthoplasty (9/16/22), asthma presents with urinary retention with no urination since kennedy dc yesterday am, new kennedy placed by urology, urine with uti, labs pending. Likely admit for complicated uti.

## 2022-11-05 NOTE — ED PROVIDER NOTE - NS ED ROS FT
GENERAL: no fever, no chills, no weight loss  EYES: no change in vision, no irritation, no discharge, no redness, no pain  HEENT: no trouble swallowing or speaking, no throat pain, no ear pain  CARDIAC: no chest pain, no palpitations   PULMONARY: no cough, no shortness of breath, no wheezing  GI: no abdominal pain, no nausea, no vomiting, no diarrhea, no constipation, no melena, no hematochezia, no hematemesis  : +retention, no changes in urination, no dysuria, no frequency, no hematuria, no discharge  SKIN: no rashes  NEURO: no headache, no numbness, no weakness  MSK: no joint pain, no muscle pain, no back pain, no calf pain

## 2022-11-05 NOTE — H&P ADULT - NSHPADDITIONALINFOADULT_GEN_ALL_CORE
Shi Frank   Hospitalist    /TEAMS Recent Arthroplasty / cont to monitor     PLEASE F/up INR to night and DOSE COUMADIN TONIGHT ( 11/5)   discussed with ACP       Shi Frank   Hospitalist   [phone] /TEAMS

## 2022-11-06 LAB
ALBUMIN SERPL ELPH-MCNC: 2.2 G/DL — LOW (ref 3.3–5)
ALP SERPL-CCNC: 562 U/L — HIGH (ref 40–120)
ALT FLD-CCNC: 103 U/L — HIGH (ref 10–45)
ANION GAP SERPL CALC-SCNC: 14 MMOL/L — SIGNIFICANT CHANGE UP (ref 5–17)
APTT BLD: 44.5 SEC — HIGH (ref 27.5–35.5)
AST SERPL-CCNC: 174 U/L — HIGH (ref 10–40)
BILIRUB SERPL-MCNC: 3.4 MG/DL — HIGH (ref 0.2–1.2)
BUN SERPL-MCNC: 30 MG/DL — HIGH (ref 7–23)
CALCIUM SERPL-MCNC: 8.4 MG/DL — SIGNIFICANT CHANGE UP (ref 8.4–10.5)
CHLORIDE SERPL-SCNC: 107 MMOL/L — SIGNIFICANT CHANGE UP (ref 96–108)
CO2 SERPL-SCNC: 17 MMOL/L — LOW (ref 22–31)
CREAT SERPL-MCNC: 1.29 MG/DL — SIGNIFICANT CHANGE UP (ref 0.5–1.3)
EGFR: 62 ML/MIN/1.73M2 — SIGNIFICANT CHANGE UP
GLUCOSE SERPL-MCNC: 87 MG/DL — SIGNIFICANT CHANGE UP (ref 70–99)
HCT VFR BLD CALC: 32.7 % — LOW (ref 39–50)
HGB BLD-MCNC: 10 G/DL — LOW (ref 13–17)
INR BLD: 4.5 RATIO — HIGH (ref 0.88–1.16)
MCHC RBC-ENTMCNC: 25.1 PG — LOW (ref 27–34)
MCHC RBC-ENTMCNC: 30.6 GM/DL — LOW (ref 32–36)
MCV RBC AUTO: 82.2 FL — SIGNIFICANT CHANGE UP (ref 80–100)
NRBC # BLD: 0 /100 WBCS — SIGNIFICANT CHANGE UP (ref 0–0)
PLATELET # BLD AUTO: 332 K/UL — SIGNIFICANT CHANGE UP (ref 150–400)
POTASSIUM SERPL-MCNC: 4.3 MMOL/L — SIGNIFICANT CHANGE UP (ref 3.5–5.3)
POTASSIUM SERPL-SCNC: 4.3 MMOL/L — SIGNIFICANT CHANGE UP (ref 3.5–5.3)
PROT SERPL-MCNC: 5.7 G/DL — LOW (ref 6–8.3)
PROTHROM AB SERPL-ACNC: 52.5 SEC — HIGH (ref 10.5–13.4)
RBC # BLD: 3.98 M/UL — LOW (ref 4.2–5.8)
RBC # FLD: 16.1 % — HIGH (ref 10.3–14.5)
SODIUM SERPL-SCNC: 138 MMOL/L — SIGNIFICANT CHANGE UP (ref 135–145)
WBC # BLD: 11.06 K/UL — HIGH (ref 3.8–10.5)
WBC # FLD AUTO: 11.06 K/UL — HIGH (ref 3.8–10.5)

## 2022-11-06 PROCEDURE — 99233 SBSQ HOSP IP/OBS HIGH 50: CPT | Mod: GC

## 2022-11-06 RX ORDER — PIPERACILLIN AND TAZOBACTAM 4; .5 G/20ML; G/20ML
3.38 INJECTION, POWDER, LYOPHILIZED, FOR SOLUTION INTRAVENOUS EVERY 8 HOURS
Refills: 0 | Status: DISCONTINUED | OUTPATIENT
Start: 2022-11-07 | End: 2022-11-09

## 2022-11-06 RX ORDER — TAMSULOSIN HYDROCHLORIDE 0.4 MG/1
0.4 CAPSULE ORAL AT BEDTIME
Refills: 0 | Status: DISCONTINUED | OUTPATIENT
Start: 2022-11-06 | End: 2022-11-09

## 2022-11-06 RX ORDER — PIPERACILLIN AND TAZOBACTAM 4; .5 G/20ML; G/20ML
3.38 INJECTION, POWDER, LYOPHILIZED, FOR SOLUTION INTRAVENOUS ONCE
Refills: 0 | Status: COMPLETED | OUTPATIENT
Start: 2022-11-06 | End: 2022-11-06

## 2022-11-06 RX ORDER — PIPERACILLIN AND TAZOBACTAM 4; .5 G/20ML; G/20ML
3.38 INJECTION, POWDER, LYOPHILIZED, FOR SOLUTION INTRAVENOUS ONCE
Refills: 0 | Status: COMPLETED | OUTPATIENT
Start: 2022-11-07 | End: 2022-11-06

## 2022-11-06 RX ORDER — INFLUENZA VIRUS VACCINE 15; 15; 15; 15 UG/.5ML; UG/.5ML; UG/.5ML; UG/.5ML
0.7 SUSPENSION INTRAMUSCULAR ONCE
Refills: 0 | Status: DISCONTINUED | OUTPATIENT
Start: 2022-11-06 | End: 2022-11-09

## 2022-11-06 RX ADMIN — PIPERACILLIN AND TAZOBACTAM 25 GRAM(S): 4; .5 INJECTION, POWDER, LYOPHILIZED, FOR SOLUTION INTRAVENOUS at 23:35

## 2022-11-06 RX ADMIN — ALBUTEROL 2 PUFF(S): 90 AEROSOL, METERED ORAL at 12:30

## 2022-11-06 RX ADMIN — ZINC SULFATE TAB 220 MG (50 MG ZINC EQUIVALENT) 220 MILLIGRAM(S): 220 (50 ZN) TAB at 12:31

## 2022-11-06 RX ADMIN — ALBUTEROL 2 PUFF(S): 90 AEROSOL, METERED ORAL at 17:44

## 2022-11-06 RX ADMIN — FAMOTIDINE 20 MILLIGRAM(S): 10 INJECTION INTRAVENOUS at 11:12

## 2022-11-06 RX ADMIN — PIPERACILLIN AND TAZOBACTAM 25 GRAM(S): 4; .5 INJECTION, POWDER, LYOPHILIZED, FOR SOLUTION INTRAVENOUS at 17:44

## 2022-11-06 RX ADMIN — Medication 30 MILLILITER(S): at 06:17

## 2022-11-06 RX ADMIN — Medication 1 DROP(S): at 06:17

## 2022-11-06 RX ADMIN — TAMSULOSIN HYDROCHLORIDE 0.4 MILLIGRAM(S): 0.4 CAPSULE ORAL at 22:20

## 2022-11-06 RX ADMIN — PIPERACILLIN AND TAZOBACTAM 200 GRAM(S): 4; .5 INJECTION, POWDER, LYOPHILIZED, FOR SOLUTION INTRAVENOUS at 13:32

## 2022-11-06 RX ADMIN — Medication 1 TABLET(S): at 12:31

## 2022-11-06 RX ADMIN — ATENOLOL 25 MILLIGRAM(S): 25 TABLET ORAL at 06:18

## 2022-11-06 RX ADMIN — ALBUTEROL 2 PUFF(S): 90 AEROSOL, METERED ORAL at 23:37

## 2022-11-06 RX ADMIN — LATANOPROST 1 DROP(S): 0.05 SOLUTION/ DROPS OPHTHALMIC; TOPICAL at 22:20

## 2022-11-06 RX ADMIN — Medication 100 MILLIGRAM(S): at 11:12

## 2022-11-06 RX ADMIN — Medication 1 DROP(S): at 17:44

## 2022-11-06 RX ADMIN — TIOTROPIUM BROMIDE 1 CAPSULE(S): 18 CAPSULE ORAL; RESPIRATORY (INHALATION) at 12:30

## 2022-11-06 NOTE — PATIENT PROFILE ADULT - FUNCTIONAL ASSESSMENT - BASIC MOBILITY 6.
2-calculated by average/Not able to assess (calculate score using [facility] averaging method) 1 = Total assistance

## 2022-11-07 LAB
ALBUMIN SERPL ELPH-MCNC: 2.4 G/DL — LOW (ref 3.3–5)
ALP SERPL-CCNC: 493 U/L — HIGH (ref 40–120)
ALT FLD-CCNC: 72 U/L — HIGH (ref 10–45)
ANION GAP SERPL CALC-SCNC: 12 MMOL/L — SIGNIFICANT CHANGE UP (ref 5–17)
APTT BLD: 44.6 SEC — HIGH (ref 27.5–35.5)
AST SERPL-CCNC: 64 U/L — HIGH (ref 10–40)
BASOPHILS # BLD AUTO: 0.06 K/UL — SIGNIFICANT CHANGE UP (ref 0–0.2)
BASOPHILS NFR BLD AUTO: 0.7 % — SIGNIFICANT CHANGE UP (ref 0–2)
BILIRUB SERPL-MCNC: 1.3 MG/DL — HIGH (ref 0.2–1.2)
BUN SERPL-MCNC: 29 MG/DL — HIGH (ref 7–23)
CALCIUM SERPL-MCNC: 8.5 MG/DL — SIGNIFICANT CHANGE UP (ref 8.4–10.5)
CHLORIDE SERPL-SCNC: 106 MMOL/L — SIGNIFICANT CHANGE UP (ref 96–108)
CO2 SERPL-SCNC: 21 MMOL/L — LOW (ref 22–31)
CREAT SERPL-MCNC: 1.5 MG/DL — HIGH (ref 0.5–1.3)
EGFR: 51 ML/MIN/1.73M2 — LOW
EOSINOPHIL # BLD AUTO: 0.14 K/UL — SIGNIFICANT CHANGE UP (ref 0–0.5)
EOSINOPHIL NFR BLD AUTO: 1.6 % — SIGNIFICANT CHANGE UP (ref 0–6)
FERRITIN SERPL-MCNC: 692 NG/ML — HIGH (ref 30–400)
FOLATE SERPL-MCNC: 11 NG/ML — SIGNIFICANT CHANGE UP
GLUCOSE SERPL-MCNC: 99 MG/DL — SIGNIFICANT CHANGE UP (ref 70–99)
HAV IGM SER-ACNC: SIGNIFICANT CHANGE UP
HBV CORE IGM SER-ACNC: SIGNIFICANT CHANGE UP
HBV SURFACE AG SER-ACNC: SIGNIFICANT CHANGE UP
HCT VFR BLD CALC: 32.1 % — LOW (ref 39–50)
HCV AB S/CO SERPL IA: 0.1 S/CO — SIGNIFICANT CHANGE UP (ref 0–0.99)
HCV AB SERPL-IMP: SIGNIFICANT CHANGE UP
HGB BLD-MCNC: 9.3 G/DL — LOW (ref 13–17)
IMM GRANULOCYTES NFR BLD AUTO: 0.9 % — SIGNIFICANT CHANGE UP (ref 0–0.9)
INR BLD: 4.24 RATIO — HIGH (ref 0.88–1.16)
IRON SATN MFR SERPL: 20 % — SIGNIFICANT CHANGE UP (ref 16–55)
IRON SATN MFR SERPL: 28 UG/DL — LOW (ref 45–165)
LYMPHOCYTES # BLD AUTO: 1.16 K/UL — SIGNIFICANT CHANGE UP (ref 1–3.3)
LYMPHOCYTES # BLD AUTO: 12.9 % — LOW (ref 13–44)
MCHC RBC-ENTMCNC: 25.3 PG — LOW (ref 27–34)
MCHC RBC-ENTMCNC: 29 GM/DL — LOW (ref 32–36)
MCV RBC AUTO: 87.2 FL — SIGNIFICANT CHANGE UP (ref 80–100)
MONOCYTES # BLD AUTO: 0.52 K/UL — SIGNIFICANT CHANGE UP (ref 0–0.9)
MONOCYTES NFR BLD AUTO: 5.8 % — SIGNIFICANT CHANGE UP (ref 2–14)
NEUTROPHILS # BLD AUTO: 7.04 K/UL — SIGNIFICANT CHANGE UP (ref 1.8–7.4)
NEUTROPHILS NFR BLD AUTO: 78.1 % — HIGH (ref 43–77)
NRBC # BLD: 0 /100 WBCS — SIGNIFICANT CHANGE UP (ref 0–0)
PLATELET # BLD AUTO: 378 K/UL — SIGNIFICANT CHANGE UP (ref 150–400)
POTASSIUM SERPL-MCNC: 4 MMOL/L — SIGNIFICANT CHANGE UP (ref 3.5–5.3)
POTASSIUM SERPL-SCNC: 4 MMOL/L — SIGNIFICANT CHANGE UP (ref 3.5–5.3)
PROT SERPL-MCNC: 5.6 G/DL — LOW (ref 6–8.3)
PROTHROM AB SERPL-ACNC: 49.5 SEC — HIGH (ref 10.5–13.4)
RBC # BLD: 3.68 M/UL — LOW (ref 4.2–5.8)
RBC # FLD: 16.3 % — HIGH (ref 10.3–14.5)
SODIUM SERPL-SCNC: 139 MMOL/L — SIGNIFICANT CHANGE UP (ref 135–145)
TIBC SERPL-MCNC: 137 UG/DL — LOW (ref 220–430)
UIBC SERPL-MCNC: 109 UG/DL — LOW (ref 110–370)
VIT B12 SERPL-MCNC: 656 PG/ML — SIGNIFICANT CHANGE UP (ref 232–1245)
WBC # BLD: 9 K/UL — SIGNIFICANT CHANGE UP (ref 3.8–10.5)
WBC # FLD AUTO: 9 K/UL — SIGNIFICANT CHANGE UP (ref 3.8–10.5)

## 2022-11-07 PROCEDURE — 99233 SBSQ HOSP IP/OBS HIGH 50: CPT

## 2022-11-07 RX ADMIN — LATANOPROST 1 DROP(S): 0.05 SOLUTION/ DROPS OPHTHALMIC; TOPICAL at 23:01

## 2022-11-07 RX ADMIN — Medication 1 TABLET(S): at 11:56

## 2022-11-07 RX ADMIN — Medication 1 DROP(S): at 05:22

## 2022-11-07 RX ADMIN — ZINC SULFATE TAB 220 MG (50 MG ZINC EQUIVALENT) 220 MILLIGRAM(S): 220 (50 ZN) TAB at 11:56

## 2022-11-07 RX ADMIN — ALBUTEROL 2 PUFF(S): 90 AEROSOL, METERED ORAL at 11:56

## 2022-11-07 RX ADMIN — ALBUTEROL 2 PUFF(S): 90 AEROSOL, METERED ORAL at 05:22

## 2022-11-07 RX ADMIN — ALBUTEROL 2 PUFF(S): 90 AEROSOL, METERED ORAL at 17:08

## 2022-11-07 RX ADMIN — ATENOLOL 25 MILLIGRAM(S): 25 TABLET ORAL at 05:22

## 2022-11-07 RX ADMIN — Medication 100 MILLIGRAM(S): at 11:56

## 2022-11-07 RX ADMIN — Medication 1 DROP(S): at 17:07

## 2022-11-07 RX ADMIN — TAMSULOSIN HYDROCHLORIDE 0.4 MILLIGRAM(S): 0.4 CAPSULE ORAL at 23:01

## 2022-11-07 RX ADMIN — PIPERACILLIN AND TAZOBACTAM 25 GRAM(S): 4; .5 INJECTION, POWDER, LYOPHILIZED, FOR SOLUTION INTRAVENOUS at 14:18

## 2022-11-07 RX ADMIN — TIOTROPIUM BROMIDE 1 CAPSULE(S): 18 CAPSULE ORAL; RESPIRATORY (INHALATION) at 11:57

## 2022-11-07 RX ADMIN — PIPERACILLIN AND TAZOBACTAM 25 GRAM(S): 4; .5 INJECTION, POWDER, LYOPHILIZED, FOR SOLUTION INTRAVENOUS at 05:22

## 2022-11-07 RX ADMIN — FAMOTIDINE 20 MILLIGRAM(S): 10 INJECTION INTRAVENOUS at 11:57

## 2022-11-07 NOTE — PHYSICAL THERAPY INITIAL EVALUATION ADULT - TRANSFER TRAINING, PT EVAL
GOAL ;pt will transfer min of 1 at rolling walker in 3 weeks GOAL ;pt will transfer min of 1 at rolling walker in 4-6 weeks

## 2022-11-07 NOTE — PHYSICAL THERAPY INITIAL EVALUATION ADULT - ADDITIONAL COMMENTS
pt lives at home with spouse Kimber Merritt who is ID as caregiver [phone] pt lives at home with spouse Kimber Merritt who is ID as caregiver [phone] with no steps to negotiate per pt to get in home or w/in home ; pt used rolling walker or std cane PTA per pt , has a tub shower pt lives at home with spouse Kimber Merritt who is ID as caregiver [phone] with no steps to negotiate per pt to get in home or w/in home ; pt used rolling walker or std cane PTA per pt , has a tub shower; pt report 11/8/22 in PT session pt is lobito beaver at subacute rehab

## 2022-11-07 NOTE — PHYSICAL THERAPY INITIAL EVALUATION ADULT - NSPTDISCHREC_GEN_A_CORE
PT recommend return to subacute rehab , if pt goes home need assist all fxl actvity and adl's and Home PT ; DME TBD once fxl eval can be completed/Sub-acute Rehab PT recommend return to subacute rehab , if pt goes home need assist all fxl actvity and adl's and Home PT ; DME TBD once fxl eval can be completed; if home pt would need hospital bed , transport w/c , rolling walker , lobito lift/Sub-acute Rehab

## 2022-11-07 NOTE — PHYSICAL THERAPY INITIAL EVALUATION ADULT - ACTIVE RANGE OF MOTION EXAMINATION, REHAB EVAL
AAROM wfl's feet/ankles B ; R hip wfl's aarom , R knee 0-45 degrees what pt allow ; L hip wfl's aarom , L knee 0-70 degrees what pt allow wish to go to sleep now vss/bilateral upper extremity Active ROM was WFL (within functional limits)

## 2022-11-07 NOTE — PHYSICAL THERAPY INITIAL EVALUATION ADULT - LEVEL OF INDEPENDENCE, REHAB EVAL
pt not up to perform pt educate re pressure relief and how often pt not up to perform pt educate re pressure relief and how often/moderate assist (50% patients effort)

## 2022-11-07 NOTE — PHYSICAL THERAPY INITIAL EVALUATION ADULT - GAIT TRAINING, PT EVAL
GOAL ;pt will amb with rolling walker min of 1 in 3 weeks for 50 ft x2 GOAL ;pt will amb with rolling walker min of 1 in 4 weeks for 50 ft x2

## 2022-11-07 NOTE — PHYSICAL THERAPY INITIAL EVALUATION ADULT - PERTINENT HX OF CURRENT PROBLEM, REHAB EVAL
68 yo M PMHx of HTN, HLD, heart arrythmia, renal cancer s/p partial nephrectomy (~2017/18), DVT/PE iso renal cancer (~2017/18)was on xarelto and now on Coumadin from recent hosp stay,  s/p total L knee arthoplasty (9/16/22), asthma presents with urinary retention. Kennedy was discontinued since in morning of admission and patient has had no output since then. Patient has no pain, nausea, vomiting, fevers.  Pt received Ceftriaxone in the ED , kennedy was placed in the ED and admitted for possible UTI. + UTI gram negative rods 11/5/22 ; COVID 19 (+) on 10/21/22 then (-) 11/5/22 ; JEREMIAH 10/20/22 Minimal MR, AR ; EKG 11/5/22 AFib, WBC elevated 11.06, INR 4.24 APTT 44.6 11/6/22

## 2022-11-07 NOTE — PHYSICAL THERAPY INITIAL EVALUATION ADULT - GENERAL OBSERVATIONS, REHAB EVAL
pt received in bed all siderails up HOb 30 call bell,phone and table in reach , wheels locked , bed in lowest position and bed alarm active ; pt resting but arousable , + IV L UE intact , L knee incision c,d I healing GWENDOLYN , sm scab ; ecchymotic diffuse areas ue's and le's

## 2022-11-07 NOTE — PHYSICAL THERAPY INITIAL EVALUATION ADULT - IMPAIRMENTS FOUND, PT EVAL
aerobic capacity/endurance/cognitive impairment/muscle strength/ROM L knee 0-80 degrees , R knee 0-60 degrees/aerobic capacity/endurance/cognitive impairment/gait, locomotion, and balance/joint integrity and mobility/muscle strength/ROM

## 2022-11-07 NOTE — PHYSICAL THERAPY INITIAL EVALUATION ADULT - IMPAIRMENTS CONTRIBUTING IMPAIRED BED MOBILITY, REHAB EVAL
decrease endurance , sat EOB x approx 10 min work on wt shift and balance pull to sit , le therex knees and feet/ankles arom ; hips aarom hips weak/impaired balance/cognition/decreased flexibility/impaired postural control/decreased strength

## 2022-11-08 ENCOUNTER — TRANSCRIPTION ENCOUNTER (OUTPATIENT)
Age: 67
End: 2022-11-08

## 2022-11-08 LAB
-  AMIKACIN: SIGNIFICANT CHANGE UP
-  AMOXICILLIN/CLAVULANIC ACID: SIGNIFICANT CHANGE UP
-  AMPICILLIN/SULBACTAM: SIGNIFICANT CHANGE UP
-  AMPICILLIN: SIGNIFICANT CHANGE UP
-  AZTREONAM: SIGNIFICANT CHANGE UP
-  CEFAZOLIN: SIGNIFICANT CHANGE UP
-  CEFEPIME: SIGNIFICANT CHANGE UP
-  CEFTRIAXONE: SIGNIFICANT CHANGE UP
-  CIPROFLOXACIN: SIGNIFICANT CHANGE UP
-  ERTAPENEM: SIGNIFICANT CHANGE UP
-  GENTAMICIN: SIGNIFICANT CHANGE UP
-  IMIPENEM: SIGNIFICANT CHANGE UP
-  LEVOFLOXACIN: SIGNIFICANT CHANGE UP
-  MEROPENEM: SIGNIFICANT CHANGE UP
-  NITROFURANTOIN: SIGNIFICANT CHANGE UP
-  PIPERACILLIN/TAZOBACTAM: SIGNIFICANT CHANGE UP
-  TOBRAMYCIN: SIGNIFICANT CHANGE UP
-  TRIMETHOPRIM/SULFAMETHOXAZOLE: SIGNIFICANT CHANGE UP
ALBUMIN SERPL ELPH-MCNC: 2.5 G/DL — LOW (ref 3.3–5)
ALT FLD-CCNC: 52 U/L — HIGH (ref 10–45)
ANION GAP SERPL CALC-SCNC: 11 MMOL/L — SIGNIFICANT CHANGE UP (ref 5–17)
BILIRUB SERPL-MCNC: 1 MG/DL — SIGNIFICANT CHANGE UP (ref 0.2–1.2)
BUN SERPL-MCNC: 31 MG/DL — HIGH (ref 7–23)
CALCIUM SERPL-MCNC: 8.6 MG/DL — SIGNIFICANT CHANGE UP (ref 8.4–10.5)
CHLORIDE SERPL-SCNC: 106 MMOL/L — SIGNIFICANT CHANGE UP (ref 96–108)
CO2 SERPL-SCNC: 22 MMOL/L — SIGNIFICANT CHANGE UP (ref 22–31)
CREAT SERPL-MCNC: 1.5 MG/DL — HIGH (ref 0.5–1.3)
EGFR: 51 ML/MIN/1.73M2 — LOW
GLUCOSE SERPL-MCNC: 105 MG/DL — HIGH (ref 70–99)
HCT VFR BLD CALC: 33.2 % — LOW (ref 39–50)
HGB BLD-MCNC: 9.6 G/DL — LOW (ref 13–17)
INR BLD: 2.59 RATIO — HIGH (ref 0.88–1.16)
MCHC RBC-ENTMCNC: 24.7 PG — LOW (ref 27–34)
MCHC RBC-ENTMCNC: 28.9 GM/DL — LOW (ref 32–36)
MCV RBC AUTO: 85.3 FL — SIGNIFICANT CHANGE UP (ref 80–100)
METHOD TYPE: SIGNIFICANT CHANGE UP
NRBC # BLD: 0 /100 WBCS — SIGNIFICANT CHANGE UP (ref 0–0)
PLATELET # BLD AUTO: 395 K/UL — SIGNIFICANT CHANGE UP (ref 150–400)
POTASSIUM SERPL-MCNC: 3.6 MMOL/L — SIGNIFICANT CHANGE UP (ref 3.5–5.3)
POTASSIUM SERPL-SCNC: 3.6 MMOL/L — SIGNIFICANT CHANGE UP (ref 3.5–5.3)
PROT SERPL-MCNC: 6.1 G/DL — SIGNIFICANT CHANGE UP (ref 6–8.3)
PROTHROM AB SERPL-ACNC: 30.1 SEC — HIGH (ref 10.5–13.4)
RBC # BLD: 3.89 M/UL — LOW (ref 4.2–5.8)
RBC # FLD: 16.7 % — HIGH (ref 10.3–14.5)
SODIUM SERPL-SCNC: 139 MMOL/L — SIGNIFICANT CHANGE UP (ref 135–145)
WBC # BLD: 8.89 K/UL — SIGNIFICANT CHANGE UP (ref 3.8–10.5)
WBC # FLD AUTO: 8.89 K/UL — SIGNIFICANT CHANGE UP (ref 3.8–10.5)

## 2022-11-08 PROCEDURE — 99233 SBSQ HOSP IP/OBS HIGH 50: CPT

## 2022-11-08 RX ORDER — WARFARIN SODIUM 2.5 MG/1
2 TABLET ORAL ONCE
Refills: 0 | Status: COMPLETED | OUTPATIENT
Start: 2022-11-08 | End: 2022-11-08

## 2022-11-08 RX ADMIN — TIOTROPIUM BROMIDE 1 CAPSULE(S): 18 CAPSULE ORAL; RESPIRATORY (INHALATION) at 12:01

## 2022-11-08 RX ADMIN — Medication 100 MILLIGRAM(S): at 12:01

## 2022-11-08 RX ADMIN — ALBUTEROL 2 PUFF(S): 90 AEROSOL, METERED ORAL at 23:17

## 2022-11-08 RX ADMIN — LATANOPROST 1 DROP(S): 0.05 SOLUTION/ DROPS OPHTHALMIC; TOPICAL at 21:15

## 2022-11-08 RX ADMIN — Medication 1 TABLET(S): at 12:01

## 2022-11-08 RX ADMIN — ALBUTEROL 2 PUFF(S): 90 AEROSOL, METERED ORAL at 17:24

## 2022-11-08 RX ADMIN — TAMSULOSIN HYDROCHLORIDE 0.4 MILLIGRAM(S): 0.4 CAPSULE ORAL at 21:16

## 2022-11-08 RX ADMIN — PIPERACILLIN AND TAZOBACTAM 25 GRAM(S): 4; .5 INJECTION, POWDER, LYOPHILIZED, FOR SOLUTION INTRAVENOUS at 06:50

## 2022-11-08 RX ADMIN — Medication 1 DROP(S): at 17:24

## 2022-11-08 RX ADMIN — ZINC SULFATE TAB 220 MG (50 MG ZINC EQUIVALENT) 220 MILLIGRAM(S): 220 (50 ZN) TAB at 12:00

## 2022-11-08 RX ADMIN — ALBUTEROL 2 PUFF(S): 90 AEROSOL, METERED ORAL at 00:27

## 2022-11-08 RX ADMIN — ALBUTEROL 2 PUFF(S): 90 AEROSOL, METERED ORAL at 12:00

## 2022-11-08 RX ADMIN — WARFARIN SODIUM 2 MILLIGRAM(S): 2.5 TABLET ORAL at 21:31

## 2022-11-08 RX ADMIN — Medication 1 DROP(S): at 05:47

## 2022-11-08 RX ADMIN — ATENOLOL 25 MILLIGRAM(S): 25 TABLET ORAL at 05:47

## 2022-11-08 RX ADMIN — PIPERACILLIN AND TAZOBACTAM 25 GRAM(S): 4; .5 INJECTION, POWDER, LYOPHILIZED, FOR SOLUTION INTRAVENOUS at 14:07

## 2022-11-08 RX ADMIN — PIPERACILLIN AND TAZOBACTAM 25 GRAM(S): 4; .5 INJECTION, POWDER, LYOPHILIZED, FOR SOLUTION INTRAVENOUS at 00:53

## 2022-11-08 RX ADMIN — FAMOTIDINE 20 MILLIGRAM(S): 10 INJECTION INTRAVENOUS at 12:01

## 2022-11-08 RX ADMIN — PIPERACILLIN AND TAZOBACTAM 25 GRAM(S): 4; .5 INJECTION, POWDER, LYOPHILIZED, FOR SOLUTION INTRAVENOUS at 21:15

## 2022-11-08 NOTE — DISCHARGE NOTE PROVIDER - CARE PROVIDER_API CALL
Alejandro Botello (DO)  Family Medicine; Sports Medicine  [address] [facility]  [place] [address]  Phone: [phone]  Fax: [phone]  Follow Up Time: 1 week    Albert Duke  Urology  [address] [id number]  Phone: ()-  Fax: ()-  Follow Up Time: 1 week

## 2022-11-08 NOTE — DISCHARGE NOTE PROVIDER - HOSPITAL COURSE
68 yo M PMHx of HTN, HLD, heart arrythmia, renal cancer s/p partial nephrectomy (~2017/18), DVT/PE iso renal cancer (~2017/18)was on xarelto and now on Coumadin from recent hosp stay,  s/p total L knee arthoplasty (9/16/22), asthma presents with urinary retention. Patient has no pain, nausea, vomiting, fevers. In the ED, patient received ceftriaxone and a kennedy was placed. Patient was admitted for possible UTI and further medical management. Upon admission, patient was started on Flomax and a course of Zosyn. Kennedy was discontinued since in morning of admission. Patient failed trial of void and kennedy was replaced. Patient will need outpatient urology evaluation and follow up. Urine culture growing pseudomonas and enterococcus.   Patient found to have a DVT on prior admission from 10/10/22 to 10/22/22 and currently on Coumadin. PT/INR monitored throughout hospital admission and Coumadin was held/dosed appropriately.  During hospital admission, patient found to be anemic., not requiring transfusions. Hemoglobin and hematocrit are currently stable. Patient also found to have transaminitis; etiology is unclear with possible relation to early sepsis from UTI. Hepatitis panel negative. Liver enzymes improved upon discharge.  Patient to de discharged to subacute rehab.  Discharge/dispo/med rec discussed with attending Dr. Hogan. Patient medically cleared for discharge with outpatient follow up.           67 year old M PMHx of HTN, HLD, heart arrythmia, renal cancer s/p partial nephrectomy (~2017/18), DVT/PE iso renal cancer (~2017/18)was on xarelto and now on Coumadin from recent hospital stay,  s/p total L knee arthoplasty (9/16/22), asthma presents with urinary retention. In the ED, patient received ceftriaxone and a kennedy was placed. Patient was admitted for possible UTI and further medical management.     Upon admission, patient was started on Flomax and a course of Zosyn. Kennedy was discontinued, TOV initiated. Patient failed trial of void and kennedy was replaced. Patient will need outpatient urology evaluation and follow up as outpatient. Urine culture grew pseudomonas and enterococcus.     Patient has a hx of DVT on prior admission from 10/10/22 to 10/22/22 and currently on Coumadin. PT/INR monitored throughout hospital admission and Coumadin was held/dosed appropriately. During hospital admission, patient found to be anemic, not requiring transfusions. Hemoglobin and hematocrit remained stable. Patient also found to have transaminitis; etiology is unclear with possible relation to early sepsis from UTI. Hepatitis panel negative. Liver enzymes improved upon discharge. PT evaluated patient recommending [facility].     Discharge/dispo/med rec discussed with attending Dr. Hogan. Patient medically cleared for discharge to [facility] with outpatient follow up.

## 2022-11-08 NOTE — DISCHARGE NOTE PROVIDER - NSDCCPCAREPLAN_GEN_ALL_CORE_FT
PRINCIPAL DISCHARGE DIAGNOSIS  Diagnosis: Complicated UTI (urinary tract infection)  Assessment and Plan of Treatment: You had a bladder and/or kidney infection.  If you are discharged on antibiotics, you will need to finish the medication as prescribed to reduced the likelihood that the infection will recur.  Avoid medications that will cause urinary retention such as benadryl whenever possible.  Drink adequate fluids - there is no harm in drinking cranberry juice.  Females should urinate right after sex.  Contact your doctor if you experience new symptoms or continued symptoms after treatment, such as pain or burning with urination, frequent urination, urinary urgency, blood in the urine, fever, back pain, and/or nausea vomiting.      SECONDARY DISCHARGE DIAGNOSES  Diagnosis: Acute urinary retention  Assessment and Plan of Treatment: Owusu placed in ED; patient failed trial of void upon admission; folwy replaced   Follow up with outpatient urology   Continue all medications as prescribed    Diagnosis: HTN (hypertension)  Assessment and Plan of Treatment: Continue to follow a low salt/sodium diet.  Perform physical activities as tolerated in consultation with your Primary Care Provider and physical therapist.  Take all medications as prescribed.  Follow up with your medical doctor for routine blood pressure monitoring at your next visit.  Notify your doctor if you have any of the following symptoms:  Dizziness, lightheadedness, blurry vision, headache, chest pain, or shortness of breath.    Diagnosis: Deep vein thrombosis (DVT)  Assessment and Plan of Treatment: Continue taking "blood thinners" as prescribed.  Blood thinners can result in abnormal bleeding and brushing.  Be mindful to avoid accidental trauma.  Participate in activities as prescribed.  If you develop new leg pain, swelling, and/or redness contact your healthcare provider.  Call your doctor if you experience lightheadedness, loss of consciousness, shortness of breath (especially with exercise), feel your heart racing or beating unusually, or experience frequent or abnormal bleeding.     PRINCIPAL DISCHARGE DIAGNOSIS  Diagnosis: Complicated UTI (urinary tract infection)  Assessment and Plan of Treatment: You had a bladder and/or kidney infection.  If you are discharged on antibiotics, you will need to finish the medication as prescribed to reduced the likelihood that the infection will recur.  Avoid medications that will cause urinary retention such as benadryl whenever possible.  Drink adequate fluids - there is no harm in drinking cranberry juice.  Females should urinate right after sex.  Contact your doctor if you experience new symptoms or continued symptoms after treatment, such as pain or burning with urination, frequent urination, urinary urgency, blood in the urine, fever, back pain, and/or nausea vomiting.      SECONDARY DISCHARGE DIAGNOSES  Diagnosis: Acute urinary retention  Assessment and Plan of Treatment: Owusu placed in ED; patient failed trial of void upon admission; folwy replaced   Follow up with outpatient urology   Continue all medications as prescribed    Diagnosis: HTN (hypertension)  Assessment and Plan of Treatment: Continue to follow a low salt/sodium diet.  Perform physical activities as tolerated in consultation with your Primary Care Provider and physical therapist.  Take all medications as prescribed.  Follow up with your medical doctor for routine blood pressure monitoring at your next visit.  Notify your doctor if you have any of the following symptoms:  Dizziness, lightheadedness, blurry vision, headache, chest pain, or shortness of breath.    Diagnosis: Deep vein thrombosis (DVT)  Assessment and Plan of Treatment: Continue taking "blood thinners" as prescribed.  Blood thinners can result in abnormal bleeding and brushing.  Be mindful to avoid accidental trauma.  Participate in activities as prescribed.  If you develop new leg pain, swelling, and/or redness contact your healthcare provider.  Call your doctor if you experience lightheadedness, loss of consciousness, shortness of breath (especially with exercise), feel your heart racing or beating unusually, or experience frequent or abnormal bleeding.    Diagnosis: Anemia  Assessment and Plan of Treatment: Resolved; H/H currently stable  Notify your doctor immediately if you experience abnormal bleeding.  Avoid overuse of NSAIDs (aspirin, Ibuprofen, Advil, Motrin, and Aleve) unless instructed to do so by your doctor.  Signs of worsening anemia include dizziness, lightheadedness, difficulty concentrating, chest pain, palpitations, and shortness of breath.  If you experience these symptoms call your doctor or call an ambulance to take you to the emergency room.      Diagnosis: Transaminitis  Assessment and Plan of Treatment: Liver enzymes elevated possibly secondary to early sepsis from UTI; hepatitis panel negative   Liver enzymes improving    Diagnosis: Renal cancer  Assessment and Plan of Treatment: s/p partial nephrectomy     PRINCIPAL DISCHARGE DIAGNOSIS  Diagnosis: Complicated UTI (urinary tract infection)  Assessment and Plan of Treatment: You had a bladder and/or kidney infection.  If you are discharged on antibiotics, you will need to finish the medication as prescribed to reduced the likelihood that the infection will recur.  Avoid medications that will cause urinary retention such as benadryl whenever possible.  Drink adequate fluids - there is no harm in drinking cranberry juice.  Females should urinate right after sex.  Contact your doctor if you experience new symptoms or continued symptoms after treatment, such as pain or burning with urination, frequent urination, urinary urgency, blood in the urine, fever, back pain, and/or nausea vomiting.      SECONDARY DISCHARGE DIAGNOSES  Diagnosis: HTN (hypertension)  Assessment and Plan of Treatment: Continue to follow a low salt/sodium diet.  Perform physical activities as tolerated in consultation with your Primary Care Provider and physical therapist.  Take all medications as prescribed.  Follow up with your medical doctor for routine blood pressure monitoring at your next visit.  Notify your doctor if you have any of the following symptoms:  Dizziness, lightheadedness, blurry vision, headache, chest pain, or shortness of breath.    Diagnosis: Deep vein thrombosis (DVT)  Assessment and Plan of Treatment: Continue taking "blood thinners" as prescribed.  Blood thinners can result in abnormal bleeding and brushing.  Be mindful to avoid accidental trauma.  Participate in activities as prescribed.  If you develop new leg pain, swelling, and/or redness contact your healthcare provider.  Call your doctor if you experience lightheadedness, loss of consciousness, shortness of breath (especially with exercise), feel your heart racing or beating unusually, or experience frequent or abnormal bleeding.    Diagnosis: Acute urinary retention  Assessment and Plan of Treatment: Kennedy placed in ED; patient failed trial of void upon admission; kennedy was reinserted  Follow up with outpatient urology   Continue all medications as prescribed    Diagnosis: Anemia  Assessment and Plan of Treatment: Resolved; H/H currently stable  Notify your doctor immediately if you experience abnormal bleeding.  Avoid overuse of NSAIDs (aspirin, Ibuprofen, Advil, Motrin, and Aleve) unless instructed to do so by your doctor.  Signs of worsening anemia include dizziness, lightheadedness, difficulty concentrating, chest pain, palpitations, and shortness of breath.  If you experience these symptoms call your doctor or call an ambulance to take you to the emergency room.      Diagnosis: Transaminitis  Assessment and Plan of Treatment: Liver enzymes elevated possibly secondary to early sepsis from UTI; hepatitis panel negative   Liver enzymes improving    Diagnosis: Renal cancer  Assessment and Plan of Treatment: s/p partial nephrectomy

## 2022-11-08 NOTE — DISCHARGE NOTE PROVIDER - CARE PROVIDERS DIRECT ADDRESSES
,[email or web address]

## 2022-11-08 NOTE — DISCHARGE NOTE PROVIDER - NSDCMRMEDTOKEN_GEN_ALL_CORE_FT
allopurinol 100 mg oral tablet: 1 tab(s) orally once a day  atenolol 25 mg oral tablet: 1 tab(s) orally once a day  Combivent Respimat 20 mcg-100 mcg/inh inhalation aerosol: 1 puff(s) inhaled 4 times a day, As Needed  famotidine 20 mg oral tablet: 1 tab(s) orally once a day  latanoprost 0.005% ophthalmic solution: 1 drop(s) to each affected eye once a day (in the evening)  losartan 100 mg oral tablet: 1 tab(s) orally once a day  multivitamin with minerals: 1 tab(s) orally once a day  mupirocin 2% topical ointment: Apply topically to affected area 3 times a day  timolol maleate 0.5% ophthalmic solution: 1 drop(s) in each eye 2 times a day  warfarin 2.5 mg oral tablet: 1 tab(s) orally once a day  Patient takes a total dose of 5.5mg once a day  warfarin 3 mg oral tablet: 1 tab(s) orally once a day.  Patient takes a total dose of 5.5mg once a day  zinc sulfate 220 mg oral tablet: 1 tab(s) orally once a day   albuterol 90 mcg/inh inhalation aerosol: 2 puff(s) inhaled every 6 hours  allopurinol 100 mg oral tablet: 1 tab(s) orally once a day  atenolol 25 mg oral tablet: 1 tab(s) orally once a day  Combivent Respimat 20 mcg-100 mcg/inh inhalation aerosol: 1 puff(s) inhaled 4 times a day, As Needed  ertapenem 1 g injection: 1 gram(s) injectable once a day   dx - UTI   ICD  - 599.0  famotidine 20 mg oral tablet: 1 tab(s) orally once a day  famotidine 20 mg oral tablet: 1 tab(s) orally once a day  latanoprost 0.005% ophthalmic solution: 1 drop(s) to each affected eye once a day (in the evening)  latanoprost 0.005% ophthalmic solution: 1 drop(s) to each affected eye once a day (at bedtime)  losartan 100 mg oral tablet: 1 tab(s) orally once a day  melatonin 3 mg oral tablet: 1 tab(s) orally once a day (at bedtime), As needed, Insomnia  Hold for sedation   Multiple Vitamins with Minerals oral tablet: 1 tab(s) orally once a day  multivitamin with minerals: 1 tab(s) orally once a day  mupirocin 2% topical ointment: Apply topically to affected area 3 times a day  tamsulosin 0.4 mg oral capsule: 1 cap(s) orally once a day (at bedtime)  timolol maleate 0.5% ophthalmic solution: 1 drop(s) in each eye 2 times a day  timolol maleate 0.5% ophthalmic solution: 1 drop(s) to each affected eye 2 times a day  tiotropium 18 mcg inhalation capsule: 1 cap(s) inhaled once a day  warfarin 2.5 mg oral tablet: 1 tab(s) orally once a day  Patient takes a total dose of 5.5mg once a day  warfarin 3 mg oral tablet: 1 tab(s) orally once a day.  Patient takes a total dose of 5.5mg once a day  zinc sulfate 220 mg oral capsule: 1 cap(s) orally once a day  zinc sulfate 220 mg oral tablet: 1 tab(s) orally once a day   albuterol 90 mcg/inh inhalation aerosol: 2 puff(s) inhaled every 6 hours  allopurinol 100 mg oral tablet: 1 tab(s) orally once a day  atenolol 25 mg oral tablet: 1 tab(s) orally once a day  ertapenem 1 g injection: 1 gram(s) injectable once a day   dx - UTI   ICD  - 599.0  famotidine 20 mg oral tablet: 1 tab(s) orally once a day  latanoprost 0.005% ophthalmic solution: 1 drop(s) to each affected eye once a day (at bedtime)  melatonin 3 mg oral tablet: 1 tab(s) orally once a day (at bedtime), As needed, Insomnia  Hold for sedation   Multiple Vitamins with Minerals oral tablet: 1 tab(s) orally once a day  tamsulosin 0.4 mg oral capsule: 1 cap(s) orally once a day (at bedtime)  timolol maleate 0.5% ophthalmic solution: 1 drop(s) to each affected eye 2 times a day  tiotropium 18 mcg inhalation capsule: 1 cap(s) inhaled once a day  warfarin 2.5 mg oral tablet: 1 tab(s) orally once a day  Patient takes a total dose of 5.5mg once a day  Contiue with home dose and contiue to monitor INR monitoring and dosing    warfarin 3 mg oral tablet: 1 tab(s) orally once a day.  Patient takes a total dose of 5.5mg once a day  Contiue with home dose and contiue to monitor INR monitoring and dosing    zinc sulfate 220 mg oral capsule: 1 cap(s) orally once a day

## 2022-11-08 NOTE — DISCHARGE NOTE PROVIDER - PROVIDER TOKENS
PROVIDER:[TOKEN:[[id number]],FOLLOWUP:[1 week]],PROVIDER:[TOKEN:[[id number]],FOLLOWUP:[1 week]]

## 2022-11-09 ENCOUNTER — TRANSCRIPTION ENCOUNTER (OUTPATIENT)
Age: 67
End: 2022-11-09

## 2022-11-09 VITALS
HEART RATE: 87 BPM | OXYGEN SATURATION: 94 % | DIASTOLIC BLOOD PRESSURE: 86 MMHG | RESPIRATION RATE: 18 BRPM | SYSTOLIC BLOOD PRESSURE: 131 MMHG | TEMPERATURE: 98 F

## 2022-11-09 LAB
-  AMPICILLIN: SIGNIFICANT CHANGE UP
-  CIPROFLOXACIN: SIGNIFICANT CHANGE UP
-  DAPTOMYCIN: SIGNIFICANT CHANGE UP
-  LEVOFLOXACIN: SIGNIFICANT CHANGE UP
-  LINEZOLID: SIGNIFICANT CHANGE UP
-  NITROFURANTOIN: SIGNIFICANT CHANGE UP
-  TETRACYCLINE: SIGNIFICANT CHANGE UP
-  VANCOMYCIN: SIGNIFICANT CHANGE UP
ALBUMIN SERPL ELPH-MCNC: 2.3 G/DL — LOW (ref 3.3–5)
ALP SERPL-CCNC: 333 U/L — HIGH (ref 40–120)
ALT FLD-CCNC: 34 U/L — SIGNIFICANT CHANGE UP (ref 10–45)
ANION GAP SERPL CALC-SCNC: 13 MMOL/L — SIGNIFICANT CHANGE UP (ref 5–17)
AST SERPL-CCNC: 35 U/L — SIGNIFICANT CHANGE UP (ref 10–40)
BILIRUB SERPL-MCNC: 1 MG/DL — SIGNIFICANT CHANGE UP (ref 0.2–1.2)
BUN SERPL-MCNC: 25 MG/DL — HIGH (ref 7–23)
CALCIUM SERPL-MCNC: 8.6 MG/DL — SIGNIFICANT CHANGE UP (ref 8.4–10.5)
CHLORIDE SERPL-SCNC: 108 MMOL/L — SIGNIFICANT CHANGE UP (ref 96–108)
CO2 SERPL-SCNC: 17 MMOL/L — LOW (ref 22–31)
CREAT SERPL-MCNC: 1.33 MG/DL — HIGH (ref 0.5–1.3)
CULTURE RESULTS: SIGNIFICANT CHANGE UP
EGFR: 59 ML/MIN/1.73M2 — LOW
GLUCOSE SERPL-MCNC: 86 MG/DL — SIGNIFICANT CHANGE UP (ref 70–99)
HCT VFR BLD CALC: 32.8 % — LOW (ref 39–50)
HGB BLD-MCNC: 9.7 G/DL — LOW (ref 13–17)
INR BLD: 1.87 RATIO — HIGH (ref 0.88–1.16)
MCHC RBC-ENTMCNC: 25 PG — LOW (ref 27–34)
MCHC RBC-ENTMCNC: 29.6 GM/DL — LOW (ref 32–36)
MCV RBC AUTO: 84.5 FL — SIGNIFICANT CHANGE UP (ref 80–100)
METHOD TYPE: SIGNIFICANT CHANGE UP
NRBC # BLD: 0 /100 WBCS — SIGNIFICANT CHANGE UP (ref 0–0)
ORGANISM # SPEC MICROSCOPIC CNT: SIGNIFICANT CHANGE UP
PLATELET # BLD AUTO: 398 K/UL — SIGNIFICANT CHANGE UP (ref 150–400)
POTASSIUM SERPL-MCNC: 4.7 MMOL/L — SIGNIFICANT CHANGE UP (ref 3.5–5.3)
POTASSIUM SERPL-SCNC: 4.7 MMOL/L — SIGNIFICANT CHANGE UP (ref 3.5–5.3)
PROT SERPL-MCNC: 5.6 G/DL — LOW (ref 6–8.3)
PROTHROM AB SERPL-ACNC: 21.8 SEC — HIGH (ref 10.5–13.4)
RBC # BLD: 3.88 M/UL — LOW (ref 4.2–5.8)
RBC # FLD: 16.8 % — HIGH (ref 10.3–14.5)
SARS-COV-2 RNA SPEC QL NAA+PROBE: SIGNIFICANT CHANGE UP
SODIUM SERPL-SCNC: 138 MMOL/L — SIGNIFICANT CHANGE UP (ref 135–145)
SPECIMEN SOURCE: SIGNIFICANT CHANGE UP
WBC # BLD: 8.09 K/UL — SIGNIFICANT CHANGE UP (ref 3.8–10.5)
WBC # FLD AUTO: 8.09 K/UL — SIGNIFICANT CHANGE UP (ref 3.8–10.5)

## 2022-11-09 PROCEDURE — 51702 INSERT TEMP BLADDER CATH: CPT

## 2022-11-09 PROCEDURE — 81001 URINALYSIS AUTO W/SCOPE: CPT

## 2022-11-09 PROCEDURE — 96374 THER/PROPH/DIAG INJ IV PUSH: CPT

## 2022-11-09 PROCEDURE — 87086 URINE CULTURE/COLONY COUNT: CPT

## 2022-11-09 PROCEDURE — 82728 ASSAY OF FERRITIN: CPT

## 2022-11-09 PROCEDURE — 87186 SC STD MICRODIL/AGAR DIL: CPT

## 2022-11-09 PROCEDURE — 85610 PROTHROMBIN TIME: CPT

## 2022-11-09 PROCEDURE — 85730 THROMBOPLASTIN TIME PARTIAL: CPT

## 2022-11-09 PROCEDURE — 94640 AIRWAY INHALATION TREATMENT: CPT

## 2022-11-09 PROCEDURE — 97162 PT EVAL MOD COMPLEX 30 MIN: CPT

## 2022-11-09 PROCEDURE — 82746 ASSAY OF FOLIC ACID SERUM: CPT

## 2022-11-09 PROCEDURE — 87077 CULTURE AEROBIC IDENTIFY: CPT

## 2022-11-09 PROCEDURE — 82607 VITAMIN B-12: CPT

## 2022-11-09 PROCEDURE — 97110 THERAPEUTIC EXERCISES: CPT

## 2022-11-09 PROCEDURE — 99239 HOSP IP/OBS DSCHRG MGMT >30: CPT

## 2022-11-09 PROCEDURE — 71045 X-RAY EXAM CHEST 1 VIEW: CPT

## 2022-11-09 PROCEDURE — 99285 EMERGENCY DEPT VISIT HI MDM: CPT | Mod: 25

## 2022-11-09 PROCEDURE — 83540 ASSAY OF IRON: CPT

## 2022-11-09 PROCEDURE — U0003: CPT

## 2022-11-09 PROCEDURE — 80076 HEPATIC FUNCTION PANEL: CPT

## 2022-11-09 PROCEDURE — 97530 THERAPEUTIC ACTIVITIES: CPT

## 2022-11-09 PROCEDURE — 80048 BASIC METABOLIC PNL TOTAL CA: CPT

## 2022-11-09 PROCEDURE — 85025 COMPLETE CBC W/AUTO DIFF WBC: CPT

## 2022-11-09 PROCEDURE — 83550 IRON BINDING TEST: CPT

## 2022-11-09 PROCEDURE — 85027 COMPLETE CBC AUTOMATED: CPT

## 2022-11-09 PROCEDURE — 80053 COMPREHEN METABOLIC PANEL: CPT

## 2022-11-09 PROCEDURE — 36415 COLL VENOUS BLD VENIPUNCTURE: CPT

## 2022-11-09 PROCEDURE — U0005: CPT

## 2022-11-09 PROCEDURE — 80074 ACUTE HEPATITIS PANEL: CPT

## 2022-11-09 RX ORDER — TIMOLOL 0.5 %
1 DROPS OPHTHALMIC (EYE)
Qty: 0 | Refills: 0 | DISCHARGE
Start: 2022-11-09

## 2022-11-09 RX ORDER — MULTIVIT-MIN/FERROUS GLUCONATE 9 MG/15 ML
1 LIQUID (ML) ORAL
Qty: 0 | Refills: 0 | DISCHARGE

## 2022-11-09 RX ORDER — ALBUTEROL 90 UG/1
2 AEROSOL, METERED ORAL
Qty: 0 | Refills: 0 | DISCHARGE
Start: 2022-11-09

## 2022-11-09 RX ORDER — TAMSULOSIN HYDROCHLORIDE 0.4 MG/1
1 CAPSULE ORAL
Qty: 0 | Refills: 0 | DISCHARGE
Start: 2022-11-09

## 2022-11-09 RX ORDER — LATANOPROST 0.05 MG/ML
1 SOLUTION/ DROPS OPHTHALMIC; TOPICAL
Qty: 0 | Refills: 0 | DISCHARGE

## 2022-11-09 RX ORDER — MULTIVIT-MIN/FERROUS GLUCONATE 9 MG/15 ML
1 LIQUID (ML) ORAL
Qty: 0 | Refills: 0 | DISCHARGE
Start: 2022-11-09

## 2022-11-09 RX ORDER — FAMOTIDINE 10 MG/ML
1 INJECTION INTRAVENOUS
Qty: 0 | Refills: 0 | DISCHARGE

## 2022-11-09 RX ORDER — ERTAPENEM SODIUM 1 G/1
1000 INJECTION, POWDER, LYOPHILIZED, FOR SOLUTION INTRAMUSCULAR; INTRAVENOUS EVERY 24 HOURS
Refills: 0 | Status: DISCONTINUED | OUTPATIENT
Start: 2022-11-09 | End: 2022-11-09

## 2022-11-09 RX ORDER — LATANOPROST 0.05 MG/ML
1 SOLUTION/ DROPS OPHTHALMIC; TOPICAL
Qty: 0 | Refills: 0 | DISCHARGE
Start: 2022-11-09

## 2022-11-09 RX ORDER — ZINC SULFATE TAB 220 MG (50 MG ZINC EQUIVALENT) 220 (50 ZN) MG
1 TAB ORAL
Qty: 0 | Refills: 0 | DISCHARGE
Start: 2022-11-09

## 2022-11-09 RX ORDER — IPRATROPIUM/ALBUTEROL SULFATE 18-103MCG
1 AEROSOL WITH ADAPTER (GRAM) INHALATION
Qty: 0 | Refills: 0 | DISCHARGE

## 2022-11-09 RX ORDER — LOSARTAN POTASSIUM 100 MG/1
1 TABLET, FILM COATED ORAL
Qty: 0 | Refills: 0 | DISCHARGE

## 2022-11-09 RX ORDER — WARFARIN SODIUM 2.5 MG/1
1 TABLET ORAL
Qty: 0 | Refills: 0 | DISCHARGE

## 2022-11-09 RX ORDER — ZINC SULFATE TAB 220 MG (50 MG ZINC EQUIVALENT) 220 (50 ZN) MG
1 TAB ORAL
Qty: 0 | Refills: 0 | DISCHARGE

## 2022-11-09 RX ORDER — ERTAPENEM SODIUM 1 G/1
1 INJECTION, POWDER, LYOPHILIZED, FOR SOLUTION INTRAMUSCULAR; INTRAVENOUS
Qty: 4 | Refills: 0
Start: 2022-11-09 | End: 2022-11-12

## 2022-11-09 RX ORDER — TIMOLOL 0.5 %
1 DROPS OPHTHALMIC (EYE)
Qty: 0 | Refills: 0 | DISCHARGE

## 2022-11-09 RX ORDER — FAMOTIDINE 10 MG/ML
1 INJECTION INTRAVENOUS
Qty: 0 | Refills: 0 | DISCHARGE
Start: 2022-11-09

## 2022-11-09 RX ORDER — MUPIROCIN 20 MG/G
1 OINTMENT TOPICAL
Qty: 0 | Refills: 0 | DISCHARGE

## 2022-11-09 RX ORDER — TIOTROPIUM BROMIDE 18 UG/1
1 CAPSULE ORAL; RESPIRATORY (INHALATION)
Qty: 0 | Refills: 0 | DISCHARGE
Start: 2022-11-09

## 2022-11-09 RX ORDER — LANOLIN ALCOHOL/MO/W.PET/CERES
1 CREAM (GRAM) TOPICAL
Qty: 0 | Refills: 0 | DISCHARGE
Start: 2022-11-09

## 2022-11-09 RX ADMIN — Medication 1 TABLET(S): at 12:07

## 2022-11-09 RX ADMIN — ZINC SULFATE TAB 220 MG (50 MG ZINC EQUIVALENT) 220 MILLIGRAM(S): 220 (50 ZN) TAB at 12:08

## 2022-11-09 RX ADMIN — ERTAPENEM SODIUM 120 MILLIGRAM(S): 1 INJECTION, POWDER, LYOPHILIZED, FOR SOLUTION INTRAMUSCULAR; INTRAVENOUS at 13:07

## 2022-11-09 RX ADMIN — Medication 100 MILLIGRAM(S): at 12:06

## 2022-11-09 RX ADMIN — ATENOLOL 25 MILLIGRAM(S): 25 TABLET ORAL at 05:04

## 2022-11-09 RX ADMIN — Medication 1 DROP(S): at 05:04

## 2022-11-09 RX ADMIN — ALBUTEROL 2 PUFF(S): 90 AEROSOL, METERED ORAL at 05:04

## 2022-11-09 RX ADMIN — TIOTROPIUM BROMIDE 1 CAPSULE(S): 18 CAPSULE ORAL; RESPIRATORY (INHALATION) at 12:07

## 2022-11-09 RX ADMIN — ALBUTEROL 2 PUFF(S): 90 AEROSOL, METERED ORAL at 12:05

## 2022-11-09 RX ADMIN — PIPERACILLIN AND TAZOBACTAM 25 GRAM(S): 4; .5 INJECTION, POWDER, LYOPHILIZED, FOR SOLUTION INTRAVENOUS at 05:05

## 2022-11-09 RX ADMIN — FAMOTIDINE 20 MILLIGRAM(S): 10 INJECTION INTRAVENOUS at 12:08

## 2022-11-09 NOTE — PROGRESS NOTE ADULT - PROBLEM SELECTOR PLAN 8
Etiology is unclear   will trend and if not improved will start work up  hepatitis profile
Etiology is unclear- possibly related to early sepsis from UTI, now improving.  will trend and if not improved will start work up  hepatitis profile negative    dispo: likely back to [facility] pending stable INR, final abx plan. Will need to f/u with Dr. Duke post rehab and likely be sent home with kennedy catheter.   plan discussed with and approved by pt and wife Kimber over the phone.
Etiology is unclear   will trend and if not improved will start work up  hepatitis profile    dispo: likely back to [facility] pending stable INR, final abx plan. Will need repeat TOV in rehab vs. OP urology f/u for TOV- pt does not remember who his prior operating urologist was.
Etiology is unclear- possibly related to early sepsis from UTI, now improving.  will trend and if not improved will start work up  hepatitis profile    dispo: likely back to [facility] pending stable INR, final abx plan. Will need to f/u with Dr. Duke post rehab and likely be sent home with kennedy catheter.   plan discussed with and approved by pt and wife Kimber over the phone.

## 2022-11-09 NOTE — PROGRESS NOTE ADULT - SUBJECTIVE AND OBJECTIVE BOX
Chester Hogan MD  Division of Hospital Medicine  Available on MS teams until 7pm  If no response or off-hours, page [phone]  -------------------------------------    Patient is a 67y old  Male who presents with a chief complaint of urinary retention (07 Nov 2022 13:50)      SUBJECTIVE / OVERNIGHT EVENTS:  ADDITIONAL REVIEW OF SYSTEMS:    MEDICATIONS  (STANDING):  ALBUTerol    90 MICROgram(s) HFA Inhaler 2 Puff(s) Inhalation every 6 hours  allopurinol 100 milliGRAM(s) Oral daily  ATENolol  Tablet 25 milliGRAM(s) Oral daily  famotidine    Tablet 20 milliGRAM(s) Oral daily  influenza  Vaccine (HIGH DOSE) 0.7 milliLiter(s) IntraMuscular once  latanoprost 0.005% Ophthalmic Solution 1 Drop(s) Both EYES at bedtime  multivitamin/minerals 1 Tablet(s) Oral daily  piperacillin/tazobactam IVPB.. 3.375 Gram(s) IV Intermittent every 8 hours  tamsulosin 0.4 milliGRAM(s) Oral at bedtime  timolol 0.5% Solution 1 Drop(s) Both EYES two times a day  tiotropium 18 MICROgram(s) Capsule 1 Capsule(s) Inhalation daily  zinc sulfate 220 milliGRAM(s) Oral daily    MEDICATIONS  (PRN):  aluminum hydroxide/magnesium hydroxide/simethicone Suspension 30 milliLiter(s) Oral every 4 hours PRN Dyspepsia  melatonin 3 milliGRAM(s) Oral at bedtime PRN Insomnia  ondansetron Injectable 4 milliGRAM(s) IV Push every 8 hours PRN Nausea and/or Vomiting      CAPILLARY BLOOD GLUCOSE        I&O's Summary    07 Nov 2022 07:01  -  08 Nov 2022 07:00  --------------------------------------------------------  IN: 1380 mL / OUT: 1000 mL / NET: 380 mL    08 Nov 2022 07:01  -  08 Nov 2022 13:47  --------------------------------------------------------  IN: 240 mL / OUT: 0 mL / NET: 240 mL        PHYSICAL EXAM:  Vital Signs Last 24 Hrs  T(C): 37.1 (08 Nov 2022 12:01), Max: 37.1 (08 Nov 2022 12:01)  T(F): 98.8 (08 Nov 2022 12:01), Max: 98.8 (08 Nov 2022 12:01)  HR: 74 (08 Nov 2022 12:01) (74 - 86)  BP: 124/83 (08 Nov 2022 12:01) (113/79 - 124/83)  BP(mean): --  RR: 17 (08 Nov 2022 12:01) (17 - 18)  SpO2: 97% (08 Nov 2022 12:01) (97% - 99%)    Parameters below as of 08 Nov 2022 12:01  Patient On (Oxygen Delivery Method): room air      CONSTITUTIONAL: NAD  EYES: PERRLA; conjunctiva and sclera clear  ENMT: MMM  NECK: Supple  RESPIRATORY: Normal respiratory effort; CTAB  CARDIOVASCULAR: RRR, no JVD, no peripheral edema   ABDOMEN: Nontender to palpation, normoactive BS, no guarding/rigidity  MUSCLOSKELETAL:  no clubbing/cyanosis, no joint swelling or tenderness to palpation  PSYCH: A+O x 3, affect normal  NEUROLOGY: CN 2-12 are intact and symmetric; no gross sensory or motor deficits  SKIN: No rashes; no palpable lesions    LABS:                        9.6    8.89  )-----------( 395      ( 08 Nov 2022 07:05 )             33.2     11-08    139  |  106  |  31<H>  ----------------------------<  105<H>  3.6   |  22  |  1.50<H>    Ca    8.6      08 Nov 2022 07:05    TPro  6.1  /  Alb  2.5<L>  /  TBili  1.0  /  DBili  0.4<H>  /  AST  36  /  ALT  52<H>  /  AlkPhos  418<H>  11-08    PT/INR - ( 08 Nov 2022 07:05 )   PT: 30.1 sec;   INR: 2.59 ratio         PTT - ( 07 Nov 2022 06:40 )  PTT:44.6 sec            RADIOLOGY & ADDITIONAL TESTS:  Results Reviewed:   Imaging Personally Reviewed:  Electrocardiogram Personally Reviewed:    COORDINATION OF CARE:  Care Discussed with Consultants/Other Providers [Y/N]: floor NP  Prior or Outpatient Records Reviewed [Y/N]:  
Chester Hogan MD  Division of Hospital Medicine  Available on MS teams until 7pm  If no response or off-hours, page [phone]  -------------------------------------    Patient is a 67y old  Male who presents with a chief complaint of urinary retention (06 Nov 2022 18:38)      SUBJECTIVE / OVERNIGHT EVENTS: none acute  ADDITIONAL REVIEW OF SYSTEMS: pt resting comfortably, denies any uncontrolled aches/pains, no fevers/chills. no abd pain/n/v. ros otherwise negative.     MEDICATIONS  (STANDING):  ALBUTerol    90 MICROgram(s) HFA Inhaler 2 Puff(s) Inhalation every 6 hours  allopurinol 100 milliGRAM(s) Oral daily  ATENolol  Tablet 25 milliGRAM(s) Oral daily  famotidine    Tablet 20 milliGRAM(s) Oral daily  influenza  Vaccine (HIGH DOSE) 0.7 milliLiter(s) IntraMuscular once  latanoprost 0.005% Ophthalmic Solution 1 Drop(s) Both EYES at bedtime  multivitamin/minerals 1 Tablet(s) Oral daily  piperacillin/tazobactam IVPB.. 3.375 Gram(s) IV Intermittent every 8 hours  tamsulosin 0.4 milliGRAM(s) Oral at bedtime  timolol 0.5% Solution 1 Drop(s) Both EYES two times a day  tiotropium 18 MICROgram(s) Capsule 1 Capsule(s) Inhalation daily  zinc sulfate 220 milliGRAM(s) Oral daily    MEDICATIONS  (PRN):  aluminum hydroxide/magnesium hydroxide/simethicone Suspension 30 milliLiter(s) Oral every 4 hours PRN Dyspepsia  melatonin 3 milliGRAM(s) Oral at bedtime PRN Insomnia  ondansetron Injectable 4 milliGRAM(s) IV Push every 8 hours PRN Nausea and/or Vomiting      CAPILLARY BLOOD GLUCOSE        I&O's Summary    06 Nov 2022 07:01  -  07 Nov 2022 07:00  --------------------------------------------------------  IN: 790 mL / OUT: 800 mL / NET: -10 mL    07 Nov 2022 07:01  -  07 Nov 2022 14:23  --------------------------------------------------------  IN: 480 mL / OUT: 300 mL / NET: 180 mL        PHYSICAL EXAM:  Vital Signs Last 24 Hrs  T(C): 36.9 (07 Nov 2022 13:29), Max: 36.9 (07 Nov 2022 13:29)  T(F): 98.4 (07 Nov 2022 13:29), Max: 98.4 (07 Nov 2022 13:29)  HR: 80 (07 Nov 2022 13:29) (75 - 96)  BP: 132/84 (07 Nov 2022 13:29) (118/67 - 132/84)  BP(mean): --  RR: 18 (07 Nov 2022 13:29) (18 - 18)  SpO2: 96% (07 Nov 2022 13:29) (96% - 99%)    Parameters below as of 07 Nov 2022 13:29  Patient On (Oxygen Delivery Method): room air      CONSTITUTIONAL: NAD, obese  EYES: PERRLA; conjunctiva and sclera clear  ENMT: MMM  NECK: Supple  RESPIRATORY: Normal respiratory effort; CTAB  CARDIOVASCULAR: RRR, no JVD, +2 pitting edema  ABDOMEN: Nontender to palpation, normoactive BS, no guarding/rigidity  MUSCLOSKELETAL:  no clubbing/cyanosis, no joint swelling or tenderness to palpation  PSYCH: A+O x 3, affect normal  NEUROLOGY: CN 2-12 are intact and symmetric; no gross sensory or motor deficits  SKIN: No rashes; no palpable lesions    LABS:                        9.3    9.00  )-----------( 378      ( 07 Nov 2022 06:40 )             32.1     11-07    139  |  106  |  29<H>  ----------------------------<  99  4.0   |  21<L>  |  1.50<H>    Ca    8.5      07 Nov 2022 06:40    TPro  5.6<L>  /  Alb  2.4<L>  /  TBili  1.3<H>  /  DBili  x   /  AST  64<H>  /  ALT  72<H>  /  AlkPhos  493<H>  11-07    PT/INR - ( 07 Nov 2022 06:40 )   PT: 49.5 sec;   INR: 4.24 ratio         PTT - ( 07 Nov 2022 06:40 )  PTT:44.6 sec          Culture - Urine (collected 05 Nov 2022 04:56)  Source: Clean Catch Clean Catch (Midstream)  Preliminary Report (07 Nov 2022 13:30):    >100,000 CFU/ml Klebsiella pneumoniae        RADIOLOGY & ADDITIONAL TESTS:  Results Reviewed:   Imaging Personally Reviewed:  Electrocardiogram Personally Reviewed:    COORDINATION OF CARE:  Care Discussed with Consultants/Other Providers [Y/N]: Dr. Trotter OP nephrologist  Prior or Outpatient Records Reviewed [Y/N]:  
Chester Hogan MD  Division of Hospital Medicine  Available on MS teams until 7pm  If no response or off-hours, page [phone]  -------------------------------------    Patient is a 67y old  Male who presents with a chief complaint of urinary retention (08 Nov 2022 15:19)    SUBJECTIVE / OVERNIGHT EVENTS: none acute  ADDITIONAL REVIEW OF SYSTEMS: pt denies any new/acute complaints, no aches/pains. no fevers/chills. ros otherwise negative.     MEDICATIONS  (STANDING):  ALBUTerol    90 MICROgram(s) HFA Inhaler 2 Puff(s) Inhalation every 6 hours  allopurinol 100 milliGRAM(s) Oral daily  ATENolol  Tablet 25 milliGRAM(s) Oral daily  ertapenem  IVPB 1000 milliGRAM(s) IV Intermittent every 24 hours  famotidine    Tablet 20 milliGRAM(s) Oral daily  influenza  Vaccine (HIGH DOSE) 0.7 milliLiter(s) IntraMuscular once  latanoprost 0.005% Ophthalmic Solution 1 Drop(s) Both EYES at bedtime  multivitamin/minerals 1 Tablet(s) Oral daily  tamsulosin 0.4 milliGRAM(s) Oral at bedtime  timolol 0.5% Solution 1 Drop(s) Both EYES two times a day  tiotropium 18 MICROgram(s) Capsule 1 Capsule(s) Inhalation daily  zinc sulfate 220 milliGRAM(s) Oral daily    MEDICATIONS  (PRN):  aluminum hydroxide/magnesium hydroxide/simethicone Suspension 30 milliLiter(s) Oral every 4 hours PRN Dyspepsia  melatonin 3 milliGRAM(s) Oral at bedtime PRN Insomnia  ondansetron Injectable 4 milliGRAM(s) IV Push every 8 hours PRN Nausea and/or Vomiting      CAPILLARY BLOOD GLUCOSE        I&O's Summary    08 Nov 2022 07:01  -  09 Nov 2022 07:00  --------------------------------------------------------  IN: 3030 mL / OUT: 1100 mL / NET: 1930 mL    09 Nov 2022 07:01  -  09 Nov 2022 12:48  --------------------------------------------------------  IN: 240 mL / OUT: 0 mL / NET: 240 mL        PHYSICAL EXAM:  Vital Signs Last 24 Hrs  T(C): 36.6 (09 Nov 2022 11:50), Max: 36.6 (09 Nov 2022 11:50)  T(F): 97.9 (09 Nov 2022 11:50), Max: 97.9 (09 Nov 2022 11:50)  HR: 76 (09 Nov 2022 11:50) (76 - 98)  BP: 120/88 (09 Nov 2022 11:50) (117/79 - 137/86)  BP(mean): --  RR: 18 (09 Nov 2022 11:50) (17 - 18)  SpO2: 98% (09 Nov 2022 11:50) (96% - 100%)    Parameters below as of 09 Nov 2022 11:50  Patient On (Oxygen Delivery Method): room air      CONSTITUTIONAL: NAD  EYES: PERRLA; conjunctiva and sclera clear  ENMT: MMM  NECK: Supple  RESPIRATORY: Normal respiratory effort; CTAB  CARDIOVASCULAR: RRR, no JVD, +2 pitting edema, chronic  ABDOMEN: Nontender to palpation, normoactive BS, no guarding/rigidity  MUSCLOSKELETAL:  no clubbing/cyanosis, no joint swelling or tenderness to palpation  PSYCH: A+O x 3, affect normal  NEUROLOGY: CN 2-12 are intact and symmetric; no gross sensory or motor deficits  SKIN: No rashes; no palpable lesions    LABS:                        9.7    8.09  )-----------( 398      ( 09 Nov 2022 11:46 )             32.8     11-09    138  |  108  |  25<H>  ----------------------------<  86  4.7   |  17<L>  |  1.33<H>    Ca    8.6      09 Nov 2022 08:53    TPro  5.6<L>  /  Alb  2.3<L>  /  TBili  1.0  /  DBili  x   /  AST  35  /  ALT  34  /  AlkPhos  333<H>  11-09    PT/INR - ( 08 Nov 2022 07:05 )   PT: 30.1 sec;   INR: 2.59 ratio                     RADIOLOGY & ADDITIONAL TESTS:  Results Reviewed:   Imaging Personally Reviewed:  Electrocardiogram Personally Reviewed:    COORDINATION OF CARE:  Care Discussed with Consultants/Other Providers [Y/N]: floor np  Prior or Outpatient Records Reviewed [Y/N]:  
  Patient is a 67y old  Male who presents with a chief complaint of urinary retention (2022 17:44)      SUBJECTIVE / OVERNIGHT EVENTS:   NO complaints today       ADDITIONAL REVIEW OF SYSTEMS: Negative except for above    MEDICATIONS  (STANDING):  ALBUTerol    90 MICROgram(s) HFA Inhaler 2 Puff(s) Inhalation every 6 hours  allopurinol 100 milliGRAM(s) Oral daily  ATENolol  Tablet 25 milliGRAM(s) Oral daily  famotidine    Tablet 20 milliGRAM(s) Oral daily  influenza  Vaccine (HIGH DOSE) 0.7 milliLiter(s) IntraMuscular once  latanoprost 0.005% Ophthalmic Solution 1 Drop(s) Both EYES at bedtime  multivitamin/minerals 1 Tablet(s) Oral daily  timolol 0.5% Solution 1 Drop(s) Both EYES two times a day  tiotropium 18 MICROgram(s) Capsule 1 Capsule(s) Inhalation daily  zinc sulfate 220 milliGRAM(s) Oral daily    MEDICATIONS  (PRN):  aluminum hydroxide/magnesium hydroxide/simethicone Suspension 30 milliLiter(s) Oral every 4 hours PRN Dyspepsia  melatonin 3 milliGRAM(s) Oral at bedtime PRN Insomnia  ondansetron Injectable 4 milliGRAM(s) IV Push every 8 hours PRN Nausea and/or Vomiting      CAPILLARY BLOOD GLUCOSE        I&O's Summary    2022 07:01  -  2022 18:38  --------------------------------------------------------  IN: 240 mL / OUT: 0 mL / NET: 240 mL        PHYSICAL EXAM:  Vital Signs Last 24 Hrs  T(C): 36.6 (2022 11:40), Max: 37.1 (2022 04:50)  T(F): 97.9 (2022 11:40), Max: 98.8 (2022 04:50)  HR: 94 (2022 11:40) (88 - 98)  BP: 133/89 (2022 11:40) (126/88 - 153/98)  BP(mean): --  RR: 18 (2022 11:40) (17 - 18)  SpO2: 99% (2022 11:40) (95% - 99%)    Parameters below as of 2022 11:40  Patient On (Oxygen Delivery Method): room air        PHYSICAL EXAM:  GENERAL: NAD, obese male   HEAD:  Atraumatic, Normocephalic  EYES:  conjunctiva and sclera clear  NECK: Supple, No JVD  CHEST/LUNG: Clear to auscultation bilaterally; No wheeze  HEART: Irregular rate and rhythm; No murmurs, rubs, or gallops  ABDOMEN: Soft, Nontender, Nondistended; Bowel sounds present  EXTREMITIES: pos chronic hyperpigmented skin changes   PSYCH: AAOx3        LABS:                        10.0   11.06 )-----------( 332      ( 2022 08:58 )             32.7     11-    138  |  107  |  30<H>  ----------------------------<  87  4.3   |  17<L>  |  1.29    Ca    8.4      2022 07:44    TPro  5.7<L>  /  Alb  2.2<L>  /  TBili  3.4<H>  /  DBili  x   /  AST  174<H>  /  ALT  103<H>  /  AlkPhos  562<H>  11-06    PT/INR - ( 2022 13:40 )   PT: 52.5 sec;   INR: 4.50 ratio         PTT - ( 2022 13:40 )  PTT:44.5 sec      Urinalysis Basic - ( 2022 04:56 )    Color: Yellow / Appearance: Slightly Turbid / S.021 / pH: x  Gluc: x / Ketone: Negative  / Bili: Negative / Urobili: Negative   Blood: x / Protein: 30 mg/dL / Nitrite: Negative   Leuk Esterase: Large / RBC: 13 /hpf /  /HPF   Sq Epi: x / Non Sq Epi: 0 /hpf / Bacteria: Many        Culture - Urine (collected 2022 04:56)  Source: Clean Catch Clean Catch (Midstream)  Preliminary Report (2022 16:52):    >100,000 CFU/ml Gram Negative Rods        RADIOLOGY & ADDITIONAL TESTS:    Imaging Personally Reviewed:    Electrocardiogram Personally Reviewed:    COORDINATION OF CARE:  Care Discussed with Consultants/Other Providers [Y/N]:  Prior or Outpatient Records Reviewed [Y/N]:

## 2022-11-09 NOTE — PROGRESS NOTE ADULT - PROBLEM SELECTOR PLAN 6
Pt received Ceftriaxone in the ED   Pt was noted to have Enterococcus in the urine on 10/27/22 although CFU was <100K  and was given one dose of FOSFOMYCIN 3 gram oral ( which has pretty broad coverage for UTI including enterococcus)  , but Urine CX with GNR and increased WBC , so Zosyn was started , f/up UCX
Pt received Ceftriaxone in the ED   Pt was noted to have Enterococcus in the urine on 10/27/22 although CFU was <100K  and was given one dose of FOSFOMYCIN 3 gram oral ( which has pretty broad coverage for UTI including enterococcus)  , but Urine CX with GNR and increased WBC , so Zosyn was started , now on ertapenem through 11/13  checking to see if remainder of course can be given peripherally at rehab
Pt received Ceftriaxone in the ED   Pt was noted to have Enterococcus in the urine on 10/27/22 although CFU was <100K  and was given one dose of FOSFOMYCIN 3 gram oral ( which has pretty broad coverage for UTI including enterococcus)  , but Urine CX with GNR and increased WBC , so Zosyn was started , f/up UCX
Pt received Ceftriaxone in the ED   Pt was noted to have Enterococcus in the urine on 10/27/22 although CFU was <100K  and was given one dose of FOSFOMYCIN 3 gram oral ( which has pretty broad coverage for UTI including enterococcus)  , but Urine CX with GNR and increased WBC , so Zosyn was started , f/up UCX

## 2022-11-09 NOTE — PROGRESS NOTE ADULT - PROBLEM SELECTOR PLAN 1
S/P kennedy replacement in the ED, removed, failed TOV so replaced. DDx includes undiagnosed BPH vs. possible adverse effect of pain medication from rehab (pt thinks it was oxycodone) vs. UTI.  pt will need oupt Urology eval and follow up   Most likely will benefit from Flomax  f/up UCX  pt currently not on any meds that are known to cause urinary retention
S/P kennedy replacement in the ED   pt will need oupt Urology eval and follow up   Most likely will benefit from Flomax  Pt will most likely be dc with kennedy and oupt Urology eval  f/up UCX
S/P kennedy replacement in the ED, removed, failed TOV so replaced. Per wife, had failed TOV x 2 at rehab as well. Possible neurogenic bladder vs. doubt BPH as CT a/p in 10/2022 shows normal sized prostate, vs. possible atypica presentation of UTI  pt will need oupt Urology eval and follow up- I reached out to urologist Dr. Duke's office to further discuss, awaiting call back  Most likely will benefit from Flomax- started  f/up UCX- growing ESBL klebsiella- switch to ertapenem 1gm iv q24 hrs, through 11/13- CM asking if rehab will be able to complete via peripheral or will need midline placement  pt currently not on any meds that are known to cause urinary retention  recent CT a/p non con without any obstructive masses or BPH- notes nonobstructive renal calculi however it's unlikely these are not obstructing as patient appears to have bladder outlet obstruction and no flank/abd pain
S/P kennedy replacement in the ED, removed, failed TOV so replaced. Per wife, had failed TOV x 2 at rehab as well. Possible neurogenic bladder vs. doubt BPH as CT a/p in 10/2022 shows normal sized prostate, vs. possible atypica presentation of UTI  pt will need oupt Urology eval and follow up- I reached out to urologist Dr. Duke's office to further discuss  Most likely will benefit from Flomax- started  f/up UCX- growing klebs, awaiting sensitivities  pt currently not on any meds that are known to cause urinary retention

## 2022-11-09 NOTE — PROGRESS NOTE ADULT - PROBLEM SELECTOR PLAN 2
Currently on Coumadin for DVT as had failed OP DOAC  - cont trending INR, dose coumadin accordingly
Currently on Coumadin   INR  >4 , HOLD coumadin tonight and repeat INR in AM and dose coumadin as deem fit   Please dose coumadin accordingly
Currently on Coumadin for DVT.   INR  >4 , HOLD coumadin tonight and repeat INR in AM and dose coumadin as deem fit   Please dose coumadin accordingly
Currently on Coumadin for DVT as had failed OP DOAC  - INR 2-3, will dose coumadin 2mg po tonight  - cont trending INR, dose coumadin accordingly

## 2022-11-09 NOTE — DISCHARGE NOTE NURSING/CASE MANAGEMENT/SOCIAL WORK - NSDCPEFALRISK_GEN_ALL_CORE
For information on Fall & Injury Prevention, visit: [email or web address] OR  [email or web address] OR  https://www.cdc.gov/steadi/patient.html

## 2022-11-09 NOTE — PROGRESS NOTE ADULT - PROBLEM SELECTOR PLAN 7
Hem is stable , cont to monitor anemia profile in AM

## 2022-11-09 NOTE — DISCHARGE NOTE NURSING/CASE MANAGEMENT/SOCIAL WORK - PATIENT PORTAL LINK FT
You can access the FollowMyHealth Patient Portal offered by [facility] by registering at the following website: [email or web address]. By joining [facility]’s FollowMyHealth portal, you will also be able to view your health information using other applications (apps) compatible with our system.

## 2022-11-09 NOTE — PROGRESS NOTE ADULT - ASSESSMENT
68 yo M PMHx of HTN, HLD, heart arrythmia, renal cancer s/p partial nephrectomy (~2017/18), DVT/PE iso renal cancer (~2017/18)was on xarelto and now on Coumadin from recent hosp stay,  s/p total L knee arthoplasty (9/16/22), asthma presents with urinary retention. Kennedy was discontinued since in morning of admission and patient has had no output since then. Patient has no pain, nausea, vomiting, fevers.  Pt received Ceftriaxone in the ED , kennedy was placed in the ED and admitted for possible UTI.  
 68 yo M PMHx of HTN, HLD, heart arrythmia, renal cancer s/p partial nephrectomy (~2017/18), DVT/PE iso renal cancer (~2017/18)was on xarelto and now on Coumadin from recent hosp stay,  s/p total L knee arthoplasty (9/16/22), asthma presents with urinary retention. Kennedy was discontinued since in morning of admission and patient has had no output since then. Patient has no pain, nausea, vomiting, fevers.  Pt received Ceftriaxone in the ED , kennedy was placed in the ED and admitted for UTI.

## 2022-11-09 NOTE — PROGRESS NOTE ADULT - PROBLEM SELECTOR PLAN 3
pt with hx of chronic afib- cont to monitor  cont Atenolol  coumadin dosing as above
cont to monitor  cont Atenolol
cont to monitor  cont Atenolol
pt with hx of chronic afib- cont to monitor  cont Atenolol

## 2022-11-09 NOTE — PROGRESS NOTE ADULT - PROBLEM SELECTOR PLAN 5
Cont home meds   Monitor BP

## 2022-11-09 NOTE — PROGRESS NOTE ADULT - PROBLEM SELECTOR PLAN 4
s/p partial nephrectomy (~2017/18)  pt was due for f/u with urologist Dr. Duke- will now f/u for urinary retention as well
s/p partial nephrectomy (~2017/18)  Oncology follow up oupt
s/p partial nephrectomy (~2017/18)  pt was due for f/u with urologist Dr. Duke- will now f/u for urinary retention as well
s/p partial nephrectomy (~2017/18)  Oncology follow up oupt

## 2022-11-12 NOTE — CHART NOTE - NSCHARTNOTEFT_GEN_A_CORE
Received notification from lab on 11/10 with updated urine culture results showing growth of also VRE, < 100K cfu. Given pt with chronic kennedy, this may become a significant pathogen if not treated. I left a message for medical director at [facility] to relay these results but did not hear back. I called pt's wife 11/12 with updated results and provided my office number so she could have medical staff reach out to me to discuss. Given the VRE was susceptible to ampicillin, I expect it should be susceptible to the ertapenem he was sent on, but nevertheless wanted [facility] to be aware. Wife was appreciative of update.

## 2022-11-18 NOTE — CDI QUERY NOTE - NSCDIOTHERTXTBX_GEN_ALL_CORE_HH
The patient presented with urinary retention, was admitted for UTI.  Urine culture:    >100,000 CFU/ml Klebsiella pneumoniae ESBL    50,000 - 99,000 CFU/ml Enterococcus faecalis    He was started with CEftriaxone, Fosfomycin and Vantin on 11/5/2022, changed to Zosyn on 11/6/2022 to 11/9/2022.  He was discharged on Invanz IV for 4 more days.    Per triage Note:  · Chief Complaint Quote	patient had kennedy cath d/c this am and has had no urinary output since then    1.  Please clarify the etiology or source of the UTI, if known?       = UTI secondary to kennedy cath,       = UTI secondary to urinary retention,       = Other,       = Unable to determine.      Progress note from 11/8 to discharge note:  Patient also found to have transaminitis; etiology is unclear with possible relation to early sepsis from UTI.     ED vital signs:  T-97.6   P-97   BP-136/81   R-18  WBC = 10.68-->11.06    2.  Please clarify the status of sepsis during this admission, if known?      = Patient was admitted with sepsis.      = Patient had sepsis, not present on admission.      = Other.      = Unable to determine.    Thank you.

## 2022-11-22 ENCOUNTER — INPATIENT (INPATIENT)
Facility: HOSPITAL | Age: 67
LOS: 13 days | Discharge: HOME HEALTH | DRG: 682 | End: 2022-12-06
Attending: HOSPITALIST | Admitting: HOSPITALIST
Payer: MEDICARE

## 2022-11-22 VITALS
SYSTOLIC BLOOD PRESSURE: 170 MMHG | DIASTOLIC BLOOD PRESSURE: 120 MMHG | HEIGHT: 68 IN | RESPIRATION RATE: 20 BRPM | TEMPERATURE: 98 F | HEART RATE: 85 BPM

## 2022-11-22 DIAGNOSIS — R41.82 ALTERED MENTAL STATUS, UNSPECIFIED: ICD-10-CM

## 2022-11-22 DIAGNOSIS — Z96.652 PRESENCE OF LEFT ARTIFICIAL KNEE JOINT: Chronic | ICD-10-CM

## 2022-11-22 DIAGNOSIS — Z90.5 ACQUIRED ABSENCE OF KIDNEY: Chronic | ICD-10-CM

## 2022-11-22 LAB
ALBUMIN SERPL ELPH-MCNC: 3 G/DL — LOW (ref 3.3–5)
ALP SERPL-CCNC: 111 U/L — SIGNIFICANT CHANGE UP (ref 40–120)
ALT FLD-CCNC: 9 U/L — LOW (ref 10–45)
ANION GAP SERPL CALC-SCNC: 8 MMOL/L — SIGNIFICANT CHANGE UP (ref 5–17)
APPEARANCE UR: CLEAR — SIGNIFICANT CHANGE UP
APPEARANCE UR: CLEAR — SIGNIFICANT CHANGE UP
APTT BLD: 45.4 SEC — HIGH (ref 27.5–35.5)
AST SERPL-CCNC: 14 U/L — SIGNIFICANT CHANGE UP (ref 10–40)
BACTERIA # UR AUTO: NEGATIVE — SIGNIFICANT CHANGE UP
BASE EXCESS BLDV CALC-SCNC: 1.9 MMOL/L — SIGNIFICANT CHANGE UP (ref -2–3)
BASOPHILS # BLD AUTO: 0.03 K/UL — SIGNIFICANT CHANGE UP (ref 0–0.2)
BASOPHILS NFR BLD AUTO: 0.3 % — SIGNIFICANT CHANGE UP (ref 0–2)
BILIRUB SERPL-MCNC: 1 MG/DL — SIGNIFICANT CHANGE UP (ref 0.2–1.2)
BILIRUB UR-MCNC: NEGATIVE — SIGNIFICANT CHANGE UP
BILIRUB UR-MCNC: NEGATIVE — SIGNIFICANT CHANGE UP
BUN SERPL-MCNC: 20 MG/DL — SIGNIFICANT CHANGE UP (ref 7–23)
CA-I SERPL-SCNC: 1.22 MMOL/L — SIGNIFICANT CHANGE UP (ref 1.15–1.33)
CALCIUM SERPL-MCNC: 8.9 MG/DL — SIGNIFICANT CHANGE UP (ref 8.4–10.5)
CHLORIDE BLDV-SCNC: 110 MMOL/L — HIGH (ref 96–108)
CHLORIDE SERPL-SCNC: 111 MMOL/L — HIGH (ref 96–108)
CO2 BLDV-SCNC: 29 MMOL/L — HIGH (ref 22–26)
CO2 SERPL-SCNC: 27 MMOL/L — SIGNIFICANT CHANGE UP (ref 22–31)
COLOR SPEC: COLORLESS — SIGNIFICANT CHANGE UP
COLOR SPEC: YELLOW — SIGNIFICANT CHANGE UP
CREAT SERPL-MCNC: 1.33 MG/DL — HIGH (ref 0.5–1.3)
DIFF PNL FLD: ABNORMAL
DIFF PNL FLD: ABNORMAL
EGFR: 59 ML/MIN/1.73M2 — LOW
EOSINOPHIL # BLD AUTO: 0.06 K/UL — SIGNIFICANT CHANGE UP (ref 0–0.5)
EOSINOPHIL NFR BLD AUTO: 0.7 % — SIGNIFICANT CHANGE UP (ref 0–6)
EPI CELLS # UR: 3 /HPF — SIGNIFICANT CHANGE UP
FLUAV AG NPH QL: SIGNIFICANT CHANGE UP
FLUBV AG NPH QL: SIGNIFICANT CHANGE UP
GAS PNL BLDV: 144 MMOL/L — SIGNIFICANT CHANGE UP (ref 136–145)
GAS PNL BLDV: SIGNIFICANT CHANGE UP
GLUCOSE BLDV-MCNC: 101 MG/DL — HIGH (ref 70–99)
GLUCOSE SERPL-MCNC: 113 MG/DL — HIGH (ref 70–99)
GLUCOSE UR QL: NEGATIVE — SIGNIFICANT CHANGE UP
GLUCOSE UR QL: NEGATIVE — SIGNIFICANT CHANGE UP
HCO3 BLDV-SCNC: 27 MMOL/L — SIGNIFICANT CHANGE UP (ref 22–29)
HCT VFR BLD CALC: 32.1 % — LOW (ref 39–50)
HCT VFR BLDA CALC: 28 % — LOW (ref 39–51)
HGB BLD CALC-MCNC: 9.3 G/DL — LOW (ref 12.6–17.4)
HGB BLD-MCNC: 9.4 G/DL — LOW (ref 13–17)
HYALINE CASTS # UR AUTO: 4 /LPF — HIGH (ref 0–2)
IMM GRANULOCYTES NFR BLD AUTO: 0.5 % — SIGNIFICANT CHANGE UP (ref 0–0.9)
INR BLD: 5.82 RATIO — CRITICAL HIGH (ref 0.88–1.16)
KETONES UR-MCNC: NEGATIVE — SIGNIFICANT CHANGE UP
KETONES UR-MCNC: NEGATIVE — SIGNIFICANT CHANGE UP
LACTATE BLDV-MCNC: 1.1 MMOL/L — SIGNIFICANT CHANGE UP (ref 0.5–2)
LEUKOCYTE ESTERASE UR-ACNC: NEGATIVE — SIGNIFICANT CHANGE UP
LEUKOCYTE ESTERASE UR-ACNC: NEGATIVE — SIGNIFICANT CHANGE UP
LYMPHOCYTES # BLD AUTO: 1.09 K/UL — SIGNIFICANT CHANGE UP (ref 1–3.3)
LYMPHOCYTES # BLD AUTO: 12.4 % — LOW (ref 13–44)
MCHC RBC-ENTMCNC: 25.1 PG — LOW (ref 27–34)
MCHC RBC-ENTMCNC: 29.3 GM/DL — LOW (ref 32–36)
MCV RBC AUTO: 85.6 FL — SIGNIFICANT CHANGE UP (ref 80–100)
MONOCYTES # BLD AUTO: 0.56 K/UL — SIGNIFICANT CHANGE UP (ref 0–0.9)
MONOCYTES NFR BLD AUTO: 6.3 % — SIGNIFICANT CHANGE UP (ref 2–14)
NEUTROPHILS # BLD AUTO: 7.04 K/UL — SIGNIFICANT CHANGE UP (ref 1.8–7.4)
NEUTROPHILS NFR BLD AUTO: 79.8 % — HIGH (ref 43–77)
NITRITE UR-MCNC: NEGATIVE — SIGNIFICANT CHANGE UP
NITRITE UR-MCNC: NEGATIVE — SIGNIFICANT CHANGE UP
NRBC # BLD: 0 /100 WBCS — SIGNIFICANT CHANGE UP (ref 0–0)
PCO2 BLDV: 45 MMHG — SIGNIFICANT CHANGE UP (ref 42–55)
PH BLDV: 7.39 — SIGNIFICANT CHANGE UP (ref 7.32–7.43)
PH UR: 6.5 — SIGNIFICANT CHANGE UP (ref 5–8)
PH UR: 6.5 — SIGNIFICANT CHANGE UP (ref 5–8)
PLATELET # BLD AUTO: 327 K/UL — SIGNIFICANT CHANGE UP (ref 150–400)
PO2 BLDV: 19 MMHG — LOW (ref 25–45)
POTASSIUM BLDV-SCNC: 3.9 MMOL/L — SIGNIFICANT CHANGE UP (ref 3.5–5.1)
POTASSIUM SERPL-MCNC: 4 MMOL/L — SIGNIFICANT CHANGE UP (ref 3.5–5.3)
POTASSIUM SERPL-SCNC: 4 MMOL/L — SIGNIFICANT CHANGE UP (ref 3.5–5.3)
PROT SERPL-MCNC: 6.2 G/DL — SIGNIFICANT CHANGE UP (ref 6–8.3)
PROT UR-MCNC: ABNORMAL
PROT UR-MCNC: ABNORMAL
PROTHROM AB SERPL-ACNC: 68.1 SEC — HIGH (ref 10.5–13.4)
RBC # BLD: 3.75 M/UL — LOW (ref 4.2–5.8)
RBC # FLD: 17.2 % — HIGH (ref 10.3–14.5)
RBC CASTS # UR COMP ASSIST: 24 /HPF — HIGH (ref 0–4)
RBC CASTS # UR COMP ASSIST: SIGNIFICANT CHANGE UP /HPF (ref 0–4)
RSV RNA NPH QL NAA+NON-PROBE: SIGNIFICANT CHANGE UP
SAO2 % BLDV: 27.1 % — LOW (ref 67–88)
SARS-COV-2 RNA SPEC QL NAA+PROBE: SIGNIFICANT CHANGE UP
SODIUM SERPL-SCNC: 146 MMOL/L — HIGH (ref 135–145)
SP GR SPEC: 1.04 — SIGNIFICANT CHANGE UP (ref 1.01–1.02)
SP GR SPEC: 1.05 — HIGH (ref 1.01–1.02)
TROPONIN T, HIGH SENSITIVITY RESULT: 94 NG/L — HIGH (ref 0–51)
TROPONIN T, HIGH SENSITIVITY RESULT: 96 NG/L — HIGH (ref 0–51)
UROBILINOGEN FLD QL: ABNORMAL
UROBILINOGEN FLD QL: NEGATIVE — SIGNIFICANT CHANGE UP
WBC # BLD: 8.82 K/UL — SIGNIFICANT CHANGE UP (ref 3.8–10.5)
WBC # FLD AUTO: 8.82 K/UL — SIGNIFICANT CHANGE UP (ref 3.8–10.5)
WBC UR QL: 15 /HPF — HIGH (ref 0–5)
WBC UR QL: SIGNIFICANT CHANGE UP /HPF (ref 0–5)

## 2022-11-22 PROCEDURE — 70498 CT ANGIOGRAPHY NECK: CPT | Mod: 26,MA

## 2022-11-22 PROCEDURE — 99223 1ST HOSP IP/OBS HIGH 75: CPT

## 2022-11-22 PROCEDURE — 99285 EMERGENCY DEPT VISIT HI MDM: CPT | Mod: 25

## 2022-11-22 PROCEDURE — 71045 X-RAY EXAM CHEST 1 VIEW: CPT | Mod: 26

## 2022-11-22 PROCEDURE — 70496 CT ANGIOGRAPHY HEAD: CPT | Mod: 26,MA

## 2022-11-22 RX ORDER — FAMOTIDINE 10 MG/ML
20 INJECTION INTRAVENOUS DAILY
Refills: 0 | Status: DISCONTINUED | OUTPATIENT
Start: 2022-11-22 | End: 2022-12-06

## 2022-11-22 RX ORDER — TIOTROPIUM BROMIDE 18 UG/1
1 CAPSULE ORAL; RESPIRATORY (INHALATION) DAILY
Refills: 0 | Status: DISCONTINUED | OUTPATIENT
Start: 2022-11-22 | End: 2022-12-06

## 2022-11-22 RX ORDER — ATENOLOL 25 MG/1
25 TABLET ORAL DAILY
Refills: 0 | Status: DISCONTINUED | OUTPATIENT
Start: 2022-11-22 | End: 2022-11-26

## 2022-11-22 RX ORDER — ACETAMINOPHEN 500 MG
1000 TABLET ORAL ONCE
Refills: 0 | Status: COMPLETED | OUTPATIENT
Start: 2022-11-22 | End: 2022-11-22

## 2022-11-22 RX ORDER — TAMSULOSIN HYDROCHLORIDE 0.4 MG/1
0.4 CAPSULE ORAL AT BEDTIME
Refills: 0 | Status: DISCONTINUED | OUTPATIENT
Start: 2022-11-22 | End: 2022-12-06

## 2022-11-22 RX ORDER — TIMOLOL 0.5 %
1 DROPS OPHTHALMIC (EYE)
Refills: 0 | Status: DISCONTINUED | OUTPATIENT
Start: 2022-11-22 | End: 2022-12-06

## 2022-11-22 RX ORDER — LATANOPROST 0.05 MG/ML
1 SOLUTION/ DROPS OPHTHALMIC; TOPICAL AT BEDTIME
Refills: 0 | Status: DISCONTINUED | OUTPATIENT
Start: 2022-11-22 | End: 2022-12-06

## 2022-11-22 RX ORDER — IPRATROPIUM/ALBUTEROL SULFATE 18-103MCG
3 AEROSOL WITH ADAPTER (GRAM) INHALATION EVERY 6 HOURS
Refills: 0 | Status: DISCONTINUED | OUTPATIENT
Start: 2022-11-22 | End: 2022-12-06

## 2022-11-22 RX ORDER — ATENOLOL 25 MG/1
1 TABLET ORAL
Qty: 0 | Refills: 0 | DISCHARGE

## 2022-11-22 RX ORDER — MULTIVIT-MIN/FERROUS GLUCONATE 9 MG/15 ML
1 LIQUID (ML) ORAL DAILY
Refills: 0 | Status: DISCONTINUED | OUTPATIENT
Start: 2022-11-22 | End: 2022-12-06

## 2022-11-22 RX ORDER — LANOLIN ALCOHOL/MO/W.PET/CERES
3 CREAM (GRAM) TOPICAL AT BEDTIME
Refills: 0 | Status: DISCONTINUED | OUTPATIENT
Start: 2022-11-22 | End: 2022-12-06

## 2022-11-22 RX ORDER — ALLOPURINOL 300 MG
100 TABLET ORAL DAILY
Refills: 0 | Status: DISCONTINUED | OUTPATIENT
Start: 2022-11-22 | End: 2022-12-06

## 2022-11-22 RX ORDER — VANCOMYCIN HCL 1 G
1000 VIAL (EA) INTRAVENOUS ONCE
Refills: 0 | Status: COMPLETED | OUTPATIENT
Start: 2022-11-22 | End: 2022-11-22

## 2022-11-22 RX ORDER — ZINC SULFATE TAB 220 MG (50 MG ZINC EQUIVALENT) 220 (50 ZN) MG
220 TAB ORAL DAILY
Refills: 0 | Status: DISCONTINUED | OUTPATIENT
Start: 2022-11-22 | End: 2022-12-06

## 2022-11-22 RX ORDER — ACETAMINOPHEN 500 MG
650 TABLET ORAL EVERY 6 HOURS
Refills: 0 | Status: DISCONTINUED | OUTPATIENT
Start: 2022-11-22 | End: 2022-12-06

## 2022-11-22 RX ORDER — CEFEPIME 1 G/1
1000 INJECTION, POWDER, FOR SOLUTION INTRAMUSCULAR; INTRAVENOUS ONCE
Refills: 0 | Status: COMPLETED | OUTPATIENT
Start: 2022-11-22 | End: 2022-11-22

## 2022-11-22 RX ORDER — ERTAPENEM SODIUM 1 G/1
1000 INJECTION, POWDER, LYOPHILIZED, FOR SOLUTION INTRAMUSCULAR; INTRAVENOUS EVERY 24 HOURS
Refills: 0 | Status: COMPLETED | OUTPATIENT
Start: 2022-11-22 | End: 2022-11-24

## 2022-11-22 RX ADMIN — CEFEPIME 100 MILLIGRAM(S): 1 INJECTION, POWDER, FOR SOLUTION INTRAMUSCULAR; INTRAVENOUS at 20:37

## 2022-11-22 RX ADMIN — Medication 400 MILLIGRAM(S): at 18:06

## 2022-11-22 RX ADMIN — Medication 250 MILLIGRAM(S): at 21:19

## 2022-11-22 NOTE — ED ADULT NURSE REASSESSMENT NOTE - NS ED NURSE REASSESS COMMENT FT1
Current indwelling Kennedy catheter removed without difficulty. Discharge noted around the meatus. New kennedy catheter inserted using sterile technique. Second RN present to confirm sterility. 10 CC of urine returned into collection tubing. Bedside drainage to gravity. Stat lock in place. Patient tolerated well.

## 2022-11-22 NOTE — ED ADULT NURSE REASSESSMENT NOTE - NS ED NURSE REASSESS COMMENT FT1
Patient had IV fluids infused through US guided IV placed by PA, swelling noted to right upper arm, hot pack applied to arm. IV d/c by RN, MD Morales made aware, new PIV 20G right upper arm started by RN and abx infused through new IV. No further nursing interventions indicated.

## 2022-11-22 NOTE — ED PROVIDER NOTE - CLINICAL SUMMARY MEDICAL DECISION MAKING FREE TEXT BOX
66 y/o male with pmhx of HTN, HLD, heart arrythmia, renal cancer s/p partial nephrectomy (~2017/18), DVT/PE iso renal cancer (~2017/18) was on xarelto and now on Coumadin from recent hospital stay, s/p total L knee arthoplasty (9/16/22), asthma, kennedy for urinary retention presenting with AMS and slurred speech, febrile, unable to speak, intermittently following commands, unable to perform full neuro exam; code stroke called in triage. Suspicion for infectious etiology given hx of UTI/fever and kennedy; hemodynamically stable vs. low suspicion of CVA; CT head/CTA head/neck, basic labs, empiric abx, and reassess after imaging/infectious w/u

## 2022-11-22 NOTE — ED PROVIDER NOTE - OBJECTIVE STATEMENT
68 y/o male with pmhx of HTN, HLD, heart arrythmia, renal cancer s/p partial nephrectomy (~2017/18), DVT/PE iso renal cancer (~2017/18)was on xarelto and now on Coumadin from recent hospital stay,  s/p total L knee arthoplasty (9/16/22), asthma, kennedy for urinary retention presenting with AMS and slurred speech. Daughter and son at bedside I did not 68 y/o male with pmhx of HTN, HLD, heart arrythmia, renal cancer s/p partial nephrectomy (~2017/18), DVT/PE iso renal cancer (~2017/18) was on xarelto and now on Coumadin from recent hospital stay, s/p total L knee arthoplasty (9/16/22), asthma, kennedy for urinary retention presenting with AMS and slurred speech. Daughter and son at bedside report that patient has been increasingly delirious for past couple of weeks. Today started to have slurred speech and difficulty speaking. Intermittently speaks one or two words. Patient unable to provide hx/ROS. Hx of intermittent UTIs. No known fevers, vomiting, diarrhea, cough, rashes. No known kennedy issues.

## 2022-11-22 NOTE — H&P ADULT - PROBLEM SELECTOR PLAN 3
on coumadin , inr supra therapeutic   - hold coumadin   - monitor INR , resume coumadin once within therapeutic range   - continue atenolol

## 2022-11-22 NOTE — H&P ADULT - NSHPPHYSICALEXAM_GEN_ALL_CORE
Vital Signs Last 24 Hrs  T(C): 36.7 (22 Nov 2022 19:22), Max: 37.9 (22 Nov 2022 17:49)  T(F): 98.1 (22 Nov 2022 19:22), Max: 100.3 (22 Nov 2022 17:49)  HR: 89 (22 Nov 2022 19:22) (79 - 89)  BP: 162/115 (22 Nov 2022 19:22) (128/89 - 170/120)  BP(mean): 126 (22 Nov 2022 19:22) (126 - 126)  RR: 17 (22 Nov 2022 19:22) (14 - 20)  SpO2: 98% (22 Nov 2022 19:22) (96% - 98%)    Parameters below as of 22 Nov 2022 19:22  Patient On (Oxygen Delivery Method): room air Vital Signs Last 24 Hrs  T(C): 36.7 (22 Nov 2022 19:22), Max: 37.9 (22 Nov 2022 17:49)  T(F): 98.1 (22 Nov 2022 19:22), Max: 100.3 (22 Nov 2022 17:49)  HR: 89 (22 Nov 2022 19:22) (79 - 89)  BP: 162/115 (22 Nov 2022 19:22) (128/89 - 170/120)  BP(mean): 126 (22 Nov 2022 19:22) (126 - 126)  RR: 17 (22 Nov 2022 19:22) (14 - 20)  SpO2: 98% (22 Nov 2022 19:22) (96% - 98%)    Parameters below as of 22 Nov 2022 19:22  Patient On (Oxygen Delivery Method): room air    GENERAL: No acute distress, well-developed, obese   HEAD:  Atraumatic, Normocephalic  ENT: EOMI, PERRLA, conjunctiva and sclera clear,  moist mucosa no pharyngeal erythema or exudates   NECK: supple , no JVD   CHEST/LUNG: Clear to auscultation bilaterally; No wheeze, decreased breath sounds bilaterally   BACK: No spinal tenderness,  No CVA tenderness   HEART: irregular rate and rhythm; No appreciable murmurs, rubs, or gallops  ABDOMEN: Soft, Nontender, Nondistended; Bowel sounds present  EXTREMITIES:  No clubbing, cyanosis + 3 pitting edema L > R   MSK: No joint swelling or effusions, ROM intact   PSYCH: Normal behavior/affect  NEUROLOGY: AAOx0, non-focal, cranial nerves intact, poor cooperation with complete neuro exam , RUE 4/5 , LUE 5/5 , unable to perform finger to nose , proximal muscle weakness throughout   SKIN: Normal color, No rashes or lesions

## 2022-11-22 NOTE — ED PROVIDER NOTE - NS_BEDUNITTYPES_ED_ALL_ED
Can you please call her with a BS chart for fasting and post prandial values.  There should be one in with all the diabetic info/BS logs in [facility] office
Patient's daughter would like to know what are acceptable ranges for the patient's blood sugar. She has been checking her sugar but does not know what is good and what is bad.
She needs a pulmonary referral and a dmv form mailed to her
done
MEDICINE

## 2022-11-22 NOTE — H&P ADULT - PROBLEM SELECTOR PLAN 1
difficulty with speech , mild tremor on exam acute change form baseline. labs w/ mild hypernatremia , CTH , CT perfusion head and neck w/o acute findings   - Neurology consulted - follow up recommendations   - MRI brain w/ and w/o   - b12 / syphilis screen / tsh   - neuro checks   - dysphagia screen   - dysphagia diet

## 2022-11-22 NOTE — H&P ADULT - NSHPLABSRESULTS_GEN_ALL_CORE
Labs personally reviewed:                          9.4    8.82  )-----------( 327      ( 2022 16:52 )             32.1     11-    146<H>  |  111<H>  |  20  ----------------------------<  113<H>  4.0   |  27  |  1.33<H>    Ca    8.9      2022 16:52    TPro  6.2  /  Alb  3.0<L>  /  TBili  1.0  /  DBili  x   /  AST  14  /  ALT  9<L>  /  AlkPhos  111  11-        LIVER FUNCTIONS - ( 2022 16:52 )  Alb: 3.0 g/dL / Pro: 6.2 g/dL / ALK PHOS: 111 U/L / ALT: 9 U/L / AST: 14 U/L / GGT: x           PT/INR - ( 2022 16:52 )   PT: 68.1 sec;   INR: 5.82 ratio         PTT - ( 2022 16:52 )  PTT:45.4 sec  Urinalysis Basic - ( 2022 19:24 )    Color: Yellow / Appearance: Clear / S.050 / pH: x  Gluc: x / Ketone: Negative  / Bili: Negative / Urobili: 2 mg/dL   Blood: x / Protein: 30 mg/dL / Nitrite: Negative   Leuk Esterase: Negative / RBC: 24 /hpf / WBC 15 /HPF   Sq Epi: x / Non Sq Epi: 3 /hpf / Bacteria: Negative      CAPILLARY BLOOD GLUCOSE      POCT Blood Glucose.: 113 mg/dL (2022 16:38)      Imaging:  CXR personally reviewed: no focal opacity    Noncontrast CT brain: No acute intracranial hemorrhage, mass effect, or   CT evidence of acute territorial infarct.    CT angiography neck: No evidence of hemodynamically significant stenosis   using NASCET criteria.  Patent vertebral arteries.  No evidence of   vascular dissection.    CT angiography brain: Nondiagnostic due to motion.      EKG personally reviewed: Labs personally reviewed:                          9.4    8.82  )-----------( 327      ( 2022 16:52 )             32.1     11-    146<H>  |  111<H>  |  20  ----------------------------<  113<H>  4.0   |  27  |  1.33<H>    Ca    8.9      2022 16:52    TPro  6.2  /  Alb  3.0<L>  /  TBili  1.0  /  DBili  x   /  AST  14  /  ALT  9<L>  /  AlkPhos  111  11-        LIVER FUNCTIONS - ( 2022 16:52 )  Alb: 3.0 g/dL / Pro: 6.2 g/dL / ALK PHOS: 111 U/L / ALT: 9 U/L / AST: 14 U/L / GGT: x           PT/INR - ( 2022 16:52 )   PT: 68.1 sec;   INR: 5.82 ratio         PTT - ( 2022 16:52 )  PTT:45.4 sec  Urinalysis Basic - ( 2022 19:24 )    Color: Yellow / Appearance: Clear / S.050 / pH: x  Gluc: x / Ketone: Negative  / Bili: Negative / Urobili: 2 mg/dL   Blood: x / Protein: 30 mg/dL / Nitrite: Negative   Leuk Esterase: Negative / RBC: 24 /hpf / WBC 15 /HPF   Sq Epi: x / Non Sq Epi: 3 /hpf / Bacteria: Negative      CAPILLARY BLOOD GLUCOSE      POCT Blood Glucose.: 113 mg/dL (2022 16:38)      Imaging:  CXR personally reviewed: no focal opacity    Noncontrast CT brain: No acute intracranial hemorrhage, mass effect, or   CT evidence of acute territorial infarct.    CT angiography neck: No evidence of hemodynamically significant stenosis   using NASCET criteria.  Patent vertebral arteries.  No evidence of   vascular dissection.    CT angiography brain: Nondiagnostic due to motion.      EKG personally reviewed: afib

## 2022-11-22 NOTE — ED ADULT NURSE NOTE - NSIMPLEMENTINTERV_GEN_ALL_ED
Implemented All Fall Risk Interventions:  [place] to call system. Call bell, personal items and telephone within reach. Instruct patient to call for assistance. Room bathroom lighting operational. Non-slip footwear when patient is off stretcher. Physically safe environment: no spills, clutter or unnecessary equipment. Stretcher in lowest position, wheels locked, appropriate side rails in place. Provide visual cue, wrist band, yellow gown, etc. Monitor gait and stability. Monitor for mental status changes and reorient to person, place, and time. Review medications for side effects contributing to fall risk. Reinforce activity limits and safety measures with patient and family.

## 2022-11-22 NOTE — ED PROCEDURE NOTE - PROCEDURE ADDITIONAL DETAILS
Emergency Department Focused Ultrasound performed at patient's bedside for educational purposes. The study was performed by a credentialed ultrasound provider. -Anuel Perry PA-C
Peripheral IV access in the Emergency Department obtained under dynamic ultrasound guidance with dark nonpulsatile blood return.  Catheter was flushed afterwards without any resistance or resulting extravasation.  IV catheter confirmed in compressible vein after insertion. 18 gauge catheter placed in a peripheral vein in the right upper extremity.

## 2022-11-22 NOTE — H&P ADULT - HISTORY OF PRESENT ILLNESS
Patient is a 67 year old male w/PMHx of HTN, HLD, heart arrythmia, renal cancer s/p partial nephrectomy (~2017/18), DVT/PE iso renal cancer (~2017/18) on Coumadin,  s/p total L knee arthoplasty (9/16/22), asthma, recent admission for urinary retention treated for UTI , s/p marcia d/c’ed to [facility] 11/09/22, he now presents for altered mental state and slurred speech for the past day.     Per family , patient has been intermittently confused over the past few weeks. On day of admission , he was noted to have difficulty speaking and was slurring his words ,    Patient is a 67 year old male w/PMHx of HTN, HLD, heart arrythmia, renal cancer s/p partial nephrectomy (~2017/18), DVT/PE iso renal cancer (~2017/18) on Coumadin,  s/p total L knee arthoplasty (9/16/22), asthma, recent admission for urinary retention treated for UTI , s/p marcia , d/c’ed to [facility] 11/09/22, he now presents for altered mental state and slurred speech for the past day.   Per family , patient has been intermittently confused over the past few weeks including change in level of alertness and intermittent hallucinations. At baseline patient is conversive , however, on day of admission , he was noted to have difficulty speaking and was slurring his words , he was also more lethargic than usual. No reported fever or chills, no difficulty breathing.

## 2022-11-22 NOTE — ED PROVIDER NOTE - PHYSICAL EXAMINATION
Gen: WDWN, NAD, comfortable appearing, unable to speak but follows commands intermittently, febrile   HEENT: Atraumatic head, PERRLA, EOMI, no nasal discharge, mucous membranes mildly dry, no oropharyngeal edema/erythema/exudates   CV: Afib, +S1/S2, no M/R/G, equal b/l radial pulses 2+  Resp: CTAB, no W/R/R, SPO2 >95% on RA, no increased WOB   GI: Abdomen soft non-distended, NTTP, no masses/organomegaly   MSK/Skin: No CVA tenderness, no open wounds, no bruising, no LE edema  Neuro: Unable to participate in full neuro exam. A&Ox0, moving all 4 extremities spontaneously, gross sensation intact in UE and LE BL  Psych: appropriate mood

## 2022-11-22 NOTE — ED PROCEDURE NOTE - CPROC ED TIME OUT STATEMENT1
“Patient's name, , procedure and correct site were confirmed during the [place] Timeout.”
“Patient's name, , procedure and correct site were confirmed during the [place] Timeout.”

## 2022-11-22 NOTE — H&P ADULT - ASSESSMENT
67M w/PMHx of HTN, HLD, heart arrythmia, renal cancer s/p partial nephrectomy (~2017/18), DVT/PE iso renal cancer (~2017/18) on Coumadin,  s/p total L knee arthoplasty (9/16/22), asthma, recent admission for urinary retention treated for UTI , s/p marcia , d/c’ed to [facility] 11/09/22, p/w AMS and speech difficulty x 1 day

## 2022-11-22 NOTE — H&P ADULT - PROBLEM SELECTOR PLAN 2
has indwelling kennedy , , low grade temperature, may have underlying infection contributing to AMS ,  recent cultures w/ ESBL klebsiella and VRE , will change ABx to ertapenem   - ertapenem   - f/u urine cultures

## 2022-11-22 NOTE — ED ADULT NURSE NOTE - OBJECTIVE STATEMENT
67 year old male, A&Ox0, PMH ofsepsis, UTI, BIB EMS from rehab center for AMS. Patient son present to help with history. Patient had a knee replacement in Sept and has been in and out of 2 rehab centers for PT/OT since. Patient has had multiple episodes of UTI and sepsis since then. Patient is bed ridden at rehab center since surgery. According to family members, patient has been delirious and confused for approximately 1 week, and has gotten progressively worse over the past 2-3 days. Family member went to visit him at the rehab center on Sunday and states that out of the total 4 hours of her being there, he was only acting like his normal self for 15 minutes. On arrival, patient is weak and lethargic with minimal speech. Patient making incomprehensible sounds with some words but is not making sense. Patient able to speak some words but is not making sense. Patient has kennedy catheter in place. Discharge is noted around the urethral meatus. Urine output in kennedy bag is zack and clear. PICC line in place in the left upper extremity without signs of infection. Heart rate and rhythm regular. Respirations clear and equal bilaterally. Abdomen soft, round and nontender. Patient has generalized weakness throughout his body. IV placed and labs drawn. Patient placed on cardiac monitor. Safety and comfort measurse maintained.

## 2022-11-22 NOTE — ED PROVIDER NOTE - ATTENDING CONTRIBUTION TO CARE
I, Tatiana Marie, performed a history and physical exam of the patient and discussed their management with the resident and /or advanced care provider. I reviewed the resident and /or ACP's note and agree with the documented findings and plan of care. I was present and available for all procedures.     67-year-old male with past medical history of hypertension, high cholesterol, heart arrhythmia, renal cancer status post partial nephrectomy, DVT/PE was on Xarelto now on Coumadin from recent hospital stay, status post total left knee arthroplasty in September of this year, asthma, Owusu for urinary retention who presents to the ED with altered mental status and slurred speech.  Son at bedside reports the patient has been having intermittent urinary tract infections for the past couple of weeks.  Has been having increasing episodes of delirium over the past week.  Today at approximately 12 PM he called to speak with patient and was not answering the phone or not speaking well on the phone.  Called the nursing staff and rehabs and states that on arrival he was having slurred speech and difficulty speaking.  Initially code stroke was called to triage however after further history was obtained from stroke was canceled CT head and CT a head and neck were still obtained.  Patient found to be febrile in the ED.  Likely infectious etiology as cause of patient's symptoms.  Will obtain sepsis order set, UA UC Will change Owusu.  Chest x-ray IV fluid empiric antibiotics and will admit for further treatment.

## 2022-11-23 DIAGNOSIS — Z91.89 OTHER SPECIFIED PERSONAL RISK FACTORS, NOT ELSEWHERE CLASSIFIED: ICD-10-CM

## 2022-11-23 DIAGNOSIS — I48.20 CHRONIC ATRIAL FIBRILLATION, UNSPECIFIED: ICD-10-CM

## 2022-11-23 DIAGNOSIS — J44.9 CHRONIC OBSTRUCTIVE PULMONARY DISEASE, UNSPECIFIED: ICD-10-CM

## 2022-11-23 DIAGNOSIS — R41.82 ALTERED MENTAL STATUS, UNSPECIFIED: ICD-10-CM

## 2022-11-23 DIAGNOSIS — N39.0 URINARY TRACT INFECTION, SITE NOT SPECIFIED: ICD-10-CM

## 2022-11-23 LAB
-  COAGULASE NEGATIVE STAPHYLOCOCCUS: SIGNIFICANT CHANGE UP
24R-OH-CALCIDIOL SERPL-MCNC: 14.1 NG/ML — LOW (ref 30–80)
A1C WITH ESTIMATED AVERAGE GLUCOSE RESULT: 5.3 % — SIGNIFICANT CHANGE UP (ref 4–5.6)
ALBUMIN SERPL ELPH-MCNC: 2.8 G/DL — LOW (ref 3.3–5)
ALP SERPL-CCNC: 101 U/L — SIGNIFICANT CHANGE UP (ref 40–120)
ALT FLD-CCNC: 8 U/L — LOW (ref 10–45)
AMMONIA BLD-MCNC: 19 UMOL/L — SIGNIFICANT CHANGE UP (ref 11–55)
ANION GAP SERPL CALC-SCNC: 12 MMOL/L — SIGNIFICANT CHANGE UP (ref 5–17)
APTT BLD: 47.5 SEC — HIGH (ref 27.5–35.5)
AST SERPL-CCNC: 16 U/L — SIGNIFICANT CHANGE UP (ref 10–40)
BILIRUB SERPL-MCNC: 1.1 MG/DL — SIGNIFICANT CHANGE UP (ref 0.2–1.2)
BUN SERPL-MCNC: 19 MG/DL — SIGNIFICANT CHANGE UP (ref 7–23)
CALCIUM SERPL-MCNC: 8.6 MG/DL — SIGNIFICANT CHANGE UP (ref 8.4–10.5)
CHLORIDE SERPL-SCNC: 110 MMOL/L — HIGH (ref 96–108)
CHOLEST SERPL-MCNC: 145 MG/DL — SIGNIFICANT CHANGE UP
CO2 SERPL-SCNC: 23 MMOL/L — SIGNIFICANT CHANGE UP (ref 22–31)
CREAT SERPL-MCNC: 1.21 MG/DL — SIGNIFICANT CHANGE UP (ref 0.5–1.3)
EGFR: 66 ML/MIN/1.73M2 — SIGNIFICANT CHANGE UP
ESTIMATED AVERAGE GLUCOSE: 105 MG/DL — SIGNIFICANT CHANGE UP (ref 68–114)
GLUCOSE SERPL-MCNC: 93 MG/DL — SIGNIFICANT CHANGE UP (ref 70–99)
GRAM STN FLD: SIGNIFICANT CHANGE UP
HCT VFR BLD CALC: 29.3 % — LOW (ref 39–50)
HDLC SERPL-MCNC: 40 MG/DL — LOW
HGB BLD-MCNC: 8.5 G/DL — LOW (ref 13–17)
INR BLD: 5.56 RATIO — CRITICAL HIGH (ref 0.88–1.16)
LIPID PNL WITH DIRECT LDL SERPL: 88 MG/DL — SIGNIFICANT CHANGE UP
MCHC RBC-ENTMCNC: 24.5 PG — LOW (ref 27–34)
MCHC RBC-ENTMCNC: 29 GM/DL — LOW (ref 32–36)
MCV RBC AUTO: 84.4 FL — SIGNIFICANT CHANGE UP (ref 80–100)
METHOD TYPE: SIGNIFICANT CHANGE UP
NON HDL CHOLESTEROL: 105 MG/DL — SIGNIFICANT CHANGE UP
NRBC # BLD: 0 /100 WBCS — SIGNIFICANT CHANGE UP (ref 0–0)
PLATELET # BLD AUTO: 347 K/UL — SIGNIFICANT CHANGE UP (ref 150–400)
POTASSIUM SERPL-MCNC: 3.8 MMOL/L — SIGNIFICANT CHANGE UP (ref 3.5–5.3)
POTASSIUM SERPL-SCNC: 3.8 MMOL/L — SIGNIFICANT CHANGE UP (ref 3.5–5.3)
PROT SERPL-MCNC: 5.8 G/DL — LOW (ref 6–8.3)
PROTHROM AB SERPL-ACNC: 65.1 SEC — HIGH (ref 10.5–13.4)
RBC # BLD: 3.47 M/UL — LOW (ref 4.2–5.8)
RBC # FLD: 17.3 % — HIGH (ref 10.3–14.5)
SODIUM SERPL-SCNC: 145 MMOL/L — SIGNIFICANT CHANGE UP (ref 135–145)
SPECIMEN SOURCE: SIGNIFICANT CHANGE UP
T PALLIDUM AB TITR SER: NEGATIVE — SIGNIFICANT CHANGE UP
T3 SERPL-MCNC: 41 NG/DL — LOW (ref 80–200)
T4 FREE SERPL-MCNC: 1.1 NG/DL — SIGNIFICANT CHANGE UP (ref 0.9–1.8)
TRIGL SERPL-MCNC: 84 MG/DL — SIGNIFICANT CHANGE UP
TSH SERPL-MCNC: 2.26 UIU/ML — SIGNIFICANT CHANGE UP (ref 0.27–4.2)
VIT B12 SERPL-MCNC: 552 PG/ML — SIGNIFICANT CHANGE UP (ref 232–1245)
WBC # BLD: 7.54 K/UL — SIGNIFICANT CHANGE UP (ref 3.8–10.5)
WBC # FLD AUTO: 7.54 K/UL — SIGNIFICANT CHANGE UP (ref 3.8–10.5)

## 2022-11-23 PROCEDURE — 99222 1ST HOSP IP/OBS MODERATE 55: CPT

## 2022-11-23 RX ORDER — PHYTONADIONE (VIT K1) 5 MG
2.5 TABLET ORAL ONCE
Refills: 0 | Status: COMPLETED | OUTPATIENT
Start: 2022-11-23 | End: 2022-11-23

## 2022-11-23 RX ORDER — SODIUM CHLORIDE 9 MG/ML
1000 INJECTION, SOLUTION INTRAVENOUS
Refills: 0 | Status: DISCONTINUED | OUTPATIENT
Start: 2022-11-23 | End: 2022-11-28

## 2022-11-23 RX ADMIN — Medication 3 MILLILITER(S): at 17:56

## 2022-11-23 RX ADMIN — ERTAPENEM SODIUM 120 MILLIGRAM(S): 1 INJECTION, POWDER, LYOPHILIZED, FOR SOLUTION INTRAMUSCULAR; INTRAVENOUS at 23:49

## 2022-11-23 RX ADMIN — LATANOPROST 1 DROP(S): 0.05 SOLUTION/ DROPS OPHTHALMIC; TOPICAL at 22:46

## 2022-11-23 RX ADMIN — LATANOPROST 1 DROP(S): 0.05 SOLUTION/ DROPS OPHTHALMIC; TOPICAL at 00:21

## 2022-11-23 RX ADMIN — TAMSULOSIN HYDROCHLORIDE 0.4 MILLIGRAM(S): 0.4 CAPSULE ORAL at 22:45

## 2022-11-23 RX ADMIN — Medication 1 DROP(S): at 00:21

## 2022-11-23 RX ADMIN — Medication 100 MILLIGRAM(S): at 11:24

## 2022-11-23 RX ADMIN — FAMOTIDINE 20 MILLIGRAM(S): 10 INJECTION INTRAVENOUS at 11:23

## 2022-11-23 RX ADMIN — Medication 1 DROP(S): at 06:39

## 2022-11-23 RX ADMIN — ATENOLOL 25 MILLIGRAM(S): 25 TABLET ORAL at 06:38

## 2022-11-23 RX ADMIN — Medication 2.5 MILLIGRAM(S): at 18:25

## 2022-11-23 RX ADMIN — Medication 3 MILLILITER(S): at 23:49

## 2022-11-23 RX ADMIN — ERTAPENEM SODIUM 120 MILLIGRAM(S): 1 INJECTION, POWDER, LYOPHILIZED, FOR SOLUTION INTRAMUSCULAR; INTRAVENOUS at 00:48

## 2022-11-23 RX ADMIN — ZINC SULFATE TAB 220 MG (50 MG ZINC EQUIVALENT) 220 MILLIGRAM(S): 220 (50 ZN) TAB at 11:23

## 2022-11-23 RX ADMIN — Medication 3 MILLILITER(S): at 06:38

## 2022-11-23 RX ADMIN — Medication 1 TABLET(S): at 11:23

## 2022-11-23 RX ADMIN — TIOTROPIUM BROMIDE 1 CAPSULE(S): 18 CAPSULE ORAL; RESPIRATORY (INHALATION) at 12:42

## 2022-11-23 RX ADMIN — SODIUM CHLORIDE 60 MILLILITER(S): 9 INJECTION, SOLUTION INTRAVENOUS at 17:56

## 2022-11-23 RX ADMIN — Medication 3 MILLILITER(S): at 11:23

## 2022-11-23 RX ADMIN — Medication 1 DROP(S): at 17:57

## 2022-11-23 NOTE — PHYSICAL THERAPY INITIAL EVALUATION ADULT - PERTINENT HX OF CURRENT PROBLEM, REHAB EVAL
67y (1955) man with a PMHx significant for gout, HTN, HLD, heart arrythmia, renal cancer s/p partial nephrectomy (~2017/18), DVT/PE iso renal cancer (~2017/18) on Coumadin, s/p total L knee arthoplasty (9/16/22), asthma, recent admission for urinary retention treated for UTI (on chronic kennedy) who present for altered mental status and speech difficulty. Symptoms overall have been getting worse for one week and exacerbated by inability to verbalize. Evaluated for STROKE 11/22 due to aphasia but code cancelled. At this current admission, the patient believed that has been intermittently confused, waxing waning alertness and intermittent hallucinations. Per son's request, patient was transferred to [facility]. While in ED, patient's family noticed 20-25 minutes of lucency where he would say "Where is everybody?" Neurology consulted for altered mental state and difficulties verbalizing. Currently warfarin held for elevated INR (5.82). Patient given cefepime x1 and vancomycin x1 67y (1955) man with a PMHx significant for gout, HTN, HLD, heart arrythmia, renal cancer s/p partial nephrectomy (~2017/18), DVT/PE iso renal cancer (~2017/18) on Coumadin, s/p total L knee arthoplasty (9/16/22), asthma, recent admission for urinary retention treated for UTI (on chronic kennedy) who present for altered mental status and speech difficulty. Symptoms overall have been getting worse for one week and exacerbated by inability to verbalize. Evaluated for STROKE 11/22 due to aphasia but code cancelled. At this current admission, the patient believed that has been intermittently confused, waxing waning alertness and intermittent hallucinations. Per son's request, patient was transferred to [facility]. While in ED, patient's family noticed 20-25 minutes of lucency where he would say "Where is everybody?" Neurology consulted for altered mental state and difficulties verbalizing. Currently warfarin held for elevated INR (5.82). Patient given cefepime x1 and vancomycin x1. Hosp Cx: 11/22 CT head and neck: Noncontrast CT brain: No acute intracranial hemorrhage, mass effect, or   CT evidence of acute territorial infarct. CT angiography neck: No evidence of hemodynamically significant stenosis using NASCET criteria.  Patent vertebral arteries.  No evidence of vascular dissection. CT angiography brain: Nondiagnostic due to motion.

## 2022-11-23 NOTE — PHYSICAL THERAPY INITIAL EVALUATION ADULT - ACTIVE RANGE OF MOTION EXAMINATION, REHAB EVAL
RUE  all WFL except shoulder flexion 0-90/Left LE Active ROM was WFL (within functional limits)/bilateral  lower extremity Active ROM was WFL (within functional limits)/deficits as listed below

## 2022-11-23 NOTE — CONSULT NOTE ADULT - SUBJECTIVE AND OBJECTIVE BOX
Neurology - Consult Note    -  Spectra: [phone] ([facility]), [phone] ([facility])  -    HPI: Patient KENNETH MULLER is a 67y (1955) man with a PMHx significant for gout, HTN, HLD, heart arrythmia, renal cancer s/p partial nephrectomy (~2017/18), DVT/PE iso renal cancer (~2017/18) on Coumadin, s/p total L knee arthoplasty (9/16/22), asthma, recent admission for urinary retention treated for UTI (on chronic kennedy) who present for altered mental status and speech difficulty. Symptoms overall have been getting worse for one week and exacerbated by inability to verbalize. According to the patient's son, after the patient had his surgery, he was sent to [facility] rehab. Per wife, patient was d/c to [facility] after L knee arthroplasty, at which point patient went from being totally independent to totally dependent post-op (requiring lift in- and out-of-bed, was in diaper, etc.). States he had started to become very confused, was not feeling well. was found to have a  UTI, became incoherent and arms started shaking, and appeared to wife "as if he aged 30 years". Unknown Abx for UTI. He was readmitted 10/11 after three days of AMS and underwent sepsis and metabolic acidosis workup for UTI and newfound COVID+. Patient's mental status improved and patient was sent back to rehab. Course complicated by new acute bilateral DVTs for which the patient was discharged 10/22 on warfarin. Patient readmitted 11/5 for urinary retention and started on CTX ---> zosyn for complicated UTI. Evaluated for STROKE 11/22 due to aphasia but code cancelled.     At this current admission, the patient believed that has been intermittently confused, waxing waning alertness and intermittent hallucinations. Two days prior to admission, the patient's brother saw him at [facility] and noted confusion with 20-25 minute lucid episode. One day prior to admission, the patient did not answer his son's calls. The day of admission, the son called into the [facility] and asked to facetime with the patient. He noted that the patient was mouthing worse but nothing was coming out. Patient was not following directions. Per son's request, patient was transferred to [facility]. While in ED, patient's family noticed 20-25 minutes of lucency where he would say "Where is everybody?" Neurology consulted for altered mental state and difficulties verbalizing. No reported fevers, chills nor difficulties breathing. Currently warfarin held for elevated INR (5.82). Patient given cefepime x1 and vancomycin x1. Currently on ertapenam for UTI. UA with WBC but negative bacteria. No history of seizures.       Review of Systems:    NEUROLOGICAL: +As stated in HPI above  All other review of systems is negative unless indicated above.    Allergies:      PMHx/PSHx/Family Hx: As above, otherwise see below   HTN (hypertension)    HLD (hyperlipidemia)    Pulmonary embolism    Arrhythmia    Deep vein thrombosis (DVT)    Asthma    Renal cancer        Social Hx:  No current use of tobacco, alcohol, or illicit drugs  Lives with ***    Medications:  MEDICATIONS  (STANDING):  albuterol/ipratropium for Nebulization 3 milliLiter(s) Nebulizer every 6 hours  allopurinol 100 milliGRAM(s) Oral daily  atenolol  Tablet 25 milliGRAM(s) Oral daily  ertapenem  IVPB 1000 milliGRAM(s) IV Intermittent every 24 hours  famotidine    Tablet 20 milliGRAM(s) Oral daily  latanoprost 0.005% Ophthalmic Solution 1 Drop(s) Both EYES at bedtime  multivitamin/minerals 1 Tablet(s) Oral daily  tamsulosin 0.4 milliGRAM(s) Oral at bedtime  timolol 0.5% Solution 1 Drop(s) Both EYES two times a day  tiotropium 18 MICROgram(s) Capsule 1 Capsule(s) Inhalation daily  zinc sulfate 220 milliGRAM(s) Oral daily    MEDICATIONS  (PRN):  acetaminophen     Tablet .. 650 milliGRAM(s) Oral every 6 hours PRN Temp greater or equal to 38C (100.4F), Mild Pain (1 - 3)  melatonin 3 milliGRAM(s) Oral at bedtime PRN Insomnia      Vitals:  T(C): 36.8 (11-23-22 @ 00:01), Max: 37.9 (11-22-22 @ 17:49)  HR: 80 (11-23-22 @ 00:01) (79 - 89)  BP: 125/70 (11-23-22 @ 00:01) (125/70 - 170/120)  RR: 18 (11-23-22 @ 00:01) (14 - 20)  SpO2: 98% (11-23-22 @ 00:01) (96% - 98%)    Physical Examination:   General - staring blankly   Cardiovascular - lower extremity edema to ankles   Neurologic Exam:  Mental status - Awake, Alert to voice not oriented to place, time, and self. however, exam limited by dysarthria and dysphagia Follows simple and complex commands (left hand touch right ear).     Cranial nerves:  CN II: BTT. Pupils are 4 mm and briskly reactive to light, difficult exam as patient photophobic   CN III, IV, VI: EOMI, no nystagmus, no ptosis  CN V: unable to assess due to dysarthria  CN VII: Face is largely symmetric with normal eye closure   CN VII:  unable to assess due to dysarthria  CN IX, X: Palate elevates symmetrically. Phonation is abnormal.  CN XI: shoulder shrug intact  CN XII: Tongue is midline with normal movements and no atrophy.    Motor - Increased tone throughout.     Strength testing            Deltoid      Biceps      Triceps     Wrist Extension    Wrist Flexion     Interossei         R            5                 5               5                     5                              5                       -                 5  L             4+                 4+               4+                     4-                             4-                        -                 4              Hip Flexion    Hip Extension    Knee Flexion    Knee Extension    Dorsiflexion    Plantar Flexion  R              5                           5                      -                           -                            5                          5  L              4+                           4+                   -                           -                           4+                          4+    Sensation - pain sensation intact in fingers and toes     DTR's -             Biceps      Triceps     Brachioradialis      Patellar    Ankle    Toes/plantar response  R             2+             2+                  2+                       2+            2+                 Down  L              3+             3+                 3++                        2+           2+                 Down    Coordination - Unable to perform due to weakness and not following some directions     Gait and station - unable to assess due to bedbound     Labs:                        9.4    8.82  )-----------( 327      ( 22 Nov 2022 16:52 )             32.1     11-22    146<H>  |  111<H>  |  20  ----------------------------<  113<H>  4.0   |  27  |  1.33<H>    Ca    8.9      22 Nov 2022 16:52    TPro  6.2  /  Alb  3.0<L>  /  TBili  1.0  /  DBili  x   /  AST  14  /  ALT  9<L>  /  AlkPhos  111  11-22    CAPILLARY BLOOD GLUCOSE      POCT Blood Glucose.: 113 mg/dL (22 Nov 2022 16:38)    LIVER FUNCTIONS - ( 22 Nov 2022 16:52 )  Alb: 3.0 g/dL / Pro: 6.2 g/dL / ALK PHOS: 111 U/L / ALT: 9 U/L / AST: 14 U/L / GGT: x             PT/INR - ( 22 Nov 2022 16:52 )   PT: 68.1 sec;   INR: 5.82 ratio         PTT - ( 22 Nov 2022 16:52 )  PTT:45.4 sec  CSF:                  Radiology:  CT Head No Cont:  (22 Nov 2022 17:15)    < from: CT Head No Cont (11.22.22 @ 17:15) >  Noncontrast CT brain: No acute intracranial hemorrhage, mass effect, or   CT evidence of acute territorial infarct.    CT angiography neck: No evidence of hemodynamically significant stenosis   using NASCET criteria.  Patent vertebral arteries.  No evidence of   vascular dissection.    CT angiography brain: Nondiagnostic due to motion.    < end of copied text >

## 2022-11-23 NOTE — CONSULT NOTE ADULT - ATTENDING COMMENTS
History reviewed including laboratory tests and CT brain  Currently patient is lying in bed - awake but only intermittently following commands. Can answer simple questions such as name - speaks with effort - very quietly  Can follow commands intermittently  Blinks to threat - face looks symmetric  Increased tone with myoclonic movements - vs coarse tremor seen bilaterally in UE  Unable to cooperate with detailed motor exam -- suggestion of slight weakness of LUE compared to right  LE - can lift both LE off of the bed symmetrically  Reflexes present with toes equivocal.    Agree with MRI brain with and without contrast - some focality to exam on top of what appears to be a toxic metabolic encephalopathy.  EEG  Please check ammonia  Infectious evaluation as you are doing.

## 2022-11-23 NOTE — PHYSICAL THERAPY INITIAL EVALUATION ADULT - TRANSFER TRAINING, PT EVAL
GOAL: Patient will perform sit to stand transfers modAx1 with rolling walker with proper hand placement

## 2022-11-23 NOTE — PHYSICAL THERAPY INITIAL EVALUATION ADULT - ADDITIONAL COMMENTS
pt lives at home with spouse Kimber Merritt who is ID as caregiver [phone] with no steps to negotiate per pt to get in home or w/in home ; pt used rolling walker or std cane PTA per pt , has a tub shower. Pt sent here from [facility].

## 2022-11-23 NOTE — CONSULT NOTE ADULT - ASSESSMENT
ASSESSMENT   KENNETH MULLER is a 67yman with a PMHx significant for gout, HTN, HLD, heart arrythmia, renal cancer s/p partial nephrectomy (~2017/18), DVT/PE iso renal cancer (~2017/18) on Coumadin, s/p total L knee arthoplasty (9/16/22), asthma, recent admission for urinary retention treated for UTI (on chronic kennedy) who present for altered mental status and speech difficulty. Symptoms overall have been getting worse for one week and exacerbated by inability to verbalize. At this current admission, the patient believed that has been intermittently confused, waxing waning alertness and intermittent hallucinations. Two days prior to admission, the patient's brother saw him at [facility] and noted confusion with 20-25 minute lucid episode. One day prior to admission, the patient did not answer his son's calls. The day of admission, the son called into the [facility] and asked to facetime with the patient. He noted that the patient was mouthing worse but nothing was coming out. Patient was not following directions. Per son's request, patient was transferred to [facility]. While in ED, patient's family noticed 20-25 minutes of lucency where he would say "Where is everybody?" Neurology consulted for altered mental state and difficulties verbalizing. No reported fevers, chills nor difficulties breathing. Currently warfarin held for elevated INR (5.82). Patient given cefepime x1 and vancomycin x1. Currently on ertapenam for UTI. UA with WBC but negative bacteria.     IMPRESSION     Progressive dysarthria, hallucinations due to toxic/metabolic/ infectious encephalopathy i/s/o diffuse cerebral dysfunction vs metastatic given history of renal cancer     RECOMMENDATION   Plan:  - U/A with blood but no bacteria   [] Continue infectious w/u by following up on Bcx and Ucx  [] Check Heavy metals, ammonia, TSH, T3/T4, B1, B6, B12, RPR, Vitamin D (25 Hydroxy), and Vitamin E  [x] Order for MRI w/wo contrast of head   [] CT chest/abdomen/pelvis with and without contrast   [] Order for vEEG   [] Please order IR consult for lumbar puncture, full labs to follow after MRI      [] Pt is at high risk for delirium, therefore, please have adequate sleeping environment for the patient, light on, curtains open, TV on during the day with regular stimulation and out of bed to chair if possible. During the night, close curtains, TV off, lights off, and minimize interruptions (limit blood draws and vital sign checks if possible). Regular interaction with patient with staff (introducing selves), frequent reorientation, and encouragement of visitors as allowed by hospital and unit policy.   [] Minimize sedating medications as tolerated   ASSESSMENT   KENNETH MULLER is a 67yman with a PMHx significant for gout, HTN, HLD, heart arrythmia, renal cancer s/p partial nephrectomy (~2017/18), DVT/PE iso renal cancer (~2017/18) on Coumadin, s/p total L knee arthoplasty (9/16/22), asthma, recent admission for urinary retention treated for UTI (on chronic kennedy) who present for altered mental status and speech difficulty. Symptoms overall have been getting worse for one week and exacerbated by inability to verbalize. At this current admission, the patient believed that has been intermittently confused, waxing waning alertness and intermittent hallucinations. Two days prior to admission, the patient's brother saw him at [facility] and noted confusion with 20-25 minute lucid episode. One day prior to admission, the patient did not answer his son's calls. The day of admission, the son called into the [facility] and asked to facetime with the patient. He noted that the patient was mouthing worse but nothing was coming out. Patient was not following directions. Per son's request, patient was transferred to [facility]. While in ED, patient's family noticed 20-25 minutes of lucency where he would say "Where is everybody?" Neurology consulted for altered mental state and difficulties verbalizing. No reported fevers, chills nor difficulties breathing. Currently warfarin held for elevated INR (5.82). Patient given cefepime x1 and vancomycin x1. Currently on ertapenam for UTI. UA with WBC but negative bacteria.     IMPRESSION     Progressive dysarthria, hallucinations due to autoimmune/ toxic/metabolic/ infectious encephalopathy i/s/o diffuse cerebral dysfunction vs metastatic given history of renal cancer     RECOMMENDATION   Plan:  - U/A with blood but no bacteria   [] Continue infectious w/u by following up on Bcx and Ucx  [] Check Heavy metals, ammonia, TSH, T3/T4, B1, B6, B12, RPR, Vitamin D (25 Hydroxy), and Vitamin E  [x] Order for MRI w/wo contrast of head   [] CT chest/abdomen/pelvis with and without contrast   [] Order for vEEG   [] Please order IR consult for lumbar puncture, full labs to follow after MRI      [] Pt is at high risk for delirium, therefore, please have adequate sleeping environment for the patient, light on, curtains open, TV on during the day with regular stimulation and out of bed to chair if possible. During the night, close curtains, TV off, lights off, and minimize interruptions (limit blood draws and vital sign checks if possible). Regular interaction with patient with staff (introducing selves), frequent reorientation, and encouragement of visitors as allowed by hospital and unit policy.   [] Minimize sedating medications as tolerated

## 2022-11-23 NOTE — PHYSICAL THERAPY INITIAL EVALUATION ADULT - ASSISTIVE DEVICE FOR TRANSFER: STAND/SIT, REHAB EVAL
dale fung dale fungx2 reps. Also attempted sit <> stand with PT in front prior to dale fung, able to clear buttocks off bed, however unable to go into upright posture.

## 2022-11-23 NOTE — PHYSICAL THERAPY INITIAL EVALUATION ADULT - ASSISTIVE DEVICE FOR TRANSFER: SIT/STAND, REHAB EVAL
dale fung dale fungx2 reps. Also attempted sit <> stand with PT in front prior to dale fung, able to clear buttocks off bed, however unable to go into upright posture

## 2022-11-24 LAB
CULTURE RESULTS: SIGNIFICANT CHANGE UP
HCT VFR BLD CALC: 29.8 % — LOW (ref 39–50)
HGB BLD-MCNC: 8.9 G/DL — LOW (ref 13–17)
INR BLD: 1.91 RATIO — HIGH (ref 0.88–1.16)
MCHC RBC-ENTMCNC: 24.9 PG — LOW (ref 27–34)
MCHC RBC-ENTMCNC: 29.9 GM/DL — LOW (ref 32–36)
MCV RBC AUTO: 83.5 FL — SIGNIFICANT CHANGE UP (ref 80–100)
NRBC # BLD: 0 /100 WBCS — SIGNIFICANT CHANGE UP (ref 0–0)
ORGANISM # SPEC MICROSCOPIC CNT: SIGNIFICANT CHANGE UP
ORGANISM # SPEC MICROSCOPIC CNT: SIGNIFICANT CHANGE UP
PLATELET # BLD AUTO: 298 K/UL — SIGNIFICANT CHANGE UP (ref 150–400)
PROTHROM AB SERPL-ACNC: 22.1 SEC — HIGH (ref 10.5–13.4)
RBC # BLD: 3.57 M/UL — LOW (ref 4.2–5.8)
RBC # FLD: 17.1 % — HIGH (ref 10.3–14.5)
SPECIMEN SOURCE: SIGNIFICANT CHANGE UP
WBC # BLD: 6.51 K/UL — SIGNIFICANT CHANGE UP (ref 3.8–10.5)
WBC # FLD AUTO: 6.51 K/UL — SIGNIFICANT CHANGE UP (ref 3.8–10.5)

## 2022-11-24 PROCEDURE — 70553 MRI BRAIN STEM W/O & W/DYE: CPT | Mod: 26

## 2022-11-24 RX ADMIN — Medication 100 MILLIGRAM(S): at 11:46

## 2022-11-24 RX ADMIN — Medication 3 MILLILITER(S): at 06:16

## 2022-11-24 RX ADMIN — ATENOLOL 25 MILLIGRAM(S): 25 TABLET ORAL at 06:17

## 2022-11-24 RX ADMIN — Medication 1 TABLET(S): at 11:45

## 2022-11-24 RX ADMIN — TAMSULOSIN HYDROCHLORIDE 0.4 MILLIGRAM(S): 0.4 CAPSULE ORAL at 21:25

## 2022-11-24 RX ADMIN — LATANOPROST 1 DROP(S): 0.05 SOLUTION/ DROPS OPHTHALMIC; TOPICAL at 21:25

## 2022-11-24 RX ADMIN — Medication 1 DROP(S): at 06:17

## 2022-11-24 RX ADMIN — ERTAPENEM SODIUM 120 MILLIGRAM(S): 1 INJECTION, POWDER, LYOPHILIZED, FOR SOLUTION INTRAMUSCULAR; INTRAVENOUS at 23:26

## 2022-11-24 RX ADMIN — ZINC SULFATE TAB 220 MG (50 MG ZINC EQUIVALENT) 220 MILLIGRAM(S): 220 (50 ZN) TAB at 11:45

## 2022-11-24 RX ADMIN — Medication 1 DROP(S): at 18:16

## 2022-11-24 RX ADMIN — Medication 3 MILLILITER(S): at 18:16

## 2022-11-24 RX ADMIN — SODIUM CHLORIDE 60 MILLILITER(S): 9 INJECTION, SOLUTION INTRAVENOUS at 10:36

## 2022-11-24 RX ADMIN — FAMOTIDINE 20 MILLIGRAM(S): 10 INJECTION INTRAVENOUS at 11:46

## 2022-11-24 NOTE — PATIENT PROFILE ADULT - FALL HARM RISK - RISK INTERVENTIONS
[facility]    Discussed SCP over the phone. Pt is following up with a dermatologist and a general surgeon in [place]. Pt states he worries about his ears and we talked about using a wide brim hat. He states he has one and will use it. Pt will call with any new questions or concerns. SC     Assistance OOB with selected safe patient handling equipment/Communicate Fall Risk and Risk Factors to all staff, patient, and family/Discuss with provider need for PT consult/Monitor gait and stability/Provide patient with walking aids - walker, cane, crutches/Reinforce activity limits and safety measures with patient and family/Visual Cue: Yellow wristband/Bed in lowest position, wheels locked, appropriate side rails in place/Call bell, personal items and telephone in reach/Instruct patient to call for assistance before getting out of bed or chair/Non-slip footwear when patient is out of bed/Arnold to call system/Physically safe environment - no spills, clutter or unnecessary equipment/Purposeful Proactive Rounding/Room/bathroom lighting operational, light cord in reach

## 2022-11-24 NOTE — PATIENT PROFILE ADULT - FUNCTIONAL ASSESSMENT - BASIC MOBILITY 3.
Spoke with Dr. Christian Galvez while rounding regarding patient's potassium 3.4.    Scotty Monroy 2 = A lot of assistance

## 2022-11-24 NOTE — PATIENT PROFILE ADULT - FUNCTIONAL ASSESSMENT - BASIC MOBILITY 6.
2-calculated by average/Not able to assess (calculate score using [facility] averaging method)

## 2022-11-25 LAB
ANION GAP SERPL CALC-SCNC: 12 MMOL/L — SIGNIFICANT CHANGE UP (ref 5–17)
BUN SERPL-MCNC: 17 MG/DL — SIGNIFICANT CHANGE UP (ref 7–23)
CALCIUM SERPL-MCNC: 8.1 MG/DL — LOW (ref 8.4–10.5)
CHLORIDE SERPL-SCNC: 110 MMOL/L — HIGH (ref 96–108)
CO2 SERPL-SCNC: 23 MMOL/L — SIGNIFICANT CHANGE UP (ref 22–31)
CREAT SERPL-MCNC: 1.05 MG/DL — SIGNIFICANT CHANGE UP (ref 0.5–1.3)
CULTURE RESULTS: SIGNIFICANT CHANGE UP
EGFR: 79 ML/MIN/1.73M2 — SIGNIFICANT CHANGE UP
GLUCOSE SERPL-MCNC: 105 MG/DL — HIGH (ref 70–99)
HCT VFR BLD CALC: 32.2 % — LOW (ref 39–50)
HGB BLD-MCNC: 9.4 G/DL — LOW (ref 13–17)
INR BLD: 1.36 RATIO — HIGH (ref 0.88–1.16)
MAGNESIUM SERPL-MCNC: 1.8 MG/DL — SIGNIFICANT CHANGE UP (ref 1.6–2.6)
MCHC RBC-ENTMCNC: 24.5 PG — LOW (ref 27–34)
MCHC RBC-ENTMCNC: 29.2 GM/DL — LOW (ref 32–36)
MCV RBC AUTO: 84.1 FL — SIGNIFICANT CHANGE UP (ref 80–100)
NRBC # BLD: 0 /100 WBCS — SIGNIFICANT CHANGE UP (ref 0–0)
PHOSPHATE SERPL-MCNC: 2.6 MG/DL — SIGNIFICANT CHANGE UP (ref 2.5–4.5)
PLATELET # BLD AUTO: 300 K/UL — SIGNIFICANT CHANGE UP (ref 150–400)
POTASSIUM SERPL-MCNC: 3.6 MMOL/L — SIGNIFICANT CHANGE UP (ref 3.5–5.3)
POTASSIUM SERPL-SCNC: 3.6 MMOL/L — SIGNIFICANT CHANGE UP (ref 3.5–5.3)
PROTHROM AB SERPL-ACNC: 15.8 SEC — HIGH (ref 10.5–13.4)
RBC # BLD: 3.83 M/UL — LOW (ref 4.2–5.8)
RBC # FLD: 17.2 % — HIGH (ref 10.3–14.5)
SODIUM SERPL-SCNC: 145 MMOL/L — SIGNIFICANT CHANGE UP (ref 135–145)
SPECIMEN SOURCE: SIGNIFICANT CHANGE UP
WBC # BLD: 6.95 K/UL — SIGNIFICANT CHANGE UP (ref 3.8–10.5)
WBC # FLD AUTO: 6.95 K/UL — SIGNIFICANT CHANGE UP (ref 3.8–10.5)

## 2022-11-25 PROCEDURE — 99232 SBSQ HOSP IP/OBS MODERATE 35: CPT

## 2022-11-25 RX ORDER — CHOLECALCIFEROL (VITAMIN D3) 125 MCG
1000 CAPSULE ORAL DAILY
Refills: 0 | Status: DISCONTINUED | OUTPATIENT
Start: 2022-11-25 | End: 2022-12-06

## 2022-11-25 RX ORDER — ENOXAPARIN SODIUM 100 MG/ML
120 INJECTION SUBCUTANEOUS EVERY 12 HOURS
Refills: 0 | Status: DISCONTINUED | OUTPATIENT
Start: 2022-11-25 | End: 2022-11-28

## 2022-11-25 RX ADMIN — Medication 1000 UNIT(S): at 13:33

## 2022-11-25 RX ADMIN — Medication 1 DROP(S): at 17:31

## 2022-11-25 RX ADMIN — Medication 100 MILLIGRAM(S): at 13:33

## 2022-11-25 RX ADMIN — Medication 1 TABLET(S): at 13:21

## 2022-11-25 RX ADMIN — ENOXAPARIN SODIUM 120 MILLIGRAM(S): 100 INJECTION SUBCUTANEOUS at 17:33

## 2022-11-25 RX ADMIN — ATENOLOL 25 MILLIGRAM(S): 25 TABLET ORAL at 05:42

## 2022-11-25 RX ADMIN — FAMOTIDINE 20 MILLIGRAM(S): 10 INJECTION INTRAVENOUS at 13:12

## 2022-11-25 RX ADMIN — TAMSULOSIN HYDROCHLORIDE 0.4 MILLIGRAM(S): 0.4 CAPSULE ORAL at 21:27

## 2022-11-25 RX ADMIN — LATANOPROST 1 DROP(S): 0.05 SOLUTION/ DROPS OPHTHALMIC; TOPICAL at 21:27

## 2022-11-25 RX ADMIN — Medication 1 DROP(S): at 05:44

## 2022-11-25 RX ADMIN — ZINC SULFATE TAB 220 MG (50 MG ZINC EQUIVALENT) 220 MILLIGRAM(S): 220 (50 ZN) TAB at 13:21

## 2022-11-25 RX ADMIN — SODIUM CHLORIDE 60 MILLILITER(S): 9 INJECTION, SOLUTION INTRAVENOUS at 17:37

## 2022-11-25 RX ADMIN — Medication 3 MILLILITER(S): at 05:52

## 2022-11-25 NOTE — SWALLOW BEDSIDE ASSESSMENT ADULT - PHARYNGEAL PHASE
No signs or symptoms of laryngeal penetration/aspiration evident with any consistency presented.  Pharyngeal swallow judged to be timely with adequate laryngeal elevation palpated

## 2022-11-25 NOTE — SWALLOW BEDSIDE ASSESSMENT ADULT - SWALLOW EVAL: DIAGNOSIS
Pt is a 68 y/o male admitted with speech difficulty, per Neuro progressive dysarthria, who presents with overtly functional oropharyngeal swallowing skills.  No signs or symptoms of laryngeal penetration/aspiration evident with any consistency presented.  Pharyngeal swallow judged to be timely with laryngeal elevation palpated.  Pt denies difficulty swallowing and articulatory precision judged to be adequate with good intelligibility.

## 2022-11-25 NOTE — SWALLOW BEDSIDE ASSESSMENT ADULT - COMMENTS
11/23/22 INR: INR 5.56. Neuro consult; Progressive dysarthria, hallucinations due to autoimmune/ toxic/metabolic/ infectious encephalopathy i/s/o diffuse cerebral dysfunction vs metastatic given history of renal cancer. 11/23/22 INR: INR 5.56. Neuro consult; Progressive dysarthria, hallucinations due to autoimmune/ toxic/metabolic/ infectious encephalopathy i/s/o diffuse cerebral dysfunction vs metastatic given history of renal cancer.  Per Neuro note: "Per wife, patient was d/c to [facility] after L knee arthroplasty, at which point patient went from being totally independent to totally dependent post-op (requiring lift in- and out-of-bed, was in diaper, etc.). States he had started to become very confused, was not feeling well. was found to have a  UTI, became incoherent and arms started shaking, and appeared to wife "as if he aged 30 years". Unknown Abx for UTI. He was readmitted 10/11 after three days of AMS and underwent sepsis and metabolic acidosis workup for UTI and newfound COVID+. Patient's mental status improved and patient was sent back to rehab. Course complicated by new acute bilateral DVTs for which the patient was discharged 10/22 on warfarin. Patient readmitted 11/5 for urinary retention and started on CTX ---> zosyn for complicated UTI. Evaluated for STROKE 11/22 due to aphasia but code cancelled." 11/23/22 INR: INR 5.56. Neuro consult; Progressive dysarthria, hallucinations due to autoimmune/ toxic/metabolic/ infectious encephalopathy i/s/o diffuse cerebral dysfunction vs metastatic given history of renal cancer.  Per Neuro note: "Per wife, patient was d/c to [facility] after L knee arthroplasty, at which point patient went from being totally independent to totally dependent post-op (requiring lift in- and out-of-bed, was in diaper, etc.). States he had started to become very confused, was not feeling well. was found to have a  UTI, became incoherent and arms started shaking, and appeared to wife "as if he aged 30 years". Unknown Abx for UTI. He was readmitted 10/11 after three days of AMS and underwent sepsis and metabolic acidosis workup for UTI and newfound COVID+. Patient's mental status improved and patient was sent back to rehab. Course complicated by new acute bilateral DVTs for which the patient was discharged 10/22 on warfarin. Patient readmitted 11/5 for urinary retention and started on CTX ---> zosyn for complicated UTI. Evaluated for STROKE 11/22 due to aphasia but code cancelled."  11/22/22 CXR: IMPRESSION: No focal consolidations.  11/24/22 MRI Brain: IMPRESSION:  No MRI evidence of acute intracranial pathology.  No evidence of acute   infarct, intracranial blood products, vasogenic edema, mass effect,   hydrocephalus or abnormal enhancement.  Mild patchy chronic microvascular ischemic disease.

## 2022-11-25 NOTE — SWALLOW BEDSIDE ASSESSMENT ADULT - SLP PERTINENT HISTORY OF CURRENT PROBLEM
67M w/PMHx of HTN, HLD, heart arrythmia, renal cancer s/p partial nephrectomy (~2017/18), DVT/PE iso renal cancer (~2017/18) on Coumadin,  s/p total L knee arthoplasty (9/16/22), asthma, recent admission for urinary retention treated for UTI , s/p marcia , d/c’ed to [facility] 11/09/22, p/w AMS and speech difficulty x 1 day

## 2022-11-25 NOTE — SWALLOW BEDSIDE ASSESSMENT ADULT - SLP GENERAL OBSERVATIONS
Pt awake, alert and oriented x 3 with mild cueing for date.  Articulatory precision judged to be adequate with good intelligibility. Pt reports swallow is at baseline although he reported difficulty swallowing a few days ago which reportedly resolved; oral exam was unremarkable.

## 2022-11-26 LAB
INR BLD: 1.42 RATIO — HIGH (ref 0.88–1.16)
PROTHROM AB SERPL-ACNC: 16.4 SEC — HIGH (ref 10.5–13.4)

## 2022-11-26 PROCEDURE — 99222 1ST HOSP IP/OBS MODERATE 55: CPT

## 2022-11-26 RX ORDER — ATENOLOL 25 MG/1
25 TABLET ORAL DAILY
Refills: 0 | Status: DISCONTINUED | OUTPATIENT
Start: 2022-11-26 | End: 2022-12-06

## 2022-11-26 RX ADMIN — SODIUM CHLORIDE 60 MILLILITER(S): 9 INJECTION, SOLUTION INTRAVENOUS at 18:05

## 2022-11-26 RX ADMIN — Medication 1000 UNIT(S): at 12:32

## 2022-11-26 RX ADMIN — TIOTROPIUM BROMIDE 1 CAPSULE(S): 18 CAPSULE ORAL; RESPIRATORY (INHALATION) at 12:33

## 2022-11-26 RX ADMIN — Medication 3 MILLILITER(S): at 05:08

## 2022-11-26 RX ADMIN — Medication 1 DROP(S): at 05:08

## 2022-11-26 RX ADMIN — Medication 1 DROP(S): at 18:06

## 2022-11-26 RX ADMIN — SODIUM CHLORIDE 60 MILLILITER(S): 9 INJECTION, SOLUTION INTRAVENOUS at 21:18

## 2022-11-26 RX ADMIN — Medication 1 TABLET(S): at 12:33

## 2022-11-26 RX ADMIN — ATENOLOL 25 MILLIGRAM(S): 25 TABLET ORAL at 05:08

## 2022-11-26 RX ADMIN — ENOXAPARIN SODIUM 120 MILLIGRAM(S): 100 INJECTION SUBCUTANEOUS at 05:08

## 2022-11-26 RX ADMIN — Medication 100 MILLIGRAM(S): at 12:32

## 2022-11-26 RX ADMIN — TAMSULOSIN HYDROCHLORIDE 0.4 MILLIGRAM(S): 0.4 CAPSULE ORAL at 21:17

## 2022-11-26 RX ADMIN — FAMOTIDINE 20 MILLIGRAM(S): 10 INJECTION INTRAVENOUS at 12:32

## 2022-11-26 RX ADMIN — LATANOPROST 1 DROP(S): 0.05 SOLUTION/ DROPS OPHTHALMIC; TOPICAL at 21:17

## 2022-11-26 RX ADMIN — Medication 3 MILLILITER(S): at 12:32

## 2022-11-26 RX ADMIN — ENOXAPARIN SODIUM 120 MILLIGRAM(S): 100 INJECTION SUBCUTANEOUS at 18:05

## 2022-11-26 RX ADMIN — ZINC SULFATE TAB 220 MG (50 MG ZINC EQUIVALENT) 220 MILLIGRAM(S): 220 (50 ZN) TAB at 12:32

## 2022-11-26 NOTE — CONSULT NOTE ADULT - SUBJECTIVE AND OBJECTIVE BOX
HPI:  67M HTN, HLD, arrythmia, renal cancer s/p partial nephrectomy (~2017/18), DVT/PE iso renal cancer (~2017/18) on Coumadin,  s/p total L knee arthoplasty (9/16/22), asthma,   recent admission for urinary retention treated for UTI , s/p isadora kennedy/roger’ed to [facility] 11/09/22,   he now presents for altered mental state and slurred speech for the past day.   Per family , patient has been intermittently confused over the past few weeks including change in level of alertness and intermittent hallucinations.   At baseline patient is conversive , however, on day of admission , he was noted to have difficulty speaking and was slurring his words , he was also more lethargic than usual. No reported fever or chills, no difficulty breathing.       PAST MEDICAL & SURGICAL HISTORY:  HTN (hypertension)      HLD (hyperlipidemia)      Pulmonary embolism      Arrhythmia      Deep vein thrombosis (DVT)      Asthma      Renal cancer  s/p partial nephrectomy      History of left knee replacement      History of partial nephrectomy          Allergies    No Known Allergies    Intolerances        ANTIMICROBIALS:      OTHER MEDS:  acetaminophen     Tablet .. 650 milliGRAM(s) Oral every 6 hours PRN  albuterol/ipratropium for Nebulization 3 milliLiter(s) Nebulizer every 6 hours  allopurinol 100 milliGRAM(s) Oral daily  atenolol  Tablet 25 milliGRAM(s) Oral daily  cholecalciferol 1000 Unit(s) Oral daily  enoxaparin Injectable 120 milliGRAM(s) SubCutaneous every 12 hours  famotidine    Tablet 20 milliGRAM(s) Oral daily  latanoprost 0.005% Ophthalmic Solution 1 Drop(s) Both EYES at bedtime  melatonin 3 milliGRAM(s) Oral at bedtime PRN  multivitamin/minerals 1 Tablet(s) Oral daily  sodium chloride 0.45%. 1000 milliLiter(s) IV Continuous <Continuous>  tamsulosin 0.4 milliGRAM(s) Oral at bedtime  timolol 0.5% Solution 1 Drop(s) Both EYES two times a day  tiotropium 18 MICROgram(s) Capsule 1 Capsule(s) Inhalation daily  zinc sulfate 220 milliGRAM(s) Oral daily      SOCIAL HISTORY:    FAMILY HISTORY:  No pertinent family history in first degree relatives        ROS:  Unobtainable because:   All other systems negative   Constitutional: no fever, no chills  Eye: no vision changes  ENT: no sore throat, no rhinorrhea  Cardiovascular: no chest pain, no palpitation  Respiratory: no SOB, no cough  GI:  no abd pain, no vomiting, no diarrhea  urinary: no dysuria, no hematuria, no flank pain  musculoskeletal: no joint pain, no joint swelling  skin: no rash  neurology: no headache, no change in mental status  psych: no anxiety    Physical Exam:  General: awake, alert, non toxic  Head: atraumatic, normocephalic  Eyes: normal sclera and conjunctiva  ENT: no LAD, neck supple  Cardio: regular rate and rhythm   Respiratory: nonlabored on room air, clear bilaterally, no wheezing  abd: soft, bowel sounds present, not tender  : no suprapubic tenderness, no kennedy  Musculoskeletal: no joint swelling, no edema  Skin: no rash  vascular: no phlebitis  Neurologic: no focal deficits  psych: normal affect       Drug Dosing Weight  Height (cm): 172.7 (22 Nov 2022 16:30)  Weight (kg): 117.7 (23 Nov 2022 00:01)  BMI (kg/m2): 39.5 (23 Nov 2022 00:01)  BSA (m2): 2.28 (23 Nov 2022 00:01)    Vital Signs Last 24 Hrs  T(F): 98.1 (11-26-22 @ 04:46), Max: 100.3 (11-22-22 @ 17:49)    Vital Signs Last 24 Hrs  HR: 82 (11-26-22 @ 04:46) (82 - 93)  BP: 153/101 (11-26-22 @ 04:46) (147/89 - 159/99)  RR: 17 (11-26-22 @ 04:46)  SpO2: 96% (11-26-22 @ 04:46) (96% - 99%)  Wt(kg): --                          9.4    6.95  )-----------( 300      ( 25 Nov 2022 10:54 )             32.2       11-25    145  |  110<H>  |  17  ----------------------------<  105<H>  3.6   |  23  |  1.05    Ca    8.1<L>      25 Nov 2022 10:54  Phos  2.6     11-25  Mg     1.8     11-25            MICROBIOLOGY:  Culture - Urine (collected 11-22-22 @ 18:05)  Source: Clean Catch Clean Catch (Midstream)  Final Report (11-25-22 @ 15:43):    <10,000 CFU/mL Normal Urogenital Maria Elena    Culture - Blood (collected 11-22-22 @ 17:35)  Source: .Blood Blood-Peripheral  Gram Stain (11-23-22 @ 17:33):    Growth in aerobic bottle: Gram Positive Cocci in Clusters  Final Report (11-24-22 @ 13:46):    Growth in aerobic bottle: Staphylococcus haemolyticus    Coag Negative Staphylococcus    Single set isolate, possible contaminant. Contact    Microbiology if susceptibility testing clinically    indicated.    ***Blood Panel PCR results on this specimen are available    approximately 3 hours after the Gram stain result.***    Gram stain, PCR, and/or culture results may not always    correspond due to difference in methodologies.    ************************************************************    This PCR assay was performed by multiplex PCR. This    Assay tests for 66 bacterial and resistance gene targets.    Please refer to the [facility] test directory    at [email or web address] for details.  Organism: Blood Culture PCR (11-24-22 @ 13:46)  Organism: Blood Culture PCR (11-24-22 @ 13:46)      -  Coagulase negative Staphylococcus: Detec      Method Type: PCR    Culture - Blood (collected 11-22-22 @ 17:20)  Source: .Blood Blood  Preliminary Report (11-23-22 @ 22:02):    No growth to date.      RADIOLOGY:  Images below reviewed personally  MR Head w/wo IV Cont (11.24.22 @ 18:42)   No MRI evidence of acute intracranial pathology.  No evidence of acute   infarct, intracranial blood products, vasogenic edema, mass effect,   hydrocephalus or abnormal enhancement.  Mild patchy chronic microvascular ischemic disease.    Xray Chest 1 View AP/PA (11.22.22 @ 17:47)   No focal consolidations.    CT Angio Neck w/ IV Cont (11.22.22 @ 17:15)   Noncontrast CT brain: No acute intracranial hemorrhage, mass effect, or   CT evidence of acute territorial infarct.  CT angiography neck: No evidence of hemodynamically significant stenosis   using NASCET criteria.  Patent vertebral arteries.  No evidence of   vascular dissection.  CT angiography brain: Nondiagnostic due to motion.   HPI:   67M HTN, HLD, arrythmia, renal cancer in remission s/p partial nephrectomy.   s/p total L knee arthoplasty 9/16/22.   Hospitalized 10/10-22 for confusion, found to be in acute renal failure, uremic and metabolic acidosis, recovered, required a kennedy and was discharged with it.   Readmitted 11/5-9 for urinary retention after kennedy was removed. It was reinserted and he was treated for UTI with a course of Zosyn. Urine grew ESBL Klebsiella (Zosyn SDD), VRE faecalis.   Back 11/22 for altered mental state and slurred speech.   Reportedly intermittently confused over the past few weeks including change in level of alertness and intermittent hallucinations.   Worse in the preceding day.   Here had 100.3F rectally initially.   Received one dose Cefepime 11/22 followed by Ertapenem 11/23-24.   Nothing is growing from the urine or blood.   Feels better overall but not really clear about specifics.   No cough, rhinorrhea, sore throat, dyspnea, abdominal pain or diarrhea.       PAST MEDICAL & SURGICAL HISTORY:  HTN (hypertension)      HLD (hyperlipidemia)      Pulmonary embolism      Arrhythmia      Deep vein thrombosis (DVT)      Asthma      Renal cancer  s/p partial nephrectomy      History of left knee replacement      History of partial nephrectomy          Allergies    No Known Allergies    Intolerances        ANTIMICROBIALS:      OTHER MEDS:  acetaminophen     Tablet .. 650 milliGRAM(s) Oral every 6 hours PRN  albuterol/ipratropium for Nebulization 3 milliLiter(s) Nebulizer every 6 hours  allopurinol 100 milliGRAM(s) Oral daily  atenolol  Tablet 25 milliGRAM(s) Oral daily  cholecalciferol 1000 Unit(s) Oral daily  enoxaparin Injectable 120 milliGRAM(s) SubCutaneous every 12 hours  famotidine    Tablet 20 milliGRAM(s) Oral daily  latanoprost 0.005% Ophthalmic Solution 1 Drop(s) Both EYES at bedtime  melatonin 3 milliGRAM(s) Oral at bedtime PRN  multivitamin/minerals 1 Tablet(s) Oral daily  sodium chloride 0.45%. 1000 milliLiter(s) IV Continuous <Continuous>  tamsulosin 0.4 milliGRAM(s) Oral at bedtime  timolol 0.5% Solution 1 Drop(s) Both EYES two times a day  tiotropium 18 MICROgram(s) Capsule 1 Capsule(s) Inhalation daily  zinc sulfate 220 milliGRAM(s) Oral daily      SOCIAL HISTORY: Nonsmoker     FAMILY HISTORY:  No pertinent family history in first degree relatives        ROS:  All other systems negative   Constitutional: no fever, no chills  Eye: no vision changes  ENT: no sore throat, no rhinorrhea  Cardiovascular: no chest pain, no palpitation  Respiratory: no SOB, no cough  GI:  no abd pain, no vomiting, no diarrhea  urinary: kennedy   musculoskeletal: no joint pain, no joint swelling  skin: no rash  neurology: was confused, better now   psych: no anxiety    Physical Exam:  General: awake, alert, non toxic, obese   Head: atraumatic, normocephalic  Eyes: normal sclera and conjunctiva  ENT: no LAD, neck supple  Cardio: regular rate and rhythm   Respiratory: nonlabored on room air, clear bilaterally, no wheezing  abd: soft, bowel sounds present, not tender  : no suprapubic tenderness. kennedy  Musculoskeletal: no joint swelling, no edema  Skin: no rash  vascular: no phlebitis  Neurologic: no focal deficits  psych: normal affect       Drug Dosing Weight  Height (cm): 172.7 (22 Nov 2022 16:30)  Weight (kg): 117.7 (23 Nov 2022 00:01)  BMI (kg/m2): 39.5 (23 Nov 2022 00:01)  BSA (m2): 2.28 (23 Nov 2022 00:01)    Vital Signs Last 24 Hrs  T(F): 98.1 (11-26-22 @ 04:46), Max: 100.3 (11-22-22 @ 17:49)    Vital Signs Last 24 Hrs  HR: 82 (11-26-22 @ 04:46) (82 - 93)  BP: 153/101 (11-26-22 @ 04:46) (147/89 - 159/99)  RR: 17 (11-26-22 @ 04:46)  SpO2: 96% (11-26-22 @ 04:46) (96% - 99%)  Wt(kg): --                          9.4    6.95  )-----------( 300      ( 25 Nov 2022 10:54 )             32.2       11-25    145  |  110<H>  |  17  ----------------------------<  105<H>  3.6   |  23  |  1.05    Ca    8.1<L>      25 Nov 2022 10:54  Phos  2.6     11-25  Mg     1.8     11-25            MICROBIOLOGY:  Culture - Urine (collected 11-22-22 @ 18:05)  Source: Clean Catch Clean Catch (Midstream)  Final Report (11-25-22 @ 15:43):    <10,000 CFU/mL Normal Urogenital Maria Elena    Culture - Blood (collected 11-22-22 @ 17:35)  Source: .Blood Blood-Peripheral  Gram Stain (11-23-22 @ 17:33):    Growth in aerobic bottle: Gram Positive Cocci in Clusters  Final Report (11-24-22 @ 13:46):    Growth in aerobic bottle: Staphylococcus haemolyticus    Coag Negative Staphylococcus    Single set isolate, possible contaminant. Contact    Microbiology if susceptibility testing clinically    indicated.    ***Blood Panel PCR results on this specimen are available    approximately 3 hours after the Gram stain result.***    Gram stain, PCR, and/or culture results may not always    correspond due to difference in methodologies.    ************************************************************    This PCR assay was performed by multiplex PCR. This    Assay tests for 66 bacterial and resistance gene targets.    Please refer to the [facility] test directory    at [email or web address] for details.  Organism: Blood Culture PCR (11-24-22 @ 13:46)  Organism: Blood Culture PCR (11-24-22 @ 13:46)      -  Coagulase negative Staphylococcus: Detec      Method Type: PCR    Culture - Blood (collected 11-22-22 @ 17:20)  Source: .Blood Blood  Preliminary Report (11-23-22 @ 22:02):    No growth to date.      RADIOLOGY:  Images below reviewed personally  MR Head w/wo IV Cont (11.24.22 @ 18:42)   No MRI evidence of acute intracranial pathology.  No evidence of acute   infarct, intracranial blood products, vasogenic edema, mass effect,   hydrocephalus or abnormal enhancement.  Mild patchy chronic microvascular ischemic disease.    Xray Chest 1 View AP/PA (11.22.22 @ 17:47)   No focal consolidations.    CT Angio Neck w/ IV Cont (11.22.22 @ 17:15)   Noncontrast CT brain: No acute intracranial hemorrhage, mass effect, or   CT evidence of acute territorial infarct.  CT angiography neck: No evidence of hemodynamically significant stenosis   using NASCET criteria.  Patent vertebral arteries.  No evidence of   vascular dissection.  CT angiography brain: Nondiagnostic due to motion.

## 2022-11-27 LAB
ANION GAP SERPL CALC-SCNC: 10 MMOL/L — SIGNIFICANT CHANGE UP (ref 5–17)
BUN SERPL-MCNC: 19 MG/DL — SIGNIFICANT CHANGE UP (ref 7–23)
CALCIUM SERPL-MCNC: 8.3 MG/DL — LOW (ref 8.4–10.5)
CHLORIDE SERPL-SCNC: 106 MMOL/L — SIGNIFICANT CHANGE UP (ref 96–108)
CO2 SERPL-SCNC: 24 MMOL/L — SIGNIFICANT CHANGE UP (ref 22–31)
CREAT SERPL-MCNC: 1 MG/DL — SIGNIFICANT CHANGE UP (ref 0.5–1.3)
CULTURE RESULTS: SIGNIFICANT CHANGE UP
EGFR: 84 ML/MIN/1.73M2 — SIGNIFICANT CHANGE UP
GLUCOSE SERPL-MCNC: 90 MG/DL — SIGNIFICANT CHANGE UP (ref 70–99)
HCT VFR BLD CALC: 31.9 % — LOW (ref 39–50)
HGB BLD-MCNC: 9.3 G/DL — LOW (ref 13–17)
INR BLD: 1.36 RATIO — HIGH (ref 0.88–1.16)
MCHC RBC-ENTMCNC: 24.3 PG — LOW (ref 27–34)
MCHC RBC-ENTMCNC: 29.2 GM/DL — LOW (ref 32–36)
MCV RBC AUTO: 83.5 FL — SIGNIFICANT CHANGE UP (ref 80–100)
NRBC # BLD: 0 /100 WBCS — SIGNIFICANT CHANGE UP (ref 0–0)
PLATELET # BLD AUTO: 324 K/UL — SIGNIFICANT CHANGE UP (ref 150–400)
POTASSIUM SERPL-MCNC: 3.9 MMOL/L — SIGNIFICANT CHANGE UP (ref 3.5–5.3)
POTASSIUM SERPL-SCNC: 3.9 MMOL/L — SIGNIFICANT CHANGE UP (ref 3.5–5.3)
PROTHROM AB SERPL-ACNC: 15.8 SEC — HIGH (ref 10.5–13.4)
RBC # BLD: 3.82 M/UL — LOW (ref 4.2–5.8)
RBC # FLD: 17.4 % — HIGH (ref 10.3–14.5)
SODIUM SERPL-SCNC: 140 MMOL/L — SIGNIFICANT CHANGE UP (ref 135–145)
SPECIMEN SOURCE: SIGNIFICANT CHANGE UP
WBC # BLD: 7.28 K/UL — SIGNIFICANT CHANGE UP (ref 3.8–10.5)
WBC # FLD AUTO: 7.28 K/UL — SIGNIFICANT CHANGE UP (ref 3.8–10.5)

## 2022-11-27 PROCEDURE — 74177 CT ABD & PELVIS W/CONTRAST: CPT | Mod: 26

## 2022-11-27 RX ADMIN — LATANOPROST 1 DROP(S): 0.05 SOLUTION/ DROPS OPHTHALMIC; TOPICAL at 21:41

## 2022-11-27 RX ADMIN — ENOXAPARIN SODIUM 120 MILLIGRAM(S): 100 INJECTION SUBCUTANEOUS at 17:45

## 2022-11-27 RX ADMIN — Medication 1 DROP(S): at 05:37

## 2022-11-27 RX ADMIN — SODIUM CHLORIDE 60 MILLILITER(S): 9 INJECTION, SOLUTION INTRAVENOUS at 23:21

## 2022-11-27 RX ADMIN — SODIUM CHLORIDE 60 MILLILITER(S): 9 INJECTION, SOLUTION INTRAVENOUS at 05:44

## 2022-11-27 RX ADMIN — ZINC SULFATE TAB 220 MG (50 MG ZINC EQUIVALENT) 220 MILLIGRAM(S): 220 (50 ZN) TAB at 12:10

## 2022-11-27 RX ADMIN — ENOXAPARIN SODIUM 120 MILLIGRAM(S): 100 INJECTION SUBCUTANEOUS at 05:37

## 2022-11-27 RX ADMIN — ATENOLOL 25 MILLIGRAM(S): 25 TABLET ORAL at 05:37

## 2022-11-27 RX ADMIN — Medication 1 DROP(S): at 17:45

## 2022-11-27 RX ADMIN — Medication 1 TABLET(S): at 12:10

## 2022-11-27 RX ADMIN — FAMOTIDINE 20 MILLIGRAM(S): 10 INJECTION INTRAVENOUS at 12:10

## 2022-11-27 RX ADMIN — TIOTROPIUM BROMIDE 1 CAPSULE(S): 18 CAPSULE ORAL; RESPIRATORY (INHALATION) at 12:10

## 2022-11-27 RX ADMIN — Medication 1000 UNIT(S): at 12:10

## 2022-11-27 RX ADMIN — TAMSULOSIN HYDROCHLORIDE 0.4 MILLIGRAM(S): 0.4 CAPSULE ORAL at 21:41

## 2022-11-27 RX ADMIN — Medication 100 MILLIGRAM(S): at 12:10

## 2022-11-27 NOTE — PROVIDER CONTACT NOTE (OTHER) - REASON
Pt hypertensive- 153/101 this am
patient's blood pressure was 151/103
patient's family concerned about patient's mental status and asking questions about antibiotic
200ml urinary output with kennedy (dark and cloudy urine).

## 2022-11-27 NOTE — PROVIDER CONTACT NOTE (OTHER) - ACTION/TREATMENT ORDERED:
Follow up with day team; ID to follow up might need to extend antibiotic treatment.
PA made aware and said to give blood pressure medication at regular time in the morning.
PA made aware and assessed patient and spoke to family member at bedside.
administer atenolol and continue to monitor

## 2022-11-27 NOTE — PROVIDER CONTACT NOTE (OTHER) - BACKGROUND
pt admitted for ams
pt admitted with altered mental status
pt admitted for ams; hx of htn
patient admitted with altered mental status

## 2022-11-27 NOTE — PROVIDER CONTACT NOTE (OTHER) - SITUATION
patient's blood pressure was 151/103
200 mL dark/cloudy urine emptied from kennedy bag this AM.
patient's family concerned about patient's mental status and asking questions about antibiotic
see above

## 2022-11-27 NOTE — PROVIDER CONTACT NOTE (OTHER) - ASSESSMENT
200 mL dark/cloudy urine emptied from kennedy bag this AM; last emptied at 18:00 yesterday. Patient received NS at 60ml/hr.
patient answered all questions asked correctly.
VS this AM: 98.1 temp orally, 82 HR, 153/101 BP, 17 RR, 96% spo2 room air. Pt denies pain, blurred vision, headache; asymptomatic.
patient bp 151/103

## 2022-11-28 LAB
ANION GAP SERPL CALC-SCNC: 8 MMOL/L — SIGNIFICANT CHANGE UP (ref 5–17)
BUN SERPL-MCNC: 18 MG/DL — SIGNIFICANT CHANGE UP (ref 7–23)
CALCIUM SERPL-MCNC: 8.6 MG/DL — SIGNIFICANT CHANGE UP (ref 8.4–10.5)
CHLORIDE SERPL-SCNC: 107 MMOL/L — SIGNIFICANT CHANGE UP (ref 96–108)
CO2 SERPL-SCNC: 26 MMOL/L — SIGNIFICANT CHANGE UP (ref 22–31)
CREAT SERPL-MCNC: 1.16 MG/DL — SIGNIFICANT CHANGE UP (ref 0.5–1.3)
EGFR: 70 ML/MIN/1.73M2 — SIGNIFICANT CHANGE UP
GLUCOSE SERPL-MCNC: 95 MG/DL — SIGNIFICANT CHANGE UP (ref 70–99)
HCT VFR BLD CALC: 32.8 % — LOW (ref 39–50)
HGB BLD-MCNC: 9.7 G/DL — LOW (ref 13–17)
INR BLD: 1.37 RATIO — HIGH (ref 0.88–1.16)
MCHC RBC-ENTMCNC: 24.6 PG — LOW (ref 27–34)
MCHC RBC-ENTMCNC: 29.6 GM/DL — LOW (ref 32–36)
MCV RBC AUTO: 83 FL — SIGNIFICANT CHANGE UP (ref 80–100)
NRBC # BLD: 0 /100 WBCS — SIGNIFICANT CHANGE UP (ref 0–0)
PLATELET # BLD AUTO: 334 K/UL — SIGNIFICANT CHANGE UP (ref 150–400)
POTASSIUM SERPL-MCNC: 4 MMOL/L — SIGNIFICANT CHANGE UP (ref 3.5–5.3)
POTASSIUM SERPL-SCNC: 4 MMOL/L — SIGNIFICANT CHANGE UP (ref 3.5–5.3)
PROTHROM AB SERPL-ACNC: 15.8 SEC — HIGH (ref 10.5–13.4)
RBC # BLD: 3.95 M/UL — LOW (ref 4.2–5.8)
RBC # FLD: 17.6 % — HIGH (ref 10.3–14.5)
SARS-COV-2 RNA SPEC QL NAA+PROBE: SIGNIFICANT CHANGE UP
SODIUM SERPL-SCNC: 141 MMOL/L — SIGNIFICANT CHANGE UP (ref 135–145)
WBC # BLD: 7.92 K/UL — SIGNIFICANT CHANGE UP (ref 3.8–10.5)
WBC # FLD AUTO: 7.92 K/UL — SIGNIFICANT CHANGE UP (ref 3.8–10.5)

## 2022-11-28 PROCEDURE — 99232 SBSQ HOSP IP/OBS MODERATE 35: CPT

## 2022-11-28 RX ORDER — APIXABAN 2.5 MG/1
5 TABLET, FILM COATED ORAL EVERY 12 HOURS
Refills: 0 | Status: DISCONTINUED | OUTPATIENT
Start: 2022-11-28 | End: 2022-12-06

## 2022-11-28 RX ORDER — APIXABAN 2.5 MG/1
1 TABLET, FILM COATED ORAL
Qty: 60 | Refills: 0
Start: 2022-11-28 | End: 2022-12-27

## 2022-11-28 RX ADMIN — Medication 1 TABLET(S): at 11:49

## 2022-11-28 RX ADMIN — Medication 1000 UNIT(S): at 12:18

## 2022-11-28 RX ADMIN — ZINC SULFATE TAB 220 MG (50 MG ZINC EQUIVALENT) 220 MILLIGRAM(S): 220 (50 ZN) TAB at 11:49

## 2022-11-28 RX ADMIN — ENOXAPARIN SODIUM 120 MILLIGRAM(S): 100 INJECTION SUBCUTANEOUS at 05:46

## 2022-11-28 RX ADMIN — APIXABAN 5 MILLIGRAM(S): 2.5 TABLET, FILM COATED ORAL at 17:42

## 2022-11-28 RX ADMIN — Medication 100 MILLIGRAM(S): at 11:49

## 2022-11-28 RX ADMIN — TAMSULOSIN HYDROCHLORIDE 0.4 MILLIGRAM(S): 0.4 CAPSULE ORAL at 21:03

## 2022-11-28 RX ADMIN — Medication 3 MILLILITER(S): at 17:43

## 2022-11-28 RX ADMIN — Medication 1 DROP(S): at 05:46

## 2022-11-28 RX ADMIN — Medication 3 MILLILITER(S): at 23:42

## 2022-11-28 RX ADMIN — Medication 3 MILLILITER(S): at 11:51

## 2022-11-28 RX ADMIN — FAMOTIDINE 20 MILLIGRAM(S): 10 INJECTION INTRAVENOUS at 11:50

## 2022-11-28 RX ADMIN — Medication 1 DROP(S): at 17:42

## 2022-11-28 RX ADMIN — TIOTROPIUM BROMIDE 1 CAPSULE(S): 18 CAPSULE ORAL; RESPIRATORY (INHALATION) at 11:50

## 2022-11-28 RX ADMIN — LATANOPROST 1 DROP(S): 0.05 SOLUTION/ DROPS OPHTHALMIC; TOPICAL at 21:03

## 2022-11-28 RX ADMIN — ATENOLOL 25 MILLIGRAM(S): 25 TABLET ORAL at 05:46

## 2022-11-28 NOTE — CHART NOTE - NSCHARTNOTEFT_GEN_A_CORE
67 year-old left-handed man with a PMHx significant for HTN, HLD, cardiac arrythmia, renal cancer (s/p partial nephrectomy ~2017/18), DVT/PE in the setting of renal cancer (~2017/18) on Coumadin, s/p total L knee arthoplasty (9/16/22), asthma, recent admission for urinary retention treated for UTI (on chronic kennedy), admitted on 11/22/22for fluctuating mental status since surgery 9/16, with report of intermittent hallucinations, and speech disturbance, for which Neurology was consulted. Initial assessment 11/23 notable for effortful speech with grossly intact comprehension (intermittently followed commands), and coarse tremors in the upper extremities b/l, overall most concerning for an encephalopathic picture. Repeat assessment 11/25 notable for significantly improved mental status, without evidence of new localizing deficits (chronic impaired ROM in the RUE and in the RLE due to knee pathology). CTH/CTA & MRI brain w/wo unrevealing. S/p ertapenem for UTI. Refused EEG.     Impression: Fluctuating mental status and speech disturbance, now improved, likely due to diffuse cerebral dysfunction most likely secondary to infectious encephalopathy with possible component of delirium with onset in the post-op setting.      Recommendations:   [x] MRI brain w/wo 11/24  [-] Consider Routine EEG ([place] order name: EEG awake and asleep): Patient refused  [/] U/A (-), Ucx (-), Bcx (+ x1 but contamination with coag negative staph)  [/] NH3 19, TSH 2.26, T3/T4 (41/1.1), B12 552, RPR (-), VitD LOW (14.1)  --> PENDING: B1, heavy metal screen, can f/u outpatient  [x] Vitamin D supplementation    #Preventive Measures   - Telemetry monitoring, Neurochecks/VS per unit protocol  - PT/OT: [facility]  - Fall & seizure precautions   - DVT ppx      Discussed with primary team. Patient now back to baseline. Discharge planning. Neurology signing off. Please reconsult PRN or call spectra at [phone] with any questions.

## 2022-11-28 NOTE — CHART NOTE - NSCHARTNOTEFT_GEN_A_CORE
Due to condition of having a chronic Owusu due to urinary retention and AMS secondary to UTI, patient has a severe mobility limitation and requires a standard wheelchair to assist in daily ADLs. Patient cannot ambulate safely with a cane or a walker. Patient has sufficient upper body strength to self propel said wheelchair and has not expressed unwillingness to use the wheelchair. patient has ample room in the home and a caregiver to assist with the wheelchair. use of a lightweight wheelchair will significantly improve the patient's ability to participate in daily ADLs and the patient will use it on a regular basis in the home

## 2022-11-29 LAB
APTT BLD: 39.1 SEC — HIGH (ref 27.5–35.5)
HCT VFR BLD CALC: 31.7 % — LOW (ref 39–50)
HGB BLD-MCNC: 9.2 G/DL — LOW (ref 13–17)
INR BLD: 1.44 RATIO — HIGH (ref 0.88–1.16)
MCHC RBC-ENTMCNC: 24.1 PG — LOW (ref 27–34)
MCHC RBC-ENTMCNC: 29 GM/DL — LOW (ref 32–36)
MCV RBC AUTO: 83.2 FL — SIGNIFICANT CHANGE UP (ref 80–100)
NRBC # BLD: 0 /100 WBCS — SIGNIFICANT CHANGE UP (ref 0–0)
PLATELET # BLD AUTO: 324 K/UL — SIGNIFICANT CHANGE UP (ref 150–400)
PROTHROM AB SERPL-ACNC: 16.8 SEC — HIGH (ref 10.5–13.4)
RBC # BLD: 3.81 M/UL — LOW (ref 4.2–5.8)
RBC # FLD: 17.7 % — HIGH (ref 10.3–14.5)
WBC # BLD: 7.92 K/UL — SIGNIFICANT CHANGE UP (ref 3.8–10.5)
WBC # FLD AUTO: 7.92 K/UL — SIGNIFICANT CHANGE UP (ref 3.8–10.5)

## 2022-11-29 RX ADMIN — Medication 1 TABLET(S): at 12:30

## 2022-11-29 RX ADMIN — FAMOTIDINE 20 MILLIGRAM(S): 10 INJECTION INTRAVENOUS at 12:28

## 2022-11-29 RX ADMIN — APIXABAN 5 MILLIGRAM(S): 2.5 TABLET, FILM COATED ORAL at 05:43

## 2022-11-29 RX ADMIN — TIOTROPIUM BROMIDE 1 CAPSULE(S): 18 CAPSULE ORAL; RESPIRATORY (INHALATION) at 12:29

## 2022-11-29 RX ADMIN — ATENOLOL 25 MILLIGRAM(S): 25 TABLET ORAL at 05:40

## 2022-11-29 RX ADMIN — Medication 1 DROP(S): at 17:29

## 2022-11-29 RX ADMIN — Medication 1 DROP(S): at 05:41

## 2022-11-29 RX ADMIN — Medication 1000 UNIT(S): at 12:28

## 2022-11-29 RX ADMIN — LATANOPROST 1 DROP(S): 0.05 SOLUTION/ DROPS OPHTHALMIC; TOPICAL at 21:16

## 2022-11-29 RX ADMIN — APIXABAN 5 MILLIGRAM(S): 2.5 TABLET, FILM COATED ORAL at 17:30

## 2022-11-29 RX ADMIN — Medication 100 MILLIGRAM(S): at 12:27

## 2022-11-29 RX ADMIN — TAMSULOSIN HYDROCHLORIDE 0.4 MILLIGRAM(S): 0.4 CAPSULE ORAL at 21:16

## 2022-11-29 RX ADMIN — ZINC SULFATE TAB 220 MG (50 MG ZINC EQUIVALENT) 220 MILLIGRAM(S): 220 (50 ZN) TAB at 12:27

## 2022-11-30 LAB
INR BLD: 1.29 RATIO — HIGH (ref 0.88–1.16)
PROTHROM AB SERPL-ACNC: 14.9 SEC — HIGH (ref 10.5–13.4)

## 2022-11-30 RX ADMIN — APIXABAN 5 MILLIGRAM(S): 2.5 TABLET, FILM COATED ORAL at 17:39

## 2022-11-30 RX ADMIN — Medication 1 DROP(S): at 17:39

## 2022-11-30 RX ADMIN — FAMOTIDINE 20 MILLIGRAM(S): 10 INJECTION INTRAVENOUS at 12:55

## 2022-11-30 RX ADMIN — Medication 3 MILLILITER(S): at 17:39

## 2022-11-30 RX ADMIN — TIOTROPIUM BROMIDE 1 CAPSULE(S): 18 CAPSULE ORAL; RESPIRATORY (INHALATION) at 12:55

## 2022-11-30 RX ADMIN — APIXABAN 5 MILLIGRAM(S): 2.5 TABLET, FILM COATED ORAL at 05:56

## 2022-11-30 RX ADMIN — ATENOLOL 25 MILLIGRAM(S): 25 TABLET ORAL at 05:57

## 2022-11-30 RX ADMIN — TAMSULOSIN HYDROCHLORIDE 0.4 MILLIGRAM(S): 0.4 CAPSULE ORAL at 21:27

## 2022-11-30 RX ADMIN — Medication 3 MILLILITER(S): at 12:54

## 2022-11-30 RX ADMIN — Medication 1000 UNIT(S): at 12:55

## 2022-11-30 RX ADMIN — Medication 3 MILLILITER(S): at 05:56

## 2022-11-30 RX ADMIN — Medication 1 DROP(S): at 05:56

## 2022-11-30 RX ADMIN — Medication 100 MILLIGRAM(S): at 12:55

## 2022-11-30 RX ADMIN — Medication 1 TABLET(S): at 12:54

## 2022-11-30 RX ADMIN — LATANOPROST 1 DROP(S): 0.05 SOLUTION/ DROPS OPHTHALMIC; TOPICAL at 21:27

## 2022-11-30 RX ADMIN — ZINC SULFATE TAB 220 MG (50 MG ZINC EQUIVALENT) 220 MILLIGRAM(S): 220 (50 ZN) TAB at 12:55

## 2022-11-30 RX ADMIN — Medication 3 MILLILITER(S): at 00:40

## 2022-11-30 NOTE — CHART NOTE - NSCHARTNOTEFT_GEN_A_CORE
Based on patient's generalized weakness and deconditioning secondary to  diagnosis of Renal Cancer,  patient requires Reema lift to transfer from bed to chair and chair to bed.

## 2022-12-01 LAB
ARSENIC SERPL-MCNC: 2 UG/L — SIGNIFICANT CHANGE UP (ref 0–9)
CADMIUM SERPL-MCNC: 0.8 UG/L — SIGNIFICANT CHANGE UP (ref 0–1.2)
INR BLD: 1.49 RATIO — HIGH (ref 0.88–1.16)
LEAD BLD-MCNC: 1.1 UG/DL — SIGNIFICANT CHANGE UP (ref 0–3.4)
MERCURY SERPL-MCNC: 1.3 UG/L — SIGNIFICANT CHANGE UP (ref 0–14.9)
PROTHROM AB SERPL-ACNC: 17.4 SEC — HIGH (ref 10.5–13.4)
VIT B1 SERPL-MCNC: 118.6 NMOL/L — SIGNIFICANT CHANGE UP (ref 66.5–200)

## 2022-12-01 RX ADMIN — FAMOTIDINE 20 MILLIGRAM(S): 10 INJECTION INTRAVENOUS at 12:39

## 2022-12-01 RX ADMIN — Medication 3 MILLILITER(S): at 00:17

## 2022-12-01 RX ADMIN — APIXABAN 5 MILLIGRAM(S): 2.5 TABLET, FILM COATED ORAL at 05:04

## 2022-12-01 RX ADMIN — Medication 100 MILLIGRAM(S): at 12:38

## 2022-12-01 RX ADMIN — LATANOPROST 1 DROP(S): 0.05 SOLUTION/ DROPS OPHTHALMIC; TOPICAL at 21:37

## 2022-12-01 RX ADMIN — TAMSULOSIN HYDROCHLORIDE 0.4 MILLIGRAM(S): 0.4 CAPSULE ORAL at 21:37

## 2022-12-01 RX ADMIN — Medication 1 TABLET(S): at 12:38

## 2022-12-01 RX ADMIN — ZINC SULFATE TAB 220 MG (50 MG ZINC EQUIVALENT) 220 MILLIGRAM(S): 220 (50 ZN) TAB at 12:39

## 2022-12-01 RX ADMIN — TIOTROPIUM BROMIDE 1 CAPSULE(S): 18 CAPSULE ORAL; RESPIRATORY (INHALATION) at 12:38

## 2022-12-01 RX ADMIN — Medication 3 MILLILITER(S): at 05:03

## 2022-12-01 RX ADMIN — Medication 1 DROP(S): at 17:27

## 2022-12-01 RX ADMIN — APIXABAN 5 MILLIGRAM(S): 2.5 TABLET, FILM COATED ORAL at 17:27

## 2022-12-01 RX ADMIN — Medication 1000 UNIT(S): at 12:39

## 2022-12-01 RX ADMIN — Medication 1 DROP(S): at 05:06

## 2022-12-01 RX ADMIN — ATENOLOL 25 MILLIGRAM(S): 25 TABLET ORAL at 05:05

## 2022-12-02 DIAGNOSIS — M79.89 OTHER SPECIFIED SOFT TISSUE DISORDERS: ICD-10-CM

## 2022-12-02 LAB
INR BLD: 1.7 RATIO — HIGH (ref 0.88–1.16)
PROTHROM AB SERPL-ACNC: 19.6 SEC — HIGH (ref 10.5–13.4)

## 2022-12-02 RX ORDER — FUROSEMIDE 40 MG
40 TABLET ORAL DAILY
Refills: 0 | Status: DISCONTINUED | OUTPATIENT
Start: 2022-12-02 | End: 2022-12-02

## 2022-12-02 RX ORDER — POTASSIUM CHLORIDE 20 MEQ
10 PACKET (EA) ORAL DAILY
Refills: 0 | Status: DISCONTINUED | OUTPATIENT
Start: 2022-12-02 | End: 2022-12-02

## 2022-12-02 RX ORDER — FUROSEMIDE 40 MG
40 TABLET ORAL DAILY
Refills: 0 | Status: DISCONTINUED | OUTPATIENT
Start: 2022-12-02 | End: 2022-12-05

## 2022-12-02 RX ORDER — POTASSIUM CHLORIDE 20 MEQ
20 PACKET (EA) ORAL DAILY
Refills: 0 | Status: DISCONTINUED | OUTPATIENT
Start: 2022-12-02 | End: 2022-12-06

## 2022-12-02 RX ORDER — FUROSEMIDE 40 MG
20 TABLET ORAL ONCE
Refills: 0 | Status: COMPLETED | OUTPATIENT
Start: 2022-12-02 | End: 2022-12-02

## 2022-12-02 RX ADMIN — TAMSULOSIN HYDROCHLORIDE 0.4 MILLIGRAM(S): 0.4 CAPSULE ORAL at 21:53

## 2022-12-02 RX ADMIN — Medication 3 MILLILITER(S): at 05:11

## 2022-12-02 RX ADMIN — Medication 20 MILLIGRAM(S): at 17:34

## 2022-12-02 RX ADMIN — ATENOLOL 25 MILLIGRAM(S): 25 TABLET ORAL at 05:11

## 2022-12-02 RX ADMIN — LATANOPROST 1 DROP(S): 0.05 SOLUTION/ DROPS OPHTHALMIC; TOPICAL at 21:53

## 2022-12-02 RX ADMIN — APIXABAN 5 MILLIGRAM(S): 2.5 TABLET, FILM COATED ORAL at 05:12

## 2022-12-02 RX ADMIN — FAMOTIDINE 20 MILLIGRAM(S): 10 INJECTION INTRAVENOUS at 12:53

## 2022-12-02 RX ADMIN — Medication 1000 UNIT(S): at 12:53

## 2022-12-02 RX ADMIN — Medication 1 DROP(S): at 17:35

## 2022-12-02 RX ADMIN — Medication 40 MILLIGRAM(S): at 12:52

## 2022-12-02 RX ADMIN — Medication 1 TABLET(S): at 12:53

## 2022-12-02 RX ADMIN — Medication 1 DROP(S): at 05:11

## 2022-12-02 RX ADMIN — APIXABAN 5 MILLIGRAM(S): 2.5 TABLET, FILM COATED ORAL at 17:35

## 2022-12-02 RX ADMIN — Medication 10 MILLIEQUIVALENT(S): at 12:52

## 2022-12-02 RX ADMIN — ZINC SULFATE TAB 220 MG (50 MG ZINC EQUIVALENT) 220 MILLIGRAM(S): 220 (50 ZN) TAB at 12:53

## 2022-12-02 RX ADMIN — Medication 100 MILLIGRAM(S): at 12:53

## 2022-12-02 RX ADMIN — TIOTROPIUM BROMIDE 1 CAPSULE(S): 18 CAPSULE ORAL; RESPIRATORY (INHALATION) at 12:53

## 2022-12-02 RX ADMIN — Medication 20 MILLIEQUIVALENT(S): at 17:34

## 2022-12-02 NOTE — CHART NOTE - NSCHARTNOTEFT_GEN_A_CORE
67 year old male admitted with  AMS that now resolved ---Concern  for infectious etiology given hx of UTI/fever and Owusu; MRI Brain. Treated for UTI- s/p Cefepime / Invanz pt is now with 16lbs wt gain --noted with +3 LE edema; Lasix 40mg ivp x1 ordered as d/w Dr. Mojica to achieved goal net neg 4-5L; repeat ECHO pending.

## 2022-12-03 LAB
ANION GAP SERPL CALC-SCNC: 11 MMOL/L — SIGNIFICANT CHANGE UP (ref 5–17)
BUN SERPL-MCNC: 27 MG/DL — HIGH (ref 7–23)
CALCIUM SERPL-MCNC: 8.5 MG/DL — SIGNIFICANT CHANGE UP (ref 8.4–10.5)
CHLORIDE SERPL-SCNC: 105 MMOL/L — SIGNIFICANT CHANGE UP (ref 96–108)
CO2 SERPL-SCNC: 25 MMOL/L — SIGNIFICANT CHANGE UP (ref 22–31)
CREAT SERPL-MCNC: 1.2 MG/DL — SIGNIFICANT CHANGE UP (ref 0.5–1.3)
EGFR: 67 ML/MIN/1.73M2 — SIGNIFICANT CHANGE UP
GLUCOSE SERPL-MCNC: 117 MG/DL — HIGH (ref 70–99)
INR BLD: 1.58 RATIO — HIGH (ref 0.88–1.16)
POTASSIUM SERPL-MCNC: 3.8 MMOL/L — SIGNIFICANT CHANGE UP (ref 3.5–5.3)
POTASSIUM SERPL-SCNC: 3.8 MMOL/L — SIGNIFICANT CHANGE UP (ref 3.5–5.3)
PROTHROM AB SERPL-ACNC: 18.3 SEC — HIGH (ref 10.5–13.4)
SODIUM SERPL-SCNC: 141 MMOL/L — SIGNIFICANT CHANGE UP (ref 135–145)

## 2022-12-03 PROCEDURE — 76376 3D RENDER W/INTRP POSTPROCES: CPT | Mod: 26

## 2022-12-03 PROCEDURE — 93306 TTE W/DOPPLER COMPLETE: CPT | Mod: 26

## 2022-12-03 RX ADMIN — Medication 100 MILLIGRAM(S): at 11:45

## 2022-12-03 RX ADMIN — LATANOPROST 1 DROP(S): 0.05 SOLUTION/ DROPS OPHTHALMIC; TOPICAL at 22:49

## 2022-12-03 RX ADMIN — FAMOTIDINE 20 MILLIGRAM(S): 10 INJECTION INTRAVENOUS at 11:45

## 2022-12-03 RX ADMIN — ZINC SULFATE TAB 220 MG (50 MG ZINC EQUIVALENT) 220 MILLIGRAM(S): 220 (50 ZN) TAB at 11:44

## 2022-12-03 RX ADMIN — TAMSULOSIN HYDROCHLORIDE 0.4 MILLIGRAM(S): 0.4 CAPSULE ORAL at 22:49

## 2022-12-03 RX ADMIN — Medication 1000 UNIT(S): at 11:45

## 2022-12-03 RX ADMIN — Medication 40 MILLIGRAM(S): at 06:32

## 2022-12-03 RX ADMIN — APIXABAN 5 MILLIGRAM(S): 2.5 TABLET, FILM COATED ORAL at 18:14

## 2022-12-03 RX ADMIN — Medication 1 DROP(S): at 18:14

## 2022-12-03 RX ADMIN — Medication 1 DROP(S): at 06:32

## 2022-12-03 RX ADMIN — APIXABAN 5 MILLIGRAM(S): 2.5 TABLET, FILM COATED ORAL at 06:32

## 2022-12-03 RX ADMIN — Medication 20 MILLIEQUIVALENT(S): at 11:44

## 2022-12-03 RX ADMIN — Medication 1 TABLET(S): at 11:44

## 2022-12-03 RX ADMIN — ATENOLOL 25 MILLIGRAM(S): 25 TABLET ORAL at 06:32

## 2022-12-04 LAB
INR BLD: 1.43 RATIO — HIGH (ref 0.88–1.16)
PROTHROM AB SERPL-ACNC: 16.5 SEC — HIGH (ref 10.5–13.4)

## 2022-12-04 RX ADMIN — Medication 3 MILLILITER(S): at 12:25

## 2022-12-04 RX ADMIN — Medication 20 MILLIEQUIVALENT(S): at 12:26

## 2022-12-04 RX ADMIN — Medication 40 MILLIGRAM(S): at 06:48

## 2022-12-04 RX ADMIN — LATANOPROST 1 DROP(S): 0.05 SOLUTION/ DROPS OPHTHALMIC; TOPICAL at 22:35

## 2022-12-04 RX ADMIN — ATENOLOL 25 MILLIGRAM(S): 25 TABLET ORAL at 06:49

## 2022-12-04 RX ADMIN — Medication 1 DROP(S): at 06:49

## 2022-12-04 RX ADMIN — Medication 3 MILLILITER(S): at 17:24

## 2022-12-04 RX ADMIN — APIXABAN 5 MILLIGRAM(S): 2.5 TABLET, FILM COATED ORAL at 17:24

## 2022-12-04 RX ADMIN — TAMSULOSIN HYDROCHLORIDE 0.4 MILLIGRAM(S): 0.4 CAPSULE ORAL at 22:35

## 2022-12-04 RX ADMIN — Medication 1 DROP(S): at 17:24

## 2022-12-04 RX ADMIN — Medication 3 MILLILITER(S): at 23:45

## 2022-12-04 RX ADMIN — Medication 100 MILLIGRAM(S): at 12:25

## 2022-12-04 RX ADMIN — Medication 1000 UNIT(S): at 12:25

## 2022-12-04 RX ADMIN — TIOTROPIUM BROMIDE 1 CAPSULE(S): 18 CAPSULE ORAL; RESPIRATORY (INHALATION) at 12:25

## 2022-12-04 RX ADMIN — Medication 3 MILLILITER(S): at 00:27

## 2022-12-04 RX ADMIN — Medication 3 MILLILITER(S): at 06:48

## 2022-12-04 RX ADMIN — FAMOTIDINE 20 MILLIGRAM(S): 10 INJECTION INTRAVENOUS at 12:26

## 2022-12-04 RX ADMIN — ZINC SULFATE TAB 220 MG (50 MG ZINC EQUIVALENT) 220 MILLIGRAM(S): 220 (50 ZN) TAB at 12:25

## 2022-12-04 RX ADMIN — APIXABAN 5 MILLIGRAM(S): 2.5 TABLET, FILM COATED ORAL at 06:49

## 2022-12-04 RX ADMIN — Medication 1 TABLET(S): at 12:25

## 2022-12-05 DIAGNOSIS — N17.9 ACUTE KIDNEY FAILURE, UNSPECIFIED: ICD-10-CM

## 2022-12-05 LAB
ANION GAP SERPL CALC-SCNC: 11 MMOL/L — SIGNIFICANT CHANGE UP (ref 5–17)
BUN SERPL-MCNC: 35 MG/DL — HIGH (ref 7–23)
CALCIUM SERPL-MCNC: 9.1 MG/DL — SIGNIFICANT CHANGE UP (ref 8.4–10.5)
CHLORIDE SERPL-SCNC: 103 MMOL/L — SIGNIFICANT CHANGE UP (ref 96–108)
CO2 SERPL-SCNC: 27 MMOL/L — SIGNIFICANT CHANGE UP (ref 22–31)
CREAT SERPL-MCNC: 1.42 MG/DL — HIGH (ref 0.5–1.3)
EGFR: 55 ML/MIN/1.73M2 — LOW
GLUCOSE SERPL-MCNC: 93 MG/DL — SIGNIFICANT CHANGE UP (ref 70–99)
HCT VFR BLD CALC: 30 % — LOW (ref 39–50)
HGB BLD-MCNC: 8.8 G/DL — LOW (ref 13–17)
INR BLD: 1.46 RATIO — HIGH (ref 0.88–1.16)
MAGNESIUM SERPL-MCNC: 1.9 MG/DL — SIGNIFICANT CHANGE UP (ref 1.6–2.6)
MCHC RBC-ENTMCNC: 24 PG — LOW (ref 27–34)
MCHC RBC-ENTMCNC: 29.3 GM/DL — LOW (ref 32–36)
MCV RBC AUTO: 81.7 FL — SIGNIFICANT CHANGE UP (ref 80–100)
NRBC # BLD: 0 /100 WBCS — SIGNIFICANT CHANGE UP (ref 0–0)
PLATELET # BLD AUTO: 350 K/UL — SIGNIFICANT CHANGE UP (ref 150–400)
POTASSIUM SERPL-MCNC: 4.3 MMOL/L — SIGNIFICANT CHANGE UP (ref 3.5–5.3)
POTASSIUM SERPL-SCNC: 4.3 MMOL/L — SIGNIFICANT CHANGE UP (ref 3.5–5.3)
PROTHROM AB SERPL-ACNC: 17 SEC — HIGH (ref 10.5–13.4)
RBC # BLD: 3.67 M/UL — LOW (ref 4.2–5.8)
RBC # FLD: 18 % — HIGH (ref 10.3–14.5)
SODIUM SERPL-SCNC: 141 MMOL/L — SIGNIFICANT CHANGE UP (ref 135–145)
WBC # BLD: 8.16 K/UL — SIGNIFICANT CHANGE UP (ref 3.8–10.5)
WBC # FLD AUTO: 8.16 K/UL — SIGNIFICANT CHANGE UP (ref 3.8–10.5)

## 2022-12-05 RX ORDER — FUROSEMIDE 40 MG
40 TABLET ORAL DAILY
Refills: 0 | Status: DISCONTINUED | OUTPATIENT
Start: 2022-12-05 | End: 2022-12-06

## 2022-12-05 RX ADMIN — APIXABAN 5 MILLIGRAM(S): 2.5 TABLET, FILM COATED ORAL at 17:33

## 2022-12-05 RX ADMIN — Medication 1 DROP(S): at 17:33

## 2022-12-05 RX ADMIN — ZINC SULFATE TAB 220 MG (50 MG ZINC EQUIVALENT) 220 MILLIGRAM(S): 220 (50 ZN) TAB at 11:31

## 2022-12-05 RX ADMIN — Medication 1 TABLET(S): at 11:32

## 2022-12-05 RX ADMIN — Medication 40 MILLIGRAM(S): at 05:33

## 2022-12-05 RX ADMIN — TAMSULOSIN HYDROCHLORIDE 0.4 MILLIGRAM(S): 0.4 CAPSULE ORAL at 21:30

## 2022-12-05 RX ADMIN — APIXABAN 5 MILLIGRAM(S): 2.5 TABLET, FILM COATED ORAL at 05:33

## 2022-12-05 RX ADMIN — Medication 3 MILLILITER(S): at 05:33

## 2022-12-05 RX ADMIN — TIOTROPIUM BROMIDE 1 CAPSULE(S): 18 CAPSULE ORAL; RESPIRATORY (INHALATION) at 11:31

## 2022-12-05 RX ADMIN — Medication 100 MILLIGRAM(S): at 11:31

## 2022-12-05 RX ADMIN — Medication 3 MILLILITER(S): at 11:31

## 2022-12-05 RX ADMIN — Medication 1 DROP(S): at 05:34

## 2022-12-05 RX ADMIN — ATENOLOL 25 MILLIGRAM(S): 25 TABLET ORAL at 05:33

## 2022-12-05 RX ADMIN — Medication 20 MILLIEQUIVALENT(S): at 11:32

## 2022-12-05 RX ADMIN — FAMOTIDINE 20 MILLIGRAM(S): 10 INJECTION INTRAVENOUS at 11:32

## 2022-12-05 RX ADMIN — LATANOPROST 1 DROP(S): 0.05 SOLUTION/ DROPS OPHTHALMIC; TOPICAL at 21:30

## 2022-12-05 RX ADMIN — Medication 1000 UNIT(S): at 11:32

## 2022-12-05 NOTE — DIETITIAN INITIAL EVALUATION ADULT - ENERGY INTAKE
Adequate (%) Patient reports eating better now on admission; states in the first week of admission he was not eating well due to being depressed, and after much encouragement from staff he is eating very well now per patient.

## 2022-12-05 NOTE — DIETITIAN INITIAL EVALUATION ADULT - ADD RECOMMEND
- continue current diet as ordered of: DASH/TLC diet  - encourage PO intake, protein source with each meal as tolerated  - provide assistance with meals for patient as needed  - continue vitamin D, MVI and zinc as ordered  - monitor tolerance and adequacy of PO intake, weight trend, electrolytes, labs, BMs

## 2022-12-05 NOTE — DIETITIAN INITIAL EVALUATION ADULT - PERTINENT LABORATORY DATA
12-05    141  |  103  |  35<H>  ----------------------------<  93  4.3   |  27  |  1.42<H>    Ca    9.1      05 Dec 2022 07:01  Mg     1.9     12-05    A1C with Estimated Average Glucose Result: 5.3 % (11-23-22 @ 06:58)

## 2022-12-05 NOTE — DIETITIAN INITIAL EVALUATION ADULT - REASON FOR ADMISSION
Altered mental status    Per EMR, patient is a 66 y/o male with PMH including HTN, HLD, heart arrythmia, h/o renal cancer (s/p partial nephrectomy ~2017/2018), h/o DVT/PE i/s/o renal CA (~2017/2018) on coumadin, s/p L TKR (9/2022), asthma, h/o recent admission for urinary retention treated for UTI & was d/c to [facility] 11/9. Patient now presents to [facility] due to AMS and slurred speech for the past 1 day; patient's family noting patient had been intermittently confused over the past few weeks including changes in level of alertness and intermittent hallucinations. SLP evaluation appreciated; 11/25 approved patient for regular consistency diet w/ thin liquids. Neurology following, per neurology imaging unrevealing.

## 2022-12-05 NOTE — DIETITIAN INITIAL EVALUATION ADULT - ORAL INTAKE PTA/DIET HISTORY
Patient reports he normally eats well PTA but will have periods he does not eat well due to being depressed. Patient states when this happens the nature will be skipping meals more than reducing meal portions. Patient denied history of chewing/swallowing impairment PTA (also states doing well here), no nausea/vomiting. Has intermittent dexterity difficulty per patient but states he 'gets by' and states his wife helps him at home as needed.

## 2022-12-05 NOTE — DIETITIAN INITIAL EVALUATION ADULT - PERTINENT MEDS FT
MEDICATIONS  (STANDING):  albuterol/ipratropium for Nebulization 3 milliLiter(s) Nebulizer every 6 hours  allopurinol 100 milliGRAM(s) Oral daily  apixaban 5 milliGRAM(s) Oral every 12 hours  atenolol  Tablet 25 milliGRAM(s) Oral daily  cholecalciferol 1000 Unit(s) Oral daily  famotidine    Tablet 20 milliGRAM(s) Oral daily  furosemide    Tablet 40 milliGRAM(s) Oral daily  latanoprost 0.005% Ophthalmic Solution 1 Drop(s) Both EYES at bedtime  multivitamin/minerals 1 Tablet(s) Oral daily  potassium chloride    Tablet ER 20 milliEquivalent(s) Oral daily  tamsulosin 0.4 milliGRAM(s) Oral at bedtime  timolol 0.5% Solution 1 Drop(s) Both EYES two times a day  tiotropium 18 MICROgram(s) Capsule 1 Capsule(s) Inhalation daily  zinc sulfate 220 milliGRAM(s) Oral daily    MEDICATIONS  (PRN):  acetaminophen     Tablet .. 650 milliGRAM(s) Oral every 6 hours PRN Temp greater or equal to 38C (100.4F), Mild Pain (1 - 3)  melatonin 3 milliGRAM(s) Oral at bedtime PRN Insomnia

## 2022-12-05 NOTE — DIETITIAN INITIAL EVALUATION ADULT - NSFNSADHERENCEPTAFT_GEN_A_CORE
Patient does not follow a specialty diet PTA; states supportive wife at home helps with meals and prepare/setup meals for patient as needed (intermittent dexterity issues per patient).

## 2022-12-05 NOTE — DIETITIAN INITIAL EVALUATION ADULT - REASON
Patient currently eating well on admission and was eating well w/ no recent PO fluctuations PTA or weight fluctuations per patient

## 2022-12-05 NOTE — DIETITIAN INITIAL EVALUATION ADULT - CALCULATED TO (CAL/KG)
Patient received Depoprovera injection today in preparation for Essure procedure.. Tolerated well by patient, see administration form.  She will review pamphlet and RTC for Essure consents.       [id number]

## 2022-12-06 ENCOUNTER — TRANSCRIPTION ENCOUNTER (OUTPATIENT)
Age: 67
End: 2022-12-06

## 2022-12-06 VITALS
DIASTOLIC BLOOD PRESSURE: 79 MMHG | OXYGEN SATURATION: 95 % | HEART RATE: 86 BPM | SYSTOLIC BLOOD PRESSURE: 109 MMHG | RESPIRATION RATE: 18 BRPM | TEMPERATURE: 99 F

## 2022-12-06 LAB
ANION GAP SERPL CALC-SCNC: 11 MMOL/L — SIGNIFICANT CHANGE UP (ref 5–17)
BUN SERPL-MCNC: 38 MG/DL — HIGH (ref 7–23)
CALCIUM SERPL-MCNC: 8.7 MG/DL — SIGNIFICANT CHANGE UP (ref 8.4–10.5)
CHLORIDE SERPL-SCNC: 105 MMOL/L — SIGNIFICANT CHANGE UP (ref 96–108)
CO2 SERPL-SCNC: 28 MMOL/L — SIGNIFICANT CHANGE UP (ref 22–31)
CREAT SERPL-MCNC: 1.32 MG/DL — HIGH (ref 0.5–1.3)
EGFR: 60 ML/MIN/1.73M2 — SIGNIFICANT CHANGE UP
GLUCOSE SERPL-MCNC: 108 MG/DL — HIGH (ref 70–99)
HCT VFR BLD CALC: 29.2 % — LOW (ref 39–50)
HGB BLD-MCNC: 8.6 G/DL — LOW (ref 13–17)
INR BLD: 1.4 RATIO — HIGH (ref 0.88–1.16)
MCHC RBC-ENTMCNC: 24.4 PG — LOW (ref 27–34)
MCHC RBC-ENTMCNC: 29.5 GM/DL — LOW (ref 32–36)
MCV RBC AUTO: 82.7 FL — SIGNIFICANT CHANGE UP (ref 80–100)
NRBC # BLD: 0 /100 WBCS — SIGNIFICANT CHANGE UP (ref 0–0)
PLATELET # BLD AUTO: 331 K/UL — SIGNIFICANT CHANGE UP (ref 150–400)
POTASSIUM SERPL-MCNC: 4.1 MMOL/L — SIGNIFICANT CHANGE UP (ref 3.5–5.3)
POTASSIUM SERPL-SCNC: 4.1 MMOL/L — SIGNIFICANT CHANGE UP (ref 3.5–5.3)
PROTHROM AB SERPL-ACNC: 16.1 SEC — HIGH (ref 10.5–13.4)
RBC # BLD: 3.53 M/UL — LOW (ref 4.2–5.8)
RBC # FLD: 18.1 % — HIGH (ref 10.3–14.5)
SODIUM SERPL-SCNC: 144 MMOL/L — SIGNIFICANT CHANGE UP (ref 135–145)
WBC # BLD: 7.16 K/UL — SIGNIFICANT CHANGE UP (ref 3.8–10.5)
WBC # FLD AUTO: 7.16 K/UL — SIGNIFICANT CHANGE UP (ref 3.8–10.5)

## 2022-12-06 PROCEDURE — 82140 ASSAY OF AMMONIA: CPT

## 2022-12-06 PROCEDURE — U0005: CPT

## 2022-12-06 PROCEDURE — 87150 DNA/RNA AMPLIFIED PROBE: CPT

## 2022-12-06 PROCEDURE — 85025 COMPLETE CBC W/AUTO DIFF WBC: CPT

## 2022-12-06 PROCEDURE — 85730 THROMBOPLASTIN TIME PARTIAL: CPT

## 2022-12-06 PROCEDURE — 71045 X-RAY EXAM CHEST 1 VIEW: CPT

## 2022-12-06 PROCEDURE — 84425 ASSAY OF VITAMIN B-1: CPT

## 2022-12-06 PROCEDURE — 87040 BLOOD CULTURE FOR BACTERIA: CPT

## 2022-12-06 PROCEDURE — 70498 CT ANGIOGRAPHY NECK: CPT | Mod: MA

## 2022-12-06 PROCEDURE — 97530 THERAPEUTIC ACTIVITIES: CPT

## 2022-12-06 PROCEDURE — 74177 CT ABD & PELVIS W/CONTRAST: CPT

## 2022-12-06 PROCEDURE — 70496 CT ANGIOGRAPHY HEAD: CPT | Mod: MA

## 2022-12-06 PROCEDURE — 82962 GLUCOSE BLOOD TEST: CPT

## 2022-12-06 PROCEDURE — 87637 SARSCOV2&INF A&B&RSV AMP PRB: CPT

## 2022-12-06 PROCEDURE — 80048 BASIC METABOLIC PNL TOTAL CA: CPT

## 2022-12-06 PROCEDURE — 93306 TTE W/DOPPLER COMPLETE: CPT

## 2022-12-06 PROCEDURE — 92610 EVALUATE SWALLOWING FUNCTION: CPT

## 2022-12-06 PROCEDURE — 82803 BLOOD GASES ANY COMBINATION: CPT

## 2022-12-06 PROCEDURE — 83735 ASSAY OF MAGNESIUM: CPT

## 2022-12-06 PROCEDURE — 87086 URINE CULTURE/COLONY COUNT: CPT

## 2022-12-06 PROCEDURE — 93005 ELECTROCARDIOGRAM TRACING: CPT

## 2022-12-06 PROCEDURE — 94640 AIRWAY INHALATION TREATMENT: CPT

## 2022-12-06 PROCEDURE — 84100 ASSAY OF PHOSPHORUS: CPT

## 2022-12-06 PROCEDURE — 97110 THERAPEUTIC EXERCISES: CPT

## 2022-12-06 PROCEDURE — 87077 CULTURE AEROBIC IDENTIFY: CPT

## 2022-12-06 PROCEDURE — 84132 ASSAY OF SERUM POTASSIUM: CPT

## 2022-12-06 PROCEDURE — 85014 HEMATOCRIT: CPT

## 2022-12-06 PROCEDURE — 76376 3D RENDER W/INTRP POSTPROCES: CPT

## 2022-12-06 PROCEDURE — 83605 ASSAY OF LACTIC ACID: CPT

## 2022-12-06 PROCEDURE — U0003: CPT

## 2022-12-06 PROCEDURE — 85610 PROTHROMBIN TIME: CPT

## 2022-12-06 PROCEDURE — 84480 ASSAY TRIIODOTHYRONINE (T3): CPT

## 2022-12-06 PROCEDURE — 83036 HEMOGLOBIN GLYCOSYLATED A1C: CPT

## 2022-12-06 PROCEDURE — 96374 THER/PROPH/DIAG INJ IV PUSH: CPT

## 2022-12-06 PROCEDURE — 82175 ASSAY OF ARSENIC: CPT

## 2022-12-06 PROCEDURE — 86780 TREPONEMA PALLIDUM: CPT

## 2022-12-06 PROCEDURE — 82435 ASSAY OF BLOOD CHLORIDE: CPT

## 2022-12-06 PROCEDURE — 85018 HEMOGLOBIN: CPT

## 2022-12-06 PROCEDURE — 82947 ASSAY GLUCOSE BLOOD QUANT: CPT

## 2022-12-06 PROCEDURE — 70553 MRI BRAIN STEM W/O & W/DYE: CPT

## 2022-12-06 PROCEDURE — 80061 LIPID PANEL: CPT

## 2022-12-06 PROCEDURE — 70450 CT HEAD/BRAIN W/O DYE: CPT | Mod: MA

## 2022-12-06 PROCEDURE — 82330 ASSAY OF CALCIUM: CPT

## 2022-12-06 PROCEDURE — 82607 VITAMIN B-12: CPT

## 2022-12-06 PROCEDURE — 84484 ASSAY OF TROPONIN QUANT: CPT

## 2022-12-06 PROCEDURE — 84443 ASSAY THYROID STIM HORMONE: CPT

## 2022-12-06 PROCEDURE — 84439 ASSAY OF FREE THYROXINE: CPT

## 2022-12-06 PROCEDURE — 97161 PT EVAL LOW COMPLEX 20 MIN: CPT

## 2022-12-06 PROCEDURE — 81001 URINALYSIS AUTO W/SCOPE: CPT

## 2022-12-06 PROCEDURE — 82306 VITAMIN D 25 HYDROXY: CPT

## 2022-12-06 PROCEDURE — 36415 COLL VENOUS BLD VENIPUNCTURE: CPT

## 2022-12-06 PROCEDURE — 99285 EMERGENCY DEPT VISIT HI MDM: CPT | Mod: 25

## 2022-12-06 PROCEDURE — 84295 ASSAY OF SERUM SODIUM: CPT

## 2022-12-06 PROCEDURE — 96375 TX/PRO/DX INJ NEW DRUG ADDON: CPT

## 2022-12-06 PROCEDURE — 80053 COMPREHEN METABOLIC PANEL: CPT

## 2022-12-06 PROCEDURE — 92526 ORAL FUNCTION THERAPY: CPT

## 2022-12-06 PROCEDURE — 85027 COMPLETE CBC AUTOMATED: CPT

## 2022-12-06 RX ORDER — WARFARIN SODIUM 2.5 MG/1
1 TABLET ORAL
Qty: 0 | Refills: 0 | DISCHARGE

## 2022-12-06 RX ORDER — FUROSEMIDE 40 MG
1 TABLET ORAL
Qty: 30 | Refills: 0
Start: 2022-12-06 | End: 2023-01-04

## 2022-12-06 RX ORDER — CHOLECALCIFEROL (VITAMIN D3) 125 MCG
1 CAPSULE ORAL
Qty: 30 | Refills: 0
Start: 2022-12-06 | End: 2023-01-04

## 2022-12-06 RX ORDER — APIXABAN 2.5 MG/1
1 TABLET, FILM COATED ORAL
Qty: 60 | Refills: 0
Start: 2022-12-06 | End: 2023-01-04

## 2022-12-06 RX ORDER — POTASSIUM CHLORIDE 20 MEQ
1 PACKET (EA) ORAL
Qty: 30 | Refills: 0
Start: 2022-12-06 | End: 2023-01-04

## 2022-12-06 RX ADMIN — Medication 100 MILLIGRAM(S): at 13:48

## 2022-12-06 RX ADMIN — Medication 40 MILLIGRAM(S): at 06:29

## 2022-12-06 RX ADMIN — Medication 1000 UNIT(S): at 13:48

## 2022-12-06 RX ADMIN — Medication 1 DROP(S): at 06:29

## 2022-12-06 RX ADMIN — FAMOTIDINE 20 MILLIGRAM(S): 10 INJECTION INTRAVENOUS at 13:47

## 2022-12-06 RX ADMIN — APIXABAN 5 MILLIGRAM(S): 2.5 TABLET, FILM COATED ORAL at 17:54

## 2022-12-06 RX ADMIN — ATENOLOL 25 MILLIGRAM(S): 25 TABLET ORAL at 06:29

## 2022-12-06 RX ADMIN — ZINC SULFATE TAB 220 MG (50 MG ZINC EQUIVALENT) 220 MILLIGRAM(S): 220 (50 ZN) TAB at 13:48

## 2022-12-06 RX ADMIN — APIXABAN 5 MILLIGRAM(S): 2.5 TABLET, FILM COATED ORAL at 06:29

## 2022-12-06 RX ADMIN — Medication 1 TABLET(S): at 13:48

## 2022-12-06 RX ADMIN — Medication 1 DROP(S): at 17:54

## 2022-12-06 RX ADMIN — Medication 20 MILLIEQUIVALENT(S): at 13:48

## 2022-12-06 RX ADMIN — Medication 3 MILLILITER(S): at 17:53

## 2022-12-06 RX ADMIN — TIOTROPIUM BROMIDE 1 CAPSULE(S): 18 CAPSULE ORAL; RESPIRATORY (INHALATION) at 13:47

## 2022-12-06 RX ADMIN — Medication 3 MILLILITER(S): at 13:47

## 2022-12-06 NOTE — PROGRESS NOTE ADULT - PROVIDER SPECIALTY LIST ADULT
Internal Medicine
Cardiology
Internal Medicine
Neurology
Infectious Disease
Internal Medicine

## 2022-12-06 NOTE — PROGRESS NOTE ADULT - PROBLEM SELECTOR PROBLEM 2
UTI (urinary tract infection)
Leg swelling
UTI (urinary tract infection)
Leg swelling
Leg swelling

## 2022-12-06 NOTE — DISCHARGE NOTE PROVIDER - NSDCCPCAREPLAN_GEN_ALL_CORE_FT
PRINCIPAL DISCHARGE DIAGNOSIS  Diagnosis: AMS (altered mental status)  Assessment and Plan of Treatment: resolved, back to baseline      SECONDARY DISCHARGE DIAGNOSES  Diagnosis: UTI (urinary tract infection)  Assessment and Plan of Treatment: You completed Ertapenum, follow up with pcp in a few days    Diagnosis: Leg swelling  Assessment and Plan of Treatment: You were given IV Lasix and now on oral Lasix. follow up with pcp as instructed.    Diagnosis: Asthma with COPD  Assessment and Plan of Treatment: take meds as prescribed and follow up with PCP    Diagnosis: Chronic atrial fibrillation  Assessment and Plan of Treatment: Eliquis as prescribe. Take all meds, follow up with pcp    Diagnosis: SONAL (acute kidney injury)  Assessment and Plan of Treatment: improved, follow up for repeat labs with pcp in a few days, bring this p[aper with you

## 2022-12-06 NOTE — PROGRESS NOTE ADULT - PROBLEM SELECTOR PLAN 5
- tiotropium   - Duoneb q6h
h/o DVT / PE on coumadin
previously on coumadin , inr supra therapeutic  resolved and now therapeutic.   - cover with Lovenox as bridge therapy  switched to Eliquis 5 mg BID due to recent supratherapeutic INR and non-compliant  - continue atenolol
- tiotropium   - Duoneb q6h
previously on coumadin , inr supra therapeutic  resolved and now therapeutic.   - cover with Lovenox as bridge therapy  switched to Eliquis 5 mg BID due to recent supratherapeutic INR and non-compliant  - continue atenolol

## 2022-12-06 NOTE — PROGRESS NOTE ADULT - PROBLEM SELECTOR PLAN 4
h/o DVT / PE on coumadin
h/o DVT / PE on coumadin
previously on coumadin , inr supra therapeutic  resolved and now therapeutic.   - cover with Lovenox as bridge therapy  switched to Eliquis 5 mg BID due to recent supratherapeutic INR and non-compliant  - continue atenolol
h/o DVT / PE on coumadin
h/o DVT / PE on coumadin
has indwelling kennedy , may have underlying infection contributing to AMS ,  recent cultures w/ ESBL klebsiella and VRE , will change ABx to ertapenem   - completed course ertapenem   -ID confirmed no longer needed abx
h/o DVT / PE on coumadin
h/o DVT / PE on coumadin
has indwelling kennedy , may have underlying infection contributing to AMS ,  recent cultures w/ ESBL klebsiella and VRE , will change ABx to ertapenem   - completed course ertapenem   -ID confirmed no longer needed abx

## 2022-12-06 NOTE — PROGRESS NOTE ADULT - REASON FOR ADMISSION
ams x 1 day

## 2022-12-06 NOTE — DISCHARGE NOTE PROVIDER - HOSPITAL COURSE
67M w/PMHx of HTN, HLD, heart arrythmia, renal cancer s/p partial nephrectomy (~2017/18), DVT/PE iso renal cancer (~2017/18) on Coumadin,  s/p total L knee arthoplasty (9/16/22), asthma, recent admission for urinary retention treated for UTI , s/p kennedy , d/c’ed to [facility] 11/09/22, p/w AMS and speech difficulty x 1 day        Problem/Plan - 1:  ·  Problem: Altered mental state.   ·  Plan: difficulty with speech , mild tremor on exam acute change form baseline. labs w/ mild hypernatremia , CTH , CT perfusion head and neck w/o acute findings   - Neurology consulted - follow up recommendations   - MRI brain w/ and w/o   - b12 / syphilis screen / tsh   - neuro checks   - dysphagia screen   - dysphagia d  ·  Problem: UTI (urinary tract infection).   ·  Plan: has indwelling kennedy , , low grade temperature, may have underlying infection contributing to AMS ,  recent cultures w/ ESBL klebsiella and VRE , will change ABx to ertapenem   - ertapenem   - f/u urine cultures  Consul  ·  Problem: Chronic atrial fibrillation.   ·  Plan: on coumadin , inr supra therapeutic   - hold coumadin, Vitamin K x 1 dose.   - monitor INR , resume coumadin once within therapeutic range   - continue atenolol.     Problem/Plan - 4:  ·  Problem: At risk for venous thromboembolism (VTE).   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     Problem/Plan - 5:  ·  Problem: Asthma with COPD.   ·  Plan: - tiotropium   - Duoneb q6h.    Attestation Statements:    Attestation Statements:  Time-based billing (NON-critical care).     36 minutes spent on total encounter; more than 50% of the visit was spent counseling and / or coordinating care by the attending physician.  The necessity of the time spent during the 67M w/PMHx of HTN, HLD, heart arrythmia, renal cancer s/p partial nephrectomy (~2017/18), DVT/PE iso renal cancer (~2017/18) on Coumadin,  s/p total L knee arthoplasty (9/16/22), asthma, recent admission for urinary retention treated for UTI , s/p kennedy , d/c’ed to [facility] 11/09/22, p/w AMS and speech difficulty x 1 day     Problem/Plan - 1:  ·  Problem: Altered mental state.   ·  Plan: difficulty with speech , mild tremor on exam acute change form baseline. labs w/ mild hypernatremia , CTH , CT perfusion head and neck w/o acute findings   - Neurology consulted - follow up recommendations   - MRI brain negative for stroke ruled out.   - r/o meningitis with LP by IR/neurologist  -mental status much better, patient back to baseline.     Problem/Plan - 2:  ·  Problem: Leg swelling.   ·  Plan: most likely due to volume overload, improved with IV Lasix. Switch to PO Lasix due to worsening renal function.     Problem/Plan - 3:  ·  Problem: SONAL (acute kidney injury).   ·  Plan: due to overdiuresis with IV Lasix. Switch to PO Lasix. Monitor Cre overnight. If plateaus he can be discharged tomorrow.     Problem/Plan - 4:  ·  Problem: UTI (urinary tract infection).   ·  Plan: has indwelling kennedy , may have underlying infection contributing to AMS ,  recent cultures w/ ESBL klebsiella and VRE , will change ABx to ertapenem   - completed course ertapenem   -ID confirmed no longer needed abx.     Problem/Plan - 5:  ·  Problem: Chronic atrial fibrillation.   ·  Plan: previously on coumadin , inr supra therapeutic  resolved and now therapeutic.   - cover with Lovenox as bridge therapy  switched to Eliquis 5 mg BID due to recent supratherapeutic INR and non-compliant  - continue atenolol.     Problem/Plan - 6:  ·  Problem: At risk for venous thromboembolism (VTE).   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     Problem/Plan - 7:  ·  Problem: Asthma with COPD.   ·  Plan: - tiotropium   - Duoneb q6h.      Dispo: MELANIE, fam/pt refusing wants home. Family request lobito lift/hospital bed which is delivered.   Cleared for dc home by attending to follow up with PCP

## 2022-12-06 NOTE — DISCHARGE NOTE PROVIDER - NSDCMRMEDTOKEN_GEN_ALL_CORE_FT
albuterol 90 mcg/inh inhalation aerosol: 2 puff(s) inhaled every 6 hours  allopurinol 100 mg oral tablet: 1 tab(s) orally once a day  atenolol 25 mg oral tablet: 1 tab(s) orally once a day  Eliquis 5 mg oral tablet: 1 tab(s) orally 2 times a day   famotidine 20 mg oral tablet: 1 tab(s) orally once a day  latanoprost 0.005% ophthalmic solution: 1 drop(s) to each affected eye once a day (at bedtime)  melatonin 3 mg oral tablet: 1 tab(s) orally once a day (at bedtime), As needed, Insomnia  Hold for sedation   Multiple Vitamins with Minerals oral tablet: 1 tab(s) orally once a day  tamsulosin 0.4 mg oral capsule: 1 cap(s) orally once a day (at bedtime)  timolol maleate 0.5% ophthalmic solution: 1 drop(s) to each affected eye 2 times a day  tiotropium 18 mcg inhalation capsule: 1 cap(s) inhaled once a day  warfarin 2.5 mg oral tablet: 1 tab(s) orally once a day  Patient takes a total dose of 5.5mg once a day  Contiue with home dose and contiue to monitor INR monitoring and dosing    warfarin 3 mg oral tablet: 1 tab(s) orally once a day.  Patient takes a total dose of 5.5mg once a day  Contiue with home dose and contiue to monitor INR monitoring and dosing    zinc sulfate 220 mg oral capsule: 1 cap(s) orally once a day   albuterol 90 mcg/inh inhalation aerosol: 2 puff(s) inhaled every 6 hours  allopurinol 100 mg oral tablet: 1 tab(s) orally once a day  atenolol 25 mg oral tablet: 1 tab(s) orally once a day  cholecalciferol 25 mcg (1000 intl units) oral tablet: 1 tab(s) orally once a day   Eliquis 5 mg oral tablet: 1 tab(s) orally 2 times a day   famotidine 20 mg oral tablet: 1 tab(s) orally once a day  furosemide 40 mg oral tablet: 1 tab(s) orally once a day  latanoprost 0.005% ophthalmic solution: 1 drop(s) to each affected eye once a day (at bedtime)  melatonin 3 mg oral tablet: 1 tab(s) orally once a day (at bedtime), As needed, Insomnia  Hold for sedation   Multiple Vitamins with Minerals oral tablet: 1 tab(s) orally once a day  potassium chloride 20 mEq oral tablet, extended release: 1 tab(s) orally once a day  tamsulosin 0.4 mg oral capsule: 1 cap(s) orally once a day (at bedtime)  timolol maleate 0.5% ophthalmic solution: 1 drop(s) to each affected eye 2 times a day  tiotropium 18 mcg inhalation capsule: 1 cap(s) inhaled once a day  zinc sulfate 220 mg oral capsule: 1 cap(s) orally once a day

## 2022-12-06 NOTE — PROGRESS NOTE ADULT - ASSESSMENT
67 year-old left-handed man with a PMHx significant for HTN, HLD, cardiac arrythmia, renal cancer (s/p partial nephrectomy ~2017/18), DVT/PE in the setting of renal cancer (~2017/18) on Coumadin, s/p total L knee arthoplasty (9/16/22), asthma, recent admission for urinary retention treated for UTI (on chronic kennedy), admitted on 11/22/22for fluctuating mental status since surgery 9/16, with report of intermittent hallucinations, and speech disturbance, for which Neurology was consulted. Initial assessment 11/23 notable for effortful speech with grossly intact comprehension (intermittently followed commands), and coarse tremors in the upper extremities b/l, overall most concerning for an encephalopathic picture. Repeat assessment today 11/25 notable for significantly improved mental status, without evidence of new localizing deficits (chronic impaired ROM in the RUE and in the RLE due to knee pathology). CTH/CTA & MRI brain w/wo unrevealing. On ertapenem for UTI.     Impression: Fluctuating mental status and speech disturbance, now improving, likely due to diffuse cerebral dysfunction most likely secondary to infectious encephalopathy with possible component of delirium with onset in the post-op setting.      Recommendations:   [x] MRI brain w/wo 11/24  [] Consider Routine EEG ([facility] order name: EEG awake and asleep)  [/] U/A (-), Ucx, Bcx  [/] NH3 19, TSH 2.26, T3/T4 (41/1.1), B12 552, RPR (-), VitD LOW (14.1)  --> PENDING: B1, B6  [x] Vitamin D supplementation    #Preventive Measures   - Telemetry monitoring, Neurochecks/VS per unit protocol  - PT/OT  - Fall & seizure precautions   - DVT ppx    Seen and discussed with Neurology attending, Dr. Vázquez.
67M w/PMHx of HTN, HLD, heart arrythmia, renal cancer s/p partial nephrectomy (~2017/18), DVT/PE iso renal cancer (~2017/18) on Coumadin,  s/p total L knee arthoplasty (9/16/22), asthma, recent admission for urinary retention treated for UTI , s/p marcia , d/c’ed to [facility] 11/09/22, p/w AMS and speech difficulty x 1 day   
67M w/PMHx of HTN, HLD, heart arrythmia, renal cancer s/p partial nephrectomy (~2017/18), DVT/PE iso renal cancer (~2017/18) on Coumadin,  s/p total L knee arthoplasty (9/16/22), asthma, recent admission for urinary retention treated for UTI , s/p marcia , d/c’ed to [facility] 11/09/22, p/w AMS and speech difficulty x 1 day   
67M w/PMHx of HTN, HLD, heart arrythmia, renal cancer s/p partial nephrectomy (~2017/18), DVT/PE iso renal cancer (~2017/18) on Coumadin,  s/p total L knee arthoplasty (9/16/22), asthma, recent admission for urinary retention treated for UTI , s/p marcia , d/c’ed to [facility] 11/09/22, p/w AMS and speech difficulty x 1 day   
67M w/PMHx of HTN, HLD, heart arrythmia, renal cancer s/p partial nephrectomy (~2017/18), DVT/PE iso renal cancer (~2017/18) on Coumadin,  s/p total L knee arthoplasty (9/16/22), asthma, recent admission for urinary retention treated for UTI , s/p marcia , d/c’ed to [facility] 11/09/22, p/w AMS and speech difficulty x 1 day   
67M BMI 39, HTN, HLD, renal cancer in remission.   Admitted 10/2022 with renal failure and uremia, recovered.   Readmitted 11/5-9 with urinary retention, treated ESBL Klebsiella and VRE faecalis bacteriuria.   Back 11/22 for altered mental state and slurred speech.   Episodic for weeks.   100.3F rectal temp on presentation but unclear significance in the absence of infectious findings.   L knee arthoplasty 9/16 - not hot or tender.   CoNS on blood cultures appears contaminant.   Urine without growth.   CXR and CT abdomen without focus.     Suggest  -monitor off antibiotics    Discussed with medicine   Will sign off, call back if needed    Edvin Townsend MD   Infectious Disease   Available on TEAMS. After 5PM and on weekends please page fellow on call or call [phone]
67M w/PMHx of HTN, HLD, heart arrythmia, renal cancer s/p partial nephrectomy (~2017/18), DVT/PE iso renal cancer (~2017/18) on Coumadin,  s/p total L knee arthoplasty (9/16/22), asthma, recent admission for urinary retention treated for UTI , s/p marcia , d/c’ed to [facility] 11/09/22, p/w AMS and speech difficulty x 1 day   
67M w/PMHx of HTN, HLD, heart arrythmia, renal cancer s/p partial nephrectomy (~2017/18), DVT/PE iso renal cancer (~2017/18) on Coumadin,  s/p total L knee arthoplasty (9/16/22), asthma, recent admission for urinary retention treated for UTI , s/p marcia , d/c’ed to [facility] 11/09/22, p/w AMS and speech difficulty x 1 day   
67M w/PMHx of HTN, HLD, heart arrythmia, renal cancer s/p partial nephrectomy (~2017/18), DVT/PE iso renal cancer (~2017/18) on Coumadin,  s/p total L knee arthoplasty (9/16/22), asthma, recent admission for urinary retention treated for UTI , s/p marcia , d/c’ed to [facility] 11/09/22, p/w AMS and speech difficulty x 1 day   
67M w/PMHx of HTN, HLD, heart arrythmia, renal cancer s/p partial nephrectomy (~2017/18), DVT/PE iso renal cancer (~2017/18) on Coumadin,  s/p total L knee arthoplasty (9/16/22), asthma, recent admission for urinary retention treated for UTI , s/p marcia , d/c’ed to [facility] 11/09/22, p/w AMS and speech difficulty x 1 day   
67M w/PMHx of HTN, HLD, heart arrythmia, renal cancer s/p partial nephrectomy (~2017/18), DVT/PE iso renal cancer (~2017/18) on Coumadin,  s/p total L knee arthoplasty (9/16/22), asthma, recent admission for urinary retention treated for UTI , s/p marcia , d/c’ed to [facility] 11/09/22, p/w AMS and speech difficulty x 1 day

## 2022-12-06 NOTE — PROGRESS NOTE ADULT - PROBLEM SELECTOR PLAN 3
on coumadin , inr supra therapeutic  resolved and now therapeutic.   - cover with Lovenox as bridge therapy until night before LP  - hold coumadin, Vitamin K x 1 dose.   - monitor INR , resume coumadin once within therapeutic range   - continue atenolol
previously on coumadin , inr supra therapeutic  resolved and now therapeutic.   - cover with Lovenox as bridge therapy  switched to Eliquis 5 mg BID due to recent supratherapeutic INR and non-compliant  - continue atenolol
previously on coumadin , inr supra therapeutic  resolved and now therapeutic.   - cover with Lovenox as bridge therapy  switched to Eliquis 5 mg BID due to recent supratherapeutic INR and non-compliant  - continue atenolol
on coumadin , inr supra therapeutic   - hold coumadin, Vitamin K x 1 dose.   - monitor INR , resume coumadin once within therapeutic range   - continue atenolol
due to overdiuresis with IV Lasix. Switch to PO Lasix. Monitor Cre overnight. If plateaus he can be discharged tomorrow.
previously on coumadin , inr supra therapeutic  resolved and now therapeutic.   - cover with Lovenox as bridge therapy  switched to Eliquis 5 mg BID due to recent supratherapeutic INR and non-compliant  - continue atenolol
has indwelling kennedy , may have underlying infection contributing to AMS ,  recent cultures w/ ESBL klebsiella and VRE , will change ABx to ertapenem   - completed course ertapenem   -ID confirmed no longer needed abx
previously on coumadin , inr supra therapeutic  resolved and now therapeutic.   - cover with Lovenox as bridge therapy  switched to Eliquis 5 mg BID due to recent supratherapeutic INR and non-compliant  - continue atenolol
due to overdiuresis with IV Lasix. Switch to PO Lasix. Monitor Cre overnight. If plateaus he can be discharged tomorrow.

## 2022-12-06 NOTE — PROGRESS NOTE ADULT - PROBLEM SELECTOR PLAN 2
has indwelling kennedy , , low grade temperature, may have underlying infection contributing to AMS ,  recent cultures w/ ESBL klebsiella and VRE , will change ABx to ertapenem   - ertapenem   - f/u urine cultures  Consult ID.
has indwelling kennedy , may have underlying infection contributing to AMS ,  recent cultures w/ ESBL klebsiella and VRE , will change ABx to ertapenem   - completed course ertapenem   -ID confirmed no longer needed abx
most likely due to volume overload, improved with IV Lasix. Switch to PO Lasix due to worsening renal function.
3+ edema  started on Lasix PO 40 mg daily with potassium supplement. Monitor weight and renal function  as outpatient by PCP.
has indwelling kennedy , may have underlying infection contributing to AMS ,  recent cultures w/ ESBL klebsiella and VRE , will change ABx to ertapenem   - completed course ertapenem   -ID confirmed no longer needed abx
has indwelling kennedy , , low grade temperature, may have underlying infection contributing to AMS ,  recent cultures w/ ESBL klebsiella and VRE , will change ABx to ertapenem   - completed course ertapenem   - f/u urine cultures  - f/u ID if he need IV Ampicillin
most likely due to volume overload, improved with IV Lasix. Switch to PO Lasix due to worsening renal function.

## 2022-12-06 NOTE — PROGRESS NOTE ADULT - TIME BILLING
Discussed with PA
Discussed with PA
Counseling and coordinating care
Discussed with PA
I spent  36 minutes with the patient at least 50% of this time spent face to face with patient and filling out form.  Discussed with PA
d/w NP about his reversing INR, LP, d/w his son about his ACP for at least 30 min and reviewing MOLST form and decided to keep as full code.
Discussed with PA
Discussed with PA
Discuss with PA and ID
Discussed with NP

## 2022-12-06 NOTE — PROGRESS NOTE ADULT - PROBLEM SELECTOR PROBLEM 5
Asthma with COPD
Chronic atrial fibrillation
Asthma with COPD
At risk for venous thromboembolism (VTE)
Chronic atrial fibrillation

## 2022-12-06 NOTE — DISCHARGE NOTE NURSING/CASE MANAGEMENT/SOCIAL WORK - PATIENT PORTAL LINK FT
You can access the FollowMyHealth Patient Portal offered by [facility] by registering at the following website: [email or web address]. By joining [employer]’s FollowMyHealth portal, you will also be able to view your health information using other applications (apps) compatible with our system.

## 2022-12-06 NOTE — PROGRESS NOTE ADULT - PROBLEM SELECTOR PROBLEM 1
Altered mental state

## 2022-12-06 NOTE — PROGRESS NOTE ADULT - PROBLEM SELECTOR PROBLEM 3
UTI (urinary tract infection)
Chronic atrial fibrillation
SONAL (acute kidney injury)
Chronic atrial fibrillation
SONAL (acute kidney injury)
Chronic atrial fibrillation

## 2022-12-06 NOTE — PROGRESS NOTE ADULT - SUBJECTIVE AND OBJECTIVE BOX
PROGRESS  NOTE   ________________________________________________    CHIEF COMPLAINT:Patient is a 67y old  Male who presents with a chief complaint of ams x 1 day (23 Nov 2022 22:30)  no complain  	  REVIEW OF SYSTEMS:  CONSTITUTIONAL: No fever, weight loss, or fatigue  EYES: No eye pain, visual disturbances, or discharge  ENT:  No difficulty hearing, tinnitus, vertigo; No sinus or throat pain  NECK: No pain or stiffness  RESPIRATORY: No cough, wheezing, chills or hemoptysis; No Shortness of Breath  CARDIOVASCULAR: No chest pain, palpitations, passing out, dizziness, or leg swelling  GASTROINTESTINAL: No abdominal or epigastric pain. No nausea, vomiting, or hematemesis; No diarrhea or constipation. No melena or hematochezia.  GENITOURINARY: No dysuria, frequency, hematuria, or incontinence  NEUROLOGICAL: No headaches, memory loss, loss of strength, numbness, or tremors  SKIN: No itching, burning, rashes, or lesions   LYMPH Nodes: No enlarged glands  ENDOCRINE: No heat or cold intolerance; No hair loss  MUSCULOSKELETAL: No joint pain or swelling; No muscle, back, or extremity pain  PSYCHIATRIC: No depression, anxiety, mood swings, or difficulty sleeping  HEME/LYMPH: No easy bruising, or bleeding gums  ALLERGY AND IMMUNOLOGIC: No hives or eczema	    [ ] All others negative	  [x ] Unable to obtain    PHYSICAL EXAM:  T(C): 36.8 (11-24-22 @ 12:23), Max: 37.1 (11-23-22 @ 19:29)  HR: 80 (11-24-22 @ 12:23) (80 - 80)  BP: 131/86 (11-24-22 @ 12:23) (131/86 - 150/90)  RR: 18 (11-24-22 @ 12:23) (18 - 20)  SpO2: 98% (11-24-22 @ 12:23) (96% - 98%)  Wt(kg): --  I&O's Summary    23 Nov 2022 07:01  -  24 Nov 2022 07:00  --------------------------------------------------------  IN: 400 mL / OUT: 1651 mL / NET: -1251 mL    24 Nov 2022 07:01  -  24 Nov 2022 14:05  --------------------------------------------------------  IN: 240 mL / OUT: 0 mL / NET: 240 mL        Appearance: Normal	  HEENT:   Normal oral mucosa, PERRL, EOMI	  Lymphatic: No lymphadenopathy  Cardiovascular: Normal S1 S2, No JVD, + murmurs, No edema  Respiratory: Lungs clear to auscultation	  Gastrointestinal:  Soft, Non-tender, + BS	  Skin: No rashes, No ecchymoses, No cyanosis	  Extremities: Normal range of motion, No clubbing, cyanosis or edema  Vascular: Peripheral pulses palpable 2+ bilaterally    MEDICATIONS  (STANDING):  albuterol/ipratropium for Nebulization 3 milliLiter(s) Nebulizer every 6 hours  allopurinol 100 milliGRAM(s) Oral daily  atenolol  Tablet 25 milliGRAM(s) Oral daily  ertapenem  IVPB 1000 milliGRAM(s) IV Intermittent every 24 hours  famotidine    Tablet 20 milliGRAM(s) Oral daily  latanoprost 0.005% Ophthalmic Solution 1 Drop(s) Both EYES at bedtime  multivitamin/minerals 1 Tablet(s) Oral daily  sodium chloride 0.45%. 1000 milliLiter(s) (60 mL/Hr) IV Continuous <Continuous>  tamsulosin 0.4 milliGRAM(s) Oral at bedtime  timolol 0.5% Solution 1 Drop(s) Both EYES two times a day  tiotropium 18 MICROgram(s) Capsule 1 Capsule(s) Inhalation daily  zinc sulfate 220 milliGRAM(s) Oral daily      TELEMETRY: 	    ECG:  	  RADIOLOGY:  OTHER: 	  	  LABS:	 	    CARDIAC MARKERS:                                8.9    6.51  )-----------( 298      ( 24 Nov 2022 12:44 )             29.8     11-23    145  |  110<H>  |  19  ----------------------------<  93  3.8   |  23  |  1.21    Ca    8.6      23 Nov 2022 06:33    TPro  5.8<L>  /  Alb  2.8<L>  /  TBili  1.1  /  DBili  x   /  AST  16  /  ALT  8<L>  /  AlkPhos  101  11-23    proBNP:   Lipid Profile: Cholesterol 145  LDL --  HDL 40  TG 84    HgA1c:   TSH: Thyroid Stimulating Hormone, Serum: 2.26 uIU/mL (11-23 @ 07:02)    PT/INR - ( 24 Nov 2022 12:44 )   PT: 22.1 sec;   INR: 1.91 ratio         PTT - ( 23 Nov 2022 06:34 )  PTT:47.5 sec      Assessment and plan  ---------------------------  ·  Plan: difficulty with speech , mild tremor on exam acute change form baseline. labs w/ mild hypernatremia , CTH , CT perfusion head and neck w/o acute findings   - Neurology consulted - follow up recommendations   - MRI brain w/ and w/o not done yet  - b12 / syphilis screen / tsh   - neuro checks   - dysphagia screen   - dysphagia diet.  ·  Plan: has indwelling kennedy , , low grade temperature, may have underlying infection contributing to AMS ,  recent cultures w/ ESBL klebsiella and VRE , will change ABx to ertapenem   - ertapenem   - f/u urine cultures  Consult ID awaiting  hx of a.fib bridge with heparin    	        
   PROGRESS  NOTE   ________________________________________________    CHIEF COMPLAINT:Patient is a 67y old  Male who presents with a chief complaint of ams x 1 day (26 Nov 2022 14:38)  wants to go hopme  	  REVIEW OF SYSTEMS:  CONSTITUTIONAL: No fever, weight loss, or fatigue  EYES: No eye pain, visual disturbances, or discharge  ENT:  No difficulty hearing, tinnitus, vertigo; No sinus or throat pain  NECK: No pain or stiffness  RESPIRATORY: No cough, wheezing, chills or hemoptysis; No Shortness of Breath  CARDIOVASCULAR: No chest pain, palpitations, passing out, dizziness, or leg swelling  GASTROINTESTINAL: No abdominal or epigastric pain. No nausea, vomiting, or hematemesis; No diarrhea or constipation. No melena or hematochezia.  GENITOURINARY: No dysuria, frequency, hematuria, or incontinence  NEUROLOGICAL: No headaches, memory loss, loss of strength, numbness, or tremors  SKIN: No itching, burning, rashes, or lesions   LYMPH Nodes: No enlarged glands  ENDOCRINE: No heat or cold intolerance; No hair loss  MUSCULOSKELETAL: No joint pain or swelling; No muscle, back, or extremity pain  PSYCHIATRIC: No depression, anxiety, mood swings, or difficulty sleeping  HEME/LYMPH: No easy bruising, or bleeding gums  ALLERGY AND IMMUNOLOGIC: No hives or eczema	    [x ] All others negative	  [ ] Unable to obtain    PHYSICAL EXAM:  T(C): 36.9 (11-27-22 @ 12:45), Max: 37.2 (11-27-22 @ 09:34)  HR: 70 (11-27-22 @ 12:45) (64 - 80)  BP: 138/84 (11-27-22 @ 12:45) (138/84 - 159/100)  RR: 18 (11-27-22 @ 12:45) (17 - 18)  SpO2: 96% (11-27-22 @ 12:45) (93% - 99%)  Wt(kg): --  I&O's Summary    26 Nov 2022 07:01  -  27 Nov 2022 07:00  --------------------------------------------------------  IN: 1400 mL / OUT: 1400 mL / NET: 0 mL    27 Nov 2022 07:01  -  27 Nov 2022 14:08  --------------------------------------------------------  IN: 240 mL / OUT: 500 mL / NET: -260 mL        Appearance: Normal	  HEENT:   Normal oral mucosa, PERRL, EOMI	  Lymphatic: No lymphadenopathy  Cardiovascular: Normal S1 S2, No JVD, +murmurs, + edema  Respiratory: Lungs clear to auscultation	  Psychiatry: A & O x 3, Mood & affect appropriate  Gastrointestinal:  Soft, Non-tender, + BS	  Skin: No rashes, No ecchymoses, No cyanosis	  Neurologic: Non-focal  Extremities: Normal range of motion, No clubbing, cyanosis , + edema  Vascular: Peripheral pulses palpable 2+ bilaterally    MEDICATIONS  (STANDING):  albuterol/ipratropium for Nebulization 3 milliLiter(s) Nebulizer every 6 hours  allopurinol 100 milliGRAM(s) Oral daily  atenolol  Tablet 25 milliGRAM(s) Oral daily  cholecalciferol 1000 Unit(s) Oral daily  enoxaparin Injectable 120 milliGRAM(s) SubCutaneous every 12 hours  famotidine    Tablet 20 milliGRAM(s) Oral daily  latanoprost 0.005% Ophthalmic Solution 1 Drop(s) Both EYES at bedtime  multivitamin/minerals 1 Tablet(s) Oral daily  sodium chloride 0.45%. 1000 milliLiter(s) (60 mL/Hr) IV Continuous <Continuous>  tamsulosin 0.4 milliGRAM(s) Oral at bedtime  timolol 0.5% Solution 1 Drop(s) Both EYES two times a day  tiotropium 18 MICROgram(s) Capsule 1 Capsule(s) Inhalation daily  zinc sulfate 220 milliGRAM(s) Oral daily      TELEMETRY: 	    ECG:  	  RADIOLOGY:  OTHER: 	  	  LABS:	 	    CARDIAC MARKERS:                                9.3    7.28  )-----------( 324      ( 27 Nov 2022 13:25 )             31.9     11-27    140  |  106  |  19  ----------------------------<  90  3.9   |  24  |  1.00    Ca    8.3<L>      27 Nov 2022 13:25      proBNP:   Lipid Profile: Cholesterol 145  LDL --  HDL 40  TG 84    HgA1c:   TSH: Thyroid Stimulating Hormone, Serum: 2.26 uIU/mL (11-23 @ 07:02)    PT/INR - ( 27 Nov 2022 13:25 )   PT: 15.8 sec;   INR: 1.36 ratio         < from: 12 Lead ECG (11.22.22 @ 17:04) >  Diagnosis Line ATRIAL FIBRILLATION  LOW VOLTAGE QRS  NONSPECIFIC ST ABNORMALITY  ABNORMAL ECG  WHEN COMPARED WITH ECG OF 05-NOV-2022 09:54,  NO SIGNIFICANT CHANGE WAS FOUND      < from: Transthoracic Echocardiogram (10.20.22 @ 15:08) >  1.Normal mitral valve. Minimal mitral regurgitation.  2. Calcified trileaflet aortic valve with normal opening.  Mild aortic regurgitation.  3. Patient in atrial fibrillation; ejeciton fraction varies  with R-R interval. Endocardium not well visualized; grossly  normal left ventricular systolic function.  4. The right ventricle is not well visualized; grossly  normal right ventricular systolic function.      Assessment and plan  ---------------------------  CTH , CT perfusion head and neck w/o acute findings   - Neurology consulted - follow up recommendations   - MRI brain w/ and w/o noted  - b12 / syphilis screen / tsh   - neuro checks   - dysphagia screen   - dysphagia diet  - r/o meningitis with LP by IR/neurologist.    Problem/Plan - 2:  ·  Problem: UTI (urinary tract infection).   ·  Plan: has indwelling kennedy , , low grade temperature, may have underlying infection contributing to AMS ,  recent cultures w/ ESBL klebsiella and VRE , will change ABx to ertapenem   - completed course ertapenem   - f/u urine cultures  - f/u ID if he need IV Ampicillin.    Problem/Plan - 3:  ·  Problem: Chronic atrial fibrillation.   ·  Plan: on coumadin , inr supra therapeutic  resolved and now therapeutic.   - cover with Lovenox as bridge therapy until night before LP  - hold coumadin, Vitamin K x 1 dose.   - monitor INR , resume coumadin once within therapeutic range   - continue atenolol, will increase dose if increase hr    Problem/Plan - 4:  ·  Problem: At risk for venous thromboembolism (VTE).   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    Problem/Plan - 5:  ·  Problem: Asthma with COPD.   ·  Plan: - tiotropium   - Duoneb q6h.  blood test noted from 11/25    a.fib hr is well controlled  AC  echo noted    	        
           CARDIOLOGY     PROGRESS  NOTE   ________________________________________________    CHIEF COMPLAINT:Patient is a 67y old  Male who presents with a chief complaint of ams x 1 day (02 Dec 2022 18:00)  no complain.  	  REVIEW OF SYSTEMS:  CONSTITUTIONAL: No fever, weight loss, or fatigue  EYES: No eye pain, visual disturbances, or discharge  ENT:  No difficulty hearing, tinnitus, vertigo; No sinus or throat pain  NECK: No pain or stiffness  RESPIRATORY: No cough, wheezing, chills or hemoptysis; + Shortness of Breath  CARDIOVASCULAR: No chest pain, palpitations, passing out, dizziness, or leg swelling  GASTROINTESTINAL: No abdominal or epigastric pain. No nausea, vomiting, or hematemesis; No diarrhea or constipation. No melena or hematochezia.  GENITOURINARY: No dysuria, frequency, hematuria, or incontinence  NEUROLOGICAL: No headaches, memory loss, loss of strength, numbness, or tremors  SKIN: No itching, burning, rashes, or lesions   LYMPH Nodes: No enlarged glands  ENDOCRINE: No heat or cold intolerance; No hair loss  MUSCULOSKELETAL: No joint pain or swelling; No muscle, back, or extremity pain  PSYCHIATRIC: No depression, anxiety, mood swings, or difficulty sleeping  HEME/LYMPH: No easy bruising, or bleeding gums  ALLERGY AND IMMUNOLOGIC: No hives or eczema	    [x ] All others negative	  [ ] Unable to obtain    PHYSICAL EXAM:  T(C): 37.5 (12-03-22 @ 15:33), Max: 37.5 (12-03-22 @ 08:42)  HR: 84 (12-03-22 @ 15:33) (83 - 93)  BP: 124/82 (12-03-22 @ 15:33) (124/82 - 149/87)  RR: 20 (12-03-22 @ 15:33) (18 - 20)  SpO2: 99% (12-03-22 @ 15:33) (95% - 99%)  Wt(kg): --  I&O's Summary    02 Dec 2022 07:01  -  03 Dec 2022 07:00  --------------------------------------------------------  IN: 1330 mL / OUT: 2275 mL / NET: -945 mL    03 Dec 2022 07:01  -  03 Dec 2022 16:13  --------------------------------------------------------  IN: 0 mL / OUT: 1100 mL / NET: -1100 mL        Appearance: Normal	  HEENT:   Normal oral mucosa, PERRL, EOMI	  Lymphatic: No lymphadenopathy  Cardiovascular: Normal S1 S2, No JVD, + murmurs, + edema  Respiratory: rhonchi  Psychiatry: A & O x 3, Mood & affect appropriate  Gastrointestinal:  Soft, Non-tender, + BS	  Skin: No rashes, No ecchymoses, No cyanosis	  Neurologic: Non-focal  Extremities: Normal range of motion, No clubbing, cyanosis , + edema  Vascular: Peripheral pulses palpable 2+ bilaterally    MEDICATIONS  (STANDING):  albuterol/ipratropium for Nebulization 3 milliLiter(s) Nebulizer every 6 hours  allopurinol 100 milliGRAM(s) Oral daily  apixaban 5 milliGRAM(s) Oral every 12 hours  atenolol  Tablet 25 milliGRAM(s) Oral daily  cholecalciferol 1000 Unit(s) Oral daily  famotidine    Tablet 20 milliGRAM(s) Oral daily  furosemide   Injectable 40 milliGRAM(s) IV Push daily  latanoprost 0.005% Ophthalmic Solution 1 Drop(s) Both EYES at bedtime  multivitamin/minerals 1 Tablet(s) Oral daily  potassium chloride    Tablet ER 20 milliEquivalent(s) Oral daily  tamsulosin 0.4 milliGRAM(s) Oral at bedtime  timolol 0.5% Solution 1 Drop(s) Both EYES two times a day  tiotropium 18 MICROgram(s) Capsule 1 Capsule(s) Inhalation daily  zinc sulfate 220 milliGRAM(s) Oral daily      TELEMETRY: 	    ECG:  	  RADIOLOGY:  OTHER: 	  	  LABS:	 	    CARDIAC MARKERS:            12-03    141  |  105  |  27<H>  ----------------------------<  117<H>  3.8   |  25  |  1.20    Ca    8.5      03 Dec 2022 07:09      proBNP:   Lipid Profile: Cholesterol 145  LDL --  HDL 40  TG 84    HgA1c:   TSH: Thyroid Stimulating Hormone, Serum: 2.26 uIU/mL (11-23 @ 07:02)    PT/INR - ( 03 Dec 2022 07:10 )   PT: 18.3 sec;   INR: 1.58 ratio         < from: TTE with Doppler (w/3D Echo) (12.03.22 @ 09:53) >  1. Severely dilated left atrium.  LA volume index = 75  cc/m2.  2. Mild concentric left ventricular hypertrophy.  3. Normal left ventricular systolic function. No segmental  wall motion abnormalities.  4. Normal right ventricular size and function.  5. Estimated pulmonary artery systolic pressure equals 42  mm Hg, assuming right atrial pressure equals 8 mm Hg,  consistent with mild pulmonary pressures.        Assessment and plan  ---------------------------  ·  Problem: Altered mental state.   ·  Plan: difficulty with speech , mild tremor on exam acute change form baseline. labs w/ mild hypernatremia , CTH , CT perfusion head and neck w/o acute findings   - Neurology consulted - follow up recommendations   - MRI brain negative for stroke ruled out.   - r/o meningitis with LP by IR/neurologist  -mental status much better, patient back to baseline.  ·  Problem: Chronic atrial fibrillation.   ·  Plan: previously on coumadin , inr supra therapeutic  resolved and now therapeutic.   - cover with Lovenox as bridge therapy  switched to Eliquis 5 mg BID due to recent supratherapeutic INR and non-compliant  - continue atenolol.  	        
           CARDIOLOGY     PROGRESS  NOTE   ________________________________________________    CHIEF COMPLAINT:Patient is a 67y old  Male who presents with a chief complaint of ams x 1 day (03 Dec 2022 16:13)  no complain  	  REVIEW OF SYSTEMS:  CONSTITUTIONAL: No fever, weight loss, or fatigue  EYES: No eye pain, visual disturbances, or discharge  ENT:  No difficulty hearing, tinnitus, vertigo; No sinus or throat pain  NECK: No pain or stiffness  RESPIRATORY: No cough, wheezing, chills or hemoptysis; No Shortness of Breath  CARDIOVASCULAR: No chest pain, palpitations, passing out, dizziness, or leg swelling  GASTROINTESTINAL: No abdominal or epigastric pain. No nausea, vomiting, or hematemesis; No diarrhea or constipation. No melena or hematochezia.  GENITOURINARY: No dysuria, frequency, hematuria, or incontinence  NEUROLOGICAL: No headaches, memory loss, loss of strength, numbness, or tremors  SKIN: No itching, burning, rashes, or lesions   LYMPH Nodes: No enlarged glands  ENDOCRINE: No heat or cold intolerance; No hair loss  MUSCULOSKELETAL: No joint pain or swelling; No muscle, back, or extremity pain  PSYCHIATRIC: No depression, anxiety, mood swings, or difficulty sleeping  HEME/LYMPH: No easy bruising, or bleeding gums  ALLERGY AND IMMUNOLOGIC: No hives or eczema	    [x ] All others negative	  [ ] Unable to obtain    PHYSICAL EXAM:  T(C): 37.4 (12-04-22 @ 05:10), Max: 37.7 (12-03-22 @ 20:45)  HR: 75 (12-04-22 @ 05:10) (73 - 85)  BP: 138/83 (12-04-22 @ 05:10) (124/82 - 138/83)  RR: 18 (12-04-22 @ 05:10) (18 - 20)  SpO2: 96% (12-04-22 @ 05:10) (96% - 99%)  Wt(kg): --  I&O's Summary    03 Dec 2022 07:01  -  04 Dec 2022 07:00  --------------------------------------------------------  IN: 540 mL / OUT: 2500 mL / NET: -1960 mL        Appearance: Normal	  HEENT:   Normal oral mucosa, PERRL, EOMI	  Lymphatic: No lymphadenopathy  Cardiovascular: Normal S1 S2, No JVD, + murmurs, + edema  Respiratory: Lungs clear to auscultation	  Psychiatry: A & O x 3, Mood & affect appropriate  Gastrointestinal:  Soft, Non-tender, + BS	  Skin: No rashes, No ecchymoses, No cyanosis	  Neurologic: Non-focal  Extremities: Normal range of motion, No clubbing, cyanosis + edema  Vascular: Peripheral pulses palpable 2+ bilaterally    MEDICATIONS  (STANDING):  albuterol/ipratropium for Nebulization 3 milliLiter(s) Nebulizer every 6 hours  allopurinol 100 milliGRAM(s) Oral daily  apixaban 5 milliGRAM(s) Oral every 12 hours  atenolol  Tablet 25 milliGRAM(s) Oral daily  cholecalciferol 1000 Unit(s) Oral daily  famotidine    Tablet 20 milliGRAM(s) Oral daily  furosemide   Injectable 40 milliGRAM(s) IV Push daily  latanoprost 0.005% Ophthalmic Solution 1 Drop(s) Both EYES at bedtime  multivitamin/minerals 1 Tablet(s) Oral daily  potassium chloride    Tablet ER 20 milliEquivalent(s) Oral daily  tamsulosin 0.4 milliGRAM(s) Oral at bedtime  timolol 0.5% Solution 1 Drop(s) Both EYES two times a day  tiotropium 18 MICROgram(s) Capsule 1 Capsule(s) Inhalation daily  zinc sulfate 220 milliGRAM(s) Oral daily      TELEMETRY: 	    ECG:  	  RADIOLOGY:  OTHER: 	  	  LABS:	 	    CARDIAC MARKERS:            12-03    141  |  105  |  27<H>  ----------------------------<  117<H>  3.8   |  25  |  1.20    Ca    8.5      03 Dec 2022 07:09      proBNP:   Lipid Profile: Cholesterol 145  LDL --  HDL 40  TG 84    HgA1c:   TSH: Thyroid Stimulating Hormone, Serum: 2.26 uIU/mL (11-23 @ 07:02)    PT/INR - ( 04 Dec 2022 06:58 )   PT: 16.5 sec;   INR: 1.43 ratio               Assessment and plan  ---------------------------  ·  Problem: Altered mental state.   ·  Plan: difficulty with speech , mild tremor on exam acute change form baseline. labs w/ mild hypernatremia , CTH , CT perfusion head and neck w/o acute findings   - Neurology consulted - follow up recommendations   - MRI brain negative for stroke ruled out.   - r/o meningitis with LP by IR/neurologist  -mental status much better, patient back to baseline.  ·  Problem: Chronic atrial fibrillation.   ·  Plan: previously on coumadin , inr supra therapeutic  resolved and now therapeutic.   - cover with Lovenox as bridge therapy  switched to Eliquis 5 mg BID due to recent supratherapeutic INR and non-compliant  - continue atenolol.  ?dc to rehab tomorrow    	        
     PROGRESS  NOTE   ________________________________________________    CHIEF COMPLAINT:Patient is a 67y old  Male who presents with a chief complaint of ams x 1 day (26 Nov 2022 10:02)  doing well, no complain  	  REVIEW OF SYSTEMS:  CONSTITUTIONAL: No fever, weight loss, or fatigue  EYES: No eye pain, visual disturbances, or discharge  ENT:  No difficulty hearing, tinnitus, vertigo; No sinus or throat pain  NECK: No pain or stiffness  RESPIRATORY: No cough, wheezing, chills or hemoptysis; No Shortness of Breath  CARDIOVASCULAR: No chest pain, palpitations, passing out, dizziness, or leg swelling  GASTROINTESTINAL: No abdominal or epigastric pain. No nausea, vomiting, or hematemesis; No diarrhea or constipation. No melena or hematochezia.  GENITOURINARY: No dysuria, frequency, hematuria, or incontinence  NEUROLOGICAL: No headaches, memory loss, loss of strength, numbness, or tremors  SKIN: No itching, burning, rashes, or lesions   LYMPH Nodes: No enlarged glands  ENDOCRINE: No heat or cold intolerance; No hair loss  MUSCULOSKELETAL: No joint pain or swelling; No muscle, back, or extremity pain  PSYCHIATRIC: No depression, anxiety, mood swings, or difficulty sleeping  HEME/LYMPH: No easy bruising, or bleeding gums  ALLERGY AND IMMUNOLOGIC: No hives or eczema	    [ x] All others negative	  [ ] Unable to obtain    PHYSICAL EXAM:  T(C): 36.6 (11-26-22 @ 10:02), Max: 36.7 (11-25-22 @ 19:54)  HR: 77 (11-26-22 @ 10:02) (77 - 88)  BP: 151/95 (11-26-22 @ 10:02) (147/89 - 153/101)  RR: 18 (11-26-22 @ 10:02) (17 - 18)  SpO2: 98% (11-26-22 @ 10:02) (96% - 98%)  Wt(kg): --  I&O's Summary    25 Nov 2022 07:01  -  26 Nov 2022 07:00  --------------------------------------------------------  IN: 1920 mL / OUT: 951 mL / NET: 969 mL    26 Nov 2022 07:01  -  26 Nov 2022 14:39  --------------------------------------------------------  IN: 440 mL / OUT: 350 mL / NET: 90 mL        Appearance: Normal	  HEENT:   Normal oral mucosa, PERRL, EOMI	  Lymphatic: No lymphadenopathy  Cardiovascular: Normal S1 S2, No JVD, + murmurs, No edema  Respiratory: rhonchi  Gastrointestinal:  Soft, Non-tender, + BS	  Skin: No rashes, No ecchymoses, No cyanosis	  Neurologic: Non-focal  Extremities: Normal range of motion, No clubbing, cyanosis or edema      MEDICATIONS  (STANDING):  albuterol/ipratropium for Nebulization 3 milliLiter(s) Nebulizer every 6 hours  allopurinol 100 milliGRAM(s) Oral daily  atenolol  Tablet 25 milliGRAM(s) Oral daily  cholecalciferol 1000 Unit(s) Oral daily  enoxaparin Injectable 120 milliGRAM(s) SubCutaneous every 12 hours  famotidine    Tablet 20 milliGRAM(s) Oral daily  latanoprost 0.005% Ophthalmic Solution 1 Drop(s) Both EYES at bedtime  multivitamin/minerals 1 Tablet(s) Oral daily  sodium chloride 0.45%. 1000 milliLiter(s) (60 mL/Hr) IV Continuous <Continuous>  tamsulosin 0.4 milliGRAM(s) Oral at bedtime  timolol 0.5% Solution 1 Drop(s) Both EYES two times a day  tiotropium 18 MICROgram(s) Capsule 1 Capsule(s) Inhalation daily  zinc sulfate 220 milliGRAM(s) Oral daily      TELEMETRY: 	    ECG:  	  RADIOLOGY:  OTHER: 	  	  LABS:	 	    CARDIAC MARKERS:                                9.4    6.95  )-----------( 300      ( 25 Nov 2022 10:54 )             32.2     11-25    145  |  110<H>  |  17  ----------------------------<  105<H>  3.6   |  23  |  1.05    Ca    8.1<L>      25 Nov 2022 10:54  Phos  2.6     11-25  Mg     1.8     11-25      proBNP:   Lipid Profile: Cholesterol 145  LDL --  HDL 40  TG 84    HgA1c:   TSH: Thyroid Stimulating Hormone, Serum: 2.26 uIU/mL (11-23 @ 07:02)    PT/INR - ( 26 Nov 2022 10:51 )   PT: 16.4 sec;   INR: 1.42 ratio         < from: MR Head w/wo IV Cont (11.24.22 @ 18:42) >  No MRI evidence of acute intracranial pathology.  No evidence of acute   infarct, intracranial blood products, vasogenic edema, mass effect,   hydrocephalus or abnormal enhancement.    Mild patchy chronic microvascular ischemic disease.            Assessment and plan  ---------------------------  CTH , CT perfusion head and neck w/o acute findings   - Neurology consulted - follow up recommendations   - MRI brain w/ and w/o noted  - b12 / syphilis screen / tsh   - neuro checks   - dysphagia screen   - dysphagia diet  - r/o meningitis with LP by IR/neurologist.    Problem/Plan - 2:  ·  Problem: UTI (urinary tract infection).   ·  Plan: has indwelling kennedy , , low grade temperature, may have underlying infection contributing to AMS ,  recent cultures w/ ESBL klebsiella and VRE , will change ABx to ertapenem   - completed course ertapenem   - f/u urine cultures  - f/u ID if he need IV Ampicillin.    Problem/Plan - 3:  ·  Problem: Chronic atrial fibrillation.   ·  Plan: on coumadin , inr supra therapeutic  resolved and now therapeutic.   - cover with Lovenox as bridge therapy until night before LP  - hold coumadin, Vitamin K x 1 dose.   - monitor INR , resume coumadin once within therapeutic range   - continue atenolol, will increase dose if increase hr    Problem/Plan - 4:  ·  Problem: At risk for venous thromboembolism (VTE).   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    Problem/Plan - 5:  ·  Problem: Asthma with COPD.   ·  Plan: - tiotropium   - Duoneb q6h.  blood test noted from 11/25    	        
Follow Up: AMS    Interval History/ROS: Afebrile, feels fine. No pain, cough, diarrhea or dysuria. Hoping to go home. Still has periods of confusion but it comes and goes, none now.     Allergies  No Known Allergies        ANTIMICROBIALS:      OTHER MEDS:  acetaminophen     Tablet .. 650 milliGRAM(s) Oral every 6 hours PRN  albuterol/ipratropium for Nebulization 3 milliLiter(s) Nebulizer every 6 hours  allopurinol 100 milliGRAM(s) Oral daily  atenolol  Tablet 25 milliGRAM(s) Oral daily  cholecalciferol 1000 Unit(s) Oral daily  enoxaparin Injectable 120 milliGRAM(s) SubCutaneous every 12 hours  famotidine    Tablet 20 milliGRAM(s) Oral daily  latanoprost 0.005% Ophthalmic Solution 1 Drop(s) Both EYES at bedtime  melatonin 3 milliGRAM(s) Oral at bedtime PRN  multivitamin/minerals 1 Tablet(s) Oral daily  sodium chloride 0.45%. 1000 milliLiter(s) IV Continuous <Continuous>  tamsulosin 0.4 milliGRAM(s) Oral at bedtime  timolol 0.5% Solution 1 Drop(s) Both EYES two times a day  tiotropium 18 MICROgram(s) Capsule 1 Capsule(s) Inhalation daily  zinc sulfate 220 milliGRAM(s) Oral daily      Vital Signs Last 24 Hrs  T(C): 36.6 (28 Nov 2022 13:20), Max: 37.1 (27 Nov 2022 20:00)  T(F): 97.9 (28 Nov 2022 13:20), Max: 98.8 (27 Nov 2022 20:00)  HR: 80 (28 Nov 2022 13:20) (61 - 86)  BP: 120/84 (28 Nov 2022 13:20) (120/84 - 164/99)  BP(mean): --  RR: 18 (28 Nov 2022 13:20) (18 - 18)  SpO2: 98% (28 Nov 2022 13:20) (96% - 99%)    Parameters below as of 28 Nov 2022 13:20  Patient On (Oxygen Delivery Method): room air        Physical Exam:  General: non toxic, obese  Cardio: regular rate   Respiratory: nonlabored, grossly clear   abd: nondistended, soft   Musculoskeletal: edematous legs   Skin: no rash                          9.7    7.92  )-----------( 334      ( 28 Nov 2022 10:53 )             32.8       11-28    141  |  107  |  18  ----------------------------<  95  4.0   |  26  |  1.16    Ca    8.6      28 Nov 2022 10:56            MICROBIOLOGY:  Culture - Urine (collected 11-22-22 @ 18:05)  Source: Clean Catch Clean Catch (Midstream)  Final Report (11-25-22 @ 15:43):    <10,000 CFU/mL Normal Urogenital Maria Elena    Culture - Blood (collected 11-22-22 @ 17:35)  Source: .Blood Blood-Peripheral  Gram Stain (11-23-22 @ 17:33):    Growth in aerobic bottle: Gram Positive Cocci in Clusters  Final Report (11-24-22 @ 13:46):    Growth in aerobic bottle: Staphylococcus haemolyticus    Coag Negative Staphylococcus    Single set isolate, possible contaminant. Contact    Microbiology if susceptibility testing clinically    indicated.    ***Blood Panel PCR results on this specimen are available    approximately 3 hours after the Gram stain result.***    Gram stain, PCR, and/or culture results may not always    correspond due to difference in methodologies.    ************************************************************    This PCR assay was performed by multiplex PCR. This    Assay tests for 66 bacterial and resistance gene targets.    Please refer to the [facility] test directory    at [email or web address] for details.  Organism: Blood Culture PCR (11-24-22 @ 13:46)  Organism: Blood Culture PCR (11-24-22 @ 13:46)      -  Coagulase negative Staphylococcus: Detec      Method Type: PCR    Culture - Blood (collected 11-22-22 @ 17:20)  Source: .Blood Blood  Final Report (11-27-22 @ 22:00):    No Growth Final      RADIOLOGY:  Images below reviewed personally  CT Abdomen and Pelvis w/ IV Cont (11.27.22 @ 15:01)   No specific source of infection identified on this study.  Additional findings, as outlined above    Xray Chest 1 View AP/PA (11.22.22 @ 17:47)   No focal consolidations.
Patient is a 67y old  Male who presents with a chief complaint of ams x 1 day (28 Nov 2022 17:40)      INTERVAL HPI/OVERNIGHT EVENTS: No event. Stable. Medically clear for discharge tomorrow at home. Already started in [facility].     Pain Location & Control: OK    MEDICATIONS  (STANDING):  albuterol/ipratropium for Nebulization 3 milliLiter(s) Nebulizer every 6 hours  allopurinol 100 milliGRAM(s) Oral daily  apixaban 5 milliGRAM(s) Oral every 12 hours  atenolol  Tablet 25 milliGRAM(s) Oral daily  cholecalciferol 1000 Unit(s) Oral daily  famotidine    Tablet 20 milliGRAM(s) Oral daily  latanoprost 0.005% Ophthalmic Solution 1 Drop(s) Both EYES at bedtime  multivitamin/minerals 1 Tablet(s) Oral daily  tamsulosin 0.4 milliGRAM(s) Oral at bedtime  timolol 0.5% Solution 1 Drop(s) Both EYES two times a day  tiotropium 18 MICROgram(s) Capsule 1 Capsule(s) Inhalation daily  zinc sulfate 220 milliGRAM(s) Oral daily    MEDICATIONS  (PRN):  acetaminophen     Tablet .. 650 milliGRAM(s) Oral every 6 hours PRN Temp greater or equal to 38C (100.4F), Mild Pain (1 - 3)  melatonin 3 milliGRAM(s) Oral at bedtime PRN Insomnia      Allergies    No Known Allergies    Intolerances        REVIEW OF SYSTEMS:  CONSTITUTIONAL: No fever, weight loss, or fatigue  EYES: No eye pain, visual disturbances, or discharge  ENMT:  No difficulty hearing, tinnitus, vertigo; No sinus or throat pain  NECK: No pain or stiffness  BREASTS: No pain, masses, or nipple discharge  RESPIRATORY: No cough, wheezing, chills or hemoptysis; No shortness of breath  CARDIOVASCULAR: No chest pain, palpitations, dizziness, or leg swelling  GASTROINTESTINAL: No abdominal or epigastric pain. No nausea, vomiting, or hematemesis; No diarrhea or constipation. No melena or hematochezia.  GENITOURINARY: No dysuria, frequency, hematuria, or incontinence  NEUROLOGICAL: No headaches, memory loss, loss of strength, numbness, or tremors  SKIN: No itching, burning, rashes, or lesions   LYMPH NODES: No enlarged glands  ENDOCRINE: No heat or cold intolerance; No hair loss; No polydipsia or polyuria  MUSCULOSKELETAL: No back pain  PSYCHIATRIC: No depression, anxiety, mood swings, or difficulty sleeping  HEME/LYMPH: No easy bruising, or bleeding gums  ALLERGY AND IMMUNOLOGIC: No hives or eczema    Vital Signs Last 24 Hrs  T(C): 36.9 (29 Nov 2022 19:55), Max: 36.9 (29 Nov 2022 00:25)  T(F): 98.5 (29 Nov 2022 19:55), Max: 98.5 (29 Nov 2022 13:19)  HR: 87 (29 Nov 2022 19:55) (72 - 95)  BP: 149/89 (29 Nov 2022 19:55) (118/86 - 168/90)  BP(mean): --  RR: 18 (29 Nov 2022 19:55) (18 - 19)  SpO2: 94% (29 Nov 2022 19:55) (92% - 96%)    Parameters below as of 29 Nov 2022 19:55  Patient On (Oxygen Delivery Method): room air        PHYSICAL EXAM:  GENERAL: NAD, well-groomed, well-developed  HEAD:  Atraumatic, Normocephalic  EYES: EOMI, PERRLA, conjunctiva and sclera clear  ENMT: No tonsillar erythema, exudates, or enlargement; Moist mucous membranes, Good dentition, No lesions  NECK: Supple, No JVD, Normal thyroid  NERVOUS SYSTEM:  Alert & Oriented X 2 mildly confused   CHEST/LUNG: Clear to auscultation bilaterally; No rales, rhonchi, wheezing, or rubs  HEART: Regular rate and rhythm; No murmurs, rubs, or gallops  ABDOMEN: Soft, Nontender, Nondistended; Bowel sounds present, kennedy catheter   EXTREMITIES:  2+ Peripheral Pulses, No clubbing or cyanosis  LYMPH: No lymphadenopathy noted  SKIN: No rashes or lesions    LABS:                        9.2    7.92  )-----------( 324      ( 29 Nov 2022 12:21 )             31.7       Ca    8.6        28 Nov 2022 10:56      PT/INR - ( 29 Nov 2022 12:21 )   PT: 16.8 sec;   INR: 1.44 ratio         PTT - ( 29 Nov 2022 12:21 )  PTT:39.1 sec    CAPILLARY BLOOD GLUCOSE            Cultures      RADIOLOGY & ADDITIONAL TESTS:    Imaging Personally Reviewed:  [ X] YES  [ ] NO    Consultant(s) Notes Reviewed:  [X ] YES  [ ] NO    Care Discussed with Consultants/Other Providers [X ] YES  [ ] NO
SUBJECTIVE/INTERVAL HISTORY: Patient seen and examined at bedside this AM with Neurology attending and team. Reports he is feeling much better this AM. Does not report any new complaints at this time.       ROS: As above, otherwise negative.       MEDICATIONS:  MEDICATIONS  (STANDING):  albuterol/ipratropium for Nebulization 3 milliLiter(s) Nebulizer every 6 hours  allopurinol 100 milliGRAM(s) Oral daily  atenolol  Tablet 25 milliGRAM(s) Oral daily  cholecalciferol 1000 Unit(s) Oral daily  famotidine    Tablet 20 milliGRAM(s) Oral daily  latanoprost 0.005% Ophthalmic Solution 1 Drop(s) Both EYES at bedtime  multivitamin/minerals 1 Tablet(s) Oral daily  sodium chloride 0.45%. 1000 milliLiter(s) (60 mL/Hr) IV Continuous <Continuous>  tamsulosin 0.4 milliGRAM(s) Oral at bedtime  timolol 0.5% Solution 1 Drop(s) Both EYES two times a day  tiotropium 18 MICROgram(s) Capsule 1 Capsule(s) Inhalation daily  zinc sulfate 220 milliGRAM(s) Oral daily    MEDICATIONS  (PRN):  acetaminophen     Tablet .. 650 milliGRAM(s) Oral every 6 hours PRN Temp greater or equal to 38C (100.4F), Mild Pain (1 - 3)  melatonin 3 milliGRAM(s) Oral at bedtime PRN Insomnia      VITALS & EXAMINATION:  Vital Signs Last 24 Hrs  T(C): 36.8 (25 Nov 2022 12:24), Max: 37.1 (25 Nov 2022 04:58)  T(F): 98.2 (25 Nov 2022 12:24), Max: 98.7 (25 Nov 2022 04:58)  HR: 93 (25 Nov 2022 12:28) (79 - 93)  BP: 159/99 (25 Nov 2022 12:28) (118/81 - 159/99)  RR: 18 (25 Nov 2022 12:24) (18 - 18)  SpO2: 99% (25 Nov 2022 12:28) (95% - 99%)    Parameters below as of 25 Nov 2022 12:28  Patient On (Oxygen Delivery Method): room air    General:  Constitutional: Elderly M, appears stated age, NAD.  Respiratory: Breathing comfortably on room air.  Extremities: B/l lower extremity swelling noted.   Skin: Chronic discoloration of the distal LE b/l.     Neurological (>12):  MS: Eyes open, awake, alert and oriented. Normal affect. Follows all commands.  Speech/Language: Clear, fluent.  CNs: EOMI. CVF intact. No nystagmus, no diplopia. No facial asymmetry b/l. Hearing grossly normal to conversation. Gag reflex deferred. Tongue midline, normal movements.  Motor: Increased tone in the upper extremities b/l.    RUE: ROM limited (unable to raise above the shoulder - chronic), with otherwise good  strength and resistance provided with arm flexion/abduction and elbow extension.     LUE: ROM intact. Full strength with arm flexion/abduction and elbow extension. Weakness of hand extension, chronic, and impaired FFM.    RLE: Difficulty raising at the knee, due to chronic knee pathology; able to elevate antigravity from the hip; 5/5 ankle plantarflexion and dorsiflexion.    LLE: Able to elevate antigravity at the hip and knee; 5/5 ankle plantarflexion and dorsiflexion.  Sensation: Grossly intact to LT b/l throughout.   Coordination: No dysmetria to FTN b/l.   Gait: Not assessed.       LABS:  CBC                       9.4    6.95  )-----------( 300      ( 25 Nov 2022 10:54 )             32.2     Chem 11-25    145  |  110<H>  |  17  ----------------------------<  105<H>  3.6   |  23  |  1.05    Ca    8.1<L>      25 Nov 2022 10:54  Phos  2.6     11-25  Mg     1.8     11-25      LFTs   Coagulopathy PT/INR - ( 25 Nov 2022 10:54 )   PT: 15.8 sec;   INR: 1.36 ratio        Lipid Panel 11-23 Chol 145 LDL -- HDL 40<L> Trig 84  A1c   Cardiac enzymes         STUDIES & IMAGING:    MR Head w/wo IV Cont:  (24 Nov 2022 18:42)  No MRI evidence of acute intracranial pathology.  No evidence of acute   infarct, intracranial blood products, vasogenic edema, mass effect,   hydrocephalus or abnormal enhancement.  Mild patchy chronic microvascular ischemic disease.    CT Head No Cont & CT Angio Head/Neck:  (22 Nov 2022 17:15)    Noncontrast CT brain: No acute intracranial hemorrhage, mass effect, or   CT evidence of acute territorial infarct.    CT angiography neck: No evidence of hemodynamically significant stenosis   using NASCET criteria.  Patent vertebral arteries.  No evidence of   vascular dissection.    CT angiography brain: Nondiagnostic due to motion.    
Patient is a 67y old  Male who presents with a chief complaint of ams x 1 day (29 Nov 2022 21:03)      INTERVAL HPI/OVERNIGHT EVENTS:  Medically clear. Waiting for equipment to be delivered to his house to be discharged tomorrow.     Pain Location & Control: OK    MEDICATIONS  (STANDING):  albuterol/ipratropium for Nebulization 3 milliLiter(s) Nebulizer every 6 hours  allopurinol 100 milliGRAM(s) Oral daily  apixaban 5 milliGRAM(s) Oral every 12 hours  atenolol  Tablet 25 milliGRAM(s) Oral daily  cholecalciferol 1000 Unit(s) Oral daily  famotidine    Tablet 20 milliGRAM(s) Oral daily  latanoprost 0.005% Ophthalmic Solution 1 Drop(s) Both EYES at bedtime  multivitamin/minerals 1 Tablet(s) Oral daily  tamsulosin 0.4 milliGRAM(s) Oral at bedtime  timolol 0.5% Solution 1 Drop(s) Both EYES two times a day  tiotropium 18 MICROgram(s) Capsule 1 Capsule(s) Inhalation daily  zinc sulfate 220 milliGRAM(s) Oral daily    MEDICATIONS  (PRN):  acetaminophen     Tablet .. 650 milliGRAM(s) Oral every 6 hours PRN Temp greater or equal to 38C (100.4F), Mild Pain (1 - 3)  melatonin 3 milliGRAM(s) Oral at bedtime PRN Insomnia      Allergies    No Known Allergies    Intolerances        REVIEW OF SYSTEMS:  CONSTITUTIONAL: No fever, weight loss, or fatigue  EYES: No eye pain, visual disturbances, or discharge  ENMT:  No difficulty hearing, tinnitus, vertigo; No sinus or throat pain  NECK: No pain or stiffness  BREASTS: No pain, masses, or nipple discharge  RESPIRATORY: No cough, wheezing, chills or hemoptysis; No shortness of breath  CARDIOVASCULAR: No chest pain, palpitations, dizziness, or leg swelling  GASTROINTESTINAL: No abdominal or epigastric pain. No nausea, vomiting, or hematemesis; No diarrhea or constipation. No melena or hematochezia.  GENITOURINARY: No dysuria, frequency, hematuria, or incontinence  NEUROLOGICAL: No headaches, memory loss, loss of strength, numbness, or tremors  SKIN: No itching, burning, rashes, or lesions   LYMPH NODES: No enlarged glands  ENDOCRINE: No heat or cold intolerance; No hair loss; No polydipsia or polyuria  MUSCULOSKELETAL: No back pain  PSYCHIATRIC: No depression, anxiety, mood swings, or difficulty sleeping  HEME/LYMPH: No easy bruising, or bleeding gums  ALLERGY AND IMMUNOLOGIC: No hives or eczema    Vital Signs Last 24 Hrs  T(C): 36.5 (30 Nov 2022 12:02), Max: 36.9 (29 Nov 2022 19:55)  T(F): 97.7 (30 Nov 2022 12:02), Max: 98.5 (29 Nov 2022 19:55)  HR: 84 (30 Nov 2022 12:02) (84 - 87)  BP: 134/72 (30 Nov 2022 12:02) (134/72 - 149/89)  BP(mean): --  RR: 18 (30 Nov 2022 12:02) (18 - 18)  SpO2: 96% (30 Nov 2022 12:02) (94% - 96%)    Parameters below as of 30 Nov 2022 03:58  Patient On (Oxygen Delivery Method): room air        PHYSICAL EXAM:  GENERAL: NAD, well-groomed, well-developed  HEAD:  Atraumatic, Normocephalic  EYES: EOMI, PERRLA, conjunctiva and sclera clear  ENMT: No tonsillar erythema, exudates, or enlargement; Moist mucous membranes, Good dentition, No lesions  NECK: Supple, No JVD, Normal thyroid  NERVOUS SYSTEM:  Alert & Oriented X2  CHEST/LUNG: Clear to auscultation bilaterally; No rales, rhonchi, wheezing, or rubs  HEART: Regular rate and rhythm; No murmurs, rubs, or gallops  ABDOMEN: Soft, Nontender, Nondistended; Bowel sounds present  EXTREMITIES:  2+ Peripheral Pulses, No clubbing or cyanosis 2+ edema   LYMPH: No lymphadenopathy noted  SKIN: No rashes or lesions    LABS:          PT/INR - ( 30 Nov 2022 07:07 )   PT: 14.9 sec;   INR: 1.29 ratio         PTT - ( 29 Nov 2022 12:21 )  PTT:39.1 sec    CAPILLARY BLOOD GLUCOSE            Cultures      RADIOLOGY & ADDITIONAL TESTS:    Imaging Personally Reviewed:  [ X] YES  [ ] NO    Consultant(s) Notes Reviewed:  [X ] YES  [ ] NO    Care Discussed with Consultants/Other Providers [X ] YES  [ ] NO
Patient is a 67y old  Male who presents with a chief complaint of ams x 1 day (28 Nov 2022 15:43)      INTERVAL HPI/OVERNIGHT EVENTS: No event. Afebrile. Labs reviewed. INR 1.36 on Lovenox as bridge therapy.  Blood cx positive x 1 but contamination with coag negative staph just only 1 bottle. UC&S negative. CT abdomen/pelvis recommended by ID, cam back negative, no specific source of infection. Evaluated by ID who recommended this CT scan. Off of IV abx, all completed.  F/u ID for more recommendations. Otherwise we can switch Lovenox to Eliquis at any time to cover afib. He used to be on coumadin there is not contraindication for Eliquis will switch Lovenox to Eliquis 5 mg BID. Family asked me to fill FMLA form due to his employment. I filled and will send back to family. Patient is medically clear for discharge.     Pain Location & Control: OK    MEDICATIONS  (STANDING):  albuterol/ipratropium for Nebulization 3 milliLiter(s) Nebulizer every 6 hours  allopurinol 100 milliGRAM(s) Oral daily  apixaban 5 milliGRAM(s) Oral every 12 hours  atenolol  Tablet 25 milliGRAM(s) Oral daily  cholecalciferol 1000 Unit(s) Oral daily  famotidine    Tablet 20 milliGRAM(s) Oral daily  latanoprost 0.005% Ophthalmic Solution 1 Drop(s) Both EYES at bedtime  multivitamin/minerals 1 Tablet(s) Oral daily  tamsulosin 0.4 milliGRAM(s) Oral at bedtime  timolol 0.5% Solution 1 Drop(s) Both EYES two times a day  tiotropium 18 MICROgram(s) Capsule 1 Capsule(s) Inhalation daily  zinc sulfate 220 milliGRAM(s) Oral daily    MEDICATIONS  (PRN):  acetaminophen     Tablet .. 650 milliGRAM(s) Oral every 6 hours PRN Temp greater or equal to 38C (100.4F), Mild Pain (1 - 3)  melatonin 3 milliGRAM(s) Oral at bedtime PRN Insomnia      Allergies    No Known Allergies    Intolerances        REVIEW OF SYSTEMS:  CONSTITUTIONAL: No fever, weight loss, or fatigue  EYES: No eye pain, visual disturbances, or discharge  ENMT:  No difficulty hearing, tinnitus, vertigo; No sinus or throat pain  NECK: No pain or stiffness  BREASTS: No pain, masses, or nipple discharge  RESPIRATORY: No cough, wheezing, chills or hemoptysis; No shortness of breath  CARDIOVASCULAR: No chest pain, palpitations, dizziness, or leg swelling  GASTROINTESTINAL: No abdominal or epigastric pain. No nausea, vomiting, or hematemesis; No diarrhea or constipation. No melena or hematochezia.  GENITOURINARY: No dysuria, frequency, hematuria, or incontinence  NEUROLOGICAL: No headaches, memory loss, loss of strength, numbness, or tremors  SKIN: No itching, burning, rashes, or lesions   LYMPH NODES: No enlarged glands  ENDOCRINE: No heat or cold intolerance; No hair loss; No polydipsia or polyuria  MUSCULOSKELETAL: No back pain  PSYCHIATRIC: No depression, anxiety, mood swings, or difficulty sleeping  HEME/LYMPH: No easy bruising, or bleeding gums  ALLERGY AND IMMUNOLOGIC: No hives or eczema    Vital Signs Last 24 Hrs  T(C): 36.7 (28 Nov 2022 15:52), Max: 37.1 (27 Nov 2022 20:00)  T(F): 98.1 (28 Nov 2022 15:52), Max: 98.8 (27 Nov 2022 20:00)  HR: 82 (28 Nov 2022 15:52) (61 - 86)  BP: 144/96 (28 Nov 2022 15:52) (120/84 - 164/99)  BP(mean): --  RR: 18 (28 Nov 2022 15:52) (18 - 18)  SpO2: 98% (28 Nov 2022 15:52) (96% - 98%)    Parameters below as of 28 Nov 2022 15:52  Patient On (Oxygen Delivery Method): room air        PHYSICAL EXAM:  GENERAL: NAD, well-groomed, well-developed  HEAD:  Atraumatic, Normocephalic  EYES: EOMI, PERRLA, conjunctiva and sclera clear  ENMT: No tonsillar erythema, exudates, or enlargement; Moist mucous membranes, Good dentition, No lesions  NECK: Supple, No JVD, Normal thyroid  NERVOUS SYSTEM:  Alert & Oriented X  2  CHEST/LUNG: Clear to auscultation bilaterally; No rales, rhonchi, wheezing, or rubs  HEART: Regular rate and rhythm; No murmurs, rubs, or gallops  ABDOMEN: Soft, Nontender, Nondistended; Bowel sounds present  EXTREMITIES:  2+ Peripheral Pulses, No clubbing or cyanosis  LYMPH: No lymphadenopathy noted  SKIN: No rashes or lesions  INCISION:  Dressing dry and intact    LABS:                        9.7    7.92  )-----------( 334      ( 28 Nov 2022 10:53 )             32.8     28 Nov 2022 10:56    141    |  107    |  18     ----------------------------<  95     4.0     |  26     |  1.16     Ca    8.6        28 Nov 2022 10:56      PT/INR - ( 28 Nov 2022 10:54 )   PT: 15.8 sec;   INR: 1.37 ratio             CAPILLARY BLOOD GLUCOSE            Cultures  Culture Results:   <10,000 CFU/mL Normal Urogenital Maria Elena (11-22-22 @ 18:05)  Culture Results:   Growth in aerobic bottle: Staphylococcus haemolyticus  Coag Negative Staphylococcus  Single set isolate, possible contaminant. Contact  Microbiology if susceptibility testing clinically  indicated.  ***Blood Panel PCR results on this specimen are available  approximately 3 hours after the Gram stain result.***  Gram stain, PCR, and/or culture results may not always  correspond due to difference in methodologies.  ************************************************************  This PCR assay was performed by multiplex PCR. This  Assay tests for 66 bacterial and resistance gene targets.  Please refer to the [facility] test directory  at [email or web address] for details. (11-22-22 @ 17:35)  Culture Results:   No Growth Final (11-22-22 @ 17:20)      RADIOLOGY & ADDITIONAL TESTS:    Imaging Personally Reviewed:  [ X] YES  [ ] NO    Consultant(s) Notes Reviewed:  [X ] YES  [ ] NO    Care Discussed with Consultants/Other Providers [X ] YES  [ ] NO
Patient is a 67y old  Male who presents with a chief complaint of ams x 1 day (01 Dec 2022 16:44)      INTERVAL HPI/OVERNIGHT EVENTS: No event. LE swelling 3 + edema  started on Lasix PO 40 mg daily with potassium supplement. Monitor weight and renal function  as outpatient by PCP.     Pain Location & Control: OK    MEDICATIONS  (STANDING):  albuterol/ipratropium for Nebulization 3 milliLiter(s) Nebulizer every 6 hours  allopurinol 100 milliGRAM(s) Oral daily  apixaban 5 milliGRAM(s) Oral every 12 hours  atenolol  Tablet 25 milliGRAM(s) Oral daily  cholecalciferol 1000 Unit(s) Oral daily  famotidine    Tablet 20 milliGRAM(s) Oral daily  furosemide   Injectable 40 milliGRAM(s) IV Push daily  latanoprost 0.005% Ophthalmic Solution 1 Drop(s) Both EYES at bedtime  multivitamin/minerals 1 Tablet(s) Oral daily  potassium chloride    Tablet ER 20 milliEquivalent(s) Oral daily  tamsulosin 0.4 milliGRAM(s) Oral at bedtime  timolol 0.5% Solution 1 Drop(s) Both EYES two times a day  tiotropium 18 MICROgram(s) Capsule 1 Capsule(s) Inhalation daily  zinc sulfate 220 milliGRAM(s) Oral daily    MEDICATIONS  (PRN):  acetaminophen     Tablet .. 650 milliGRAM(s) Oral every 6 hours PRN Temp greater or equal to 38C (100.4F), Mild Pain (1 - 3)  melatonin 3 milliGRAM(s) Oral at bedtime PRN Insomnia      Allergies    No Known Allergies    Intolerances        REVIEW OF SYSTEMS:  CONSTITUTIONAL: No fever, weight loss, or fatigue  EYES: No eye pain, visual disturbances, or discharge  ENMT:  No difficulty hearing, tinnitus, vertigo; No sinus or throat pain  NECK: No pain or stiffness  BREASTS: No pain, masses, or nipple discharge  RESPIRATORY: No cough, wheezing, chills or hemoptysis; No shortness of breath  CARDIOVASCULAR: No chest pain, palpitations, dizziness, or leg swelling  GASTROINTESTINAL: No abdominal or epigastric pain. No nausea, vomiting, or hematemesis; No diarrhea or constipation. No melena or hematochezia.  GENITOURINARY: No dysuria, frequency, hematuria, or incontinence  NEUROLOGICAL: No headaches, memory loss, loss of strength, numbness, or tremors  SKIN: No itching, burning, rashes, or lesions   LYMPH NODES: No enlarged glands  ENDOCRINE: No heat or cold intolerance; No hair loss; No polydipsia or polyuria  MUSCULOSKELETAL: No back pain  PSYCHIATRIC: No depression, anxiety, mood swings, or difficulty sleeping  HEME/LYMPH: No easy bruising, or bleeding gums  ALLERGY AND IMMUNOLOGIC: No hives or eczema    Vital Signs Last 24 Hrs  T(C): 37.5 (02 Dec 2022 12:46), Max: 37.5 (02 Dec 2022 12:46)  T(F): 99.5 (02 Dec 2022 12:46), Max: 99.5 (02 Dec 2022 12:46)  HR: 79 (02 Dec 2022 12:46) (76 - 80)  BP: 124/76 (02 Dec 2022 12:46) (124/76 - 136/84)  BP(mean): --  RR: 18 (02 Dec 2022 12:46) (18 - 18)  SpO2: 96% (02 Dec 2022 12:46) (94% - 96%)    Parameters below as of 02 Dec 2022 12:46  Patient On (Oxygen Delivery Method): room air        PHYSICAL EXAM:  GENERAL: NAD, well-groomed, well-developed  HEAD:  Atraumatic, Normocephalic  EYES: EOMI, PERRLA, conjunctiva and sclera clear  ENMT: No tonsillar erythema, exudates, or enlargement; Moist mucous membranes, Good dentition, No lesions  NECK: Supple, No JVD, Normal thyroid  NERVOUS SYSTEM:  Alert & Oriented X 2  CHEST/LUNG: Clear to auscultation bilaterally; No rales, rhonchi, wheezing, or rubs  HEART: Regular rate and rhythm; No murmurs, rubs, or gallops  ABDOMEN: Soft, Nontender, Nondistended; Bowel sounds present  EXTREMITIES:  2+ Peripheral Pulses, No clubbing or cyanosis, LE 3+ edema  LYMPH: No lymphadenopathy noted  SKIN: No rashes or lesions      LABS:          PT/INR - ( 02 Dec 2022 09:54 )   PT: 19.6 sec;   INR: 1.70 ratio             CAPILLARY BLOOD GLUCOSE            Cultures      RADIOLOGY & ADDITIONAL TESTS:    Imaging Personally Reviewed:  [X ] YES  [ ] NO    Consultant(s) Notes Reviewed:  [ X] YES  [ ] NO    Care Discussed with Consultants/Other Providers [ X] YES  [ ] NO
Patient is a 67y old  Male who presents with a chief complaint of ams x 1 day (30 Nov 2022 18:29)      INTERVAL HPI/OVERNIGHT EVENTS: No event. Stable. Still waiting for discharge home.     Pain Location & Control: OK    MEDICATIONS  (STANDING):  albuterol/ipratropium for Nebulization 3 milliLiter(s) Nebulizer every 6 hours  allopurinol 100 milliGRAM(s) Oral daily  apixaban 5 milliGRAM(s) Oral every 12 hours  atenolol  Tablet 25 milliGRAM(s) Oral daily  cholecalciferol 1000 Unit(s) Oral daily  famotidine    Tablet 20 milliGRAM(s) Oral daily  latanoprost 0.005% Ophthalmic Solution 1 Drop(s) Both EYES at bedtime  multivitamin/minerals 1 Tablet(s) Oral daily  tamsulosin 0.4 milliGRAM(s) Oral at bedtime  timolol 0.5% Solution 1 Drop(s) Both EYES two times a day  tiotropium 18 MICROgram(s) Capsule 1 Capsule(s) Inhalation daily  zinc sulfate 220 milliGRAM(s) Oral daily    MEDICATIONS  (PRN):  acetaminophen     Tablet .. 650 milliGRAM(s) Oral every 6 hours PRN Temp greater or equal to 38C (100.4F), Mild Pain (1 - 3)  melatonin 3 milliGRAM(s) Oral at bedtime PRN Insomnia      Allergies    No Known Allergies    Intolerances        REVIEW OF SYSTEMS:  CONSTITUTIONAL: No fever, weight loss, or fatigue  EYES: No eye pain, visual disturbances, or discharge  ENMT:  No difficulty hearing, tinnitus, vertigo; No sinus or throat pain  NECK: No pain or stiffness  BREASTS: No pain, masses, or nipple discharge  RESPIRATORY: No cough, wheezing, chills or hemoptysis; No shortness of breath  CARDIOVASCULAR: No chest pain, palpitations, dizziness, or leg swelling  GASTROINTESTINAL: No abdominal or epigastric pain. No nausea, vomiting, or hematemesis; No diarrhea or constipation. No melena or hematochezia.  GENITOURINARY: No dysuria, frequency, hematuria, or incontinence  NEUROLOGICAL: No headaches, memory loss, loss of strength, numbness, or tremors  SKIN: No itching, burning, rashes, or lesions   LYMPH NODES: No enlarged glands  ENDOCRINE: No heat or cold intolerance; No hair loss; No polydipsia or polyuria  MUSCULOSKELETAL: No back pain  PSYCHIATRIC: No depression, anxiety, mood swings, or difficulty sleeping  HEME/LYMPH: No easy bruising, or bleeding gums  ALLERGY AND IMMUNOLOGIC: No hives or eczema    Vital Signs Last 24 Hrs  T(C): 36.4 (01 Dec 2022 12:59), Max: 37.1 (30 Nov 2022 20:53)  T(F): 97.6 (01 Dec 2022 12:59), Max: 98.8 (30 Nov 2022 20:53)  HR: 82 (01 Dec 2022 12:59) (82 - 89)  BP: 155/93 (01 Dec 2022 12:59) (133/85 - 155/93)  BP(mean): --  RR: 18 (01 Dec 2022 12:59) (18 - 18)  SpO2: 98% (01 Dec 2022 12:59) (95% - 98%)    Parameters below as of 01 Dec 2022 12:59  Patient On (Oxygen Delivery Method): room air        PHYSICAL EXAM:  GENERAL: NAD, well-groomed, well-developed  HEAD:  Atraumatic, Normocephalic  EYES: EOMI, PERRLA, conjunctiva and sclera clear  ENMT: No tonsillar erythema, exudates, or enlargement; Moist mucous membranes, Good dentition, No lesions  NECK: Supple, No JVD, Normal thyroid  NERVOUS SYSTEM:  Alert & Oriented X  2  CHEST/LUNG: Clear to auscultation bilaterally; No rales, rhonchi, wheezing, or rubs  HEART: Regular rate and rhythm; No murmurs, rubs, or gallops  ABDOMEN: Soft, Nontender, Nondistended; Bowel sounds present  EXTREMITIES:  2+ Peripheral Pulses, No clubbing or cyanosis, 2+ edema   LYMPH: No lymphadenopathy noted  SKIN: No rashes or lesions      LABS:          PT/INR - ( 01 Dec 2022 09:22 )   PT: 17.4 sec;   INR: 1.49 ratio             CAPILLARY BLOOD GLUCOSE            Cultures      RADIOLOGY & ADDITIONAL TESTS:    Imaging Personally Reviewed:  [X ] YES  [ ] NO    Consultant(s) Notes Reviewed:  [ X] YES  [ ] NO    Care Discussed with Consultants/Other Providers [X ] YES  [ ] NO
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 Patient now presents to [facility] due to AMS and slurred speech for the past 1 day; patient's family noting patient had been intermittently confused over the past few weeks including changes in level of alertness and intermittent hallucinations. SLP evaluation appreciated; 11/25 approved patient for regular consistency diet w/ thin liquids. Neurology following, per neurology imaging unrevealing. (05 Dec 2022 15:21)      INTERVAL HPI/OVERNIGHT EVENTS: No event. He was diuresed with IV Lasix and lost a few pounds with negative balance urine output.  BP stable. Edema mildly improved. Cre jumped  up to 1.4 due to diuresis, will switch IV Lasix to PO Lasix and monitor renal function on it.     Pain Location & Control: OK    MEDICATIONS  (STANDING):  albuterol/ipratropium for Nebulization 3 milliLiter(s) Nebulizer every 6 hours  allopurinol 100 milliGRAM(s) Oral daily  apixaban 5 milliGRAM(s) Oral every 12 hours  atenolol  Tablet 25 milliGRAM(s) Oral daily  cholecalciferol 1000 Unit(s) Oral daily  famotidine    Tablet 20 milliGRAM(s) Oral daily  furosemide    Tablet 40 milliGRAM(s) Oral daily  latanoprost 0.005% Ophthalmic Solution 1 Drop(s) Both EYES at bedtime  multivitamin/minerals 1 Tablet(s) Oral daily  potassium chloride    Tablet ER 20 milliEquivalent(s) Oral daily  tamsulosin 0.4 milliGRAM(s) Oral at bedtime  timolol 0.5% Solution 1 Drop(s) Both EYES two times a day  tiotropium 18 MICROgram(s) Capsule 1 Capsule(s) Inhalation daily  zinc sulfate 220 milliGRAM(s) Oral daily    MEDICATIONS  (PRN):  acetaminophen     Tablet .. 650 milliGRAM(s) Oral every 6 hours PRN Temp greater or equal to 38C (100.4F), Mild Pain (1 - 3)  melatonin 3 milliGRAM(s) Oral at bedtime PRN Insomnia      Allergies    No Known Allergies    Intolerances        REVIEW OF SYSTEMS:  CONSTITUTIONAL: No fever, weight loss, or fatigue  EYES: No eye pain, visual disturbances, or discharge  ENMT:  No difficulty hearing, tinnitus, vertigo; No sinus or throat pain  NECK: No pain or stiffness  BREASTS: No pain, masses, or nipple discharge  RESPIRATORY: No cough, wheezing, chills or hemoptysis; No shortness of breath  CARDIOVASCULAR: No chest pain, palpitations, dizziness, or leg swelling  GASTROINTESTINAL: No abdominal or epigastric pain. No nausea, vomiting, or hematemesis; No diarrhea or constipation. No melena or hematochezia.  GENITOURINARY: No dysuria, frequency, hematuria, or incontinence  NEUROLOGICAL: No headaches, memory loss, loss of strength, numbness, or tremors  SKIN: No itching, burning, rashes, or lesions   LYMPH NODES: No enlarged glands  ENDOCRINE: No heat or cold intolerance; No hair loss; No polydipsia or polyuria  MUSCULOSKELETAL: No back pain  PSYCHIATRIC: No depression, anxiety, mood swings, or difficulty sleeping  HEME/LYMPH: No easy bruising, or bleeding gums  ALLERGY AND IMMUNOLOGIC: No hives or eczema    Vital Signs Last 24 Hrs  T(C): 37 (05 Dec 2022 16:45), Max: 37.2 (05 Dec 2022 04:43)  T(F): 98.6 (05 Dec 2022 16:45), Max: 99 (05 Dec 2022 04:43)  HR: 91 (05 Dec 2022 16:45) (71 - 91)  BP: 129/76 (05 Dec 2022 16:45) (107/74 - 136/78)  BP(mean): --  RR: 18 (05 Dec 2022 16:45) (18 - 18)  SpO2: 94% (05 Dec 2022 16:45) (94% - 96%)    Parameters below as of 05 Dec 2022 16:45  Patient On (Oxygen Delivery Method): room air        PHYSICAL EXAM:  GENERAL: NAD, well-groomed, well-developed  HEAD:  Atraumatic, Normocephalic  EYES: EOMI, PERRLA, conjunctiva and sclera clear  ENMT: No tonsillar erythema, exudates, or enlargement; Moist mucous membranes, Good dentition, No lesions  NECK: Supple, No JVD, Normal thyroid  NERVOUS SYSTEM:  Alert & Oriented X 2 mildly confused   CHEST/LUNG: Clear to auscultation bilaterally; No rales, rhonchi, wheezing, or rubs  HEART: Regular rate and rhythm; No murmurs, rubs, or gallops  ABDOMEN: Soft, Nontender, Nondistended; Bowel sounds present  EXTREMITIES:  2+ Peripheral Pulses, No clubbing or cyanosis, LE mildly improved   LYMPH: No lymphadenopathy noted  SKIN: No rashes or lesions    LABS:                        8.8    8.16  )-----------( 350      ( 05 Dec 2022 07:04 )             30.0     05 Dec 2022 07:01    141    |  103    |  35     ----------------------------<  93     4.3     |  27     |  1.42     Ca    9.1        05 Dec 2022 07:01  Mg     1.9       05 Dec 2022 07:01      PT/INR - ( 05 Dec 2022 07:04 )   PT: 17.0 sec;   INR: 1.46 ratio             CAPILLARY BLOOD GLUCOSE            Cultures      RADIOLOGY & ADDITIONAL TESTS:    Imaging Personally Reviewed:  [X ] YES  [ ] NO    Consultant(s) Notes Reviewed:  [ X] YES  [ ] NO    Care Discussed with Consultants/Other Providers [X ] YES  [ ] NO
Patient is a 67y old  Male who presents with a chief complaint of ams x 1 day (06 Dec 2022 13:14)      INTERVAL HPI/OVERNIGHT EVENTS: No event. Patient is medically clear for discharge. F/u PCP in 7 days.     Pain Location & Control: OK    MEDICATIONS  (STANDING):  albuterol/ipratropium for Nebulization 3 milliLiter(s) Nebulizer every 6 hours  allopurinol 100 milliGRAM(s) Oral daily  apixaban 5 milliGRAM(s) Oral every 12 hours  atenolol  Tablet 25 milliGRAM(s) Oral daily  cholecalciferol 1000 Unit(s) Oral daily  famotidine    Tablet 20 milliGRAM(s) Oral daily  furosemide    Tablet 40 milliGRAM(s) Oral daily  latanoprost 0.005% Ophthalmic Solution 1 Drop(s) Both EYES at bedtime  multivitamin/minerals 1 Tablet(s) Oral daily  potassium chloride    Tablet ER 20 milliEquivalent(s) Oral daily  tamsulosin 0.4 milliGRAM(s) Oral at bedtime  timolol 0.5% Solution 1 Drop(s) Both EYES two times a day  tiotropium 18 MICROgram(s) Capsule 1 Capsule(s) Inhalation daily  zinc sulfate 220 milliGRAM(s) Oral daily    MEDICATIONS  (PRN):  acetaminophen     Tablet .. 650 milliGRAM(s) Oral every 6 hours PRN Temp greater or equal to 38C (100.4F), Mild Pain (1 - 3)  melatonin 3 milliGRAM(s) Oral at bedtime PRN Insomnia      Allergies    No Known Allergies    Intolerances        REVIEW OF SYSTEMS:  CONSTITUTIONAL: No fever, weight loss, or fatigue  EYES: No eye pain, visual disturbances, or discharge  ENMT:  No difficulty hearing, tinnitus, vertigo; No sinus or throat pain  NECK: No pain or stiffness  BREASTS: No pain, masses, or nipple discharge  RESPIRATORY: No cough, wheezing, chills or hemoptysis; No shortness of breath  CARDIOVASCULAR: No chest pain, palpitations, dizziness, or leg swelling  GASTROINTESTINAL: No abdominal or epigastric pain. No nausea, vomiting, or hematemesis; No diarrhea or constipation. No melena or hematochezia.  GENITOURINARY: No dysuria, frequency, hematuria, or incontinence  NEUROLOGICAL: No headaches, memory loss, loss of strength, numbness, or tremors  SKIN: No itching, burning, rashes, or lesions   LYMPH NODES: No enlarged glands  ENDOCRINE: No heat or cold intolerance; No hair loss; No polydipsia or polyuria  MUSCULOSKELETAL: No back pain  PSYCHIATRIC: No depression, anxiety, mood swings, or difficulty sleeping  HEME/LYMPH: No easy bruising, or bleeding gums  ALLERGY AND IMMUNOLOGIC: No hives or eczema    Vital Signs Last 24 Hrs  T(C): 37.1 (06 Dec 2022 11:53), Max: 37.1 (06 Dec 2022 11:53)  T(F): 98.7 (06 Dec 2022 11:53), Max: 98.7 (06 Dec 2022 11:53)  HR: 86 (06 Dec 2022 11:53) (86 - 95)  BP: 109/79 (06 Dec 2022 11:53) (109/79 - 155/98)  BP(mean): --  RR: 18 (06 Dec 2022 11:53) (18 - 18)  SpO2: 95% (06 Dec 2022 11:53) (94% - 95%)    Parameters below as of 06 Dec 2022 11:53  Patient On (Oxygen Delivery Method): room air        PHYSICAL EXAM:  GENERAL: NAD, well-groomed, well-developed  HEAD:  Atraumatic, Normocephalic  EYES: EOMI, PERRLA, conjunctiva and sclera clear  ENMT: No tonsillar erythema, exudates, or enlargement; Moist mucous membranes, Good dentition, No lesions  NECK: Supple, No JVD, Normal thyroid  NERVOUS SYSTEM:  Alert & Oriented X 2  mildly confused   CHEST/LUNG: Clear to auscultation bilaterally; No rales, rhonchi, wheezing, or rubs  HEART: Regular rate and rhythm; No murmurs, rubs, or gallops  ABDOMEN: Soft, Nontender, Nondistended; Bowel sounds present  EXTREMITIES:  2+ Peripheral Pulses, No clubbing or cyanosis, LE edema   LYMPH: No lymphadenopathy noted  SKIN: No rashes or lesions      LABS:                        8.6    7.16  )-----------( 331      ( 06 Dec 2022 07:29 )             29.2     06 Dec 2022 07:23    144    |  105    |  38     ----------------------------<  108    4.1     |  28     |  1.32     Ca    8.7        06 Dec 2022 07:23      PT/INR - ( 06 Dec 2022 07:32 )   PT: 16.1 sec;   INR: 1.40 ratio             CAPILLARY BLOOD GLUCOSE            Cultures      RADIOLOGY & ADDITIONAL TESTS:    Imaging Personally Reviewed:  [X ] YES  [ ] NO    Consultant(s) Notes Reviewed:  [ X] YES  [ ] NO    Care Discussed with Consultants/Other Providers [X ] YES  [ ] NO
Patient is a 67y old  Male who presents with a chief complaint of ams x 1 day (25 Nov 2022 13:45)      INTERVAL HPI/OVERNIGHT EVENTS: No event. INR down to 1.5 with Vitamin K. Cover  him with Lovenox 1 mg/kg Q 12 hours until Sunday night and then hold for LP on Monday to r/o meningitis . MRI brain did not show any stroke. Patient back to baseline mental status. Discussed with wife at bedside regarding discharge plan.     Pain Location & Control: OK    MEDICATIONS  (STANDING):  albuterol/ipratropium for Nebulization 3 milliLiter(s) Nebulizer every 6 hours  allopurinol 100 milliGRAM(s) Oral daily  atenolol  Tablet 25 milliGRAM(s) Oral daily  cholecalciferol 1000 Unit(s) Oral daily  enoxaparin Injectable 120 milliGRAM(s) SubCutaneous every 12 hours  famotidine    Tablet 20 milliGRAM(s) Oral daily  latanoprost 0.005% Ophthalmic Solution 1 Drop(s) Both EYES at bedtime  multivitamin/minerals 1 Tablet(s) Oral daily  sodium chloride 0.45%. 1000 milliLiter(s) (60 mL/Hr) IV Continuous <Continuous>  tamsulosin 0.4 milliGRAM(s) Oral at bedtime  timolol 0.5% Solution 1 Drop(s) Both EYES two times a day  tiotropium 18 MICROgram(s) Capsule 1 Capsule(s) Inhalation daily  zinc sulfate 220 milliGRAM(s) Oral daily    MEDICATIONS  (PRN):  acetaminophen     Tablet .. 650 milliGRAM(s) Oral every 6 hours PRN Temp greater or equal to 38C (100.4F), Mild Pain (1 - 3)  melatonin 3 milliGRAM(s) Oral at bedtime PRN Insomnia      Allergies    No Known Allergies    Intolerances        REVIEW OF SYSTEMS:  CONSTITUTIONAL: No fever, weight loss, or fatigue  EYES: No eye pain, visual disturbances, or discharge  ENMT:  No difficulty hearing, tinnitus, vertigo; No sinus or throat pain  NECK: No pain or stiffness  BREASTS: No pain, masses, or nipple discharge  RESPIRATORY: No cough, wheezing, chills or hemoptysis; No shortness of breath  CARDIOVASCULAR: No chest pain, palpitations, dizziness, or leg swelling  GASTROINTESTINAL: No abdominal or epigastric pain. No nausea, vomiting, or hematemesis; No diarrhea or constipation. No melena or hematochezia.  GENITOURINARY: No dysuria, frequency, hematuria, or incontinence  NEUROLOGICAL: No headaches, memory loss, loss of strength, numbness, or tremors  SKIN: No itching, burning, rashes, or lesions   LYMPH NODES: No enlarged glands  ENDOCRINE: No heat or cold intolerance; No hair loss; No polydipsia or polyuria  MUSCULOSKELETAL: No back pain  PSYCHIATRIC: No depression, anxiety, mood swings, or difficulty sleeping  HEME/LYMPH: No easy bruising, or bleeding gums  ALLERGY AND IMMUNOLOGIC: No hives or eczema    Vital Signs Last 24 Hrs  T(C): 36.8 (25 Nov 2022 12:24), Max: 37.1 (25 Nov 2022 04:58)  T(F): 98.2 (25 Nov 2022 12:24), Max: 98.7 (25 Nov 2022 04:58)  HR: 93 (25 Nov 2022 12:28) (79 - 93)  BP: 159/99 (25 Nov 2022 12:28) (118/81 - 159/99)  BP(mean): --  RR: 18 (25 Nov 2022 12:24) (18 - 18)  SpO2: 99% (25 Nov 2022 12:28) (95% - 99%)    Parameters below as of 25 Nov 2022 12:28  Patient On (Oxygen Delivery Method): room air        PHYSICAL EXAM:  GENERAL: NAD, well-groomed, well-developed  HEAD:  Atraumatic, Normocephalic  EYES: EOMI, PERRLA, conjunctiva and sclera clear  ENMT: No tonsillar erythema, exudates, or enlargement; Moist mucous membranes, Good dentition, No lesions  NECK: Supple, No JVD, Normal thyroid  NERVOUS SYSTEM:  Alert & Oriented X  2  CHEST/LUNG: Clear to auscultation bilaterally; No rales, rhonchi, wheezing, or rubs  HEART: Regular rate and rhythm; No murmurs, rubs, or gallops  ABDOMEN: Soft, Nontender, Nondistended; Bowel sounds present  EXTREMITIES:  2+ Peripheral Pulses, No clubbing or cyanosis  LYMPH: No lymphadenopathy noted  SKIN: No rashes or lesions  INCISION:  Dressing dry and intact    LABS:                        9.4    6.95  )-----------( 300      ( 25 Nov 2022 10:54 )             32.2     25 Nov 2022 10:54    145    |  110    |  17     ----------------------------<  105    3.6     |  23     |  1.05     Ca    8.1        25 Nov 2022 10:54  Phos  2.6       25 Nov 2022 10:54  Mg     1.8       25 Nov 2022 10:54      PT/INR - ( 25 Nov 2022 10:54 )   PT: 15.8 sec;   INR: 1.36 ratio             CAPILLARY BLOOD GLUCOSE            Cultures  Culture Results:   <10,000 CFU/mL Normal Urogenital Maria Elena (11-22-22 @ 18:05)  Culture Results:   Growth in aerobic bottle: Staphylococcus haemolyticus  Coag Negative Staphylococcus  Single set isolate, possible contaminant. Contact  Microbiology if susceptibility testing clinically  indicated.  ***Blood Panel PCR results on this specimen are available  approximately 3 hours after the Gram stain result.***  Gram stain, PCR, and/or culture results may not always  correspond due to difference in methodologies.  ************************************************************  This PCR assay was performed by multiplex PCR. This  Assay tests for 66 bacterial and resistance gene targets.  Please refer to the [facility] test directory  at [email or web address] for details. (11-22-22 @ 17:35)  Culture Results:   No growth to date. (11-22-22 @ 17:20)      RADIOLOGY & ADDITIONAL TESTS:    Imaging Personally Reviewed:  [ X] YES  [ ] NO    Consultant(s) Notes Reviewed:  [ X] YES  [ ] NO    Care Discussed with Consultants/Other Providers [X ] YES  [ ] NO
Patient is a 67y old  Male who presents with a chief complaint of ams x 1 day (2022 01:29)      INTERVAL HPI/OVERNIGHT EVENTS: stable, c/w Abx. take Vitamin K, revers INR, prepare for LP per neuro recs, his aphasia improved, f/u MRI head. CTA head negative.     Pain Location & Control: ok     MEDICATIONS  (STANDING):  albuterol/ipratropium for Nebulization 3 milliLiter(s) Nebulizer every 6 hours  allopurinol 100 milliGRAM(s) Oral daily  atenolol  Tablet 25 milliGRAM(s) Oral daily  ertapenem  IVPB 1000 milliGRAM(s) IV Intermittent every 24 hours  famotidine    Tablet 20 milliGRAM(s) Oral daily  latanoprost 0.005% Ophthalmic Solution 1 Drop(s) Both EYES at bedtime  multivitamin/minerals 1 Tablet(s) Oral daily  sodium chloride 0.45%. 1000 milliLiter(s) (60 mL/Hr) IV Continuous <Continuous>  tamsulosin 0.4 milliGRAM(s) Oral at bedtime  timolol 0.5% Solution 1 Drop(s) Both EYES two times a day  tiotropium 18 MICROgram(s) Capsule 1 Capsule(s) Inhalation daily  zinc sulfate 220 milliGRAM(s) Oral daily    MEDICATIONS  (PRN):  acetaminophen     Tablet .. 650 milliGRAM(s) Oral every 6 hours PRN Temp greater or equal to 38C (100.4F), Mild Pain (1 - 3)  melatonin 3 milliGRAM(s) Oral at bedtime PRN Insomnia      Allergies    No Known Allergies    Intolerances        REVIEW OF SYSTEMS:  CONSTITUTIONAL: No fever, weight loss, or fatigue  EYES: No eye pain, visual disturbances, or discharge  ENMT:  No difficulty hearing, tinnitus, vertigo; No sinus or throat pain  NECK: No pain or stiffness  BREASTS: No pain, masses, or nipple discharge  RESPIRATORY: No cough, wheezing, chills or hemoptysis; No shortness of breath  CARDIOVASCULAR: No chest pain, palpitations, dizziness, or leg swelling  GASTROINTESTINAL: No abdominal or epigastric pain. No nausea, vomiting, or hematemesis; No diarrhea or constipation. No melena or hematochezia.  GENITOURINARY: No dysuria, frequency, hematuria, or incontinence  NEUROLOGICAL: No headaches, memory loss, loss of strength, numbness, or tremors  SKIN: No itching, burning, rashes, or lesions   LYMPH NODES: No enlarged glands  ENDOCRINE: No heat or cold intolerance; No hair loss; No polydipsia or polyuria  MUSCULOSKELETAL: No back pain  PSYCHIATRIC: No depression, anxiety, mood swings, or difficulty sleeping  HEME/LYMPH: No easy bruising, or bleeding gums  ALLERGY AND IMMUNOLOGIC: No hives or eczema    Vital Signs Last 24 Hrs  T(C): 37.1 (2022 19:29), Max: 37.1 (2022 19:29)  T(F): 98.7 (2022 19:29), Max: 98.7 (2022 19:29)  HR: 80 (:) (80 - 84)  BP: 150/90 (:) (125/70 - 156/85)  BP(mean): --  RR: 18 (:29) (18 - 18)  SpO2: 96% (:) (95% - 98%)    Parameters below as of 2022 19:29  Patient On (Oxygen Delivery Method): room air        PHYSICAL EXAM:  GENERAL: NAD, well-groomed, well-developed  HEAD:  Atraumatic, Normocephalic  EYES: EOMI, PERRLA, conjunctiva and sclera clear  ENMT: No tonsillar erythema, exudates, or enlargement; Moist mucous membranes, Good dentition, No lesions  NECK: Supple, No JVD, Normal thyroid  NERVOUS SYSTEM:  Alert & Oriented X2, confused ; Motor Strength 2/5 B/L  lower extremities; DTRs 2+ intact and symmetric  CHEST/LUNG: Clear to auscultation bilaterally; No rales, rhonchi, wheezing, or rubs  HEART: Regular rate and rhythm; No murmurs, rubs, or gallops  ABDOMEN: Soft, Nontender, Nondistended; Bowel sounds present  EXTREMITIES:  2+ Peripheral Pulses, No clubbing or cyanosis  LYMPH: No lymphadenopathy noted  SKIN: No rashes or lesions  INCISION:  Dressing dry and intact    LABS:                        8.5    7.54  )-----------( 347      ( 2022 06:58 )             29.3     2022 06:33    145    |  110    |  19     ----------------------------<  93     3.8     |  23     |  1.21     Ca    8.6        2022 06:33    TPro  5.8    /  Alb  2.8    /  TBili  1.1    /  DBili  x      /  AST  16     /  ALT  8      /  AlkPhos  101    2022 06:33    PT/INR - ( 2022 06:34 )   PT: 65.1 sec;   INR: 5.56 ratio         PTT - ( 2022 06:34 )  PTT:47.5 sec  Urinalysis Basic - ( 2022 19:24 )    Color: Yellow / Appearance: Clear / S.050 / pH: x  Gluc: x / Ketone: Negative  / Bili: Negative / Urobili: 2 mg/dL   Blood: x / Protein: 30 mg/dL / Nitrite: Negative   Leuk Esterase: Negative / RBC: 24 /hpf / WBC 15 /HPF   Sq Epi: x / Non Sq Epi: 3 /hpf / Bacteria: Negative      CAPILLARY BLOOD GLUCOSE            Cultures  Culture Results:   Growth in aerobic bottle: Gram Positive Cocci in Clusters  ***Blood Panel PCR results on this specimen are available  approximately 3 hours after the Gram stain result.***  Gram stain, PCR, and/or culture results may not always  correspond due to difference in methodologies.  ************************************************************  This PCR assay was performed by multiplex PCR. This  Assay tests for 66 bacterial and resistance gene targets.  Please refer to the [facility] test directory  at [email or web address] for details. (22 @ 17:35)  Culture Results:   No growth to date. (22 @ 17:20)      RADIOLOGY & ADDITIONAL TESTS:    Imaging Personally Reviewed:  [ x] YES  [ ] NO    Consultant(s) Notes Reviewed:  [ x] YES  [ ] NO    Care Discussed with Consultants/Other Providers [x ] YES  [ ] NO

## 2022-12-06 NOTE — PROGRESS NOTE ADULT - PROBLEM SELECTOR PLAN 1
difficulty with speech , mild tremor on exam acute change form baseline. labs w/ mild hypernatremia , CTH , CT perfusion head and neck w/o acute findings   - Neurology consulted - follow up recommendations   - MRI brain negative for stroke ruled out.   - r/o meningitis with LP by IR/neurologist  -mental status much better, patient back to baseline
difficulty with speech , mild tremor on exam acute change form baseline. labs w/ mild hypernatremia , CTH , CT perfusion head and neck w/o acute findings   - Neurology consulted - follow up recommendations   - MRI brain w/ and w/o   - b12 / syphilis screen / tsh   - neuro checks   - dysphagia screen   - dysphagia diet
difficulty with speech , mild tremor on exam acute change form baseline. labs w/ mild hypernatremia , CTH , CT perfusion head and neck w/o acute findings   - Neurology consulted - follow up recommendations   - MRI brain negative for stroke ruled out.   - r/o meningitis with LP by IR/neurologist  -mental status much better, patient back to baseline
difficulty with speech , mild tremor on exam acute change form baseline. labs w/ mild hypernatremia , CTH , CT perfusion head and neck w/o acute findings   - Neurology consulted - follow up recommendations   - MRI brain negative for stroke ruled out.   - r/o meningitis with LP by IR/neurologist  -mental status much better, patient back to baseline
difficulty with speech , mild tremor on exam acute change form baseline. labs w/ mild hypernatremia , CTH , CT perfusion head and neck w/o acute findings   - Neurology consulted - follow up recommendations   - MRI brain w/ and w/o   - b12 / syphilis screen / tsh   - neuro checks   - dysphagia screen   - dysphagia diet  - r/o meningitis with LP by IR/neurologist

## 2022-12-06 NOTE — DISCHARGE NOTE PROVIDER - ATTENDING ATTESTATION STATEMENT
I have personally seen and examined the patient. I have collaborated with and supervised the
There are no Wet Read(s) to document.

## 2022-12-09 NOTE — CHART NOTE - NSCHARTNOTEFT_GEN_A_CORE
in this admission UTI has been ruled out, with normal U/A  AMS due to metabolic encephalopathy due ot SONAL which resolved.   patient had functional paraplegia.

## 2022-12-19 ENCOUNTER — TRANSCRIPTION ENCOUNTER (OUTPATIENT)
Age: 67
End: 2022-12-19

## 2022-12-19 ENCOUNTER — INPATIENT (INPATIENT)
Facility: HOSPITAL | Age: 67
LOS: 10 days | Discharge: NURSING, REHAB OR LONG-TERM CARE | DRG: 659 | End: 2022-12-30
Attending: HOSPITALIST | Admitting: STUDENT IN AN ORGANIZED HEALTH CARE EDUCATION/TRAINING PROGRAM
Payer: MEDICARE

## 2022-12-19 VITALS
TEMPERATURE: 98 F | OXYGEN SATURATION: 98 % | HEART RATE: 74 BPM | DIASTOLIC BLOOD PRESSURE: 60 MMHG | RESPIRATION RATE: 18 BRPM | SYSTOLIC BLOOD PRESSURE: 150 MMHG

## 2022-12-19 DIAGNOSIS — Z90.5 ACQUIRED ABSENCE OF KIDNEY: Chronic | ICD-10-CM

## 2022-12-19 DIAGNOSIS — Z96.652 PRESENCE OF LEFT ARTIFICIAL KNEE JOINT: Chronic | ICD-10-CM

## 2022-12-19 LAB
ALBUMIN SERPL ELPH-MCNC: 2.4 G/DL — LOW (ref 3.3–5)
ALP SERPL-CCNC: 90 U/L — SIGNIFICANT CHANGE UP (ref 40–120)
ALT FLD-CCNC: 20 U/L — SIGNIFICANT CHANGE UP (ref 12–78)
ANION GAP SERPL CALC-SCNC: 3 MMOL/L — LOW (ref 5–17)
APPEARANCE UR: ABNORMAL
APTT BLD: 20.3 SEC — LOW (ref 27.5–35.5)
AST SERPL-CCNC: 46 U/L — HIGH (ref 15–37)
BACTERIA # UR AUTO: ABNORMAL
BASOPHILS # BLD AUTO: 0.05 K/UL — SIGNIFICANT CHANGE UP (ref 0–0.2)
BASOPHILS NFR BLD AUTO: 0.5 % — SIGNIFICANT CHANGE UP (ref 0–2)
BILIRUB SERPL-MCNC: 0.7 MG/DL — SIGNIFICANT CHANGE UP (ref 0.2–1.2)
BILIRUB UR-MCNC: NEGATIVE — SIGNIFICANT CHANGE UP
BUN SERPL-MCNC: 38 MG/DL — HIGH (ref 7–23)
CALCIUM SERPL-MCNC: 9 MG/DL — SIGNIFICANT CHANGE UP (ref 8.5–10.1)
CHLORIDE SERPL-SCNC: 111 MMOL/L — HIGH (ref 96–108)
CO2 SERPL-SCNC: 27 MMOL/L — SIGNIFICANT CHANGE UP (ref 22–31)
COLOR SPEC: YELLOW — SIGNIFICANT CHANGE UP
CREAT SERPL-MCNC: 1.3 MG/DL — SIGNIFICANT CHANGE UP (ref 0.5–1.3)
DIFF PNL FLD: ABNORMAL
EGFR: 61 ML/MIN/1.73M2 — SIGNIFICANT CHANGE UP
EOSINOPHIL # BLD AUTO: 0.28 K/UL — SIGNIFICANT CHANGE UP (ref 0–0.5)
EOSINOPHIL NFR BLD AUTO: 3 % — SIGNIFICANT CHANGE UP (ref 0–6)
EPI CELLS # UR: SIGNIFICANT CHANGE UP
FLUAV AG NPH QL: SIGNIFICANT CHANGE UP
FLUBV AG NPH QL: SIGNIFICANT CHANGE UP
GLUCOSE SERPL-MCNC: 113 MG/DL — HIGH (ref 70–99)
GLUCOSE UR QL: NEGATIVE — SIGNIFICANT CHANGE UP
HCT VFR BLD CALC: 36.2 % — LOW (ref 39–50)
HGB BLD-MCNC: 10.4 G/DL — LOW (ref 13–17)
IMM GRANULOCYTES NFR BLD AUTO: 0.5 % — SIGNIFICANT CHANGE UP (ref 0–0.9)
INR BLD: 1.14 RATIO — SIGNIFICANT CHANGE UP (ref 0.88–1.16)
KETONES UR-MCNC: NEGATIVE — SIGNIFICANT CHANGE UP
LACTATE SERPL-SCNC: 1.2 MMOL/L — SIGNIFICANT CHANGE UP (ref 0.7–2)
LEUKOCYTE ESTERASE UR-ACNC: ABNORMAL
LYMPHOCYTES # BLD AUTO: 1.8 K/UL — SIGNIFICANT CHANGE UP (ref 1–3.3)
LYMPHOCYTES # BLD AUTO: 19 % — SIGNIFICANT CHANGE UP (ref 13–44)
MCHC RBC-ENTMCNC: 24.2 PG — LOW (ref 27–34)
MCHC RBC-ENTMCNC: 28.7 GM/DL — LOW (ref 32–36)
MCV RBC AUTO: 84.4 FL — SIGNIFICANT CHANGE UP (ref 80–100)
MONOCYTES # BLD AUTO: 0.86 K/UL — SIGNIFICANT CHANGE UP (ref 0–0.9)
MONOCYTES NFR BLD AUTO: 9.1 % — SIGNIFICANT CHANGE UP (ref 2–14)
NEUTROPHILS # BLD AUTO: 6.42 K/UL — SIGNIFICANT CHANGE UP (ref 1.8–7.4)
NEUTROPHILS NFR BLD AUTO: 67.9 % — SIGNIFICANT CHANGE UP (ref 43–77)
NITRITE UR-MCNC: NEGATIVE — SIGNIFICANT CHANGE UP
NRBC # BLD: 0 /100 WBCS — SIGNIFICANT CHANGE UP (ref 0–0)
PH UR: 6 — SIGNIFICANT CHANGE UP (ref 5–8)
PLATELET # BLD AUTO: 351 K/UL — SIGNIFICANT CHANGE UP (ref 150–400)
POTASSIUM SERPL-MCNC: 5 MMOL/L — SIGNIFICANT CHANGE UP (ref 3.5–5.3)
POTASSIUM SERPL-SCNC: 5 MMOL/L — SIGNIFICANT CHANGE UP (ref 3.5–5.3)
PROT SERPL-MCNC: 6.7 G/DL — SIGNIFICANT CHANGE UP (ref 6–8.3)
PROT UR-MCNC: 30 MG/DL
PROTHROM AB SERPL-ACNC: 13.3 SEC — SIGNIFICANT CHANGE UP (ref 10.5–13.4)
RBC # BLD: 4.29 M/UL — SIGNIFICANT CHANGE UP (ref 4.2–5.8)
RBC # FLD: 19.4 % — HIGH (ref 10.3–14.5)
RBC CASTS # UR COMP ASSIST: ABNORMAL /HPF (ref 0–4)
RSV RNA NPH QL NAA+NON-PROBE: SIGNIFICANT CHANGE UP
SARS-COV-2 RNA SPEC QL NAA+PROBE: SIGNIFICANT CHANGE UP
SODIUM SERPL-SCNC: 141 MMOL/L — SIGNIFICANT CHANGE UP (ref 135–145)
SP GR SPEC: 1.01 — SIGNIFICANT CHANGE UP (ref 1.01–1.02)
TROPONIN I, HIGH SENSITIVITY RESULT: 11.9 NG/L — SIGNIFICANT CHANGE UP
UROBILINOGEN FLD QL: NEGATIVE — SIGNIFICANT CHANGE UP
WBC # BLD: 9.46 K/UL — SIGNIFICANT CHANGE UP (ref 3.8–10.5)
WBC # FLD AUTO: 9.46 K/UL — SIGNIFICANT CHANGE UP (ref 3.8–10.5)
WBC UR QL: ABNORMAL

## 2022-12-19 PROCEDURE — 99285 EMERGENCY DEPT VISIT HI MDM: CPT

## 2022-12-19 PROCEDURE — 93010 ELECTROCARDIOGRAM REPORT: CPT

## 2022-12-19 PROCEDURE — 71045 X-RAY EXAM CHEST 1 VIEW: CPT | Mod: 26

## 2022-12-19 PROCEDURE — 70450 CT HEAD/BRAIN W/O DYE: CPT | Mod: 26,MA

## 2022-12-19 PROCEDURE — 74177 CT ABD & PELVIS W/CONTRAST: CPT | Mod: 26,MA

## 2022-12-19 RX ORDER — CEFTRIAXONE 500 MG/1
1000 INJECTION, POWDER, FOR SOLUTION INTRAMUSCULAR; INTRAVENOUS ONCE
Refills: 0 | Status: COMPLETED | OUTPATIENT
Start: 2022-12-19 | End: 2022-12-19

## 2022-12-19 RX ORDER — SODIUM CHLORIDE 9 MG/ML
1000 INJECTION, SOLUTION INTRAVENOUS ONCE
Refills: 0 | Status: COMPLETED | OUTPATIENT
Start: 2022-12-19 | End: 2022-12-19

## 2022-12-19 RX ADMIN — SODIUM CHLORIDE 1000 MILLILITER(S): 9 INJECTION, SOLUTION INTRAVENOUS at 20:46

## 2022-12-19 RX ADMIN — CEFTRIAXONE 100 MILLIGRAM(S): 500 INJECTION, POWDER, FOR SOLUTION INTRAMUSCULAR; INTRAVENOUS at 21:50

## 2022-12-19 NOTE — ED ADULT NURSE NOTE - NSIMPLEMENTINTERV_GEN_ALL_ED
Implemented All Fall with Harm Risk Interventions:  [place] to call system. Call bell, personal items and telephone within reach. Instruct patient to call for assistance. Room bathroom lighting operational. Non-slip footwear when patient is off stretcher. Physically safe environment: no spills, clutter or unnecessary equipment. Stretcher in lowest position, wheels locked, appropriate side rails in place. Provide visual cue, wrist band, yellow gown, etc. Monitor gait and stability. Monitor for mental status changes and reorient to person, place, and time. Review medications for side effects contributing to fall risk. Reinforce activity limits and safety measures with patient and family. Provide visual clues: red socks. Implemented All Fall with Harm Risk Interventions:  [place] to call system. Call bell, personal items and telephone within reach. Instruct patient to call for assistance. Room bathroom lighting operational. Non-slip footwear when patient is off stretcher. Physically safe environment: no spills, clutter or unnecessary equipment. Stretcher in lowest position, wheels locked, appropriate side rails in place. Provide visual cue, wrist band, yellow gown, etc. Monitor gait and stability. Monitor for mental status changes and reorient to person, place, and time. Review medications for side effects contributing to fall risk. Reinforce activity limits and safety measures with patient and family. Provide visual clues: red socks. Implemented All Fall with Harm Risk Interventions:  [place] to call system. Call bell, personal items and telephone within reach. Instruct patient to call for assistance. Room bathroom lighting operational. Non-slip footwear when patient is off stretcher. Physically safe environment: no spills, clutter or unnecessary equipment. Stretcher in lowest position, wheels locked, appropriate side rails in place. Provide visual cue, wrist band, yellow gown, etc. Monitor gait and stability. Monitor for mental status changes and reorient to person, place, and time. Review medications for side effects contributing to fall risk. Reinforce activity limits and safety measures with patient and family. Provide visual clues: red socks.

## 2022-12-19 NOTE — ED PROVIDER NOTE - PROGRESS NOTE DETAILS
+ uti, + left sided kidney stone. case discussed with uro oncall, for stent in AM. patient given antibiotics. will admit to medicine.

## 2022-12-19 NOTE — ED PROVIDER NOTE - OBJECTIVE STATEMENT
67-year-old male history of asthma, hypertension, kidney cancer in remission, glaucoma, afib, status post total knee replacement on the left in September.  Since then he has been to multiple rehabs and has multiple urinary tract infections including 1 where he was septic and had kidney failure.  Brought home by family on December 9.  . The ER today as they are unable to properly care for him at home, they have trouble getting out of bed and to appointments.  He reports for the past 3 days he has had increased confusion and has intermittently complained of lower abdominal pain.  Has had confusion since surgery / rehabs however worse when he has UTIs. No known fevers, no nausea or vomiting, no cough or shortness of breath or chest pain.  Patient has chronic Owusu.  PMD is Dr. Botello, Urology Dr. Duke. 67-year-old male history of asthma, hypertension, kidney cancer in remission, glaucoma, afib, status post total knee replacement on the left in September.  Since then he has been to multiple rehabs and has multiple urinary tract infections including 1 where he was septic and had kidney failure.  Brought home by family on December 9.  . The ER today as they are unable to properly care for him at home, they have trouble getting out of bed and to appointments.  He reports for the past 3 days he has had increased confusion and has intermittently complained of lower abdominal pain.  Has had confusion since surgery / rehabs however worse when he has UTIs. No known fevers, no nausea or vomiting, no cough or shortness of breath or chest pain.  Patient has chronic Owsuu.  PMD is Dr. Botello, Urology Dr. Duke.

## 2022-12-19 NOTE — ED ADULT NURSE NOTE - CAS EDP DISCH DISPOSITION ADMI
[facility] / [facility]

## 2022-12-19 NOTE — ED ADULT NURSE NOTE - OBJECTIVE STATEMENT
Pt. received alert and oriented x3 with chief complaint of generalized weakness. Pt. presents w/ cloudy urine draining from chronic kennedy.

## 2022-12-19 NOTE — ED PROVIDER NOTE - PHYSICAL EXAMINATION
Vital signs as available reviewed.  General:  No acute distress.  Head:  Normocephalic, atraumatic.  Eyes:  Conjunctiva pink, no icterus.  Cardiovascular:  Regular rate, no obvious murmur. irregular.  Respiratory:  Clear to auscultation, good air entry bilaterally.  Abdomen:  Soft, non-tender.  Musculoskeletal:  No obvious deformity.  Neurologic: Alert and oriented to self, birthday, month not year, not location, moving all extremities.   Skin:  Warm and dry.

## 2022-12-19 NOTE — ED PROVIDER NOTE - CARE PLAN
Principal Discharge DX:	Kidney stone  Secondary Diagnosis:	Acute UTI  Secondary Diagnosis:	Confusion   1

## 2022-12-19 NOTE — ED PROVIDER NOTE - NS ED ROS FT
Constitutional: No reported recent fever.  Neurological: No reported acute headache. + confusion.   Eyes: No reported new vision changes.   Ears, Nose, Mouth, Throat: No reported acute sore throat.  Cardiovascular: No reported current chest pain.  Respiratory: No reported new shortness of breath.  Gastrointestinal: No reported vomiting.  Genitourinary: See HPI.  Musculoskeletal: No reported acute extremity pain.  Integumentary (skin and/or breast): No reported new rash.

## 2022-12-19 NOTE — ED PROVIDER NOTE - DIFFERENTIAL DIAGNOSIS
delirium likely from infection chava UTI, r/o intracranial lesion, doubt  meningitis. Differential Diagnosis

## 2022-12-19 NOTE — ED PROVIDER NOTE - INPATIENT RECORD SUMMARY
[facility] MRN [id number], discharge summary reviewed. [facility] MRN [id number], discharge summary reviewed. [facility] MRN [id number], discharge summary reviewed.

## 2022-12-20 DIAGNOSIS — N20.0 CALCULUS OF KIDNEY: ICD-10-CM

## 2022-12-20 DIAGNOSIS — I10 ESSENTIAL (PRIMARY) HYPERTENSION: ICD-10-CM

## 2022-12-20 DIAGNOSIS — Z90.5 ACQUIRED ABSENCE OF KIDNEY: Chronic | ICD-10-CM

## 2022-12-20 DIAGNOSIS — J44.9 CHRONIC OBSTRUCTIVE PULMONARY DISEASE, UNSPECIFIED: ICD-10-CM

## 2022-12-20 DIAGNOSIS — M79.89 OTHER SPECIFIED SOFT TISSUE DISORDERS: ICD-10-CM

## 2022-12-20 DIAGNOSIS — C64.9 MALIGNANT NEOPLASM OF UNSPECIFIED KIDNEY, EXCEPT RENAL PELVIS: ICD-10-CM

## 2022-12-20 DIAGNOSIS — I48.20 CHRONIC ATRIAL FIBRILLATION, UNSPECIFIED: ICD-10-CM

## 2022-12-20 DIAGNOSIS — Z29.9 ENCOUNTER FOR PROPHYLACTIC MEASURES, UNSPECIFIED: ICD-10-CM

## 2022-12-20 DIAGNOSIS — Z96.652 PRESENCE OF LEFT ARTIFICIAL KNEE JOINT: Chronic | ICD-10-CM

## 2022-12-20 DIAGNOSIS — N39.0 URINARY TRACT INFECTION, SITE NOT SPECIFIED: ICD-10-CM

## 2022-12-20 DIAGNOSIS — H40.9 UNSPECIFIED GLAUCOMA: ICD-10-CM

## 2022-12-20 PROCEDURE — 99223 1ST HOSP IP/OBS HIGH 75: CPT | Mod: GC

## 2022-12-20 DEVICE — URETERAL CATH OPEN END FLEXI-TIP 5FR .038" X 70CM: Type: IMPLANTABLE DEVICE | Site: LEFT | Status: FUNCTIONAL

## 2022-12-20 DEVICE — GUIDEWIRE SENSOR DUAL-FLEX NITINOL STRAIGHT .035" X 150CM: Type: IMPLANTABLE DEVICE | Site: LEFT | Status: FUNCTIONAL

## 2022-12-20 DEVICE — URETERAL STENT PERCUFLEX PLUS 4.8FR 24CM: Type: IMPLANTABLE DEVICE | Site: LEFT | Status: FUNCTIONAL

## 2022-12-20 RX ORDER — APIXABAN 2.5 MG/1
5 TABLET, FILM COATED ORAL EVERY 12 HOURS
Refills: 0 | Status: DISCONTINUED | OUTPATIENT
Start: 2022-12-20 | End: 2022-12-30

## 2022-12-20 RX ORDER — FUROSEMIDE 40 MG
40 TABLET ORAL DAILY
Refills: 0 | Status: DISCONTINUED | OUTPATIENT
Start: 2022-12-20 | End: 2022-12-20

## 2022-12-20 RX ORDER — TIOTROPIUM BROMIDE 18 UG/1
2 CAPSULE ORAL; RESPIRATORY (INHALATION) DAILY
Refills: 0 | Status: DISCONTINUED | OUTPATIENT
Start: 2022-12-20 | End: 2022-12-20

## 2022-12-20 RX ORDER — ALLOPURINOL 300 MG
100 TABLET ORAL DAILY
Refills: 0 | Status: DISCONTINUED | OUTPATIENT
Start: 2022-12-20 | End: 2022-12-20

## 2022-12-20 RX ORDER — TAMSULOSIN HYDROCHLORIDE 0.4 MG/1
0.4 CAPSULE ORAL AT BEDTIME
Refills: 0 | Status: DISCONTINUED | OUTPATIENT
Start: 2022-12-20 | End: 2022-12-20

## 2022-12-20 RX ORDER — ALLOPURINOL 300 MG
100 TABLET ORAL DAILY
Refills: 0 | Status: DISCONTINUED | OUTPATIENT
Start: 2022-12-20 | End: 2022-12-30

## 2022-12-20 RX ORDER — LANOLIN ALCOHOL/MO/W.PET/CERES
3 CREAM (GRAM) TOPICAL AT BEDTIME
Refills: 0 | Status: DISCONTINUED | OUTPATIENT
Start: 2022-12-20 | End: 2022-12-20

## 2022-12-20 RX ORDER — CHOLECALCIFEROL (VITAMIN D3) 125 MCG
1000 CAPSULE ORAL DAILY
Refills: 0 | Status: DISCONTINUED | OUTPATIENT
Start: 2022-12-20 | End: 2022-12-30

## 2022-12-20 RX ORDER — TIOTROPIUM BROMIDE 18 UG/1
2 CAPSULE ORAL; RESPIRATORY (INHALATION) DAILY
Refills: 0 | Status: DISCONTINUED | OUTPATIENT
Start: 2022-12-20 | End: 2022-12-30

## 2022-12-20 RX ORDER — ACETAMINOPHEN 500 MG
650 TABLET ORAL EVERY 6 HOURS
Refills: 0 | Status: DISCONTINUED | OUTPATIENT
Start: 2022-12-20 | End: 2022-12-30

## 2022-12-20 RX ORDER — LANOLIN ALCOHOL/MO/W.PET/CERES
3 CREAM (GRAM) TOPICAL AT BEDTIME
Refills: 0 | Status: DISCONTINUED | OUTPATIENT
Start: 2022-12-21 | End: 2022-12-20

## 2022-12-20 RX ORDER — FAMOTIDINE 10 MG/ML
20 INJECTION INTRAVENOUS DAILY
Refills: 0 | Status: DISCONTINUED | OUTPATIENT
Start: 2022-12-20 | End: 2022-12-20

## 2022-12-20 RX ORDER — ONDANSETRON 8 MG/1
4 TABLET, FILM COATED ORAL EVERY 8 HOURS
Refills: 0 | Status: DISCONTINUED | OUTPATIENT
Start: 2022-12-20 | End: 2022-12-20

## 2022-12-20 RX ORDER — FUROSEMIDE 40 MG
40 TABLET ORAL DAILY
Refills: 0 | Status: DISCONTINUED | OUTPATIENT
Start: 2022-12-20 | End: 2022-12-30

## 2022-12-20 RX ORDER — HYDROMORPHONE HYDROCHLORIDE 2 MG/ML
0.5 INJECTION INTRAMUSCULAR; INTRAVENOUS; SUBCUTANEOUS
Refills: 0 | Status: DISCONTINUED | OUTPATIENT
Start: 2022-12-20 | End: 2022-12-20

## 2022-12-20 RX ORDER — CHOLECALCIFEROL (VITAMIN D3) 125 MCG
1000 CAPSULE ORAL DAILY
Refills: 0 | Status: DISCONTINUED | OUTPATIENT
Start: 2022-12-20 | End: 2022-12-20

## 2022-12-20 RX ORDER — ALPRAZOLAM 0.25 MG
0.5 TABLET ORAL ONCE
Refills: 0 | Status: DISCONTINUED | OUTPATIENT
Start: 2022-12-20 | End: 2022-12-20

## 2022-12-20 RX ORDER — ZINC SULFATE TAB 220 MG (50 MG ZINC EQUIVALENT) 220 (50 ZN) MG
220 TAB ORAL DAILY
Refills: 0 | Status: DISCONTINUED | OUTPATIENT
Start: 2022-12-20 | End: 2022-12-20

## 2022-12-20 RX ORDER — POTASSIUM CHLORIDE 20 MEQ
20 PACKET (EA) ORAL DAILY
Refills: 0 | Status: DISCONTINUED | OUTPATIENT
Start: 2022-12-20 | End: 2022-12-20

## 2022-12-20 RX ORDER — ATENOLOL 25 MG/1
25 TABLET ORAL DAILY
Refills: 0 | Status: DISCONTINUED | OUTPATIENT
Start: 2022-12-20 | End: 2022-12-30

## 2022-12-20 RX ORDER — ALBUTEROL 90 UG/1
2 AEROSOL, METERED ORAL EVERY 6 HOURS
Refills: 0 | Status: DISCONTINUED | OUTPATIENT
Start: 2022-12-20 | End: 2022-12-30

## 2022-12-20 RX ORDER — TIMOLOL 0.5 %
1 DROPS OPHTHALMIC (EYE)
Refills: 0 | Status: DISCONTINUED | OUTPATIENT
Start: 2022-12-20 | End: 2022-12-30

## 2022-12-20 RX ORDER — APIXABAN 2.5 MG/1
5 TABLET, FILM COATED ORAL
Refills: 0 | Status: DISCONTINUED | OUTPATIENT
Start: 2022-12-20 | End: 2022-12-20

## 2022-12-20 RX ORDER — ERTAPENEM SODIUM 1 G/1
1000 INJECTION, POWDER, LYOPHILIZED, FOR SOLUTION INTRAMUSCULAR; INTRAVENOUS EVERY 24 HOURS
Refills: 0 | Status: DISCONTINUED | OUTPATIENT
Start: 2022-12-21 | End: 2022-12-23

## 2022-12-20 RX ORDER — TIMOLOL 0.5 %
1 DROPS OPHTHALMIC (EYE)
Refills: 0 | Status: DISCONTINUED | OUTPATIENT
Start: 2022-12-20 | End: 2022-12-20

## 2022-12-20 RX ORDER — SODIUM CHLORIDE 9 MG/ML
1000 INJECTION INTRAMUSCULAR; INTRAVENOUS; SUBCUTANEOUS
Refills: 0 | Status: DISCONTINUED | OUTPATIENT
Start: 2022-12-20 | End: 2022-12-20

## 2022-12-20 RX ORDER — ATENOLOL 25 MG/1
25 TABLET ORAL DAILY
Refills: 0 | Status: DISCONTINUED | OUTPATIENT
Start: 2022-12-20 | End: 2022-12-20

## 2022-12-20 RX ORDER — SENNA PLUS 8.6 MG/1
2 TABLET ORAL AT BEDTIME
Refills: 0 | Status: DISCONTINUED | OUTPATIENT
Start: 2022-12-20 | End: 2022-12-28

## 2022-12-20 RX ORDER — ACETAMINOPHEN 500 MG
650 TABLET ORAL EVERY 6 HOURS
Refills: 0 | Status: DISCONTINUED | OUTPATIENT
Start: 2022-12-20 | End: 2022-12-20

## 2022-12-20 RX ORDER — FAMOTIDINE 10 MG/ML
20 INJECTION INTRAVENOUS DAILY
Refills: 0 | Status: DISCONTINUED | OUTPATIENT
Start: 2022-12-20 | End: 2022-12-30

## 2022-12-20 RX ORDER — ZINC SULFATE TAB 220 MG (50 MG ZINC EQUIVALENT) 220 (50 ZN) MG
220 TAB ORAL DAILY
Refills: 0 | Status: DISCONTINUED | OUTPATIENT
Start: 2022-12-20 | End: 2022-12-30

## 2022-12-20 RX ORDER — ERTAPENEM SODIUM 1 G/1
1000 INJECTION, POWDER, LYOPHILIZED, FOR SOLUTION INTRAMUSCULAR; INTRAVENOUS EVERY 24 HOURS
Refills: 0 | Status: DISCONTINUED | OUTPATIENT
Start: 2022-12-20 | End: 2022-12-20

## 2022-12-20 RX ORDER — INFLUENZA VIRUS VACCINE 15; 15; 15; 15 UG/.5ML; UG/.5ML; UG/.5ML; UG/.5ML
0.7 SUSPENSION INTRAMUSCULAR ONCE
Refills: 0 | Status: DISCONTINUED | OUTPATIENT
Start: 2022-12-20 | End: 2022-12-30

## 2022-12-20 RX ORDER — ACETAMINOPHEN 500 MG
1000 TABLET ORAL ONCE
Refills: 0 | Status: DISCONTINUED | OUTPATIENT
Start: 2022-12-20 | End: 2022-12-20

## 2022-12-20 RX ORDER — ALBUTEROL 90 UG/1
2 AEROSOL, METERED ORAL EVERY 6 HOURS
Refills: 0 | Status: DISCONTINUED | OUTPATIENT
Start: 2022-12-20 | End: 2022-12-20

## 2022-12-20 RX ORDER — ONDANSETRON 8 MG/1
4 TABLET, FILM COATED ORAL ONCE
Refills: 0 | Status: DISCONTINUED | OUTPATIENT
Start: 2022-12-20 | End: 2022-12-20

## 2022-12-20 RX ORDER — LATANOPROST 0.05 MG/ML
1 SOLUTION/ DROPS OPHTHALMIC; TOPICAL AT BEDTIME
Refills: 0 | Status: DISCONTINUED | OUTPATIENT
Start: 2022-12-20 | End: 2022-12-20

## 2022-12-20 RX ORDER — TAMSULOSIN HYDROCHLORIDE 0.4 MG/1
0.4 CAPSULE ORAL DAILY
Refills: 0 | Status: DISCONTINUED | OUTPATIENT
Start: 2022-12-20 | End: 2022-12-30

## 2022-12-20 RX ADMIN — APIXABAN 5 MILLIGRAM(S): 2.5 TABLET, FILM COATED ORAL at 21:40

## 2022-12-20 RX ADMIN — ATENOLOL 25 MILLIGRAM(S): 25 TABLET ORAL at 09:41

## 2022-12-20 RX ADMIN — TIOTROPIUM BROMIDE 2 PUFF(S): 18 CAPSULE ORAL; RESPIRATORY (INHALATION) at 09:40

## 2022-12-20 RX ADMIN — Medication 1 DROP(S): at 17:13

## 2022-12-20 RX ADMIN — Medication 1 DROP(S): at 09:40

## 2022-12-20 RX ADMIN — ERTAPENEM SODIUM 120 MILLIGRAM(S): 1 INJECTION, POWDER, LYOPHILIZED, FOR SOLUTION INTRAMUSCULAR; INTRAVENOUS at 09:41

## 2022-12-20 RX ADMIN — CEFTRIAXONE 1000 MILLIGRAM(S): 500 INJECTION, POWDER, FOR SOLUTION INTRAMUSCULAR; INTRAVENOUS at 00:27

## 2022-12-20 RX ADMIN — SODIUM CHLORIDE 75 MILLILITER(S): 9 INJECTION INTRAMUSCULAR; INTRAVENOUS; SUBCUTANEOUS at 13:00

## 2022-12-20 RX ADMIN — APIXABAN 5 MILLIGRAM(S): 2.5 TABLET, FILM COATED ORAL at 09:40

## 2022-12-20 RX ADMIN — SODIUM CHLORIDE 1000 MILLILITER(S): 9 INJECTION, SOLUTION INTRAVENOUS at 00:27

## 2022-12-20 NOTE — PROGRESS NOTE ADULT - SUBJECTIVE AND OBJECTIVE BOX
Patient is a 67y old  Male who presents with a chief complaint of Nephrolithiasis (20 Dec 2022 00:38)      Subjective:  INTERVAL HPI/OVERNIGHT EVENTS: Patient seen and examined at bedside. No overnight events occurred. Patient has no complaints at this time. Denies fevers, chills, n/v/abdominal pain    MEDICATIONS  (STANDING):  allopurinol 100 milliGRAM(s) Oral daily  apixaban 5 milliGRAM(s) Oral two times a day  atenolol  Tablet 25 milliGRAM(s) Oral daily  cholecalciferol 1000 Unit(s) Oral daily  ertapenem  IVPB 1000 milliGRAM(s) IV Intermittent every 24 hours  famotidine    Tablet 20 milliGRAM(s) Oral daily  influenza  Vaccine (HIGH DOSE) 0.7 milliLiter(s) IntraMuscular once  latanoprost 0.005% Ophthalmic Solution 1 Drop(s) Both EYES at bedtime  potassium chloride    Tablet ER 20 milliEquivalent(s) Oral daily  tamsulosin 0.4 milliGRAM(s) Oral at bedtime  timolol 0.5% Solution 1 Drop(s) Both EYES two times a day  tiotropium 2.5 MICROgram(s) Inhaler 2 Puff(s) Inhalation daily  zinc sulfate 220 milliGRAM(s) Oral daily    MEDICATIONS  (PRN):  acetaminophen     Tablet .. 650 milliGRAM(s) Oral every 6 hours PRN Temp greater or equal to 38C (100.4F), Mild Pain (1 - 3)  albuterol    90 MICROgram(s) HFA Inhaler 2 Puff(s) Inhalation every 6 hours PRN Shortness of Breath and/or Wheezing  aluminum hydroxide/magnesium hydroxide/simethicone Suspension 30 milliLiter(s) Oral every 4 hours PRN Dyspepsia  melatonin 3 milliGRAM(s) Oral at bedtime PRN Insomnia  ondansetron Injectable 4 milliGRAM(s) IV Push every 8 hours PRN Nausea and/or Vomiting      Allergies    No Known Allergies    Intolerances        REVIEW OF SYSTEMS:  CONSTITUTIONAL: No fever or chills  HEENT:  No headache, no sore throat  RESPIRATORY: No cough, wheezing, or shortness of breath  CARDIOVASCULAR: No chest pain, palpitations  GASTROINTESTINAL: No abd pain, nausea, vomiting, or diarrhea  GENITOURINARY: No dysuria, frequency, or hematuria  NEUROLOGICAL: no focal weakness or dizziness  MUSCULOSKELETAL: no myalgias     Objective:  Vital Signs Last 24 Hrs  T(C): 36.6 (20 Dec 2022 04:46), Max: 37 (19 Dec 2022 19:00)  T(F): 97.8 (20 Dec 2022 04:46), Max: 98.6 (19 Dec 2022 19:00)  HR: 82 (20 Dec 2022 04:46) (70 - 92)  BP: 155/88 (20 Dec 2022 04:46) (142/98 - 155/88)  BP(mean): --  RR: 19 (20 Dec 2022 04:46) (18 - 19)  SpO2: 96% (20 Dec 2022 04:46) (96% - 99%)    Parameters below as of 20 Dec 2022 04:46  Patient On (Oxygen Delivery Method): room air        GENERAL: NAD, lying in bed comfortably, answers questions appropriately but appears somewhat confused, tired appearing  HEAD:  Atraumatic, Normocephalic  EYES: EOMI, conjunctiva and sclera clear  ENT: Moist mucous membranes  NECK: Supple, No JVD  CHEST/LUNG: Clear to auscultation bilaterally; No rales, rhonchi, wheezing, or rubs. Unlabored respirations  HEART: Regular rate and rhythm; No murmurs, rubs, or gallops  ABDOMEN: Bowel sounds present; Soft, Nontender, Nondistended. No hepatomegaly  EXTREMITIES:  b/l LE edema with stasis changes   NERVOUS SYSTEM:  Alert & Oriented X3, speech clear. No deficits   SKIN: warm, dry    LABS:                        10.4   9.46  )-----------( 351      ( 19 Dec 2022 20:45 )             36.2     CBC Full  -  ( 19 Dec 2022 20:45 )  WBC Count : 9.46 K/uL  Hemoglobin : 10.4 g/dL  Hematocrit : 36.2 %  Platelet Count - Automated : 351 K/uL  Mean Cell Volume : 84.4 fl  Mean Cell Hemoglobin : 24.2 pg  Mean Cell Hemoglobin Concentration : 28.7 gm/dL  Auto Neutrophil # : 6.42 K/uL  Auto Lymphocyte # : 1.80 K/uL  Auto Monocyte # : 0.86 K/uL  Auto Eosinophil # : 0.28 K/uL  Auto Basophil # : 0.05 K/uL  Auto Neutrophil % : 67.9 %  Auto Lymphocyte % : 19.0 %  Auto Monocyte % : 9.1 %  Auto Eosinophil % : 3.0 %  Auto Basophil % : 0.5 %    19 Dec 2022 19:46    141    |  111    |  38     ----------------------------<  113    5.0     |  27     |  1.30     Ca    9.0        19 Dec 2022 19:46    TPro  6.7    /  Alb  2.4    /  TBili  0.7    /  DBili  x      /  AST  46     /  ALT  20     /  AlkPhos  90     19 Dec 2022 19:46    PT/INR - ( 19 Dec 2022 19:46 )   PT: 13.3 sec;   INR: 1.14 ratio         PTT - ( 19 Dec 2022 19:46 )  PTT:20.3 sec  Urinalysis Basic - ( 19 Dec 2022 19:46 )    Color: Yellow / Appearance: Slightly Turbid / S.015 / pH: x  Gluc: x / Ketone: Negative  / Bili: Negative / Urobili: Negative   Blood: x / Protein: 30 mg/dL / Nitrite: Negative   Leuk Esterase: Moderate / RBC: 25-50 /HPF / WBC 11-25   Sq Epi: x / Non Sq Epi: Occasional / Bacteria: Occasional      CAPILLARY BLOOD GLUCOSE              RADIOLOGY & ADDITIONAL TESTS:    Personally reviewed.     Consultant(s) Notes Reviewed:  [x] YES  [ ] NO

## 2022-12-20 NOTE — H&P ADULT - NSHPSOCIALHISTORY_GEN_ALL_CORE
Tobacco: denies  EtOH:  denies  Recreational drug use:  Lives with: wife,   Ambulates: with assistance  ADLs: needs assistance

## 2022-12-20 NOTE — BRIEF OPERATIVE NOTE - NSICDXBRIEFPOSTOP_GEN_ALL_CORE_FT
POST-OP DIAGNOSIS:  Ureteral stone 20-Dec-2022 12:26:21  Manuel Yates  Hydronephrosis, left 20-Dec-2022 12:26:30  Manuel Yates  Bacterial UTI 20-Dec-2022 12:26:43  Manuel Yates

## 2022-12-20 NOTE — PATIENT PROFILE ADULT - STATED REASON FOR ADMISSION
pt stated that, I was having hallucinations so me and my through im having UTI because of uncleaned Owusu and per primary Doctor I came to the ED.

## 2022-12-20 NOTE — CONSULT NOTE ADULT - ASSESSMENT
Left prox. U stone with mild hydro and suspected UTI for stent placement history uric acid calculus and will put on K Citrate, management of stone surgically when urine sterile, bilateral non obstructing renal calculi    History recent UR with Chronic Owusu    History Partial Nephrectomy - Ca - followed by Dr. Duke    Patient understands indications, procedure risks and complications, necessity of F/U and agrees to Cysto/L P RPG and Stent    Dr. Aggarwal aware of planned procedure

## 2022-12-20 NOTE — H&P ADULT - ASSESSMENT
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 Admitted for nephrolithiasis and acute UTI. 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 Admitted for nephrolithiasis and acute UTI.

## 2022-12-20 NOTE — H&P ADULT - NSHPREVIEWOFSYSTEMS_GEN_ALL_CORE
REVIEW OF SYSTEMS:    CONSTITUTIONAL: + generalized weakness and anxiety, No fevers or chills  HEENT:  No headache, no visual changes, no sore throat, neck supple  RESPIRATORY: No cough, wheezing, hemoptysis; No shortness of breath  CARDIOVASCULAR: No chest pain or palpitations +B/l LE edema  GASTROINTESTINAL: No abdominal pain, no nausea, vomiting, or hematemesis; No diarrhea or constipation.   GENITOURINARY: +chronic kennedy No dysuria, frequency or hematuria  NEUROLOGICAL: No focal weakness or dizziness   MSK: No myalgias  SKIN: +venous stasis changes b/l LE No itching, burning REVIEW OF SYSTEMS:  CONSTITUTIONAL: + generalized weakness and anxiety, No fevers or chills  HEENT:  No headache, no visual changes, no sore throat, neck supple  RESPIRATORY: No cough, wheezing, hemoptysis; No shortness of breath  CARDIOVASCULAR: No chest pain or palpitations +B/l LE edema  GASTROINTESTINAL: No abdominal pain, no nausea, vomiting, or hematemesis; No diarrhea or constipation.   GENITOURINARY: +chronic kennedy No dysuria, frequency or hematuria  NEUROLOGICAL: No focal weakness or dizziness   MSK: No myalgias  SKIN: +venous stasis changes b/l LE No itching, burning

## 2022-12-20 NOTE — H&P ADULT - PROBLEM SELECTOR PLAN 1
CT Abdomen/Pelvis showing 9.4 x 6.4 mm in left mid ureteral stone present with additional bilateral non obstructing renal stones present measuring 5.1 mm in the right and 7.0 mm on the left. Bilateral renal cysts are present.  - s/p LR bolus x1 in ED  - Urology (Dr. Yates) consulted, plan for stenting later this AM  - NPO for procedure  - Eliquis held CT Abdomen/Pelvis showing 9.4 x 6.4 mm in left mid ureteral stone present with additional bilateral non obstructing renal stones present measuring 5.1 mm in the right and 7.0 mm on the left. Bilateral renal cysts are present.  - s/p LR bolus x1 in ED  - continue home tamsulosin 0.4mg po cap daily  - Urology (Dr. Yates) consulted, plan for stenting later this AM  - NPO for procedure  - RCRI score 0, Class 1 risk, patient is medically optimized for procedure.

## 2022-12-20 NOTE — H&P ADULT - PROBLEM SELECTOR PLAN 9
- Eliquis 5mg BID - Eliquis 5mg BID (patient bedbound, afib, remote hx of dvt,  high risk for dvt/pe, would not stop AC for cystoscopy unless absolutely necessary)

## 2022-12-20 NOTE — BRIEF OPERATIVE NOTE - NSICDXBRIEFPROCEDURE_GEN_ALL_CORE_FT
PROCEDURES:  Cystoscopic insertion of ureteral stent 20-Dec-2022 12:24:54  Manuel Yates  XR pyelogram retrograde left 20-Dec-2022 12:25:08  Manuel Yates

## 2022-12-20 NOTE — H&P ADULT - PROBLEM SELECTOR PLAN 3
Patient presented with AMS to ED likely 2/2 UTI. Hx of recurrent UTIs.  - UA showing mod leuk esterase, 11-25 wbc, 25-50 rbc, occasional bacteria  - CT head showing no intracranial hemorrhage or mass effect.   - Previous Urine Cx grew ESBL klebsiella, treated with ertapenem    - Chronic kennedy catheter in place  - s/p 1 dose Rocephin in ED  - Start Ertapenem  - F/u urine and blood cultures Patient presented with AMS to ED likely 2/2 UTI. Hx of recurrent UTIs.  - UA showing mod leuk esterase, 11-25 wbc, 25-50 rbc, occasional bacteria  - CT head showing no intracranial hemorrhage or mass effect.   - Previous Urine Cx grew ESBL klebsiella, treated with ertapenem    - Chronic eknnedy catheter in place  - s/p 1 dose Rocephin in ED  - Start Ertapenem  - F/u urine and blood cultures

## 2022-12-20 NOTE — PATIENT PROFILE ADULT - FUNCTIONAL ASSESSMENT - BASIC MOBILITY 6.
1-calculated by average /Not able to assess (calculate score using [facility] averaging method) 1-calculated by average /Not able to assess (calculate score using [facility] averaging method) 1-calculated by average /Not able to assess (calculate score using [facility] averaging method)

## 2022-12-20 NOTE — H&P ADULT - ATTENDING COMMENTS
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 Admitted for nephrolithiasis and acute UTI.    Agree with H&P as above. Edited where appropriate in the body of the note. 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 Admitted for nephrolithiasis and acute UTI.    Agree with H&P as above. Edited where appropriate in the body of the note.

## 2022-12-20 NOTE — H&P ADULT - NSICDXPASTMEDICALHX_GEN_ALL_CORE_FT
PAST MEDICAL HISTORY:  Asthma     Cancer of kidney     Chronic atrial fibrillation     Glaucoma     HTN (hypertension)

## 2022-12-20 NOTE — H&P ADULT - PROBLEM SELECTOR PLAN 4
Patient with chronic b/l leg swelling  - Continue home po furosemide 40mg daily  - monitor renal function, Cr stable currently Patient with chronic b/l leg swelling  - Continue home po furosemide 40mg daily  - Continue supplemental KCl tablet  - monitor renal function, Cr stable currently

## 2022-12-20 NOTE — PATIENT PROFILE ADULT - FALL HARM RISK - HARM RISK INTERVENTIONS
Assistance with ambulation/Assistance OOB with selected safe patient handling equipment/Communicate Risk of Fall with Harm to all staff/Discuss with provider need for PT consult/Monitor gait and stability/Reinforce activity limits and safety measures with patient and family/Tailored Fall Risk Interventions/Visual Cue: Yellow wristband and red socks/Bed in lowest position, wheels locked, appropriate side rails in place/Call bell, personal items and telephone in reach/Instruct patient to call for assistance before getting out of bed or chair/Non-slip footwear when patient is out of bed/[place] to call system/Physically safe environment - no spills, clutter or unnecessary equipment/Purposeful Proactive Rounding/Room/bathroom lighting operational, light cord in reach Assistance with ambulation/Assistance OOB with selected safe patient handling equipment/Communicate Risk of Fall with Harm to all staff/Discuss with provider need for PT consult/Monitor gait and stability/Reinforce activity limits and safety measures with patient and family/Tailored Fall Risk Interventions/Visual Cue: Yellow wristband and red socks/Bed in lowest position, wheels locked, appropriate side rails in place/Call bell, personal items and telephone in reach/Instruct patient to call for assistance before getting out of bed or chair/Non-slip footwear when patient is out of bed/[place] to call system/Physically safe environment - no spills, clutter or unnecessary equipment/Purposeful Proactive Rounding/Room/bathroom lighting operational, light cord in reach Assistance with ambulation/Assistance OOB with selected safe patient handling equipment/Communicate Risk of Fall with Harm to all staff/Discuss with provider need for PT consult/Monitor gait and stability/Reinforce activity limits and safety measures with patient and family/Tailored Fall Risk Interventions/Visual Cue: Yellow wristband and red socks/Bed in lowest position, wheels locked, appropriate side rails in place/Call bell, personal items and telephone in reach/Instruct patient to call for assistance before getting out of bed or chair/Non-slip footwear when patient is out of bed/[place] to call system/Physically safe environment - no spills, clutter or unnecessary equipment/Purposeful Proactive Rounding/Room/bathroom lighting operational, light cord in reach

## 2022-12-20 NOTE — H&P ADULT - PROBLEM SELECTOR PLAN 2
Hx of Kidney cancer, now in remission  - s/p nephrectomy (~2017/2018)  - follows with Dr. Duke (urology)

## 2022-12-20 NOTE — PROGRESS NOTE ADULT - PROBLEM SELECTOR PLAN 7
- Continue home latanoprost 0.005% opthalamic soln 1 drop to each eye at bedtime  - Continue home timolol maleate 0.5% opthalmic soln 1 drop to each eye bid

## 2022-12-20 NOTE — BRIEF OPERATIVE NOTE - OPERATION/FINDINGS
3.5 - 4.0 cm non-obstructing prostate, 2 + trabeculation, mild cath cystitis, mild left hydro secondary proximal ureteral calculus    20 - gram symmetric prostate on DAVIS

## 2022-12-20 NOTE — H&P ADULT - PROBLEM SELECTOR PLAN 6
- Continue home tiotropium  - Albuterol prn BP elevated at time of admission 150/60 -> 155/61   - Continue home atenolol  - Monitor routine hemodynamics

## 2022-12-20 NOTE — H&P ADULT - PROBLEM SELECTOR PLAN 7
- Scds for now - Continue home latanoprost 0.005% opthalamic soln 1 drop to each eye at bedtime  - Continue home timolol maleate 0.5% opthalmic soln 1 drop to each eye bid

## 2022-12-20 NOTE — PATIENT PROFILE ADULT - CAREGIVER ADDRESS
[address] [place] [address] Brandyn Meek, [id number] [address] [place] [address] Brandyn Meek, [id number] [address] [place] [address] Brandyn Meek, [id number]

## 2022-12-20 NOTE — BRIEF OPERATIVE NOTE - NSICDXBRIEFPREOP_GEN_ALL_CORE_FT
PRE-OP DIAGNOSIS:  Ureteral stone 20-Dec-2022 12:25:23  Manuel Yates  Hydronephrosis, left 20-Dec-2022 12:25:36  Manuel Yates  Bacterial UTI 20-Dec-2022 12:25:58  Manuel Yates

## 2022-12-20 NOTE — CONSULT NOTE ADULT - SUBJECTIVE AND OBJECTIVE BOX
CHIEF COMPLAINT:     AMS    HISTORY OF PRESENT ILLNESS:    68 y/o male PMH of asthma, HTN, kidney cancer in remission, glaucoma, afib on eliquis, s/p L total knee replacement in September presents to the ED for increased confusion with lower abdominal pain. Patient was recently hospitalized at [facility] from  -22 for AMS and UTI. Urine cultures at the time grew ESBL Klebsiella and patient was treated with ertapenem. He was discharged to [facility] with chronic indwelling kennedy catheter. Patient's family wanted to bring him home after 3 days, however, they soon felt they were unable to properly care for him at home.  At time of exam, patient denies having any pain. He is slightly anxious about being in the hospital but was A&Ox3.   Patient denies having any pain, nausea, fevers, chills, sob, vomiting, diarrhea, constipation.    ED Course  T 97.6F -> 98.6 F HR 74 /60 -> 155/61 RR 19 SpO2 99% on RA  Labs significant for H/H 10.4/36.2 (bl ~8-9)  BUN/Cr 38/1.30 (bl 1-1.2) Albumin 2.4 AST 46  UA: mod leuk esterase, 11-25 wbc, 25-50 rbc, occasional bacteria    CXR: grossly normal , f/u official read  EKG: Afib HR 70 BPM    CT Head: Limited by streak artifact.  Subcentimeter bilateral hypodensities present in the bilateral basal   ganglia, likely related to age-indeterminate lacunar infarcts. Findings   are superimposed upon chronic microangiopathic white matter changes  To the visualized extent, no intracranial hemorrhage or mass effect.     CT Abdomen/Pelvis: 9.4 x 6.4 mm in left mid ureteral stone present. Dilated left renal pelvis without mario hydronephrosis. Additional bilateral non obstructing renal stones are present measuring 5.1 mm in the right and 7.0 mm on the left. Bilateral renal cysts are present.    In the ED, patient received Rocephin x1 dose, LR bolus x1    Prior history ureteroscopy and laser of calculus - Dr. Duke    Review of Systems:    PAST MEDICAL & SURGICAL HISTORY:  HTN (hypertension)      Cancer of kidney      Asthma      Glaucoma      Chronic atrial fibrillation      S/P total knee arthroplasty, left      H/O partial nephrectomy          REVIEW OF SYSTEMS:    CONSTITUTIONAL: + generalized weakness and anxiety, No fevers or chills  HEENT:  No headache, no visual changes, no sore throat, neck supple  RESPIRATORY: No cough, wheezing, hemoptysis; No shortness of breath  CARDIOVASCULAR: No chest pain or palpitations +B/l LE edema  GASTROINTESTINAL: No abdominal pain, no nausea, vomiting, or hematemesis; No diarrhea or constipation.   GENITOURINARY: +chronic kennedy No dysuria, frequency or hematuria  NEUROLOGICAL: No focal weakness or dizziness   MSK: No myalgias  SKIN: +venous stasis changes b/l LE No itching, burning    MEDICATIONS  (STANDING):  allopurinol 100 milliGRAM(s) Oral daily  apixaban 5 milliGRAM(s) Oral two times a day  atenolol  Tablet 25 milliGRAM(s) Oral daily  cholecalciferol 1000 Unit(s) Oral daily  ertapenem  IVPB 1000 milliGRAM(s) IV Intermittent every 24 hours  famotidine    Tablet 20 milliGRAM(s) Oral daily  influenza  Vaccine (HIGH DOSE) 0.7 milliLiter(s) IntraMuscular once  latanoprost 0.005% Ophthalmic Solution 1 Drop(s) Both EYES at bedtime  potassium chloride    Tablet ER 20 milliEquivalent(s) Oral daily  tamsulosin 0.4 milliGRAM(s) Oral at bedtime  timolol 0.5% Solution 1 Drop(s) Both EYES two times a day  tiotropium 2.5 MICROgram(s) Inhaler 2 Puff(s) Inhalation daily  zinc sulfate 220 milliGRAM(s) Oral daily    MEDICATIONS  (PRN):  acetaminophen     Tablet .. 650 milliGRAM(s) Oral every 6 hours PRN Temp greater or equal to 38C (100.4F), Mild Pain (1 - 3)  albuterol    90 MICROgram(s) HFA Inhaler 2 Puff(s) Inhalation every 6 hours PRN Shortness of Breath and/or Wheezing  aluminum hydroxide/magnesium hydroxide/simethicone Suspension 30 milliLiter(s) Oral every 4 hours PRN Dyspepsia  melatonin 3 milliGRAM(s) Oral at bedtime PRN Insomnia  ondansetron Injectable 4 milliGRAM(s) IV Push every 8 hours PRN Nausea and/or Vomiting      Allergies    No Known Allergies    Intolerances        SOCIAL HISTORY:    FAMILY HISTORY:  No pertinent family history in first degree relatives        Vital Signs Last 24 Hrs  T(C): 36.4 (20 Dec 2022 10:10), Max: 37 (19 Dec 2022 19:00)  T(F): 97.5 (20 Dec 2022 10:10), Max: 98.6 (19 Dec 2022 19:00)  HR: 84 (20 Dec 2022 10:10) (70 - 92)  BP: 157/102 (20 Dec 2022 10:10) (142/98 - 157/102)  BP(mean): --  RR: 18 (20 Dec 2022 10:10) (18 - 19)  SpO2: 92% (20 Dec 2022 10:09) (92% - 99%)    Parameters below as of 20 Dec 2022 10:09  Patient On (Oxygen Delivery Method): room air        PHYSICAL EXAM:    Constitutional: NAD, well-developed  HEENT: BENITEZ, EOMI, Normal Hearing, MMM  Neck: No LAD, No JVD  Back: Normal spine flexure, No CVA tenderness  Respiratory: CTAB   Cardiovascular: S1 and S2, RRR, no M/G/R  Abd: BS+, soft, NT/ND, No CVAT  : Normal uncircumcised  phallus, patent  meatus, bilateral descended testes, no masses, Kennedy with grossly clear urine  DAVIS: deferred  to OR  Extremities: No peripheral edema  Vascular: 2+ peripheral pulses  Neurological: A/O x 3, no focal deficits  Psychiatric: Normal mood, normal affect      LABS:                        10.4   9.46  )-----------( 351      ( 19 Dec 2022 20:45 )             36.2     12-    141  |  111<H>  |  38<H>  ----------------------------<  113<H>  5.0   |  27  |  1.30    Ca    9.0      19 Dec 2022 19:46    TPro  6.7  /  Alb  2.4<L>  /  TBili  0.7  /  DBili  x   /  AST  46<H>  /  ALT  20  /  AlkPhos  90  12-    PT/INR - ( 19 Dec 2022 19:46 )   PT: 13.3 sec;   INR: 1.14 ratio         PTT - ( 19 Dec 2022 19:46 )  PTT:20.3 sec  Urinalysis Basic - ( 19 Dec 2022 19:46 )    Color: Yellow / Appearance: Slightly Turbid / S.015 / pH: x  Gluc: x / Ketone: Negative  / Bili: Negative / Urobili: Negative   Blood: x / Protein: 30 mg/dL / Nitrite: Negative   Leuk Esterase: Moderate / RBC: 25-50 /HPF / WBC 11-25   Sq Epi: x / Non Sq Epi: Occasional / Bacteria: Occasional      Urine Culture:   Hemoglobin: 10.4 g/dL ( @ 20:45)  Hematocrit: 36.2 % ( @ 20:45)      RADIOLOGY & ADDITIONAL STUDIES:    < from: CT Abdomen and Pelvis w/ IV Cont (22 @ 22:12) >  ACC: [id number] EXAM:  CT ABDOMEN AND PELVIS IC                          PROCEDURE DATE:  2022          INTERPRETATION:  CLINICAL INFORMATION: weakness, AMS, abdominal pain    COMPARISON: None.    CONTRAST/COMPLICATIONS:  IV Contrast: [id number]  90 cc administered   10 cc discarded  Oral Contrast: NONE  Complications: None reported at time of study completion    PROCEDURE:  CT of the Abdomen and Pelvis was performed.  Sagittal and coronal reformats were performed.    FINDINGS:  LOWER CHEST: Minimal bibasilar subsegmental atelectasis.    LIVER: Within normal limits.  BILE DUCTS: Normal caliber.  GALLBLADDER: Within normal limits.  SPLEEN: Within normal limits.  PANCREAS: Within normal limits.  ADRENALS: Within normal limits.  KIDNEYS/URETERS: 9.4 x 6.4 mm in left mid ureteral stone present. Dilated   left renal pelvis without mario hydronephrosis. Additional bilateral   nonobstructing renal stones are present measuring 5.1 mm in the right and   7.0 mm on the left. Bilateral renalcysts are present..    BLADDER: Kennedy catheter.  REPRODUCTIVE ORGANS: Prostate within normal limits.    Diverticulosis without acute diverticulitis.  Small fecal impaction the rectum.    BOWEL: No bowel obstruction. Appendix is normal.  PERITONEUM: No ascites.  No free air or abscess.  VESSELS: Atherosclerotic changes.  RETROPERITONEUM/LYMPH NODES: No lymphadenopathy.  ABDOMINAL WALL: Within normal limits.  BONES: Degenerative changes.    IMPRESSION:  9.4 x 6.4 mm in left mid ureteral stone present. Dilated left renal   pelvis without mario hydronephrosis. Additional bilateral nonobstructing   renal stones are present measuring 5.1 mm in the right and 7.0 mm on the   left. Bilateral renal cysts are present.    No other acute findings.    Please refer to detailed findings otherwise described above.    --- End of Report ---             JOSE ANGEL SMITH MD; Attending Radiologist  This document has been electronically signed. Dec 19 2022 10:51PM    < end of copied text >     CHIEF COMPLAINT:     AMS    HISTORY OF PRESENT ILLNESS:    66 y/o male PMH of asthma, HTN, kidney cancer in remission, glaucoma, afib on eliquis, s/p L total knee replacement in September presents to the ED for increased confusion with lower abdominal pain. Patient was recently hospitalized at [facility] from  -22 for AMS and UTI. Urine cultures at the time grew ESBL Klebsiella and patient was treated with ertapenem. He was discharged to [facility] with chronic indwelling kennedy catheter. Patient's family wanted to bring him home after 3 days, however, they soon felt they were unable to properly care for him at home.  At time of exam, patient denies having any pain. He is slightly anxious about being in the hospital but was A&Ox3.   Patient denies having any pain, nausea, fevers, chills, sob, vomiting, diarrhea, constipation.    ED Course  T 97.6F -> 98.6 F HR 74 /60 -> 155/61 RR 19 SpO2 99% on RA  Labs significant for H/H 10.4/36.2 (bl ~8-9)  BUN/Cr 38/1.30 (bl 1-1.2) Albumin 2.4 AST 46  UA: mod leuk esterase, 11-25 wbc, 25-50 rbc, occasional bacteria    CXR: grossly normal , f/u official read  EKG: Afib HR 70 BPM    CT Head: Limited by streak artifact.  Subcentimeter bilateral hypodensities present in the bilateral basal   ganglia, likely related to age-indeterminate lacunar infarcts. Findings   are superimposed upon chronic microangiopathic white matter changes  To the visualized extent, no intracranial hemorrhage or mass effect.     CT Abdomen/Pelvis: 9.4 x 6.4 mm in left mid ureteral stone present. Dilated left renal pelvis without mario hydronephrosis. Additional bilateral non obstructing renal stones are present measuring 5.1 mm in the right and 7.0 mm on the left. Bilateral renal cysts are present.    In the ED, patient received Rocephin x1 dose, LR bolus x1    Prior history ureteroscopy and laser of calculus - Dr. Duke    Review of Systems:    PAST MEDICAL & SURGICAL HISTORY:  HTN (hypertension)      Cancer of kidney      Asthma      Glaucoma      Chronic atrial fibrillation      S/P total knee arthroplasty, left      H/O partial nephrectomy          REVIEW OF SYSTEMS:    CONSTITUTIONAL: + generalized weakness and anxiety, No fevers or chills  HEENT:  No headache, no visual changes, no sore throat, neck supple  RESPIRATORY: No cough, wheezing, hemoptysis; No shortness of breath  CARDIOVASCULAR: No chest pain or palpitations +B/l LE edema  GASTROINTESTINAL: No abdominal pain, no nausea, vomiting, or hematemesis; No diarrhea or constipation.   GENITOURINARY: +chronic kennedy No dysuria, frequency or hematuria  NEUROLOGICAL: No focal weakness or dizziness   MSK: No myalgias  SKIN: +venous stasis changes b/l LE No itching, burning    MEDICATIONS  (STANDING):  allopurinol 100 milliGRAM(s) Oral daily  apixaban 5 milliGRAM(s) Oral two times a day  atenolol  Tablet 25 milliGRAM(s) Oral daily  cholecalciferol 1000 Unit(s) Oral daily  ertapenem  IVPB 1000 milliGRAM(s) IV Intermittent every 24 hours  famotidine    Tablet 20 milliGRAM(s) Oral daily  influenza  Vaccine (HIGH DOSE) 0.7 milliLiter(s) IntraMuscular once  latanoprost 0.005% Ophthalmic Solution 1 Drop(s) Both EYES at bedtime  potassium chloride    Tablet ER 20 milliEquivalent(s) Oral daily  tamsulosin 0.4 milliGRAM(s) Oral at bedtime  timolol 0.5% Solution 1 Drop(s) Both EYES two times a day  tiotropium 2.5 MICROgram(s) Inhaler 2 Puff(s) Inhalation daily  zinc sulfate 220 milliGRAM(s) Oral daily    MEDICATIONS  (PRN):  acetaminophen     Tablet .. 650 milliGRAM(s) Oral every 6 hours PRN Temp greater or equal to 38C (100.4F), Mild Pain (1 - 3)  albuterol    90 MICROgram(s) HFA Inhaler 2 Puff(s) Inhalation every 6 hours PRN Shortness of Breath and/or Wheezing  aluminum hydroxide/magnesium hydroxide/simethicone Suspension 30 milliLiter(s) Oral every 4 hours PRN Dyspepsia  melatonin 3 milliGRAM(s) Oral at bedtime PRN Insomnia  ondansetron Injectable 4 milliGRAM(s) IV Push every 8 hours PRN Nausea and/or Vomiting      Allergies    No Known Allergies    Intolerances        SOCIAL HISTORY:    FAMILY HISTORY:  No pertinent family history in first degree relatives        Vital Signs Last 24 Hrs  T(C): 36.4 (20 Dec 2022 10:10), Max: 37 (19 Dec 2022 19:00)  T(F): 97.5 (20 Dec 2022 10:10), Max: 98.6 (19 Dec 2022 19:00)  HR: 84 (20 Dec 2022 10:10) (70 - 92)  BP: 157/102 (20 Dec 2022 10:10) (142/98 - 157/102)  BP(mean): --  RR: 18 (20 Dec 2022 10:10) (18 - 19)  SpO2: 92% (20 Dec 2022 10:09) (92% - 99%)    Parameters below as of 20 Dec 2022 10:09  Patient On (Oxygen Delivery Method): room air        PHYSICAL EXAM:    Constitutional: NAD, well-developed  HEENT: BENITEZ, EOMI, Normal Hearing, MMM  Neck: No LAD, No JVD  Back: Normal spine flexure, No CVA tenderness  Respiratory: CTAB   Cardiovascular: S1 and S2, RRR, no M/G/R  Abd: BS+, soft, NT/ND, No CVAT  : Normal uncircumcised  phallus, patent  meatus, bilateral descended testes, no masses, Kennedy with grossly clear urine  DAVIS: deferred  to OR  Extremities: No peripheral edema  Vascular: 2+ peripheral pulses  Neurological: A/O x 3, no focal deficits  Psychiatric: Normal mood, normal affect      LABS:                        10.4   9.46  )-----------( 351      ( 19 Dec 2022 20:45 )             36.2     12-    141  |  111<H>  |  38<H>  ----------------------------<  113<H>  5.0   |  27  |  1.30    Ca    9.0      19 Dec 2022 19:46    TPro  6.7  /  Alb  2.4<L>  /  TBili  0.7  /  DBili  x   /  AST  46<H>  /  ALT  20  /  AlkPhos  90  12-    PT/INR - ( 19 Dec 2022 19:46 )   PT: 13.3 sec;   INR: 1.14 ratio         PTT - ( 19 Dec 2022 19:46 )  PTT:20.3 sec  Urinalysis Basic - ( 19 Dec 2022 19:46 )    Color: Yellow / Appearance: Slightly Turbid / S.015 / pH: x  Gluc: x / Ketone: Negative  / Bili: Negative / Urobili: Negative   Blood: x / Protein: 30 mg/dL / Nitrite: Negative   Leuk Esterase: Moderate / RBC: 25-50 /HPF / WBC 11-25   Sq Epi: x / Non Sq Epi: Occasional / Bacteria: Occasional      Urine Culture:   Hemoglobin: 10.4 g/dL ( @ 20:45)  Hematocrit: 36.2 % ( @ 20:45)      RADIOLOGY & ADDITIONAL STUDIES:    < from: CT Abdomen and Pelvis w/ IV Cont (22 @ 22:12) >  ACC: [id number] EXAM:  CT ABDOMEN AND PELVIS IC                          PROCEDURE DATE:  2022          INTERPRETATION:  CLINICAL INFORMATION: weakness, AMS, abdominal pain    COMPARISON: None.    CONTRAST/COMPLICATIONS:  IV Contrast: [id number]  90 cc administered   10 cc discarded  Oral Contrast: NONE  Complications: None reported at time of study completion    PROCEDURE:  CT of the Abdomen and Pelvis was performed.  Sagittal and coronal reformats were performed.    FINDINGS:  LOWER CHEST: Minimal bibasilar subsegmental atelectasis.    LIVER: Within normal limits.  BILE DUCTS: Normal caliber.  GALLBLADDER: Within normal limits.  SPLEEN: Within normal limits.  PANCREAS: Within normal limits.  ADRENALS: Within normal limits.  KIDNEYS/URETERS: 9.4 x 6.4 mm in left mid ureteral stone present. Dilated   left renal pelvis without mario hydronephrosis. Additional bilateral   nonobstructing renal stones are present measuring 5.1 mm in the right and   7.0 mm on the left. Bilateral renalcysts are present..    BLADDER: Kennedy catheter.  REPRODUCTIVE ORGANS: Prostate within normal limits.    Diverticulosis without acute diverticulitis.  Small fecal impaction the rectum.    BOWEL: No bowel obstruction. Appendix is normal.  PERITONEUM: No ascites.  No free air or abscess.  VESSELS: Atherosclerotic changes.  RETROPERITONEUM/LYMPH NODES: No lymphadenopathy.  ABDOMINAL WALL: Within normal limits.  BONES: Degenerative changes.    IMPRESSION:  9.4 x 6.4 mm in left mid ureteral stone present. Dilated left renal   pelvis without mario hydronephrosis. Additional bilateral nonobstructing   renal stones are present measuring 5.1 mm in the right and 7.0 mm on the   left. Bilateral renal cysts are present.    No other acute findings.    Please refer to detailed findings otherwise described above.    --- End of Report ---             JOSE ANGEL SMITH MD; Attending Radiologist  This document has been electronically signed. Dec 19 2022 10:51PM    < end of copied text >     CHIEF COMPLAINT:     AMS    HISTORY OF PRESENT ILLNESS:    66 y/o male PMH of asthma, HTN, kidney cancer in remission, glaucoma, afib on eliquis, s/p L total knee replacement in September presents to the ED for increased confusion with lower abdominal pain. Patient was recently hospitalized at [facility] from  -22 for AMS and UTI. Urine cultures at the time grew ESBL Klebsiella and patient was treated with ertapenem. He was discharged to [facility] with chronic indwelling kennedy catheter. Patient's family wanted to bring him home after 3 days, however, they soon felt they were unable to properly care for him at home.  At time of exam, patient denies having any pain. He is slightly anxious about being in the hospital but was A&Ox3.   Patient denies having any pain, nausea, fevers, chills, sob, vomiting, diarrhea, constipation.    ED Course  T 97.6F -> 98.6 F HR 74 /60 -> 155/61 RR 19 SpO2 99% on RA  Labs significant for H/H 10.4/36.2 (bl ~8-9)  BUN/Cr 38/1.30 (bl 1-1.2) Albumin 2.4 AST 46  UA: mod leuk esterase, 11-25 wbc, 25-50 rbc, occasional bacteria    CXR: grossly normal , f/u official read  EKG: Afib HR 70 BPM    CT Head: Limited by streak artifact.  Subcentimeter bilateral hypodensities present in the bilateral basal   ganglia, likely related to age-indeterminate lacunar infarcts. Findings   are superimposed upon chronic microangiopathic white matter changes  To the visualized extent, no intracranial hemorrhage or mass effect.     CT Abdomen/Pelvis: 9.4 x 6.4 mm in left mid ureteral stone present. Dilated left renal pelvis without mario hydronephrosis. Additional bilateral non obstructing renal stones are present measuring 5.1 mm in the right and 7.0 mm on the left. Bilateral renal cysts are present.    In the ED, patient received Rocephin x1 dose, LR bolus x1    Prior history ureteroscopy and laser of calculus - Dr. Duke    Review of Systems:    PAST MEDICAL & SURGICAL HISTORY:  HTN (hypertension)      Cancer of kidney      Asthma      Glaucoma      Chronic atrial fibrillation      S/P total knee arthroplasty, left      H/O partial nephrectomy          REVIEW OF SYSTEMS:    CONSTITUTIONAL: + generalized weakness and anxiety, No fevers or chills  HEENT:  No headache, no visual changes, no sore throat, neck supple  RESPIRATORY: No cough, wheezing, hemoptysis; No shortness of breath  CARDIOVASCULAR: No chest pain or palpitations +B/l LE edema  GASTROINTESTINAL: No abdominal pain, no nausea, vomiting, or hematemesis; No diarrhea or constipation.   GENITOURINARY: +chronic kennedy No dysuria, frequency or hematuria  NEUROLOGICAL: No focal weakness or dizziness   MSK: No myalgias  SKIN: +venous stasis changes b/l LE No itching, burning    MEDICATIONS  (STANDING):  allopurinol 100 milliGRAM(s) Oral daily  apixaban 5 milliGRAM(s) Oral two times a day  atenolol  Tablet 25 milliGRAM(s) Oral daily  cholecalciferol 1000 Unit(s) Oral daily  ertapenem  IVPB 1000 milliGRAM(s) IV Intermittent every 24 hours  famotidine    Tablet 20 milliGRAM(s) Oral daily  influenza  Vaccine (HIGH DOSE) 0.7 milliLiter(s) IntraMuscular once  latanoprost 0.005% Ophthalmic Solution 1 Drop(s) Both EYES at bedtime  potassium chloride    Tablet ER 20 milliEquivalent(s) Oral daily  tamsulosin 0.4 milliGRAM(s) Oral at bedtime  timolol 0.5% Solution 1 Drop(s) Both EYES two times a day  tiotropium 2.5 MICROgram(s) Inhaler 2 Puff(s) Inhalation daily  zinc sulfate 220 milliGRAM(s) Oral daily    MEDICATIONS  (PRN):  acetaminophen     Tablet .. 650 milliGRAM(s) Oral every 6 hours PRN Temp greater or equal to 38C (100.4F), Mild Pain (1 - 3)  albuterol    90 MICROgram(s) HFA Inhaler 2 Puff(s) Inhalation every 6 hours PRN Shortness of Breath and/or Wheezing  aluminum hydroxide/magnesium hydroxide/simethicone Suspension 30 milliLiter(s) Oral every 4 hours PRN Dyspepsia  melatonin 3 milliGRAM(s) Oral at bedtime PRN Insomnia  ondansetron Injectable 4 milliGRAM(s) IV Push every 8 hours PRN Nausea and/or Vomiting      Allergies    No Known Allergies    Intolerances        SOCIAL HISTORY:    FAMILY HISTORY:  No pertinent family history in first degree relatives        Vital Signs Last 24 Hrs  T(C): 36.4 (20 Dec 2022 10:10), Max: 37 (19 Dec 2022 19:00)  T(F): 97.5 (20 Dec 2022 10:10), Max: 98.6 (19 Dec 2022 19:00)  HR: 84 (20 Dec 2022 10:10) (70 - 92)  BP: 157/102 (20 Dec 2022 10:10) (142/98 - 157/102)  BP(mean): --  RR: 18 (20 Dec 2022 10:10) (18 - 19)  SpO2: 92% (20 Dec 2022 10:09) (92% - 99%)    Parameters below as of 20 Dec 2022 10:09  Patient On (Oxygen Delivery Method): room air        PHYSICAL EXAM:    Constitutional: NAD, well-developed  HEENT: BENITEZ, EOMI, Normal Hearing, MMM  Neck: No LAD, No JVD  Back: Normal spine flexure, No CVA tenderness  Respiratory: CTAB   Cardiovascular: S1 and S2, RRR, no M/G/R  Abd: BS+, soft, NT/ND, No CVAT  : Normal uncircumcised  phallus, patent  meatus, bilateral descended testes, no masses, Kennedy with grossly clear urine  DAVIS: deferred  to OR  Extremities: No peripheral edema  Vascular: 2+ peripheral pulses  Neurological: A/O x 3, no focal deficits  Psychiatric: Normal mood, normal affect      LABS:                        10.4   9.46  )-----------( 351      ( 19 Dec 2022 20:45 )             36.2     12-    141  |  111<H>  |  38<H>  ----------------------------<  113<H>  5.0   |  27  |  1.30    Ca    9.0      19 Dec 2022 19:46    TPro  6.7  /  Alb  2.4<L>  /  TBili  0.7  /  DBili  x   /  AST  46<H>  /  ALT  20  /  AlkPhos  90  12-    PT/INR - ( 19 Dec 2022 19:46 )   PT: 13.3 sec;   INR: 1.14 ratio         PTT - ( 19 Dec 2022 19:46 )  PTT:20.3 sec  Urinalysis Basic - ( 19 Dec 2022 19:46 )    Color: Yellow / Appearance: Slightly Turbid / S.015 / pH: x  Gluc: x / Ketone: Negative  / Bili: Negative / Urobili: Negative   Blood: x / Protein: 30 mg/dL / Nitrite: Negative   Leuk Esterase: Moderate / RBC: 25-50 /HPF / WBC 11-25   Sq Epi: x / Non Sq Epi: Occasional / Bacteria: Occasional      Urine Culture:   Hemoglobin: 10.4 g/dL ( @ 20:45)  Hematocrit: 36.2 % ( @ 20:45)      RADIOLOGY & ADDITIONAL STUDIES:    < from: CT Abdomen and Pelvis w/ IV Cont (22 @ 22:12) >  ACC: [id number] EXAM:  CT ABDOMEN AND PELVIS IC                          PROCEDURE DATE:  2022          INTERPRETATION:  CLINICAL INFORMATION: weakness, AMS, abdominal pain    COMPARISON: None.    CONTRAST/COMPLICATIONS:  IV Contrast: [id number]  90 cc administered   10 cc discarded  Oral Contrast: NONE  Complications: None reported at time of study completion    PROCEDURE:  CT of the Abdomen and Pelvis was performed.  Sagittal and coronal reformats were performed.    FINDINGS:  LOWER CHEST: Minimal bibasilar subsegmental atelectasis.    LIVER: Within normal limits.  BILE DUCTS: Normal caliber.  GALLBLADDER: Within normal limits.  SPLEEN: Within normal limits.  PANCREAS: Within normal limits.  ADRENALS: Within normal limits.  KIDNEYS/URETERS: 9.4 x 6.4 mm in left mid ureteral stone present. Dilated   left renal pelvis without mario hydronephrosis. Additional bilateral   nonobstructing renal stones are present measuring 5.1 mm in the right and   7.0 mm on the left. Bilateral renalcysts are present..    BLADDER: Kennedy catheter.  REPRODUCTIVE ORGANS: Prostate within normal limits.    Diverticulosis without acute diverticulitis.  Small fecal impaction the rectum.    BOWEL: No bowel obstruction. Appendix is normal.  PERITONEUM: No ascites.  No free air or abscess.  VESSELS: Atherosclerotic changes.  RETROPERITONEUM/LYMPH NODES: No lymphadenopathy.  ABDOMINAL WALL: Within normal limits.  BONES: Degenerative changes.    IMPRESSION:  9.4 x 6.4 mm in left mid ureteral stone present. Dilated left renal   pelvis without mario hydronephrosis. Additional bilateral nonobstructing   renal stones are present measuring 5.1 mm in the right and 7.0 mm on the   left. Bilateral renal cysts are present.    No other acute findings.    Please refer to detailed findings otherwise described above.    --- End of Report ---             JOSE ANGEL SMITH MD; Attending Radiologist  This document has been electronically signed. Dec 19 2022 10:51PM    < end of copied text >

## 2022-12-20 NOTE — H&P ADULT - NSHPPHYSICALEXAM_GEN_ALL_CORE
PHYSICAL EXAM:  GENERAL: NAD, well-developed, obese  HEAD:  Atraumatic, Normocephalic  EYES: EOMI, conjunctiva and sclera clear  NECK: Supple, Normal ROM  CHEST/LUNG: decreased inspiratory effort, Clear to auscultation bilaterally; No wheeze  HEART: irregularly irregular.  No murmurs, rubs, or gallops  ABDOMEN: Soft, Nontender, Nondistended; Bowel sounds present  : chronic indwelling kennedy catheter draining yellow urine  PSYCH: AAOx3  NEUROLOGY: Moving all 4 extremities, answering questions and following commands appropriately  SKIN: Venous stasis changes bilaterally

## 2022-12-20 NOTE — H&P ADULT - HISTORY OF PRESENT ILLNESS
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 Patient was recently hospitalized at [facility] from 11/22 -12/06/22 for AMS and UTI. Urine cultures at the time grew ESBL Klebsiella and patient was treated with ertapenem. He was discharged to [facility] with chronic indwelling kennedy catheter. Patient's family wanted to bring him home after 3 days, however, they soon felt they were unable to properly care for him at home.  At time of exam, patient denies having any pain. He is slightly anxious about being in the hospital but was A&Ox3.   Patient denies having any pain, nausea, fevers, chills, sob, vomiting, diarrhea, constipation.    ED Course  T 97.6F -> 98.6 F HR 74 /60 -> 155/61 RR 19 SpO2 99% on RA  Labs significant for H/H 10.4/36.2 (bl ~8-9)  BUN/Cr 38/1.30 (bl 1-1.2) Albumin 2.4 AST 46  UA: mod leuk esterase, 11-25 wbc, 25-50 rbc, occasional bacteria    CXR: grossly normal , f/u official read  EKG: Afib HR 70 BPM    CT Head: Limited by streak artifact.  Subcentimeter bilateral hypodensities present in the bilateral basal   ganglia, likely related to age-indeterminate lacunar infarcts. Findings   are superimposed upon chronic microangiopathic white matter changes  To the visualized extent, no intracranial hemorrhage or mass effect.     CT Abdomen/Pelvis: 9.4 x 6.4 mm in left mid ureteral stone present. Dilated left renal pelvis without mario hydronephrosis. Additional bilateral non obstructing renal stones are present measuring 5.1 mm in the right and 7.0 mm on the left. Bilateral renal cysts are present.    In the ED, patient received Rocephin x1 dose, LR bolus x1   66 y/o male PMH of asthma, HTN, kidney cancer in remission, glaucoma, afib on eliquis, s/p L total knee replacement in September presents to the ED for increased confusion with lower abdominal pain. Patient was recently hospitalized at [facility] from 11/22 -12/06/22 for AMS and UTI. Urine cultures at the time grew ESBL Klebsiella and patient was treated with ertapenem. He was discharged to [facility] with chronic indwelling kennedy catheter. Patient's family wanted to bring him home after 3 days, however, they soon felt they were unable to properly care for him at home.  At time of exam, patient denies having any pain. He is slightly anxious about being in the hospital but was A&Ox3.   Patient denies having any pain, nausea, fevers, chills, sob, vomiting, diarrhea, constipation.    ED Course  T 97.6F -> 98.6 F HR 74 /60 -> 155/61 RR 19 SpO2 99% on RA  Labs significant for H/H 10.4/36.2 (bl ~8-9)  BUN/Cr 38/1.30 (bl 1-1.2) Albumin 2.4 AST 46  UA: mod leuk esterase, 11-25 wbc, 25-50 rbc, occasional bacteria    CXR: grossly normal , f/u official read  EKG: Afib HR 70 BPM    CT Head: Limited by streak artifact.  Subcentimeter bilateral hypodensities present in the bilateral basal   ganglia, likely related to age-indeterminate lacunar infarcts. Findings   are superimposed upon chronic microangiopathic white matter changes  To the visualized extent, no intracranial hemorrhage or mass effect.     CT Abdomen/Pelvis: 9.4 x 6.4 mm in left mid ureteral stone present. Dilated left renal pelvis without mario hydronephrosis. Additional bilateral non obstructing renal stones are present measuring 5.1 mm in the right and 7.0 mm on the left. Bilateral renal cysts are present.    In the ED, patient received Rocephin x1 dose, LR bolus x1   66 y/o male PMH of asthma, HTN, kidney cancer in remission, glaucoma, afib on eliquis, s/p L total knee replacement in September presents to the ED for increased confusion with lower abdominal pain. Patient was recently hospitalized at [facility] from 11/22 -12/06/22 for AMS and UTI. Urine cultures at the time grew ESBL Klebsiella and patient was treated with ertapenem. He was discharged to [facility] with chronic indwelling kennedy catheter. Patient's family wanted to bring him home after 3 days, however, they soon felt they were unable to properly care for him at home.  At time of exam, patient denies having any pain. He is slightly anxious about being in the hospital but was A&Ox3.   Patient denies having any pain, nausea, fevers, chills, sob, vomiting, diarrhea, constipation.    ED Course  T 97.6F -> 98.6 F HR 74 /60 -> 155/61 RR 19 SpO2 99% on RA  Labs significant for H/H 10.4/36.2 (bl ~8-9)  BUN/Cr 38/1.30 (bl 1-1.2) Albumin 2.4 AST 46  UA: mod leuk esterase, 11-25 wbc, 25-50 rbc, occasional bacteria    CXR: grossly normal , f/u official read  EKG: Afib HR 70 BPM    CT Head: Limited by streak artifact.  Subcentimeter bilateral hypodensities present in the bilateral basal   ganglia, likely related to age-indeterminate lacunar infarcts. Findings   are superimposed upon chronic microangiopathic white matter changes  To the visualized extent, no intracranial hemorrhage or mass effect.     CT Abdomen/Pelvis: 9.4 x 6.4 mm in left mid ureteral stone present. Dilated left renal pelvis without mario hydronephrosis. Additional bilateral non obstructing renal stones are present measuring 5.1 mm in the right and 7.0 mm on the left. Bilateral renal cysts are present.    In the ED, patient received Rocephin x1 dose, LR bolus x1

## 2022-12-20 NOTE — PATIENT PROFILE ADULT - CAREGIVER NAME
Aggie [facility] Aggie [facility] Aggie [facility]

## 2022-12-21 LAB
ALBUMIN SERPL ELPH-MCNC: 2.4 G/DL — LOW (ref 3.3–5)
ALP SERPL-CCNC: 81 U/L — SIGNIFICANT CHANGE UP (ref 40–120)
ALT FLD-CCNC: 14 U/L — SIGNIFICANT CHANGE UP (ref 12–78)
ANION GAP SERPL CALC-SCNC: 5 MMOL/L — SIGNIFICANT CHANGE UP (ref 5–17)
AST SERPL-CCNC: 13 U/L — LOW (ref 15–37)
BASOPHILS # BLD AUTO: 0.05 K/UL — SIGNIFICANT CHANGE UP (ref 0–0.2)
BASOPHILS NFR BLD AUTO: 0.6 % — SIGNIFICANT CHANGE UP (ref 0–2)
BILIRUB SERPL-MCNC: 0.6 MG/DL — SIGNIFICANT CHANGE UP (ref 0.2–1.2)
BUN SERPL-MCNC: 31 MG/DL — HIGH (ref 7–23)
CALCIUM SERPL-MCNC: 8.7 MG/DL — SIGNIFICANT CHANGE UP (ref 8.5–10.1)
CHLORIDE SERPL-SCNC: 111 MMOL/L — HIGH (ref 96–108)
CO2 SERPL-SCNC: 29 MMOL/L — SIGNIFICANT CHANGE UP (ref 22–31)
CREAT SERPL-MCNC: 1.2 MG/DL — SIGNIFICANT CHANGE UP (ref 0.5–1.3)
EGFR: 67 ML/MIN/1.73M2 — SIGNIFICANT CHANGE UP
EOSINOPHIL # BLD AUTO: 0.3 K/UL — SIGNIFICANT CHANGE UP (ref 0–0.5)
EOSINOPHIL NFR BLD AUTO: 3.7 % — SIGNIFICANT CHANGE UP (ref 0–6)
GLUCOSE SERPL-MCNC: 90 MG/DL — SIGNIFICANT CHANGE UP (ref 70–99)
HCT VFR BLD CALC: 33.7 % — LOW (ref 39–50)
HCV AB S/CO SERPL IA: 0.11 S/CO — SIGNIFICANT CHANGE UP (ref 0–0.99)
HCV AB SERPL-IMP: SIGNIFICANT CHANGE UP
HGB BLD-MCNC: 9.5 G/DL — LOW (ref 13–17)
IMM GRANULOCYTES NFR BLD AUTO: 0.6 % — SIGNIFICANT CHANGE UP (ref 0–0.9)
LYMPHOCYTES # BLD AUTO: 1.55 K/UL — SIGNIFICANT CHANGE UP (ref 1–3.3)
LYMPHOCYTES # BLD AUTO: 19.1 % — SIGNIFICANT CHANGE UP (ref 13–44)
MCHC RBC-ENTMCNC: 23.7 PG — LOW (ref 27–34)
MCHC RBC-ENTMCNC: 28.2 GM/DL — LOW (ref 32–36)
MCV RBC AUTO: 84 FL — SIGNIFICANT CHANGE UP (ref 80–100)
MONOCYTES # BLD AUTO: 0.59 K/UL — SIGNIFICANT CHANGE UP (ref 0–0.9)
MONOCYTES NFR BLD AUTO: 7.3 % — SIGNIFICANT CHANGE UP (ref 2–14)
NEUTROPHILS # BLD AUTO: 5.57 K/UL — SIGNIFICANT CHANGE UP (ref 1.8–7.4)
NEUTROPHILS NFR BLD AUTO: 68.7 % — SIGNIFICANT CHANGE UP (ref 43–77)
NRBC # BLD: 0 /100 WBCS — SIGNIFICANT CHANGE UP (ref 0–0)
PLATELET # BLD AUTO: 373 K/UL — SIGNIFICANT CHANGE UP (ref 150–400)
POTASSIUM SERPL-MCNC: 4.2 MMOL/L — SIGNIFICANT CHANGE UP (ref 3.5–5.3)
POTASSIUM SERPL-SCNC: 4.2 MMOL/L — SIGNIFICANT CHANGE UP (ref 3.5–5.3)
PROT SERPL-MCNC: 6.2 G/DL — SIGNIFICANT CHANGE UP (ref 6–8.3)
RBC # BLD: 4.01 M/UL — LOW (ref 4.2–5.8)
RBC # FLD: 19 % — HIGH (ref 10.3–14.5)
SODIUM SERPL-SCNC: 145 MMOL/L — SIGNIFICANT CHANGE UP (ref 135–145)
WBC # BLD: 8.11 K/UL — SIGNIFICANT CHANGE UP (ref 3.8–10.5)
WBC # FLD AUTO: 8.11 K/UL — SIGNIFICANT CHANGE UP (ref 3.8–10.5)

## 2022-12-21 PROCEDURE — 99233 SBSQ HOSP IP/OBS HIGH 50: CPT

## 2022-12-21 RX ADMIN — Medication 1 DROP(S): at 05:30

## 2022-12-21 RX ADMIN — TIOTROPIUM BROMIDE 2 PUFF(S): 18 CAPSULE ORAL; RESPIRATORY (INHALATION) at 07:40

## 2022-12-21 RX ADMIN — Medication 100 MILLIGRAM(S): at 12:26

## 2022-12-21 RX ADMIN — Medication 40 MILLIGRAM(S): at 05:30

## 2022-12-21 RX ADMIN — TAMSULOSIN HYDROCHLORIDE 0.4 MILLIGRAM(S): 0.4 CAPSULE ORAL at 12:26

## 2022-12-21 RX ADMIN — Medication 1 DROP(S): at 17:43

## 2022-12-21 RX ADMIN — APIXABAN 5 MILLIGRAM(S): 2.5 TABLET, FILM COATED ORAL at 21:34

## 2022-12-21 RX ADMIN — APIXABAN 5 MILLIGRAM(S): 2.5 TABLET, FILM COATED ORAL at 09:45

## 2022-12-21 RX ADMIN — ERTAPENEM SODIUM 120 MILLIGRAM(S): 1 INJECTION, POWDER, LYOPHILIZED, FOR SOLUTION INTRAMUSCULAR; INTRAVENOUS at 09:45

## 2022-12-21 RX ADMIN — ATENOLOL 25 MILLIGRAM(S): 25 TABLET ORAL at 09:45

## 2022-12-21 RX ADMIN — ZINC SULFATE TAB 220 MG (50 MG ZINC EQUIVALENT) 220 MILLIGRAM(S): 220 (50 ZN) TAB at 12:29

## 2022-12-21 RX ADMIN — Medication 1000 UNIT(S): at 12:26

## 2022-12-21 RX ADMIN — FAMOTIDINE 20 MILLIGRAM(S): 10 INJECTION INTRAVENOUS at 12:26

## 2022-12-21 NOTE — PHYSICAL THERAPY INITIAL EVALUATION ADULT - ONSET DATE, REHAB EVAL
Pt is here for IUD string check. Pt denies any additional cramping or bleeding. Denies any pain. P/E Limited  Uterus: non tender; not enlarged  Adnexal: bilateral without tenderness  Cervix: CMT negative  Spec exam: IUD strings visualized.   Cervix close 20-Dec-2022

## 2022-12-21 NOTE — PROGRESS NOTE ADULT - ASSESSMENT
Stable s/p left stent fro proximal left U calculus with mild hydro and enterococcus jonathan UTI - management of stone at later date - patient to return to his Urologist Dr. Duke    History UR with small prostate without significant co-apatation of lateral lobes - plan TOV

## 2022-12-21 NOTE — PROGRESS NOTE ADULT - SUBJECTIVE AND OBJECTIVE BOX
Gu Progress Note:    ASX s/p left stent, enterococcus UTI    PAST MEDICAL & SURGICAL HISTORY:  HTN (hypertension)      Cancer of kidney      Asthma      Glaucoma      Chronic atrial fibrillation      S/P total knee arthroplasty, left      H/O partial nephrectomy            MEDICATIONS  (STANDING):  allopurinol 100 milliGRAM(s) Oral daily  apixaban 5 milliGRAM(s) Oral every 12 hours  atenolol  Tablet 25 milliGRAM(s) Oral daily  cholecalciferol 1000 Unit(s) Oral daily  ertapenem  IVPB 1000 milliGRAM(s) IV Intermittent every 24 hours  famotidine    Tablet 20 milliGRAM(s) Oral daily  furosemide    Tablet 40 milliGRAM(s) Oral daily  influenza  Vaccine (HIGH DOSE) 0.7 milliLiter(s) IntraMuscular once  tamsulosin 0.4 milliGRAM(s) Oral daily  timolol 0.5% Solution 1 Drop(s) Both EYES two times a day  tiotropium 2.5 MICROgram(s) Inhaler 2 Puff(s) Inhalation daily  zinc sulfate 220 milliGRAM(s) Oral daily    MEDICATIONS  (PRN):  acetaminophen     Tablet .. 650 milliGRAM(s) Oral every 6 hours PRN Temp greater or equal to 38C (100.4F), Mild Pain (1 - 3)  albuterol    90 MICROgram(s) HFA Inhaler 2 Puff(s) Inhalation every 6 hours PRN Shortness of Breath and/or Wheezing  senna 2 Tablet(s) Oral at bedtime PRN Constipation      Allergies    No Known Allergies    Intolerances            FAMILY HISTORY:  No pertinent family history in first degree relatives        Vital Signs Last 24 Hrs  T(C): 37 (21 Dec 2022 04:51), Max: 37 (21 Dec 2022 04:51)  T(F): 98.6 (21 Dec 2022 04:51), Max: 98.6 (21 Dec 2022 04:51)  HR: 71 (21 Dec 2022 09:39) (54 - 86)  BP: 120/81 (21 Dec 2022 09:39) (99/63 - 154/92)  BP(mean): --  RR: 18 (21 Dec 2022 04:51) (12 - 20)  SpO2: 97% (21 Dec 2022 04:51) (95% - 100%)    Parameters below as of 21 Dec 2022 04:51  Patient On (Oxygen Delivery Method): room air        PHYSICAL EXAM:    Constitutional: NAD, well-developed    Asymptomatic, AO  Abd: BS+, soft, NT/ND, No CVAT  : Normal   phallus, patent  meatus, bilateral descended testes, no masses Owusu with grossly clear urine  Extremities: No peripheral edema    LABS:                        9.5    8.11  )-----------( 373      ( 21 Dec 2022 06:04 )             33.7         145  |  111<H>  |  31<H>  ----------------------------<  90  4.2   |  29  |  1.20    Ca    8.7      21 Dec 2022 06:04    TPro  6.2  /  Alb  2.4<L>  /  TBili  0.6  /  DBili  x   /  AST  13<L>  /  ALT  14  /  AlkPhos  81      PT/INR - ( 19 Dec 2022 19:46 )   PT: 13.3 sec;   INR: 1.14 ratio         PTT - ( 19 Dec 2022 19:46 )  PTT:20.3 sec  Urinalysis Basic - ( 19 Dec 2022 19:46 )    Color: Yellow / Appearance: Slightly Turbid / S.015 / pH: x  Gluc: x / Ketone: Negative  / Bili: Negative / Urobili: Negative   Blood: x / Protein: 30 mg/dL / Nitrite: Negative   Leuk Esterase: Moderate / RBC: 25-50 /HPF / WBC 11-25   Sq Epi: x / Non Sq Epi: Occasional / Bacteria: Occasional      Urine Culture:  @ 19:46  Urine Culure Resuls   >100,000 CFU/ml Enterococcus species  Organism --    Hemoglobin: 9.5 g/dL ( @ 06:04)  Hematocrit: 33.7 % ( @ 06:04)  Hemoglobin: 10.4 g/dL ( @ 20:45)  Hematocrit: 36.2 % ( @ 20:45)      RADIOLOGY & ADDITIONAL STUDIES:

## 2022-12-21 NOTE — PHYSICAL THERAPY INITIAL EVALUATION ADULT - ADDITIONAL COMMENTS
Pt stated he was recently at subacute rehab and was only home for a few days.  Pt stated he has not been able to stand/ambulate and has w/c, rolling walker, lobito lift at home and needs assist for all ADL's.

## 2022-12-21 NOTE — PHYSICAL THERAPY INITIAL EVALUATION ADULT - PERTINENT HX OF CURRENT PROBLEM, REHAB EVAL
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 Patient was recently hospitalized at [facility] from 11/22 -12/06/22 for AMS and UTI. Urine cultures at the time grew ESBL Klebsiella and patient was treated with ertapenem. He was discharged to [facility] with chronic indwelling kennedy catheter. Patient's family wanted to bring him home after 3 days, however, they soon felt they were unable to properly care for him at home.  At time of exam, patient denies having any pain. He is slightly anxious about being in the hospital but was A&Ox3.   Patient denies having any pain, nausea, fevers, chills, sob, vomiting, diarrhea, constipation. 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 Patient was recently hospitalized at [facility] from 11/22 -12/06/22 for AMS and UTI. Urine cultures at the time grew ESBL Klebsiella and patient was treated with ertapenem. He was discharged to [facility] with chronic indwelling kennedy catheter. Patient's family wanted to bring him home after 3 days, however, they soon felt they were unable to properly care for him at home.  At time of exam, patient denies having any pain. He is slightly anxious about being in the hospital but was A&Ox3.   Patient denies having any pain, nausea, fevers, chills, sob, vomiting, diarrhea, constipation. 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 Patient was recently hospitalized at [facility] from 11/22 -12/06/22 for AMS and UTI. Urine cultures at the time grew ESBL Klebsiella and patient was treated with ertapenem. He was discharged to [facility] with chronic indwelling kennedy catheter. Patient's family wanted to bring him home after 3 days, however, they soon felt they were unable to properly care for him at home.  At time of exam, patient denies having any pain. He is slightly anxious about being in the hospital but was A&Ox3.   Patient denies having any pain, nausea, fevers, chills, sob, vomiting, diarrhea, constipation.

## 2022-12-22 LAB
-  AMPICILLIN: SIGNIFICANT CHANGE UP
-  CIPROFLOXACIN: SIGNIFICANT CHANGE UP
-  DAPTOMYCIN: SIGNIFICANT CHANGE UP
-  LEVOFLOXACIN: SIGNIFICANT CHANGE UP
-  LINEZOLID: SIGNIFICANT CHANGE UP
-  NITROFURANTOIN: SIGNIFICANT CHANGE UP
-  TETRACYCLINE: SIGNIFICANT CHANGE UP
-  VANCOMYCIN: SIGNIFICANT CHANGE UP
CULTURE RESULTS: SIGNIFICANT CHANGE UP
METHOD TYPE: SIGNIFICANT CHANGE UP
ORGANISM # SPEC MICROSCOPIC CNT: SIGNIFICANT CHANGE UP
SPECIMEN SOURCE: SIGNIFICANT CHANGE UP

## 2022-12-22 PROCEDURE — 99233 SBSQ HOSP IP/OBS HIGH 50: CPT

## 2022-12-22 RX ADMIN — Medication 1000 UNIT(S): at 12:31

## 2022-12-22 RX ADMIN — ATENOLOL 25 MILLIGRAM(S): 25 TABLET ORAL at 05:36

## 2022-12-22 RX ADMIN — Medication 100 MILLIGRAM(S): at 12:31

## 2022-12-22 RX ADMIN — APIXABAN 5 MILLIGRAM(S): 2.5 TABLET, FILM COATED ORAL at 21:25

## 2022-12-22 RX ADMIN — ZINC SULFATE TAB 220 MG (50 MG ZINC EQUIVALENT) 220 MILLIGRAM(S): 220 (50 ZN) TAB at 12:32

## 2022-12-22 RX ADMIN — APIXABAN 5 MILLIGRAM(S): 2.5 TABLET, FILM COATED ORAL at 08:21

## 2022-12-22 RX ADMIN — FAMOTIDINE 20 MILLIGRAM(S): 10 INJECTION INTRAVENOUS at 12:31

## 2022-12-22 RX ADMIN — Medication 1 DROP(S): at 17:59

## 2022-12-22 RX ADMIN — Medication 1 DROP(S): at 05:36

## 2022-12-22 RX ADMIN — TIOTROPIUM BROMIDE 2 PUFF(S): 18 CAPSULE ORAL; RESPIRATORY (INHALATION) at 06:23

## 2022-12-22 RX ADMIN — SENNA PLUS 2 TABLET(S): 8.6 TABLET ORAL at 14:27

## 2022-12-22 RX ADMIN — TAMSULOSIN HYDROCHLORIDE 0.4 MILLIGRAM(S): 0.4 CAPSULE ORAL at 12:30

## 2022-12-22 RX ADMIN — Medication 40 MILLIGRAM(S): at 05:36

## 2022-12-22 RX ADMIN — ERTAPENEM SODIUM 120 MILLIGRAM(S): 1 INJECTION, POWDER, LYOPHILIZED, FOR SOLUTION INTRAMUSCULAR; INTRAVENOUS at 08:19

## 2022-12-22 NOTE — PROGRESS NOTE ADULT - ASSESSMENT
Urin sunita retention with failed TOV    S/P left stent fro obstructing infected Enterococcus ) Calculus  Patient to f/u with Dr. BECCA Duke  - his urologist who I notiified.    D/W his wife Kimber today and questions addressed      Will nmot follow actively available as necessary    To be discharged with Owusu

## 2022-12-22 NOTE — PROGRESS NOTE ADULT - SUBJECTIVE AND OBJECTIVE BOX
Gu Progress Note:   Awake and alert, asx, Failed TOV and V Cath replaced ( cc).    PAST MEDICAL & SURGICAL HISTORY:  HTN (hypertension)      Cancer of kidney      Asthma      Glaucoma      Chronic atrial fibrillation      S/P total knee arthroplasty, left      H/O partial nephrectomy            MEDICATIONS  (STANDING):  allopurinol 100 milliGRAM(s) Oral daily  apixaban 5 milliGRAM(s) Oral every 12 hours  atenolol  Tablet 25 milliGRAM(s) Oral daily  cholecalciferol 1000 Unit(s) Oral daily  ertapenem  IVPB 1000 milliGRAM(s) IV Intermittent every 24 hours  famotidine    Tablet 20 milliGRAM(s) Oral daily  furosemide    Tablet 40 milliGRAM(s) Oral daily  influenza  Vaccine (HIGH DOSE) 0.7 milliLiter(s) IntraMuscular once  tamsulosin 0.4 milliGRAM(s) Oral daily  timolol 0.5% Solution 1 Drop(s) Both EYES two times a day  tiotropium 2.5 MICROgram(s) Inhaler 2 Puff(s) Inhalation daily  zinc sulfate 220 milliGRAM(s) Oral daily    MEDICATIONS  (PRN):  acetaminophen     Tablet .. 650 milliGRAM(s) Oral every 6 hours PRN Temp greater or equal to 38C (100.4F), Mild Pain (1 - 3)  albuterol    90 MICROgram(s) HFA Inhaler 2 Puff(s) Inhalation every 6 hours PRN Shortness of Breath and/or Wheezing  senna 2 Tablet(s) Oral at bedtime PRN Constipation      Allergies    No Known Allergies    Intolerances            FAMILY HISTORY:  No pertinent family history in first degree relatives        Vital Signs Last 24 Hrs  T(C): 37.2 (22 Dec 2022 11:46), Max: 37.2 (22 Dec 2022 11:46)  T(F): 98.9 (22 Dec 2022 11:46), Max: 98.9 (22 Dec 2022 11:46)  HR: 74 (22 Dec 2022 11:46) (74 - 81)  BP: 142/96 (22 Dec 2022 11:46) (142/91 - 164/89)  BP(mean): --  RR: 19 (22 Dec 2022 11:46) (19 - 20)  SpO2: 98% (22 Dec 2022 11:46) (96% - 98%)    Parameters below as of 22 Dec 2022 11:46  Patient On (Oxygen Delivery Method): room air        PHYSICAL EXAM:    Constitutional: NAD, dAsymptomatic, AO  Abd: BS+, soft, NT/ND, No CVAT  : Normal   phallus, patent  meatus, bilateral descended testes, no masses Owusu with grossly clear urine  Extremities: No peripheral edema    LABS:                        9.5    8.11  )-----------( 373      ( 21 Dec 2022 06:04 )             33.7     12-21    145  |  111<H>  |  31<H>  ----------------------------<  90  4.2   |  29  |  1.20    Ca    8.7      21 Dec 2022 06:04    TPro  6.2  /  Alb  2.4<L>  /  TBili  0.6  /  DBili  x   /  AST  13<L>  /  ALT  14  /  AlkPhos  81  12-21        Urine Culture:   Hemoglobin: 9.5 g/dL (12-21 @ 06:04)  Hematocrit: 33.7 % (12-21 @ 06:04)      RADIOLOGY & ADDITIONAL STUDIES:

## 2022-12-23 ENCOUNTER — TRANSCRIPTION ENCOUNTER (OUTPATIENT)
Age: 67
End: 2022-12-23

## 2022-12-23 PROCEDURE — 99233 SBSQ HOSP IP/OBS HIGH 50: CPT

## 2022-12-23 RX ORDER — AMPICILLIN SODIUM AND SULBACTAM SODIUM 250; 125 MG/ML; MG/ML
INJECTION, POWDER, FOR SUSPENSION INTRAMUSCULAR; INTRAVENOUS
Refills: 0 | Status: DISCONTINUED | OUTPATIENT
Start: 2022-12-23 | End: 2022-12-23

## 2022-12-23 RX ORDER — AMPICILLIN SODIUM AND SULBACTAM SODIUM 250; 125 MG/ML; MG/ML
3 INJECTION, POWDER, FOR SUSPENSION INTRAMUSCULAR; INTRAVENOUS ONCE
Refills: 0 | Status: COMPLETED | OUTPATIENT
Start: 2022-12-23 | End: 2022-12-23

## 2022-12-23 RX ORDER — AMPICILLIN SODIUM AND SULBACTAM SODIUM 250; 125 MG/ML; MG/ML
3 INJECTION, POWDER, FOR SUSPENSION INTRAMUSCULAR; INTRAVENOUS EVERY 6 HOURS
Refills: 0 | Status: DISCONTINUED | OUTPATIENT
Start: 2022-12-23 | End: 2022-12-23

## 2022-12-23 RX ORDER — AMOXICILLIN 250 MG/5ML
500 SUSPENSION, RECONSTITUTED, ORAL (ML) ORAL THREE TIMES A DAY
Refills: 0 | Status: DISCONTINUED | OUTPATIENT
Start: 2022-12-23 | End: 2022-12-30

## 2022-12-23 RX ADMIN — Medication 40 MILLIGRAM(S): at 05:37

## 2022-12-23 RX ADMIN — APIXABAN 5 MILLIGRAM(S): 2.5 TABLET, FILM COATED ORAL at 09:05

## 2022-12-23 RX ADMIN — TAMSULOSIN HYDROCHLORIDE 0.4 MILLIGRAM(S): 0.4 CAPSULE ORAL at 11:37

## 2022-12-23 RX ADMIN — ZINC SULFATE TAB 220 MG (50 MG ZINC EQUIVALENT) 220 MILLIGRAM(S): 220 (50 ZN) TAB at 11:37

## 2022-12-23 RX ADMIN — ERTAPENEM SODIUM 120 MILLIGRAM(S): 1 INJECTION, POWDER, LYOPHILIZED, FOR SOLUTION INTRAMUSCULAR; INTRAVENOUS at 09:05

## 2022-12-23 RX ADMIN — TIOTROPIUM BROMIDE 2 PUFF(S): 18 CAPSULE ORAL; RESPIRATORY (INHALATION) at 05:39

## 2022-12-23 RX ADMIN — FAMOTIDINE 20 MILLIGRAM(S): 10 INJECTION INTRAVENOUS at 11:37

## 2022-12-23 RX ADMIN — Medication 500 MILLIGRAM(S): at 21:12

## 2022-12-23 RX ADMIN — Medication 100 MILLIGRAM(S): at 11:37

## 2022-12-23 RX ADMIN — Medication 1 DROP(S): at 05:38

## 2022-12-23 RX ADMIN — Medication 1 DROP(S): at 18:37

## 2022-12-23 RX ADMIN — AMPICILLIN SODIUM AND SULBACTAM SODIUM 200 GRAM(S): 250; 125 INJECTION, POWDER, FOR SUSPENSION INTRAMUSCULAR; INTRAVENOUS at 13:50

## 2022-12-23 RX ADMIN — ATENOLOL 25 MILLIGRAM(S): 25 TABLET ORAL at 05:37

## 2022-12-23 RX ADMIN — Medication 1000 UNIT(S): at 11:37

## 2022-12-23 RX ADMIN — APIXABAN 5 MILLIGRAM(S): 2.5 TABLET, FILM COATED ORAL at 21:12

## 2022-12-23 NOTE — DISCHARGE NOTE PROVIDER - CARE PROVIDER_API CALL
Albert Duke  Urology  [address]  [place] [address]  Phone: ()-  Fax: ()-  Follow Up Time: 1 week   Albert Duke  Urology  [address]  [place] [address]  Phone: ()-  Fax: ()-  Follow Up Time: 1 week   Albert Duke  Urology  [address]  [place] [address]  Phone: ()-  Fax: ()-  Follow Up Time: 1 week

## 2022-12-23 NOTE — DISCHARGE NOTE NURSING/CASE MANAGEMENT/SOCIAL WORK - CAREGIVER ADDRESS
[address] [place] [address] Brand[place], [id number] [address] [place] [address] Brand[place], [id number] [address] [place] [address] Brand[place], [id number]

## 2022-12-23 NOTE — DISCHARGE NOTE NURSING/CASE MANAGEMENT/SOCIAL WORK - CAREGIVER NAME
Aggie [facility] Aggie [facility] Aggie [facility]

## 2022-12-23 NOTE — DISCHARGE NOTE NURSING/CASE MANAGEMENT/SOCIAL WORK - PATIENT PORTAL LINK FT
You can access the FollowMyHealth Patient Portal offered by [facility] by registering at the following website: [email or web address]. By joining [employer]’s FollowMyHealth portal, you will also be able to view your health information using other applications (apps) compatible with our system. You can access the FollowMyHealth Patient Portal offered by [facility] by registering at the following website: [email or web address]. By joining [employer]’s FollowMyHealth portal, you will also be able to view your health information using other applications (apps) compatible with our system. You can access the FollowMyHealth Patient Portal offered by [facility] by registering at the following website: [email or web address]. By joining [employer]’s FollowMyHealth portal, you will also be able to view your health information using other applications (apps) compatible with our system.

## 2022-12-23 NOTE — PHARMACOTHERAPY INTERVENTION NOTE - COMMENTS
Patient is a 67-year old male on ertapenem 1 g every 24 hours for urinary tract infection with urine culture from 12/19 growing ampicillin sensitive  E. faecalis VRE. Discussed with Dr Blood obtaining an ID consult for adjustment to antibiotic regimen. MD agreed and Dr. Gomez consulted.

## 2022-12-23 NOTE — DISCHARGE NOTE NURSING/CASE MANAGEMENT/SOCIAL WORK - NSDPFAC_GEN_ALL_CORE
[facility]

## 2022-12-23 NOTE — CONSULT NOTE ADULT - SUBJECTIVE AND OBJECTIVE BOX
[facility] DIVISION of INFECTIOUS DISEASE  Aleksander Gomez MD PhD, Eunice Youngblood MD, Ana Hogan MD, Rickey Swenson MD, Bradly Dubon MD  and providing coverage with Linda Salmeron MD  Providing Infectious Disease Consultations at [facility], [facility], [place], [facility], [facility]'s    Office# [phone] to schedule follow up appointments  Answering Service for urgent calls or New Consults [phone]  Cell# to text for urgent issues Aleksander Gomez [phone]     HPI:  66 y/o male PMH of asthma, HTN, kidney cancer in remission, glaucoma, afib on eliquis, s/p L total knee replacement in September presented to the ED and was admitted 12/20 for increased confusion with lower abdominal pain. Patient was recently hospitalized at [facility] from 11/22 -12/06/22 for AMS and UTI. Urine cultures at the time grew ESBL Klebsiella and patient was treated with ertapenem. He was discharged to [facility] with chronic indwelling kennedy catheter. Patient's family wanted to bring him home after 3 days, however, they soon felt they were unable to properly care for him at home.     UA: mod leuk esterase, 11-25 wbc, 25-50 rbc, occasional bacteria    CT Abdomen/Pelvis: 9.4 x 6.4 mm in left mid ureteral stone present. Dilated left renal pelvis without mario hydronephrosis. Additional bilateral non obstructing renal stones are present measuring 5.1 mm in the right and 7.0 mm on the left. Bilateral renal cysts are present.    PT underwent:  Cystoscopic insertion of ureteral stent 20-Dec-2022 12:24:54  Manuel Yates  XR pyelogram retrograde left 20-Dec-2022 12:25:08  Manuel Yates.      PAST MEDICAL & SURGICAL HISTORY:  HTN (hypertension)      Cancer of kidney      Asthma      Glaucoma      Chronic atrial fibrillation      S/P total knee arthroplasty, left      H/O partial nephrectomy          Antimicrobials  ampicillin/sulbactam  IVPB      ampicillin/sulbactam  IVPB 3 Gram(s) IV Intermittent every 6 hours      Immunological  influenza  Vaccine (HIGH DOSE) 0.7 milliLiter(s) IntraMuscular once      Other  acetaminophen     Tablet .. 650 milliGRAM(s) Oral every 6 hours PRN  albuterol    90 MICROgram(s) HFA Inhaler 2 Puff(s) Inhalation every 6 hours PRN  allopurinol 100 milliGRAM(s) Oral daily  apixaban 5 milliGRAM(s) Oral every 12 hours  atenolol  Tablet 25 milliGRAM(s) Oral daily  cholecalciferol 1000 Unit(s) Oral daily  famotidine    Tablet 20 milliGRAM(s) Oral daily  furosemide    Tablet 40 milliGRAM(s) Oral daily  senna 2 Tablet(s) Oral at bedtime PRN  tamsulosin 0.4 milliGRAM(s) Oral daily  timolol 0.5% Solution 1 Drop(s) Both EYES two times a day  tiotropium 2.5 MICROgram(s) Inhaler 2 Puff(s) Inhalation daily  zinc sulfate 220 milliGRAM(s) Oral daily      Allergies    No Known Allergies    Intolerances    SOCIAL HISTORY:  Tobacco: denies  EtOH:  denies  Recreational drug use:  Lives with: wife,   Ambulates: with assistance  ADLs: needs assistance (20 Dec 2022 00:38)      FAMILY HISTORY:  No pertinent family history in first degree relatives    ROS:    EYES:  Negative  blurry vision or double vision  GASTROINTESTINAL:  Negative for nausea, vomiting, diarrhea  -otherwise negative except for subjective    Vital Signs Last 24 Hrs  T(C): 36.8 (23 Dec 2022 11:27), Max: 36.8 (23 Dec 2022 05:30)  T(F): 98.3 (23 Dec 2022 11:27), Max: 98.3 (23 Dec 2022 11:27)  HR: 77 (23 Dec 2022 11:27) (77 - 80)  BP: 144/95 (23 Dec 2022 11:27) (144/95 - 154/82)  BP(mean): --  RR: 17 (23 Dec 2022 11:27) (17 - 18)  SpO2: 97% (23 Dec 2022 11:27) (94% - 97%)    Parameters below as of 23 Dec 2022 11:27  Patient On (Oxygen Delivery Method): room air        PE:  WDWN in no distress  HEENT:  NC, PERRL, sclerae anicteric, conjunctivae clear, EOMI.  Sinuses nontender, no nasal exudate.  No buccal or pharyngeal lesions, erythema or exudate  Neck:  Supple, no adenopathy  Lungs:  No accessory muscle use, breathing comfortably  Cor:  distant  Abd:  Symmetric, normoactive BS.  Soft, nontender, no masses, guarding or rebound.  Liver and spleen not enlarged  Extrem:  No cyanosis or edema  Skin:  No rashes.  Neuro: grossly intact  Musc: moving all limbs freely, no focal deficits        LABS:      WBC Count: 8.11 K/uL (12-21-22 @ 06:04)  WBC Count: 9.46 K/uL (12-19-22 @ 20:45)      Creatinine, Serum: 1.20 mg/dL (12-21-22 @ 06:04)  Creatinine, Serum: 1.30 mg/dL (12-19-22 @ 19:46)      MICROBIOLOGY:    Culture - Urine (12.19.22 @ 19:46)    -  Ampicillin: S <=2 Predicts results to ampicillin/sulbactam, amoxacillin-clavulanate and  piperacillin-tazobactam.    -  Ciprofloxacin: R >2    -  Daptomycin: S <=0.5    -  Levofloxacin: R >4    -  Linezolid: S <=1    -  Nitrofurantoin: S <=32 Should not be used to treat pyelonephritis.    -  Tetracycline: R >8    -  Vancomycin: R >16    Specimen Source: Clean Catch Clean Catch (Midstream)    Culture Results:   >100,000 CFU/ml Enterococcus faecalis (vancomycin resistant)  10,000 - 49,000 CFU/mL Staphylococcus epidermidis "Susceptibilities not  performed"    Organism Identification: Enterococcus faecalis (vancomycin resistant)    Organism: Enterococcus faecalis (vancomycin resistant)    Method Type: MYRA        RADIOLOGY & ADDITIONAL STUDIES:    -- [facility] DIV[facility] of INFECTIOUS DISEASE  Aleksander Gomez MD PhD, Eunice Youngblood MD, Ana Hogan MD, Rickey Swenson MD, Bradly Dubon MD  and providing coverage with Linda Salmeron MD  Providing Infectious Disease Consultations at [facility], [facility], [place], [facility], [facility]'s    Office# [phone] to schedule follow up appointments  Answering Service for urgent calls or New Consults [phone]  Cell# to text for urgent issues Aleksander Gomez [phone]     HPI:  68 y/o male PMH of asthma, HTN, kidney cancer in remission, glaucoma, afib on eliquis, s/p L total knee replacement in September presented to the ED and was admitted 12/20 for increased confusion with lower abdominal pain. Patient was recently hospitalized at [facility] from 11/22 -12/06/22 for AMS and UTI. Urine cultures at the time grew ESBL Klebsiella and patient was treated with ertapenem. He was discharged to [facility] with chronic indwelling kennedy catheter. Patient's family wanted to bring him home after 3 days, however, they soon felt they were unable to properly care for him at home.     UA: mod leuk esterase, 11-25 wbc, 25-50 rbc, occasional bacteria    CT Abdomen/Pelvis: 9.4 x 6.4 mm in left mid ureteral stone present. Dilated left renal pelvis without mario hydronephrosis. Additional bilateral non obstructing renal stones are present measuring 5.1 mm in the right and 7.0 mm on the left. Bilateral renal cysts are present.    PT underwent:  Cystoscopic insertion of ureteral stent 20-Dec-2022 12:24:54  Manuel Yates  XR pyelogram retrograde left 20-Dec-2022 12:25:08  Mnauel Yates.      PAST MEDICAL & SURGICAL HISTORY:  HTN (hypertension)      Cancer of kidney      Asthma      Glaucoma      Chronic atrial fibrillation      S/P total knee arthroplasty, left      H/O partial nephrectomy          Antimicrobials  ampicillin/sulbactam  IVPB      ampicillin/sulbactam  IVPB 3 Gram(s) IV Intermittent every 6 hours      Immunological  influenza  Vaccine (HIGH DOSE) 0.7 milliLiter(s) IntraMuscular once      Other  acetaminophen     Tablet .. 650 milliGRAM(s) Oral every 6 hours PRN  albuterol    90 MICROgram(s) HFA Inhaler 2 Puff(s) Inhalation every 6 hours PRN  allopurinol 100 milliGRAM(s) Oral daily  apixaban 5 milliGRAM(s) Oral every 12 hours  atenolol  Tablet 25 milliGRAM(s) Oral daily  cholecalciferol 1000 Unit(s) Oral daily  famotidine    Tablet 20 milliGRAM(s) Oral daily  furosemide    Tablet 40 milliGRAM(s) Oral daily  senna 2 Tablet(s) Oral at bedtime PRN  tamsulosin 0.4 milliGRAM(s) Oral daily  timolol 0.5% Solution 1 Drop(s) Both EYES two times a day  tiotropium 2.5 MICROgram(s) Inhaler 2 Puff(s) Inhalation daily  zinc sulfate 220 milliGRAM(s) Oral daily      Allergies    No Known Allergies    Intolerances    SOCIAL HISTORY:  Tobacco: denies  EtOH:  denies  Recreational drug use:  Lives with: wife,   Ambulates: with assistance  ADLs: needs assistance (20 Dec 2022 00:38)      FAMILY HISTORY:  No pertinent family history in first degree relatives    ROS:    EYES:  Negative  blurry vision or double vision  GASTROINTESTINAL:  Negative for nausea, vomiting, diarrhea  -otherwise negative except for subjective    Vital Signs Last 24 Hrs  T(C): 36.8 (23 Dec 2022 11:27), Max: 36.8 (23 Dec 2022 05:30)  T(F): 98.3 (23 Dec 2022 11:27), Max: 98.3 (23 Dec 2022 11:27)  HR: 77 (23 Dec 2022 11:27) (77 - 80)  BP: 144/95 (23 Dec 2022 11:27) (144/95 - 154/82)  BP(mean): --  RR: 17 (23 Dec 2022 11:27) (17 - 18)  SpO2: 97% (23 Dec 2022 11:27) (94% - 97%)    Parameters below as of 23 Dec 2022 11:27  Patient On (Oxygen Delivery Method): room air        PE:  WDWN in no distress  HEENT:  NC, PERRL, sclerae anicteric, conjunctivae clear, EOMI.  Sinuses nontender, no nasal exudate.  No buccal or pharyngeal lesions, erythema or exudate  Neck:  Supple, no adenopathy  Lungs:  No accessory muscle use, breathing comfortably  Cor:  distant  Abd:  Symmetric, normoactive BS.  Soft, nontender, no masses, guarding or rebound.  Liver and spleen not enlarged  Extrem:  No cyanosis or edema  Skin:  No rashes.  Neuro: grossly intact  Musc: moving all limbs freely, no focal deficits        LABS:      WBC Count: 8.11 K/uL (12-21-22 @ 06:04)  WBC Count: 9.46 K/uL (12-19-22 @ 20:45)      Creatinine, Serum: 1.20 mg/dL (12-21-22 @ 06:04)  Creatinine, Serum: 1.30 mg/dL (12-19-22 @ 19:46)      MICROBIOLOGY:    Culture - Urine (12.19.22 @ 19:46)    -  Ampicillin: S <=2 Predicts results to ampicillin/sulbactam, amoxacillin-clavulanate and  piperacillin-tazobactam.    -  Ciprofloxacin: R >2    -  Daptomycin: S <=0.5    -  Levofloxacin: R >4    -  Linezolid: S <=1    -  Nitrofurantoin: S <=32 Should not be used to treat pyelonephritis.    -  Tetracycline: R >8    -  Vancomycin: R >16    Specimen Source: Clean Catch Clean Catch (Midstream)    Culture Results:   >100,000 CFU/ml Enterococcus faecalis (vancomycin resistant)  10,000 - 49,000 CFU/mL Staphylococcus epidermidis "Susceptibilities not  performed"    Organism Identification: Enterococcus faecalis (vancomycin resistant)    Organism: Enterococcus faecalis (vancomycin resistant)    Method Type: MYRA        RADIOLOGY & ADDITIONAL STUDIES:    -- [facility] DIVISION of INFECTIOUS DISEASE  Aleksander Gomez MD PhD, Eunice Youngblood MD, Ana Hogan MD, Rickey Swenson MD, Bradly Dubon MD  and providing coverage with Linda Salmeron MD  Providing Infectious Disease Consultations at [facility], [facility], [place], [facility], [facility]'s    Office# [phone] to schedule follow up appointments  Answering Service for urgent calls or New Consults [phone]  Cell# to text for urgent issues Aleksander Gomez [phone]     HPI:  68 y/o male PMH of asthma, HTN, kidney cancer in remission, glaucoma, afib on eliquis, s/p L total knee replacement in September presented to the ED and was admitted 12/20 for increased confusion with lower abdominal pain. Patient was recently hospitalized at [facility] from 11/22 -12/06/22 for AMS and UTI. Urine cultures at the time grew ESBL Klebsiella and patient was treated with ertapenem. He was discharged to [facility] with chronic indwelling kennedy catheter. Patient's family wanted to bring him home after 3 days, however, they soon felt they were unable to properly care for him at home.     UA: mod leuk esterase, 11-25 wbc, 25-50 rbc, occasional bacteria    CT Abdomen/Pelvis: 9.4 x 6.4 mm in left mid ureteral stone present. Dilated left renal pelvis without mario hydronephrosis. Additional bilateral non obstructing renal stones are present measuring 5.1 mm in the right and 7.0 mm on the left. Bilateral renal cysts are present.    PT underwent:  Cystoscopic insertion of ureteral stent 20-Dec-2022 12:24:54  Manuel Yates  XR pyelogram retrograde left 20-Dec-2022 12:25:08  Manuel Yates.      PAST MEDICAL & SURGICAL HISTORY:  HTN (hypertension)      Cancer of kidney      Asthma      Glaucoma      Chronic atrial fibrillation      S/P total knee arthroplasty, left      H/O partial nephrectomy          Antimicrobials  ampicillin/sulbactam  IVPB      ampicillin/sulbactam  IVPB 3 Gram(s) IV Intermittent every 6 hours      Immunological  influenza  Vaccine (HIGH DOSE) 0.7 milliLiter(s) IntraMuscular once      Other  acetaminophen     Tablet .. 650 milliGRAM(s) Oral every 6 hours PRN  albuterol    90 MICROgram(s) HFA Inhaler 2 Puff(s) Inhalation every 6 hours PRN  allopurinol 100 milliGRAM(s) Oral daily  apixaban 5 milliGRAM(s) Oral every 12 hours  atenolol  Tablet 25 milliGRAM(s) Oral daily  cholecalciferol 1000 Unit(s) Oral daily  famotidine    Tablet 20 milliGRAM(s) Oral daily  furosemide    Tablet 40 milliGRAM(s) Oral daily  senna 2 Tablet(s) Oral at bedtime PRN  tamsulosin 0.4 milliGRAM(s) Oral daily  timolol 0.5% Solution 1 Drop(s) Both EYES two times a day  tiotropium 2.5 MICROgram(s) Inhaler 2 Puff(s) Inhalation daily  zinc sulfate 220 milliGRAM(s) Oral daily      Allergies    No Known Allergies    Intolerances    SOCIAL HISTORY:  Tobacco: denies  EtOH:  denies  Recreational drug use:  Lives with: wife,   Ambulates: with assistance  ADLs: needs assistance (20 Dec 2022 00:38)      FAMILY HISTORY:  No pertinent family history in first degree relatives    ROS:    EYES:  Negative  blurry vision or double vision  GASTROINTESTINAL:  Negative for nausea, vomiting, diarrhea  -otherwise negative except for subjective    Vital Signs Last 24 Hrs  T(C): 36.8 (23 Dec 2022 11:27), Max: 36.8 (23 Dec 2022 05:30)  T(F): 98.3 (23 Dec 2022 11:27), Max: 98.3 (23 Dec 2022 11:27)  HR: 77 (23 Dec 2022 11:27) (77 - 80)  BP: 144/95 (23 Dec 2022 11:27) (144/95 - 154/82)  BP(mean): --  RR: 17 (23 Dec 2022 11:27) (17 - 18)  SpO2: 97% (23 Dec 2022 11:27) (94% - 97%)    Parameters below as of 23 Dec 2022 11:27  Patient On (Oxygen Delivery Method): room air        PE:  WDWN in no distress  HEENT:  NC, PERRL, sclerae anicteric, conjunctivae clear, EOMI.  Sinuses nontender, no nasal exudate.  No buccal or pharyngeal lesions, erythema or exudate  Neck:  Supple, no adenopathy  Lungs:  No accessory muscle use, breathing comfortably  Cor:  distant  Abd:  Symmetric, normoactive BS.  Soft, nontender, no masses, guarding or rebound.  Liver and spleen not enlarged  Extrem:  No cyanosis or edema  Skin:  No rashes.  Neuro: grossly intact  Musc: moving all limbs freely, no focal deficits        LABS:      WBC Count: 8.11 K/uL (12-21-22 @ 06:04)  WBC Count: 9.46 K/uL (12-19-22 @ 20:45)      Creatinine, Serum: 1.20 mg/dL (12-21-22 @ 06:04)  Creatinine, Serum: 1.30 mg/dL (12-19-22 @ 19:46)      MICROBIOLOGY:    Culture - Urine (12.19.22 @ 19:46)    -  Ampicillin: S <=2 Predicts results to ampicillin/sulbactam, amoxacillin-clavulanate and  piperacillin-tazobactam.    -  Ciprofloxacin: R >2    -  Daptomycin: S <=0.5    -  Levofloxacin: R >4    -  Linezolid: S <=1    -  Nitrofurantoin: S <=32 Should not be used to treat pyelonephritis.    -  Tetracycline: R >8    -  Vancomycin: R >16    Specimen Source: Clean Catch Clean Catch (Midstream)    Culture Results:   >100,000 CFU/ml Enterococcus faecalis (vancomycin resistant)  10,000 - 49,000 CFU/mL Staphylococcus epidermidis "Susceptibilities not  performed"    Organism Identification: Enterococcus faecalis (vancomycin resistant)    Organism: Enterococcus faecalis (vancomycin resistant)    Method Type: MYRA        RADIOLOGY & ADDITIONAL STUDIES:    --

## 2022-12-23 NOTE — PROGRESS NOTE ADULT - SUBJECTIVE AND OBJECTIVE BOX
Subjective: Patient seen and examined. Doing well.  No overnight events.  Pain controlled.     MEDICATIONS  (STANDING):  allopurinol 100 milliGRAM(s) Oral daily  ampicillin/sulbactam  IVPB      ampicillin/sulbactam  IVPB 3 Gram(s) IV Intermittent once  ampicillin/sulbactam  IVPB 3 Gram(s) IV Intermittent every 6 hours  apixaban 5 milliGRAM(s) Oral every 12 hours  atenolol  Tablet 25 milliGRAM(s) Oral daily  cholecalciferol 1000 Unit(s) Oral daily  famotidine    Tablet 20 milliGRAM(s) Oral daily  furosemide    Tablet 40 milliGRAM(s) Oral daily  influenza  Vaccine (HIGH DOSE) 0.7 milliLiter(s) IntraMuscular once  tamsulosin 0.4 milliGRAM(s) Oral daily  timolol 0.5% Solution 1 Drop(s) Both EYES two times a day  tiotropium 2.5 MICROgram(s) Inhaler 2 Puff(s) Inhalation daily  zinc sulfate 220 milliGRAM(s) Oral daily    MEDICATIONS  (PRN):  acetaminophen     Tablet .. 650 milliGRAM(s) Oral every 6 hours PRN Temp greater or equal to 38C (100.4F), Mild Pain (1 - 3)  albuterol    90 MICROgram(s) HFA Inhaler 2 Puff(s) Inhalation every 6 hours PRN Shortness of Breath and/or Wheezing  senna 2 Tablet(s) Oral at bedtime PRN Constipation      Allergies    No Known Allergies    Intolerances        Vital Signs Last 24 Hrs  T(C): 36.8 (23 Dec 2022 11:27), Max: 36.8 (23 Dec 2022 05:30)  T(F): 98.3 (23 Dec 2022 11:27), Max: 98.3 (23 Dec 2022 11:27)  HR: 77 (23 Dec 2022 11:27) (77 - 80)  BP: 144/95 (23 Dec 2022 11:27) (144/95 - 154/82)  BP(mean): --  RR: 17 (23 Dec 2022 11:27) (17 - 18)  SpO2: 97% (23 Dec 2022 11:27) (94% - 97%)    Parameters below as of 23 Dec 2022 11:27  Patient On (Oxygen Delivery Method): room air        PHYSICAL EXAM:  GENERAL: NAD, well-groomed, well-developed  HEAD:  Atraumatic, Normocephalic  ENMT: Moist mucous membranes,   NECK: Supple, No JVD, Normal thyroid  NERVOUS SYSTEM:  All 4 extremities mobile, no gross sensory deficits.   CHEST/LUNG: Clear to auscultation bilaterally; No rales, rhonchi, wheezing, or rubs  HEART: Regular rate and rhythm; No murmurs, rubs, or gallops  ABDOMEN: Soft, Nontender, Nondistended; Bowel sounds present  EXTREMITIES:  2+ Peripheral Pulses, No clubbing, cyanosis, or edema      LABS:              CAPILLARY BLOOD GLUCOSE          RADIOLOGY & ADDITIONAL TESTS:    Imaging Personally Reviewed:  [ ] YES     Consultant(s) Notes Reviewed:      Care Discussed with Consultants/Other Providers:    Advanced Directives: [ ] DNR  [ ] No feeding tube  [ ] MOLST in chart  [ ] MOLST completed today  [ ] Unknown

## 2022-12-23 NOTE — CONSULT NOTE ADULT - ASSESSMENT
66 y/o male PMH of asthma, HTN, kidney cancer in remission, glaucoma, afib on eliquis, s/p L total knee replacement in September presented to the ED and was admitted 12/20 for increased confusion with lower abdominal pain. Found to have 9.4 x 6.4 mm in left mid ureteral stone and bilateral non obstructing renal stones are present measuring 5.1 mm in the right and 7.0 mm on the left. sp   Cystoscopic insertion of ureteral stent 20-Dec-2022 12:24:54  Manuel Yates  XR pyelogram retrograde left 20-Dec-2022 12:25:08  Manuel Yates.  Growing amp sensitive VRE    RECOMMENDATIONS  PT with nephrolithiasis and urinary tract infection with VRE that is ampicillin sensitive. No benefit to beta-lactamase inhibitor so  fine to dc on  Amoxicillin 500mg PO TID with last day 12/29    From an ID standpoint no further requirement for inpatient status for the management of ID issues. Fine with discharge from ID standpoint when other medical issues no longer require inpatient care and social issues allow for a safe discharge plan. To schedule an outpatient ID follow up appointment please call our office at [phone]    Spoke with hospitalist    Thank you for consulting us and involving us in the management of this most interesting and challenging case.  We will follow along in the care of this patient. Please call us at [phone] or text me directly on my cell# at [phone] with any concerns.   68 y/o male PMH of asthma, HTN, kidney cancer in remission, glaucoma, afib on eliquis, s/p L total knee replacement in September presented to the ED and was admitted 12/20 for increased confusion with lower abdominal pain. Found to have 9.4 x 6.4 mm in left mid ureteral stone and bilateral non obstructing renal stones are present measuring 5.1 mm in the right and 7.0 mm on the left. sp   Cystoscopic insertion of ureteral stent 20-Dec-2022 12:24:54  Manuel Yates  XR pyelogram retrograde left 20-Dec-2022 12:25:08  Manuel Yates.  Growing amp sensitive VRE    RECOMMENDATIONS  PT with nephrolithiasis and urinary tract infection with VRE that is ampicillin sensitive. No benefit to beta-lactamase inhibitor so  fine to dc on  Amoxicillin 500mg PO TID with last day 12/29    From an ID standpoint no further requirement for inpatient status for the management of ID issues. Fine with discharge from ID standpoint when other medical issues no longer require inpatient care and social issues allow for a safe discharge plan. To schedule an outpatient ID follow up appointment please call our office at [phone]    Spoke with hospitalist    Thank you for consulting us and involving us in the management of this most interesting and challenging case.  We will follow along in the care of this patient. Please call us at [phone] or text me directly on my cell# at [phone] with any concerns.   68 y/o male PMH of asthma, HTN, kidney cancer in remission, glaucoma, afib on eliquis, s/p L total knee replacement in September presented to the ED and was admitted 12/20 for increased confusion with lower abdominal pain. Found to have 9.4 x 6.4 mm in left mid ureteral stone and bilateral non obstructing renal stones are present measuring 5.1 mm in the right and 7.0 mm on the left. sp   Cystoscopic insertion of ureteral stent 20-Dec-2022 12:24:54  Manuel Yates  XR pyelogram retrograde left 20-Dec-2022 12:25:08  Manuel Yates.  Growing amp sensitive VRE    RECOMMENDATIONS  PT with nephrolithiasis and urinary tract infection with VRE that is ampicillin sensitive. No benefit to beta-lactamase inhibitor so  fine to dc on  Amoxicillin 500mg PO TID with last day 12/29    From an ID standpoint no further requirement for inpatient status for the management of ID issues. Fine with discharge from ID standpoint when other medical issues no longer require inpatient care and social issues allow for a safe discharge plan. To schedule an outpatient ID follow up appointment please call our office at [phone]    Spoke with hospitalist    Thank you for consulting us and involving us in the management of this most interesting and challenging case.  We will follow along in the care of this patient. Please call us at [phone] or text me directly on my cell# at [phone] with any concerns.

## 2022-12-23 NOTE — DISCHARGE NOTE NURSING/CASE MANAGEMENT/SOCIAL WORK - NSDCPEFALRISK_GEN_ALL_CORE
For information on Fall & Injury Prevention, visit: [email or web address] OR  [email or web address] OR  https://www.cdc.gov/steadi/patient.html For information on Fall & Injury Prevention, visit: [email or web address]://www.[email or web address]/[email or web address]-[email or web address]-mobility OR  [email or web address]://[email or web address]/[email or web address]-[email or web address]-tips-[email or web address]-injury OR  https://www.cdc.gov/steadi/patient.html For information on Fall & Injury Prevention, visit: [email or web address]://[email or web address]/[email or web address]-[email or web address]-[email or web address]-mobility OR  [email or web address]://[email or web address]/[email or web address]-[email or web address]-[email or web address]-[email or web address]-injury OR  https://www.cdc.gov/steadi/patient.html

## 2022-12-23 NOTE — DISCHARGE NOTE PROVIDER - NSDCMRMEDTOKEN_GEN_ALL_CORE_FT
albuterol 90 mcg/inh inhalation aerosol: 2 puff(s) inhaled every 6 hours, As Needed  allopurinol 100 mg oral tablet: orally once a day  atenolol 25 mg oral tablet: 1 tab(s) orally once a day  cholecalciferol 25 mcg (1000 intl units) oral capsule: 1 cap(s) orally once a day  Eliquis 5 mg oral tablet: 1 tab(s) orally 2 times a day  famotidine 20 mg oral tablet: orally once a day  furosemide 40 mg oral tablet: 1 tab(s) orally once a day  latanoprost 0.005% ophthalmic solution: to each affected eye once a day (at bedtime)  Potassium Chloride (Eqv-K-Tab) 20 mEq oral tablet, extended release: orally once a day  tamsulosin 0.4 mg oral capsule: 1 cap(s) orally once a day  Timolol Maleate, Ophthalmic 0.5% preservative-free ophthalmic solution: 1 drop(s) to each affected eye 2 times a day  tiotropium 18 mcg inhalation capsule: 1 cap(s) inhaled once a day  zinc sulfate 220 mg oral tablet: orally once a day   albuterol 90 mcg/inh inhalation aerosol: 2 puff(s) inhaled every 6 hours, As Needed  allopurinol 100 mg oral tablet: orally once a day  amoxicillin 500 mg oral capsule: 1 cap(s) orally 3 times a day (last day 12/29)  atenolol 25 mg oral tablet: 1 tab(s) orally once a day  cholecalciferol 25 mcg (1000 intl units) oral capsule: 1 cap(s) orally once a day  Eliquis 5 mg oral tablet: 1 tab(s) orally 2 times a day  famotidine 20 mg oral tablet: orally once a day  furosemide 40 mg oral tablet: 1 tab(s) orally once a day  latanoprost 0.005% ophthalmic solution: to each affected eye once a day (at bedtime)  polyethylene glycol 3350 oral powder for reconstitution: 17 gram(s) orally once a day, As needed, Constipation  Potassium Chloride (Eqv-K-Tab) 20 mEq oral tablet, extended release: orally once a day  senna leaf extract oral tablet: 2 tab(s) orally once a day (at bedtime)  tamsulosin 0.4 mg oral capsule: 1 cap(s) orally once a day  Timolol Maleate, Ophthalmic 0.5% preservative-free ophthalmic solution: 1 drop(s) to each affected eye 2 times a day  tiotropium 18 mcg inhalation capsule: 1 cap(s) inhaled once a day  zinc sulfate 220 mg oral tablet: orally once a day   albuterol 90 mcg/inh inhalation aerosol: 2 puff(s) inhaled every 6 hours, As Needed  allopurinol 100 mg oral tablet: orally once a day  atenolol 25 mg oral tablet: 1 tab(s) orally once a day  cholecalciferol 25 mcg (1000 intl units) oral capsule: 1 cap(s) orally once a day  Eliquis 5 mg oral tablet: 1 tab(s) orally 2 times a day  famotidine 20 mg oral tablet: orally once a day  furosemide 40 mg oral tablet: 1 tab(s) orally once a day  latanoprost 0.005% ophthalmic solution: to each affected eye once a day (at bedtime)  polyethylene glycol 3350 oral powder for reconstitution: 17 gram(s) orally once a day, As needed, Constipation  Potassium Chloride (Eqv-K-Tab) 20 mEq oral tablet, extended release: orally once a day  senna leaf extract oral tablet: 2 tab(s) orally once a day (at bedtime)  tamsulosin 0.4 mg oral capsule: 1 cap(s) orally once a day  Timolol Maleate, Ophthalmic 0.5% preservative-free ophthalmic solution: 1 drop(s) to each affected eye 2 times a day  tiotropium 18 mcg inhalation capsule: 1 cap(s) inhaled once a day  zinc sulfate 220 mg oral tablet: orally once a day

## 2022-12-23 NOTE — DISCHARGE NOTE PROVIDER - CARE PROVIDERS DIRECT ADDRESSES
[email or web address]

## 2022-12-23 NOTE — DISCHARGE NOTE PROVIDER - PROVIDER TOKENS
PROVIDER:[TOKEN:[[id number]],FOLLOWUP:[1 week]] PROVIDER:[TOKEN:[[id number]],FOLLOWUP:[1 week]] PROVIDER:[TOKEN:[[id number]],FOLLOWUP:[1 week]]

## 2022-12-23 NOTE — DISCHARGE NOTE PROVIDER - HOSPITAL COURSE
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 Admitted for nephrolithiasis and acute UTI.     Problem/Plan - 1:  ·  Problem: Nephrolithiasis.   ·  Plan: CT Abdomen/Pelvis showing 9.4 x 6.4 mm in left mid ureteral stone present with additional bilateral non obstructing renal stones present measuring 5.1 mm in the right and 7.0 mm on the left. Bilateral renal cysts are present.  - s/p Stent Placed; Cultures showing VRE and switched to IV Unasyn and discharge on Augementin  - continue home tamsulosin 0.4mg po cap daily  - Urology (Dr. Yates)   - NPO for procedure.     Problem/Plan - 2:  ·  Problem: Cancer of kidney.   ·  Plan: Hx of Kidney cancer, now in remission  - s/p nephrectomy (~2017/2018)  - follows with Dr. Duke (urology).     Problem/Plan - 3:  ·  Problem: Acute UTI.   ·  Plan: Patient presented with AMS to ED likely 2/2 UTI. Hx of recurrent UTIs.  - UA showing mod leuk esterase, 11-25 wbc, 25-50 rbc, occasional bacteria  - Previous Urine Cx grew ESBL klebsiella, but this time VRE  - Chronic kennedy catheter in place  - F/u urine and blood cultures  - Continue Abx   - ID consulted.     Problem/Plan - 4:  ·  Problem: Leg swelling.   ·  Plan: Patient with chronic b/l leg swelling, no known h/o heart failure   - holding am lasix due to renal function. unclear baseline. will restart as needed post cysto  - Continue supplemental KCl tablet  - monitor renal function, Cr stable currently  - recent tte - EF 56%, mild pulm htn, dilated L atrium.     Problem/Plan - 5:  ·  Problem: Chronic atrial fibrillation.   ·  Plan: - Continue home eliquis 5mg BID.     Problem/Plan - 6:  ·  Problem: Hypertension.   ·  Plan: BP elevated at time of admission 150/60 -> 155/61   - Continue home atenolol  - Monitor routine hemodynamics.     Problem/Plan - 7:  ·  Problem: Glaucoma.   ·  Plan: - Continue home latanoprost 0.005% opthalamic soln 1 drop to each eye at bedtime  - Continue home timolol maleate 0.5% opthalmic soln 1 drop to each eye bid.     Problem/Plan - 8:  ·  Problem: Asthma with COPD.   ·  Plan: - Continue home tiotropium  - Albuterol prn.     Problem/Plan - 9:  ·  Problem: DVT prophylaxis.   ·  Plan: - Eliquis 5mg BID (patient bedbound, afib, remote hx of dvt,  high risk for dvt/pe, will not stop AC for cystoscopy     Dispo: Pt with multiple recent hospitalization- was recently in rehab.  consult PT & SW for dispo planning.     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 Admitted for nephrolithiasis and acute UTI.     Problem/Plan - 1:  ·  Problem: Nephrolithiasis.   ·  Plan: CT Abdomen/Pelvis showing 9.4 x 6.4 mm in left mid ureteral stone present with additional bilateral non obstructing renal stones present measuring 5.1 mm in the right and 7.0 mm on the left. Bilateral renal cysts are present.  - s/p Stent Placed; Cultures showing VRE and switched to IV Unasyn and discharge on Augementin  - continue home tamsulosin 0.4mg po cap daily  - Urology (Dr. Yates)   - NPO for procedure.     Problem/Plan - 2:  ·  Problem: Cancer of kidney.   ·  Plan: Hx of Kidney cancer, now in remission  - s/p nephrectomy (~2017/2018)  - follows with Dr. Duke (urology).     Problem/Plan - 3:  ·  Problem: Acute UTI.   ·  Plan: Patient presented with AMS to ED likely 2/2 UTI. Hx of recurrent UTIs.  - UA showing mod leuk esterase, 11-25 wbc, 25-50 rbc, occasional bacteria  - Previous Urine Cx grew ESBL klebsiella, but this time VRE  - Chronic kennedy catheter in place  - F/u urine and blood cultures  - Continue Abx   - ID consulted.     Problem/Plan - 4:  ·  Problem: Leg swelling.   ·  Plan: Patient with chronic b/l leg swelling, no known h/o heart failure   - holding am lasix due to renal function. unclear baseline. will restart as needed post cysto  - Continue supplemental KCl tablet  - monitor renal function, Cr stable currently  - recent tte - EF 56%, mild pulm htn, dilated L atrium.     Problem/Plan - 5:  ·  Problem: Chronic atrial fibrillation.   ·  Plan: - Continue home eliquis 5mg BID.     Problem/Plan - 6:  ·  Problem: Hypertension.   ·  Plan: BP elevated at time of admission 150/60 -> 155/61   - Continue home atenolol  - Monitor routine hemodynamics.     Problem/Plan - 7:  ·  Problem: Glaucoma.   ·  Plan: - Continue home latanoprost 0.005% opthalamic soln 1 drop to each eye at bedtime  - Continue home timolol maleate 0.5% opthalmic soln 1 drop to each eye bid.     Problem/Plan - 8:  ·  Problem: Asthma with COPD.   ·  Plan: - Continue home tiotropium  - Albuterol prn.     Problem/Plan - 9:  ·  Problem: DVT prophylaxis.   ·  Plan: - Eliquis 5mg BID (patient bedbound, afib, remote hx of dvt,  high risk for dvt/pe, will not stop AC for cystoscopy     Dispo: Pt with multiple recent hospitalization- was recently in rehab.  consult PT & SW for dispo planning.     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 Patient was recently hospitalized at [facility] from 11/22 -12/06/22 for AMS and UTI. Urine cultures at the time grew ESBL Klebsiella and patient was treated with ertapenem. He was discharged to [facility] with chronic indwelling kennedy catheter. Patient's family wanted to bring him home after 3 days, however, they soon felt they were unable to properly care for him at home.  At time of exam, patient denies having any pain. He is slightly anxious about being in the hospital but was A&Ox3.   Patient denies having any pain, nausea, fevers, chills, sob, vomiting, diarrhea, constipation.    ED Course  T 97.6F -> 98.6 F HR 74 /60 -> 155/61 RR 19 SpO2 99% on RA  Labs significant for H/H 10.4/36.2 (bl ~8-9)  BUN/Cr 38/1.30 (bl 1-1.2) Albumin 2.4 AST 46  UA: mod leuk esterase, 11-25 wbc, 25-50 rbc, occasional bacteria    CXR: no active chest disease  EKG: Afib HR 70 BPM    CT Head: Limited by streak artifact.  Subcentimeter bilateral hypodensities present in the bilateral basal   ganglia, likely related to age-indeterminate lacunar infarcts. Findings   are superimposed upon chronic microangiopathic white matter changes  To the visualized extent, no intracranial hemorrhage or mass effect.     CT Abdomen/Pelvis: 9.4 x 6.4 mm in left mid ureteral stone present. Dilated left renal pelvis without mario hydronephrosis. Additional bilateral non obstructing renal stones are present measuring 5.1 mm in the right and 7.0 mm on the left. Bilateral renal cysts are present.    In the ED, patient received Rocephin x1 dose, LR bolus x1    Pt. was admitted with Urology and ID consult.  He was continued on IV antibiotics.  Pt. was brought to OR on 12/20 and had Cystoscopic insertion of ureteral stent.  Blood cultures showed no growth.  Urine culture was positive for vancomycin resistant enterococcus faecalis.    MRI head showed No acute intracranial hemorrhage, acute infarction, extra-axial fluid collection or hydrocephalus.  Chronic lacunar infarctions of the bilateral basal ganglia.  MRI of cervical spine showed No acute compression fracture or subluxation.  Multilevel degenerative changes worst at C4-5, C5-C6 and C6-C7.  Nonspecific small fluid and edema adjacent to the right C2-3 facet.   Pt. has clinically improved and was transitioned to oral antibiotics.           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 Patient was recently hospitalized at [facility] from 11/22 -12/06/22 for AMS and UTI. Urine cultures at the time grew ESBL Klebsiella and patient was treated with ertapenem. He was discharged to [facility] with chronic indwelling kennedy catheter. Patient's family wanted to bring him home after 3 days, however, they soon felt they were unable to properly care for him at home.  At time of exam, patient denies having any pain. He is slightly anxious about being in the hospital but was A&Ox3.   Patient denies having any pain, nausea, fevers, chills, sob, vomiting, diarrhea, constipation.    ED Course  T 97.6F -> 98.6 F HR 74 /60 -> 155/61 RR 19 SpO2 99% on RA  Labs significant for H/H 10.4/36.2 (bl ~8-9)  BUN/Cr 38/1.30 (bl 1-1.2) Albumin 2.4 AST 46  UA: mod leuk esterase, 11-25 wbc, 25-50 rbc, occasional bacteria    CXR: no active chest disease  EKG: Afib HR 70 BPM    CT Head: Limited by streak artifact.  Subcentimeter bilateral hypodensities present in the bilateral basal   ganglia, likely related to age-indeterminate lacunar infarcts. Findings   are superimposed upon chronic microangiopathic white matter changes  To the visualized extent, no intracranial hemorrhage or mass effect.     CT Abdomen/Pelvis: 9.4 x 6.4 mm in left mid ureteral stone present. Dilated left renal pelvis without mario hydronephrosis. Additional bilateral non obstructing renal stones are present measuring 5.1 mm in the right and 7.0 mm on the left. Bilateral renal cysts are present.    In the ED, patient received Rocephin x1 dose, LR bolus x1    Pt. was admitted with Urology and ID consult.  He was continued on IV antibiotics.  Pt. was brought to OR on 12/20 and had Cystoscopic insertion of ureteral stent.  Blood cultures showed no growth.  Urine culture was positive for vancomycin resistant enterococcus faecalis.    MRI head showed No acute intracranial hemorrhage, acute infarction, extra-axial fluid collection or hydrocephalus.  Chronic lacunar infarctions of the bilateral basal ganglia.  MRI of cervical spine showed No acute compression fracture or subluxation.  Multilevel degenerative changes worst at C4-5, C5-C6 and C6-C7.  Nonspecific small fluid and edema adjacent to the right C2-3 facet.   Pt. has clinically improved and was transitioned to oral antibiotics.           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 Patient was recently hospitalized at [facility] from 11/22 -12/06/22 for AMS and UTI. Urine cultures at the time grew ESBL Klebsiella and patient was treated with ertapenem. He was discharged to [facility] with chronic indwelling kennedy catheter. Patient's family wanted to bring him home after 3 days, however, they soon felt they were unable to properly care for him at home.  At time of exam, patient denies having any pain. He is slightly anxious about being in the hospital but was A&Ox3.   Patient denies having any pain, nausea, fevers, chills, sob, vomiting, diarrhea, constipation.    ED Course  T 97.6F -> 98.6 F HR 74 /60 -> 155/61 RR 19 SpO2 99% on RA  Labs significant for H/H 10.4/36.2 (bl ~8-9)  BUN/Cr 38/1.30 (bl 1-1.2) Albumin 2.4 AST 46  UA: mod leuk esterase, 11-25 wbc, 25-50 rbc, occasional bacteria    CXR: no active chest disease  EKG: Afib HR 70 BPM    CT Head: Limited by streak artifact.  Subcentimeter bilateral hypodensities present in the bilateral basal   ganglia, likely related to age-indeterminate lacunar infarcts. Findings   are superimposed upon chronic microangiopathic white matter changes  To the visualized extent, no intracranial hemorrhage or mass effect.     CT Abdomen/Pelvis: 9.4 x 6.4 mm in left mid ureteral stone present. Dilated left renal pelvis without mario hydronephrosis. Additional bilateral non obstructing renal stones are present measuring 5.1 mm in the right and 7.0 mm on the left. Bilateral renal cysts are present.    In the ED, patient received Rocephin x1 dose, LR bolus x1    Pt. was admitted with Urology and ID consult.  He was continued on IV antibiotics.  Pt. was brought to OR on 12/20 and had Cystoscopic insertion of ureteral stent.  Blood cultures showed no growth.  Urine culture was positive for vancomycin resistant enterococcus faecalis.    MRI head showed No acute intracranial hemorrhage, acute infarction, extra-axial fluid collection or hydrocephalus.  Chronic lacunar infarctions of the bilateral basal ganglia.  MRI of cervical spine showed No acute compression fracture or subluxation.  Multilevel degenerative changes worst at C4-5, C5-C6 and C6-C7.  Nonspecific small fluid and edema adjacent to the right C2-3 facet.   Pt. has clinically improved and was transitioned to oral antibiotics.           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 Patient was recently hospitalized at [facility] from 11/22 -12/06/22 for AMS and UTI. Urine cultures at the time grew ESBL Klebsiella and patient was treated with ertapenem. He was discharged to [facility] with chronic indwelling kennedy catheter. Patient's family wanted to bring him home after 3 days, however, they soon felt they were unable to properly care for him at home.  At time of exam, patient denies having any pain. He is slightly anxious about being in the hospital but was A&Ox3.   Patient denies having any pain, nausea, fevers, chills, sob, vomiting, diarrhea, constipation.    ED Course  T 97.6F -> 98.6 F HR 74 /60 -> 155/61 RR 19 SpO2 99% on RA  Labs significant for H/H 10.4/36.2 (bl ~8-9)  BUN/Cr 38/1.30 (bl 1-1.2) Albumin 2.4 AST 46  UA: mod leuk esterase, 11-25 wbc, 25-50 rbc, occasional bacteria    CXR: no active chest disease  EKG: Afib HR 70 BPM    CT Head: Limited by streak artifact.  Subcentimeter bilateral hypodensities present in the bilateral basal   ganglia, likely related to age-indeterminate lacunar infarcts. Findings   are superimposed upon chronic microangiopathic white matter changes  To the visualized extent, no intracranial hemorrhage or mass effect.     CT Abdomen/Pelvis: 9.4 x 6.4 mm in left mid ureteral stone present. Dilated left renal pelvis without mario hydronephrosis. Additional bilateral non obstructing renal stones are present measuring 5.1 mm in the right and 7.0 mm on the left. Bilateral renal cysts are present.    In the ED, patient received Rocephin x1 dose, LR bolus x1    Pt. was admitted with Urology and ID consult.  He was continued on IV antibiotics.  Pt. was brought to OR on 12/20 and had Cystoscopic insertion of ureteral stent.  Blood cultures showed no growth.  Urine culture was positive for vancomycin resistant enterococcus faecalis.    MRI head showed No acute intracranial hemorrhage, acute infarction, extra-axial fluid collection or hydrocephalus.  Chronic lacunar infarctions of the bilateral basal ganglia.  MRI of cervical spine showed No acute compression fracture or subluxation.  Multilevel degenerative changes worst at C4-5, C5-C6 and C6-C7.  Nonspecific small fluid and edema adjacent to the right C2-3 facet.   Pt. has clinically improved and was transitioned to oral antibiotics.   He has since completed course of antibiotics.      On day of discharge patient is in no distress.  AAOx3.  Afebrile and hemodynamically stable.           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 Patient was recently hospitalized at [facility] from 11/22 -12/06/22 for AMS and UTI. Urine cultures at the time grew ESBL Klebsiella and patient was treated with ertapenem. He was discharged to [facility] with chronic indwelling kennedy catheter. Patient's family wanted to bring him home after 3 days, however, they soon felt they were unable to properly care for him at home.  At time of exam, patient denies having any pain. He is slightly anxious about being in the hospital but was A&Ox3.   Patient denies having any pain, nausea, fevers, chills, sob, vomiting, diarrhea, constipation.    ED Course  T 97.6F -> 98.6 F HR 74 /60 -> 155/61 RR 19 SpO2 99% on RA  Labs significant for H/H 10.4/36.2 (bl ~8-9)  BUN/Cr 38/1.30 (bl 1-1.2) Albumin 2.4 AST 46  UA: mod leuk esterase, 11-25 wbc, 25-50 rbc, occasional bacteria    CXR: no active chest disease  EKG: Afib HR 70 BPM    CT Head: Limited by streak artifact.  Subcentimeter bilateral hypodensities present in the bilateral basal   ganglia, likely related to age-indeterminate lacunar infarcts. Findings   are superimposed upon chronic microangiopathic white matter changes  To the visualized extent, no intracranial hemorrhage or mass effect.     CT Abdomen/Pelvis: 9.4 x 6.4 mm in left mid ureteral stone present. Dilated left renal pelvis without mario hydronephrosis. Additional bilateral non obstructing renal stones are present measuring 5.1 mm in the right and 7.0 mm on the left. Bilateral renal cysts are present.    In the ED, patient received Rocephin x1 dose, LR bolus x1    Pt. was admitted with Urology and ID consult.  He was continued on IV antibiotics.  Pt. was brought to OR on 12/20 and had Cystoscopic insertion of ureteral stent.  Blood cultures showed no growth.  Urine culture was positive for vancomycin resistant enterococcus faecalis.    MRI head showed No acute intracranial hemorrhage, acute infarction, extra-axial fluid collection or hydrocephalus.  Chronic lacunar infarctions of the bilateral basal ganglia.  MRI of cervical spine showed No acute compression fracture or subluxation.  Multilevel degenerative changes worst at C4-5, C5-C6 and C6-C7.  Nonspecific small fluid and edema adjacent to the right C2-3 facet.   Pt. has clinically improved and was transitioned to oral antibiotics.   He has since completed course of antibiotics.      On day of discharge patient is in no distress.  AAOx3.  Afebrile and hemodynamically stable.           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 Patient was recently hospitalized at [facility] from 11/22 -12/06/22 for AMS and UTI. Urine cultures at the time grew ESBL Klebsiella and patient was treated with ertapenem. He was discharged to [facility] with chronic indwelling kennedy catheter. Patient's family wanted to bring him home after 3 days, however, they soon felt they were unable to properly care for him at home.  At time of exam, patient denies having any pain. He is slightly anxious about being in the hospital but was A&Ox3.   Patient denies having any pain, nausea, fevers, chills, sob, vomiting, diarrhea, constipation.    ED Course  T 97.6F -> 98.6 F HR 74 /60 -> 155/61 RR 19 SpO2 99% on RA  Labs significant for H/H 10.4/36.2 (bl ~8-9)  BUN/Cr 38/1.30 (bl 1-1.2) Albumin 2.4 AST 46  UA: mod leuk esterase, 11-25 wbc, 25-50 rbc, occasional bacteria    CXR: no active chest disease  EKG: Afib HR 70 BPM    CT Head: Limited by streak artifact.  Subcentimeter bilateral hypodensities present in the bilateral basal   ganglia, likely related to age-indeterminate lacunar infarcts. Findings   are superimposed upon chronic microangiopathic white matter changes  To the visualized extent, no intracranial hemorrhage or mass effect.     CT Abdomen/Pelvis: 9.4 x 6.4 mm in left mid ureteral stone present. Dilated left renal pelvis without mario hydronephrosis. Additional bilateral non obstructing renal stones are present measuring 5.1 mm in the right and 7.0 mm on the left. Bilateral renal cysts are present.    In the ED, patient received Rocephin x1 dose, LR bolus x1    Pt. was admitted with Urology and ID consult.  He was continued on IV antibiotics.  Pt. was brought to OR on 12/20 and had Cystoscopic insertion of ureteral stent.  Blood cultures showed no growth.  Urine culture was positive for vancomycin resistant enterococcus faecalis.    MRI head showed No acute intracranial hemorrhage, acute infarction, extra-axial fluid collection or hydrocephalus.  Chronic lacunar infarctions of the bilateral basal ganglia.  MRI of cervical spine showed No acute compression fracture or subluxation.  Multilevel degenerative changes worst at C4-5, C5-C6 and C6-C7.  Nonspecific small fluid and edema adjacent to the right C2-3 facet.   Pt. has clinically improved and was transitioned to oral antibiotics.   He has since completed course of antibiotics.      On day of discharge patient is in no distress.  AAOx3.  Afebrile and hemodynamically stable.

## 2022-12-23 NOTE — DISCHARGE NOTE PROVIDER - NSDCCPCAREPLAN_GEN_ALL_CORE_FT
PRINCIPAL DISCHARGE DIAGNOSIS  Diagnosis: Kidney stone  Assessment and Plan of Treatment: You were admitted for a kidney stone.  Evaluated by Urology and stent placed. Urine cultures came back and antibiotics prescribed as per Infectious Disease consult and evaluation. Please complete course and follow up with your Urologist.      SECONDARY DISCHARGE DIAGNOSES  Diagnosis: Acute UTI  Assessment and Plan of Treatment: Antibiotics     PRINCIPAL DISCHARGE DIAGNOSIS  Diagnosis: Kidney stone  Assessment and Plan of Treatment: You were admitted for a kidney stone.  Evaluated by Urology and stent placed. Urine cultures came back and antibiotics prescribed as per Infectious Disease consult and evaluation. Please complete course of antibiotic and follow up with your Urologist.      SECONDARY DISCHARGE DIAGNOSES  Diagnosis: Acute UTI  Assessment and Plan of Treatment: Please complete course of antibiotics.    Diagnosis: Chronic atrial fibrillation  Assessment and Plan of Treatment: Continue your Atenolol and Eliquis.    Diagnosis: Cancer of kidney  Assessment and Plan of Treatment: Follow up with your urologist.     PRINCIPAL DISCHARGE DIAGNOSIS  Diagnosis: Kidney stone  Assessment and Plan of Treatment: You were admitted for a kidney stone.  Evaluated by Urology and ureteral stent placed. Urine cultures came back and antibiotics prescribed as per Infectious Disease consult and evaluation. You have completed a course of antibiotic.  Please follow up with your PMD in 1 week and Urology in 1 week.      SECONDARY DISCHARGE DIAGNOSES  Diagnosis: Acute UTI  Assessment and Plan of Treatment: You have completed a course of antibiotics.    Diagnosis: Chronic atrial fibrillation  Assessment and Plan of Treatment: Continue your Atenolol and Eliquis.    Diagnosis: Cancer of kidney  Assessment and Plan of Treatment: Follow up with your urologist.

## 2022-12-23 NOTE — DISCHARGE NOTE PROVIDER - NSDCHHCONTACT_GEN_ALL_CORE_FT
As certified below, I, or a nurse practitioner or physician assistant working with me, had a face-to-face encounter that meets the physician face-to-face encounter requirements.
PE with SOB/desat on exertion, pulse oxim monitoring

## 2022-12-23 NOTE — DISCHARGE NOTE PROVIDER - TIME SPENT: (MINUTES SPENT ON THE DISCHARGE SERVICE)
Hospitalist Progress Note      Name:  To Armendariz /Age/Sex: 1953  (79 y.o. male)   MRN & CSN:  [id number] & [id number] Admission Date/Time: 2021  7:18 PM   [facility]  [facility] PCP: No primary care provider on file. Hospital Day: 5    Assessment and Plan:   To Armendariz is a 79 y.o.  male  who presents with Bilateral pleural effusion    1. Bilateral pleural effusions  -->S/p bilateral thoracenteses 1200 cc of pleural fluid removed from right, 1100 cc from left  -->Continue diuresis   2. Acute on chronic hypoxemic respiratory failure secondary to above  3. Elevated BNP  -Reviewed recent 2D echo  -Daily weights, Strict intake and output  -Monitor electrolytes, especially K     4. Elevated troponin, chronically  -Currently without chest pain  -Troponin  0.195, cycle troponins x2. Baseline roughly 1.5  -Holding cardiology consult at this time. If patient has chest pain or continued elevation of troponins consider cardiology consult     5. AOCD  -Hemoglobin 7.2, baseline ~8.5  -No evidence of hemorrhage, monitor and transfuse if hemoglobin less than 7  -Iron and TIBC  -A. m. labs     6. IDDM 2  -Continue home basal insulin, ADULT DIET; Regular; 4 carb choices (60 gm/meal); Low Fat/Low Chol/High Fiber/2 gm Na; Low Sodium (2 gm) diet, low SSI + BG checks ACHS, hypoglycemic protocol, and target -180     Other chronic medical conditions:   Continue all home meds except stated above or contraindicated. · CAD: Currently chest pain, see above  · HTN  · Colostomy  · Chronic Quiroz  · CKD stage IIIa  · COPD: Currently exacerbation  · HLD  · Insomnia  · GERD    Dispo: Pending clinical improvement, likely needs more aggressive diuretic regimen on discharge     Diet ADULT DIET; Regular; 4 carb choices (60 gm/meal); Low Fat/Low Chol/High Fiber/2 gm Na; Low Sodium (2 gm)   DVT Prophylaxis []? Lovenox, [x]? Heparin, []? SCDs, []? Ambulation   GI Prophylaxis [x]?  PPI,  []? H2 Blocker,  []? Carafate,  []? Diet/Tube Feeds   Code Status Full Code   Disposition Patient requires continued admission due to Bilateral pleural effusion   MDM []? Low, []? Moderate,[x]? High  Patient's risk as above due to acuity of condition with potential for decompensation.        History of Present Illness:     Chief Complaint: Bilateral pleural effusion  Bobby Rutherford is a 79 y.o.  male  who presents with above    No new complaint    Ten point ROS reviewed negative, unless as noted above    Objective: Intake/Output Summary (Last 24 hours) at 11/25/2021 0854  Last data filed at 11/25/2021 7347  Gross per 24 hour   Intake --   Output 2625 ml   Net -2625 ml      Vitals:   Vitals:    11/25/21 0834   BP: (!) 104/50   Pulse: 60   Resp: 16   Temp: 98.4 °F (36.9 °C)   SpO2: 100%     Physical Exam:   GEN Awake male, sitting upright in bed in no apparent distress. Appears given age. EYES Pupils are equally round. No scleral erythema, discharge, or conjunctivitis. HENT Mucous membranes are moist. Oral pharynx without exudates, no evidence of thrush. NECK Supple, no apparent thyromegaly or masses. RESP Clear to auscultation, no wheezes, rales or rhonchi. Symmetric chest movement while on room air. CARDIO/VASC S1/S2 auscultated. Regular rate without appreciable murmurs, rubs, or gallops. No JVD or carotid bruits. Peripheral pulses equal bilaterally and palpable. No peripheral edema. GI Abdomen is soft without significant tenderness, masses, or guarding. Bowel sounds are normoactive. Colostomy intact, functioning.  No costovertebral angle tenderness. Normal appearing external genitalia. Quiroz catheter is not present. HEME/LYMPH No palpable cervical lymphadenopathy and no hepatosplenomegaly. No petechiae or ecchymoses. MSK No gross joint deformities. SKIN Normal coloration, warm, dry. NEURO Cranial nerves appear grossly intact, normal speech, no lateralizing weakness. PSYCH Awake, alert, oriented x 4.   Affect appropriate.     Medications:   Medications:    collagenase   Topical Daily    amiodarone  200 mg Oral BID    vitamin C  250 mg Oral BID    aspirin  81 mg Oral Daily    atorvastatin  80 mg Oral Nightly    insulin glargine  10 Units SubCUTAneous Nightly    vitamin D  5,000 Units Oral Daily    clopidogrel  75 mg Oral Daily    isosorbide mononitrate  30 mg Oral Daily    melatonin  3 mg Oral Nightly    therapeutic multivitamin-minerals  1 tablet Oral Daily    zinc sulfate  50 mg Oral Daily    spironolactone  25 mg Oral Daily    sodium chloride flush  5-40 mL IntraVENous 2 times per day    heparin (porcine)  5,000 Units SubCUTAneous 3 times per day    insulin lispro  0-6 Units SubCUTAneous TID WC    insulin lispro  0-3 Units SubCUTAneous Nightly    furosemide  40 mg IntraVENous BID    pantoprazole  40 mg Oral QAM AC      Infusions:    dextrose      sodium chloride       PRN Meds: glucose, 15 g, PRN  dextrose, 12.5 g, PRN  glucagon (rDNA), 1 mg, PRN  dextrose, 100 mL/hr, PRN  sodium chloride flush, 5-40 mL, PRN  sodium chloride, 25 mL, PRN  polyethylene glycol, 17 g, Daily PRN  acetaminophen, 650 mg, Q6H PRN   Or  acetaminophen, 650 mg, Q6H PRN  promethazine, 12.5 mg, Q6H PRN  nitroGLYCERIN, 0.4 mg, Q5 Min PRN          Electronically signed by Ciara Canales MD on 11/25/2021 at 8:54 AM [id number] [phone]

## 2022-12-24 PROCEDURE — 99233 SBSQ HOSP IP/OBS HIGH 50: CPT

## 2022-12-24 RX ADMIN — ALBUTEROL 2 PUFF(S): 90 AEROSOL, METERED ORAL at 08:33

## 2022-12-24 RX ADMIN — APIXABAN 5 MILLIGRAM(S): 2.5 TABLET, FILM COATED ORAL at 21:51

## 2022-12-24 RX ADMIN — TIOTROPIUM BROMIDE 2 PUFF(S): 18 CAPSULE ORAL; RESPIRATORY (INHALATION) at 08:33

## 2022-12-24 RX ADMIN — ZINC SULFATE TAB 220 MG (50 MG ZINC EQUIVALENT) 220 MILLIGRAM(S): 220 (50 ZN) TAB at 12:31

## 2022-12-24 RX ADMIN — Medication 1 DROP(S): at 05:18

## 2022-12-24 RX ADMIN — Medication 1 DROP(S): at 17:21

## 2022-12-24 RX ADMIN — Medication 100 MILLIGRAM(S): at 12:29

## 2022-12-24 RX ADMIN — Medication 1000 UNIT(S): at 12:32

## 2022-12-24 RX ADMIN — Medication 500 MILLIGRAM(S): at 21:50

## 2022-12-24 RX ADMIN — FAMOTIDINE 20 MILLIGRAM(S): 10 INJECTION INTRAVENOUS at 12:31

## 2022-12-24 RX ADMIN — Medication 40 MILLIGRAM(S): at 05:17

## 2022-12-24 RX ADMIN — Medication 500 MILLIGRAM(S): at 15:07

## 2022-12-24 RX ADMIN — TAMSULOSIN HYDROCHLORIDE 0.4 MILLIGRAM(S): 0.4 CAPSULE ORAL at 12:32

## 2022-12-24 RX ADMIN — APIXABAN 5 MILLIGRAM(S): 2.5 TABLET, FILM COATED ORAL at 08:39

## 2022-12-24 RX ADMIN — Medication 500 MILLIGRAM(S): at 05:18

## 2022-12-24 RX ADMIN — ATENOLOL 25 MILLIGRAM(S): 25 TABLET ORAL at 05:18

## 2022-12-24 NOTE — PROGRESS NOTE ADULT - ASSESSMENT
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 Admitted for nephrolithiasis and acute UTI. 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 Admitted for nephrolithiasis and acute UTI.    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 Admitted for nephrolithiasis and acute UTI.    [place] HAS BEEN DISCHARGED PENDING DISPOSITION 68 y/o male PMH of asthma, HTN, kidney cancer in remission, glaucoma, afib on eliquis, s/p L total knee replacement in September presents to the ED for increased confusion with lower abdominal pain. Admitted for nephrolithiasis and acute UTI.    [place] HAS BEEN DISCHARGED PENDING DISPOSITION

## 2022-12-24 NOTE — PROGRESS NOTE ADULT - SUBJECTIVE AND OBJECTIVE BOX
Patient is a 67y old  Male who presents with a chief complaint of Nephrolithiasis (23 Dec 2022 13:54)      SUBJECTIVE / OVERNIGHT EVENTS: pt seen and examined. no fever, no shob, NAD, no c/o pain        Vital Signs Last 24 Hrs  T(C): 36.6 (24 Dec 2022 12:17), Max: 37.3 (23 Dec 2022 20:36)  T(F): 97.9 (24 Dec 2022 12:17), Max: 99.1 (23 Dec 2022 20:36)  HR: 89 (24 Dec 2022 12:17) (87 - 99)  BP: 113/74 (24 Dec 2022 12:17) (113/74 - 169/93)  BP(mean): --  RR: 18 (24 Dec 2022 12:17) (18 - 18)  SpO2: 92% (24 Dec 2022 12:17) (92% - 93%)    Parameters below as of 24 Dec 2022 12:17  Patient On (Oxygen Delivery Method): room air      I&O's Summary    23 Dec 2022 07:01  -  24 Dec 2022 07:00  --------------------------------------------------------  IN: 240 mL / OUT: 2600 mL / NET: -2360 mL    24 Dec 2022 07:01  -  24 Dec 2022 15:35  --------------------------------------------------------  IN: 0 mL / OUT: 600 mL / NET: -600 mL        PHYSICAL EXAM:  GENERAL: NAD  HEAD:  Atraumatic, Normocephalic  NECK: Supple  CHEST/LUNG: CTABL  HEART: S1+S2  ABDOMEN: Soft, Nontender, Nondistended; Bowel sounds present  Neuro: no focal deficit  EXTREMITIES:  No edema  SKIN: No rashes or lesions    LABS:            CAPILLARY BLOOD GLUCOSE                RADIOLOGY & ADDITIONAL TESTS:    Imaging Personally Reviewed:  [x] YES  [ ] NO    Consultant(s) Notes Reviewed:  [x] YES  [ ] NO      MEDICATIONS  (STANDING):  allopurinol 100 milliGRAM(s) Oral daily  amoxicillin 500 milliGRAM(s) Oral three times a day  apixaban 5 milliGRAM(s) Oral every 12 hours  atenolol  Tablet 25 milliGRAM(s) Oral daily  cholecalciferol 1000 Unit(s) Oral daily  famotidine    Tablet 20 milliGRAM(s) Oral daily  furosemide    Tablet 40 milliGRAM(s) Oral daily  influenza  Vaccine (HIGH DOSE) 0.7 milliLiter(s) IntraMuscular once  tamsulosin 0.4 milliGRAM(s) Oral daily  timolol 0.5% Solution 1 Drop(s) Both EYES two times a day  tiotropium 2.5 MICROgram(s) Inhaler 2 Puff(s) Inhalation daily  zinc sulfate 220 milliGRAM(s) Oral daily    MEDICATIONS  (PRN):  acetaminophen     Tablet .. 650 milliGRAM(s) Oral every 6 hours PRN Temp greater or equal to 38C (100.4F), Mild Pain (1 - 3)  albuterol    90 MICROgram(s) HFA Inhaler 2 Puff(s) Inhalation every 6 hours PRN Shortness of Breath and/or Wheezing  senna 2 Tablet(s) Oral at bedtime PRN Constipation      Care Discussed with Consultants/Other Providers [x] YES  [ ] NO    HEALTH ISSUES - PROBLEM Dx:  Nephrolithiasis    Acute UTI    Leg swelling    Chronic atrial fibrillation    Asthma with COPD    Cancer of kidney    DVT prophylaxis    Glaucoma    Hypertension

## 2022-12-25 LAB
ANION GAP SERPL CALC-SCNC: 8 MMOL/L — SIGNIFICANT CHANGE UP (ref 5–17)
BUN SERPL-MCNC: 32 MG/DL — HIGH (ref 7–23)
CALCIUM SERPL-MCNC: 9 MG/DL — SIGNIFICANT CHANGE UP (ref 8.5–10.1)
CHLORIDE SERPL-SCNC: 107 MMOL/L — SIGNIFICANT CHANGE UP (ref 96–108)
CO2 SERPL-SCNC: 28 MMOL/L — SIGNIFICANT CHANGE UP (ref 22–31)
CREAT SERPL-MCNC: 1.2 MG/DL — SIGNIFICANT CHANGE UP (ref 0.5–1.3)
CULTURE RESULTS: SIGNIFICANT CHANGE UP
EGFR: 67 ML/MIN/1.73M2 — SIGNIFICANT CHANGE UP
GLUCOSE SERPL-MCNC: 88 MG/DL — SIGNIFICANT CHANGE UP (ref 70–99)
HCT VFR BLD CALC: 31.5 % — LOW (ref 39–50)
HGB BLD-MCNC: 9.5 G/DL — LOW (ref 13–17)
MCHC RBC-ENTMCNC: 24 PG — LOW (ref 27–34)
MCHC RBC-ENTMCNC: 30.2 GM/DL — LOW (ref 32–36)
MCV RBC AUTO: 79.5 FL — LOW (ref 80–100)
NRBC # BLD: 0 /100 WBCS — SIGNIFICANT CHANGE UP (ref 0–0)
PLATELET # BLD AUTO: 329 K/UL — SIGNIFICANT CHANGE UP (ref 150–400)
POTASSIUM SERPL-MCNC: 3.6 MMOL/L — SIGNIFICANT CHANGE UP (ref 3.5–5.3)
POTASSIUM SERPL-SCNC: 3.6 MMOL/L — SIGNIFICANT CHANGE UP (ref 3.5–5.3)
RBC # BLD: 3.96 M/UL — LOW (ref 4.2–5.8)
RBC # FLD: 18.6 % — HIGH (ref 10.3–14.5)
SODIUM SERPL-SCNC: 143 MMOL/L — SIGNIFICANT CHANGE UP (ref 135–145)
SPECIMEN SOURCE: SIGNIFICANT CHANGE UP
WBC # BLD: 8.01 K/UL — SIGNIFICANT CHANGE UP (ref 3.8–10.5)
WBC # FLD AUTO: 8.01 K/UL — SIGNIFICANT CHANGE UP (ref 3.8–10.5)

## 2022-12-25 PROCEDURE — 99232 SBSQ HOSP IP/OBS MODERATE 35: CPT

## 2022-12-25 RX ADMIN — TIOTROPIUM BROMIDE 2 PUFF(S): 18 CAPSULE ORAL; RESPIRATORY (INHALATION) at 07:29

## 2022-12-25 RX ADMIN — FAMOTIDINE 20 MILLIGRAM(S): 10 INJECTION INTRAVENOUS at 13:16

## 2022-12-25 RX ADMIN — Medication 40 MILLIGRAM(S): at 05:30

## 2022-12-25 RX ADMIN — Medication 1 DROP(S): at 05:30

## 2022-12-25 RX ADMIN — Medication 500 MILLIGRAM(S): at 13:23

## 2022-12-25 RX ADMIN — Medication 500 MILLIGRAM(S): at 21:13

## 2022-12-25 RX ADMIN — ZINC SULFATE TAB 220 MG (50 MG ZINC EQUIVALENT) 220 MILLIGRAM(S): 220 (50 ZN) TAB at 13:18

## 2022-12-25 RX ADMIN — TAMSULOSIN HYDROCHLORIDE 0.4 MILLIGRAM(S): 0.4 CAPSULE ORAL at 13:15

## 2022-12-25 RX ADMIN — APIXABAN 5 MILLIGRAM(S): 2.5 TABLET, FILM COATED ORAL at 08:20

## 2022-12-25 RX ADMIN — Medication 100 MILLIGRAM(S): at 13:15

## 2022-12-25 RX ADMIN — Medication 1 DROP(S): at 17:44

## 2022-12-25 RX ADMIN — Medication 500 MILLIGRAM(S): at 05:30

## 2022-12-25 RX ADMIN — ATENOLOL 25 MILLIGRAM(S): 25 TABLET ORAL at 05:30

## 2022-12-25 RX ADMIN — Medication 1000 UNIT(S): at 13:16

## 2022-12-25 RX ADMIN — APIXABAN 5 MILLIGRAM(S): 2.5 TABLET, FILM COATED ORAL at 21:11

## 2022-12-25 NOTE — PROGRESS NOTE ADULT - SUBJECTIVE AND OBJECTIVE BOX
Patient is a 67y old  Male who presents with a chief complaint of Nephrolithiasis (24 Dec 2022 15:35)      SUBJECTIVE / OVERNIGHT EVENTS: pt seen and examined. no fever, no shob, NAD, no c/o pain        Vital Signs Last 24 Hrs  T(C): 37.2 (25 Dec 2022 11:50), Max: 37.4 (25 Dec 2022 05:12)  T(F): 98.9 (25 Dec 2022 11:50), Max: 99.4 (25 Dec 2022 05:12)  HR: 91 (25 Dec 2022 11:50) (83 - 91)  BP: 135/80 (25 Dec 2022 11:50) (135/80 - 147/92)  BP(mean): --  RR: 18 (25 Dec 2022 11:50) (18 - 18)  SpO2: 93% (25 Dec 2022 11:50) (93% - 95%)    Parameters below as of 25 Dec 2022 11:50  Patient On (Oxygen Delivery Method): room air      I&O's Summary    24 Dec 2022 07:01  -  25 Dec 2022 07:00  --------------------------------------------------------  IN: 0 mL / OUT: 1475 mL / NET: -1475 mL    25 Dec 2022 07:01  -  25 Dec 2022 14:29  --------------------------------------------------------  IN: 0 mL / OUT: 550 mL / NET: -550 mL        PHYSICAL EXAM:  GENERAL: NAD  HEAD:  Atraumatic, Normocephalic  NECK: Supple  CHEST/LUNG: CTABL  HEART: S1+S2  ABDOMEN: Soft, Nontender, Nondistended; Bowel sounds present  Neuro: no focal deficit  EXTREMITIES:  No edema  SKIN: No rashes or lesions    LABS:                        9.5    8.01  )-----------( 329      ( 25 Dec 2022 06:10 )             31.5     12-25    143  |  107  |  32<H>  ----------------------------<  88  3.6   |  28  |  1.20    Ca    9.0      25 Dec 2022 06:10        CAPILLARY BLOOD GLUCOSE                RADIOLOGY & ADDITIONAL TESTS:    Imaging Personally Reviewed:  [x] YES  [ ] NO    Consultant(s) Notes Reviewed:  [x] YES  [ ] NO      MEDICATIONS  (STANDING):  allopurinol 100 milliGRAM(s) Oral daily  amoxicillin 500 milliGRAM(s) Oral three times a day  apixaban 5 milliGRAM(s) Oral every 12 hours  atenolol  Tablet 25 milliGRAM(s) Oral daily  cholecalciferol 1000 Unit(s) Oral daily  famotidine    Tablet 20 milliGRAM(s) Oral daily  furosemide    Tablet 40 milliGRAM(s) Oral daily  influenza  Vaccine (HIGH DOSE) 0.7 milliLiter(s) IntraMuscular once  tamsulosin 0.4 milliGRAM(s) Oral daily  timolol 0.5% Solution 1 Drop(s) Both EYES two times a day  tiotropium 2.5 MICROgram(s) Inhaler 2 Puff(s) Inhalation daily  zinc sulfate 220 milliGRAM(s) Oral daily    MEDICATIONS  (PRN):  acetaminophen     Tablet .. 650 milliGRAM(s) Oral every 6 hours PRN Temp greater or equal to 38C (100.4F), Mild Pain (1 - 3)  albuterol    90 MICROgram(s) HFA Inhaler 2 Puff(s) Inhalation every 6 hours PRN Shortness of Breath and/or Wheezing  senna 2 Tablet(s) Oral at bedtime PRN Constipation      Care Discussed with Consultants/Other Providers [x] YES  [ ] NO    HEALTH ISSUES - PROBLEM Dx:  Nephrolithiasis    Acute UTI    Leg swelling    Chronic atrial fibrillation    Asthma with COPD    Cancer of kidney    DVT prophylaxis    Glaucoma    Hypertension

## 2022-12-26 DIAGNOSIS — G93.40 ENCEPHALOPATHY, UNSPECIFIED: ICD-10-CM

## 2022-12-26 PROCEDURE — 70551 MRI BRAIN STEM W/O DYE: CPT | Mod: 26

## 2022-12-26 PROCEDURE — 99233 SBSQ HOSP IP/OBS HIGH 50: CPT

## 2022-12-26 RX ADMIN — FAMOTIDINE 20 MILLIGRAM(S): 10 INJECTION INTRAVENOUS at 12:16

## 2022-12-26 RX ADMIN — APIXABAN 5 MILLIGRAM(S): 2.5 TABLET, FILM COATED ORAL at 08:20

## 2022-12-26 RX ADMIN — Medication 40 MILLIGRAM(S): at 05:37

## 2022-12-26 RX ADMIN — Medication 100 MILLIGRAM(S): at 12:16

## 2022-12-26 RX ADMIN — TAMSULOSIN HYDROCHLORIDE 0.4 MILLIGRAM(S): 0.4 CAPSULE ORAL at 12:16

## 2022-12-26 RX ADMIN — TIOTROPIUM BROMIDE 2 PUFF(S): 18 CAPSULE ORAL; RESPIRATORY (INHALATION) at 05:39

## 2022-12-26 RX ADMIN — APIXABAN 5 MILLIGRAM(S): 2.5 TABLET, FILM COATED ORAL at 20:19

## 2022-12-26 RX ADMIN — Medication 500 MILLIGRAM(S): at 05:37

## 2022-12-26 RX ADMIN — Medication 500 MILLIGRAM(S): at 14:06

## 2022-12-26 RX ADMIN — Medication 1000 UNIT(S): at 12:16

## 2022-12-26 RX ADMIN — ZINC SULFATE TAB 220 MG (50 MG ZINC EQUIVALENT) 220 MILLIGRAM(S): 220 (50 ZN) TAB at 12:16

## 2022-12-26 RX ADMIN — Medication 1 DROP(S): at 05:38

## 2022-12-26 RX ADMIN — Medication 500 MILLIGRAM(S): at 21:04

## 2022-12-26 RX ADMIN — ATENOLOL 25 MILLIGRAM(S): 25 TABLET ORAL at 05:37

## 2022-12-26 RX ADMIN — Medication 1 DROP(S): at 17:50

## 2022-12-26 NOTE — PROGRESS NOTE ADULT - PROBLEM SELECTOR PLAN 2
-Patient initially sent from [facility] to hospital for increasing confusion and abd pain- noted to have UTI due to kidney stone, had cystoscopy with stent placement  -Patient had CT head on admission showing age indeterminate lacunar infarcts, as per patient and wife no history of CVA in past, and patient has been compliant with eliquis (was prescribed for afib)  -Will obtain Neuro consult, check MR head as patient's recent confusion could be attributed to CVA  -Check lipid panel in AM, start statin -Patient initially sent from [facility] to hospital for increasing confusion and abd pain- noted to have UTI due to kidney stone, had cystoscopy with stent placement  -Patient had CT head on admission showing age indeterminate lacunar infarcts, as per patient and wife no history of CVA in past, and patient has been compliant with eliquis (was prescribed for afib)  -Will obtain Neuro consult, check MR head as patient's recent confusion could be attributed to CVA  -Check lipid panel in AM, start statin -Patient initially sent from [facility] to hospital for increasing confusion and abd pain- noted to have UTI due to kidney stone, had cystoscopy with stent placement  -Patient had CT head on admission showing age indeterminate lacunar infarcts, as per patient and wife no history of CVA in past, and patient has been compliant with eliquis (was prescribed for afib)  -Will obtain Neuro consult, check MR head as patient's recent confusion could be attributed to CVA  -Check lipid panel in AM, start statin

## 2022-12-26 NOTE — PROGRESS NOTE ADULT - ASSESSMENT
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 Admitted for nephrolithiasis and acute UTI. 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 Admitted for nephrolithiasis and acute UTI.

## 2022-12-26 NOTE — PROGRESS NOTE ADULT - SUBJECTIVE AND OBJECTIVE BOX
INTERVAL HPI/OVERNIGHT EVENTS:  Patient seen and examined at bedside. Patient states he does not feel like himself, states he feels a bit confused. Answered all my questions and followed commands appropriately, but does seem overall confused. Patient denies any chest pain, SOB, abd pain. Also complains of generalized weakness. Spoke with wife over phone, states patient has been in and out of hospital/rehab many times over past few months and has been getting confused intermittently.    MEDICATIONS  (STANDING):  allopurinol 100 milliGRAM(s) Oral daily  amoxicillin 500 milliGRAM(s) Oral three times a day  apixaban 5 milliGRAM(s) Oral every 12 hours  atenolol  Tablet 25 milliGRAM(s) Oral daily  cholecalciferol 1000 Unit(s) Oral daily  famotidine    Tablet 20 milliGRAM(s) Oral daily  furosemide    Tablet 40 milliGRAM(s) Oral daily  influenza  Vaccine (HIGH DOSE) 0.7 milliLiter(s) IntraMuscular once  tamsulosin 0.4 milliGRAM(s) Oral daily  timolol 0.5% Solution 1 Drop(s) Both EYES two times a day  tiotropium 2.5 MICROgram(s) Inhaler 2 Puff(s) Inhalation daily  zinc sulfate 220 milliGRAM(s) Oral daily    MEDICATIONS  (PRN):  acetaminophen     Tablet .. 650 milliGRAM(s) Oral every 6 hours PRN Temp greater or equal to 38C (100.4F), Mild Pain (1 - 3)  albuterol    90 MICROgram(s) HFA Inhaler 2 Puff(s) Inhalation every 6 hours PRN Shortness of Breath and/or Wheezing  senna 2 Tablet(s) Oral at bedtime PRN Constipation      Allergies    No Known Allergies    Intolerances      ROS:  CONSTITUTIONAL: admits generalized weakness, no fevers or chills  EYES/ENT: No visual changes;  No vertigo or throat pain   NECK: No pain or stiffness  RESPIRATORY: No cough, wheezing, hemoptysis; No shortness of breath  CARDIOVASCULAR: No chest pain or palpitations  GASTROINTESTINAL: No abdominal or epigastric pain. No nausea, vomiting, or hematemesis  GENITOURINARY: No dysuria, frequency or hematuria  NEUROLOGICAL: No numbness or tingling  All other review of systems is negative unless indicated above.    Vital Signs Last 24 Hrs  T(C): 37.2 (26 Dec 2022 05:33), Max: 37.3 (25 Dec 2022 21:34)  T(F): 99 (26 Dec 2022 05:33), Max: 99.1 (25 Dec 2022 21:34)  HR: 74 (26 Dec 2022 05:33) (74 - 91)  BP: 138/94 (26 Dec 2022 05:33) (129/85 - 138/94)  BP(mean): --  RR: 18 (26 Dec 2022 05:33) (18 - 18)  SpO2: 93% (26 Dec 2022 05:33) (93% - 93%)    Parameters below as of 26 Dec 2022 05:33  Patient On (Oxygen Delivery Method): room air        12-25 @ 07:01  -  12-26 @ 07:00  --------------------------------------------------------  IN: 0 mL / OUT: 1500 mL / NET: -1500 mL      Physical Exam:  General: laying in bed, NAD  Neurology: A&Ox3, nonfocal  Respiratory: diminished breath sounds B/L  CV:  S1S2, no murmurs, rubs or gallops  Abdominal: Soft, NT, ND +BS  Extremities: 1+ pitting edema B/L LE, + peripheral pulses      LABS:                        9.5    8.01  )-----------( 329      ( 25 Dec 2022 06:10 )             31.5     12-25    143  |  107  |  32<H>  ----------------------------<  88  3.6   |  28  |  1.20    Ca    9.0      25 Dec 2022 06:10            RADIOLOGY & ADDITIONAL TESTS:

## 2022-12-26 NOTE — PROVIDER CONTACT NOTE (CRITICAL VALUE NOTIFICATION) - TEST AND RESULT REPORTED:
Urine C&S collected on12/19 final resul. greater than 100,000 enterococcus faecalis vanco resistent , 10,000-49,000 staph epidermidis susceptibility not performed in the staph

## 2022-12-26 NOTE — PROGRESS NOTE ADULT - PROBLEM SELECTOR PLAN 7
BP elevated at time of admission 150/60 -> 155/61   - Continue home atenolol  - Monitor routine hemodynamics - Continue home atenolol  - Monitor routine hemodynamics

## 2022-12-27 LAB
ALBUMIN SERPL ELPH-MCNC: 2.2 G/DL — LOW (ref 3.3–5)
ALP SERPL-CCNC: 77 U/L — SIGNIFICANT CHANGE UP (ref 40–120)
ALT FLD-CCNC: 12 U/L — SIGNIFICANT CHANGE UP (ref 12–78)
ANION GAP SERPL CALC-SCNC: 9 MMOL/L — SIGNIFICANT CHANGE UP (ref 5–17)
AST SERPL-CCNC: 11 U/L — LOW (ref 15–37)
BASOPHILS # BLD AUTO: 0.05 K/UL — SIGNIFICANT CHANGE UP (ref 0–0.2)
BASOPHILS NFR BLD AUTO: 0.5 % — SIGNIFICANT CHANGE UP (ref 0–2)
BILIRUB SERPL-MCNC: 0.9 MG/DL — SIGNIFICANT CHANGE UP (ref 0.2–1.2)
BUN SERPL-MCNC: 34 MG/DL — HIGH (ref 7–23)
CALCIUM SERPL-MCNC: 8.8 MG/DL — SIGNIFICANT CHANGE UP (ref 8.5–10.1)
CHLORIDE SERPL-SCNC: 108 MMOL/L — SIGNIFICANT CHANGE UP (ref 96–108)
CHOLEST SERPL-MCNC: 135 MG/DL — SIGNIFICANT CHANGE UP
CO2 SERPL-SCNC: 27 MMOL/L — SIGNIFICANT CHANGE UP (ref 22–31)
CREAT SERPL-MCNC: 1.2 MG/DL — SIGNIFICANT CHANGE UP (ref 0.5–1.3)
EGFR: 67 ML/MIN/1.73M2 — SIGNIFICANT CHANGE UP
EOSINOPHIL # BLD AUTO: 0.22 K/UL — SIGNIFICANT CHANGE UP (ref 0–0.5)
EOSINOPHIL NFR BLD AUTO: 2.4 % — SIGNIFICANT CHANGE UP (ref 0–6)
GLUCOSE SERPL-MCNC: 100 MG/DL — HIGH (ref 70–99)
HCT VFR BLD CALC: 35 % — LOW (ref 39–50)
HDLC SERPL-MCNC: 39 MG/DL — LOW
HGB BLD-MCNC: 10.3 G/DL — LOW (ref 13–17)
IMM GRANULOCYTES NFR BLD AUTO: 0.3 % — SIGNIFICANT CHANGE UP (ref 0–0.9)
LIPID PNL WITH DIRECT LDL SERPL: 84 MG/DL — SIGNIFICANT CHANGE UP
LYMPHOCYTES # BLD AUTO: 1.6 K/UL — SIGNIFICANT CHANGE UP (ref 1–3.3)
LYMPHOCYTES # BLD AUTO: 17.3 % — SIGNIFICANT CHANGE UP (ref 13–44)
MCHC RBC-ENTMCNC: 23.4 PG — LOW (ref 27–34)
MCHC RBC-ENTMCNC: 29.4 GM/DL — LOW (ref 32–36)
MCV RBC AUTO: 79.5 FL — LOW (ref 80–100)
MONOCYTES # BLD AUTO: 0.77 K/UL — SIGNIFICANT CHANGE UP (ref 0–0.9)
MONOCYTES NFR BLD AUTO: 8.3 % — SIGNIFICANT CHANGE UP (ref 2–14)
NEUTROPHILS # BLD AUTO: 6.58 K/UL — SIGNIFICANT CHANGE UP (ref 1.8–7.4)
NEUTROPHILS NFR BLD AUTO: 71.2 % — SIGNIFICANT CHANGE UP (ref 43–77)
NON HDL CHOLESTEROL: 96 MG/DL — SIGNIFICANT CHANGE UP
NRBC # BLD: 0 /100 WBCS — SIGNIFICANT CHANGE UP (ref 0–0)
PLATELET # BLD AUTO: 338 K/UL — SIGNIFICANT CHANGE UP (ref 150–400)
POTASSIUM SERPL-MCNC: 3.6 MMOL/L — SIGNIFICANT CHANGE UP (ref 3.5–5.3)
POTASSIUM SERPL-SCNC: 3.6 MMOL/L — SIGNIFICANT CHANGE UP (ref 3.5–5.3)
PROT SERPL-MCNC: 6.4 G/DL — SIGNIFICANT CHANGE UP (ref 6–8.3)
RBC # BLD: 4.4 M/UL — SIGNIFICANT CHANGE UP (ref 4.2–5.8)
RBC # FLD: 18.1 % — HIGH (ref 10.3–14.5)
SARS-COV-2 RNA SPEC QL NAA+PROBE: SIGNIFICANT CHANGE UP
SODIUM SERPL-SCNC: 144 MMOL/L — SIGNIFICANT CHANGE UP (ref 135–145)
TRIGL SERPL-MCNC: 63 MG/DL — SIGNIFICANT CHANGE UP
WBC # BLD: 9.25 K/UL — SIGNIFICANT CHANGE UP (ref 3.8–10.5)
WBC # FLD AUTO: 9.25 K/UL — SIGNIFICANT CHANGE UP (ref 3.8–10.5)

## 2022-12-27 PROCEDURE — 99233 SBSQ HOSP IP/OBS HIGH 50: CPT

## 2022-12-27 PROCEDURE — 72141 MRI NECK SPINE W/O DYE: CPT | Mod: 26

## 2022-12-27 RX ORDER — POLYETHYLENE GLYCOL 3350 17 G/17G
17 POWDER, FOR SOLUTION ORAL DAILY
Refills: 0 | Status: DISCONTINUED | OUTPATIENT
Start: 2022-12-27 | End: 2022-12-30

## 2022-12-27 RX ADMIN — Medication 40 MILLIGRAM(S): at 05:13

## 2022-12-27 RX ADMIN — Medication 500 MILLIGRAM(S): at 14:27

## 2022-12-27 RX ADMIN — ATENOLOL 25 MILLIGRAM(S): 25 TABLET ORAL at 05:13

## 2022-12-27 RX ADMIN — TAMSULOSIN HYDROCHLORIDE 0.4 MILLIGRAM(S): 0.4 CAPSULE ORAL at 11:11

## 2022-12-27 RX ADMIN — APIXABAN 5 MILLIGRAM(S): 2.5 TABLET, FILM COATED ORAL at 08:24

## 2022-12-27 RX ADMIN — Medication 500 MILLIGRAM(S): at 05:12

## 2022-12-27 RX ADMIN — FAMOTIDINE 20 MILLIGRAM(S): 10 INJECTION INTRAVENOUS at 11:11

## 2022-12-27 RX ADMIN — Medication 500 MILLIGRAM(S): at 23:39

## 2022-12-27 RX ADMIN — Medication 1 DROP(S): at 05:13

## 2022-12-27 RX ADMIN — ZINC SULFATE TAB 220 MG (50 MG ZINC EQUIVALENT) 220 MILLIGRAM(S): 220 (50 ZN) TAB at 11:11

## 2022-12-27 RX ADMIN — APIXABAN 5 MILLIGRAM(S): 2.5 TABLET, FILM COATED ORAL at 23:39

## 2022-12-27 RX ADMIN — Medication 1000 UNIT(S): at 11:10

## 2022-12-27 RX ADMIN — Medication 100 MILLIGRAM(S): at 11:10

## 2022-12-27 RX ADMIN — Medication 1 DROP(S): at 17:12

## 2022-12-27 RX ADMIN — TIOTROPIUM BROMIDE 2 PUFF(S): 18 CAPSULE ORAL; RESPIRATORY (INHALATION) at 08:24

## 2022-12-27 NOTE — PROGRESS NOTE ADULT - PROBLEM SELECTOR PLAN 1
CT Abdomen/Pelvis showing 9.4 x 6.4 mm in left mid ureteral stone present with additional bilateral non obstructing renal stones present measuring 5.1 mm in the right and 7.0 mm on the left. Bilateral renal cysts are present.  - s/p Stent Placed; Cultures showing VRE and switched to IV Unasyn and discharge on Augementin  - continue home tamsulosin 0.4mg po cap daily  - Urology (Dr. Yates)
CT Abdomen/Pelvis showing 9.4 x 6.4 mm in left mid ureteral stone present with additional bilateral non obstructing renal stones present measuring 5.1 mm in the right and 7.0 mm on the left. Bilateral renal cysts are present.  - s/p Stent Placed; Cultures showing VRE and switched to IV Unasyn and discharge on Amoxicillin 500mg PO TID with last day 12/29  - continue home tamsulosin 0.4mg po cap daily  - Urology (Dr. Yates)
CT Abdomen/Pelvis showing 9.4 x 6.4 mm in left mid ureteral stone present with additional bilateral non obstructing renal stones present measuring 5.1 mm in the right and 7.0 mm on the left. Bilateral renal cysts are present.  - s/p Stent Placed; Cultures showing VRE and switched to IV Unasyn and discharge on Augementin  - continue home tamsulosin 0.4mg po cap daily  - Urology (Dr. Yates)   - NPO for procedure
CT Abdomen/Pelvis showing 9.4 x 6.4 mm in left mid ureteral stone present with additional bilateral non obstructing renal stones present measuring 5.1 mm in the right and 7.0 mm on the left. Bilateral renal cysts are present.  - s/p Stent Placed; Cultures showing VRE and switched to IV Unasyn and discharge on Amoxicillin 500mg PO TID with last day 12/29  - continue home tamsulosin 0.4mg po cap daily  - Urology (Dr. Yates)
CT Abdomen/Pelvis showing 9.4 x 6.4 mm in left mid ureteral stone present with additional bilateral non obstructing renal stones present measuring 5.1 mm in the right and 7.0 mm on the left. Bilateral renal cysts are present.  - s/p LR bolus x1 in ED  - continue home tamsulosin 0.4mg po cap daily  - Urology (Dr. Yates) consulted, plan for stent this morning   - NPO for procedure  - RCRI score 0, Class 1 risk, patient is medically optimized for procedure.
CT Abdomen/Pelvis showing 9.4 x 6.4 mm in left mid ureteral stone present with additional bilateral non obstructing renal stones present measuring 5.1 mm in the right and 7.0 mm on the left. Bilateral renal cysts are present.  - s/p Stent Placed; Cultures showing VRE and switched to IV Unasyn and discharge on Augementin  - continue home tamsulosin 0.4mg po cap daily  - Urology (Dr. Yates)

## 2022-12-27 NOTE — DIETITIAN INITIAL EVALUATION ADULT - PERTINENT MEDS FT
MEDICATIONS  (STANDING):  allopurinol 100 milliGRAM(s) Oral daily  amoxicillin 500 milliGRAM(s) Oral three times a day  apixaban 5 milliGRAM(s) Oral every 12 hours  atenolol  Tablet 25 milliGRAM(s) Oral daily  cholecalciferol 1000 Unit(s) Oral daily  famotidine    Tablet 20 milliGRAM(s) Oral daily  furosemide    Tablet 40 milliGRAM(s) Oral daily  influenza  Vaccine (HIGH DOSE) 0.7 milliLiter(s) IntraMuscular once  tamsulosin 0.4 milliGRAM(s) Oral daily  timolol 0.5% Solution 1 Drop(s) Both EYES two times a day  tiotropium 2.5 MICROgram(s) Inhaler 2 Puff(s) Inhalation daily  zinc sulfate 220 milliGRAM(s) Oral daily    MEDICATIONS  (PRN):  acetaminophen     Tablet .. 650 milliGRAM(s) Oral every 6 hours PRN Temp greater or equal to 38C (100.4F), Mild Pain (1 - 3)  albuterol    90 MICROgram(s) HFA Inhaler 2 Puff(s) Inhalation every 6 hours PRN Shortness of Breath and/or Wheezing  senna 2 Tablet(s) Oral at bedtime PRN Constipation

## 2022-12-27 NOTE — DIETITIAN INITIAL EVALUATION ADULT - PROBLEM/PLAN-1
INJECTION    Patient: Jeyson Rose    YOB: 1956    MRN: [id number]    Chief Complaint   Patient presents with   • Right Knee - Follow-up, Injections   • Left Knee - Follow-up, Injections       History of Present Illness:   Mr. Rose returns today for bilateral knee injections with Monovisc.  He was initially seen last week on 8/13/2019 and had x-rays performed of both knees.  His right knee is considerably worse than the left although both have moderate to severe arthritic changes at the medial aspect of the joint.  He reports aching and burning greater in the right than left.  He reports pain and swelling with standing for prolonged time, walking and kneeling and is able to get some relief with rest although his symptoms tend to increase at nighttime.    This problem is not new to this examiner.     Allergies:   Allergies   Allergen Reactions   • Sulfa Antibiotics Nausea Only       Medications:   Home Medications:  Current Outpatient Medications on File Prior to Visit   Medication Sig   • amLODIPine (NORVASC) 10 MG tablet Take 10 mg by mouth Daily.   • carvedilol (COREG) 12.5 MG tablet take 1 tablet by mouth twice a day   • furosemide (LASIX) 40 MG tablet Take 40 mg by mouth 2 (Two) Times a Day.   • gabapentin (NEURONTIN) 300 MG capsule Take 300 mg by mouth.   • losartan-hydrochlorothiazide (HYZAAR) 100-25 MG per tablet Take 1 tablet by mouth Daily.   • potassium chloride (KAYCIEL) 20 MEQ/15ML (10%) solution Take  by mouth Daily.     No current facility-administered medications on file prior to visit.      Current Medications:  Scheduled Meds:  PRN Meds:.    I have reviewed the patient's medical history in detail and updated the computerized patient record.  Review and summarization of old records include:    History reviewed. No pertinent past medical history.  No past surgical history on file.  Social History     Occupational History   • Not on file   Tobacco Use   • Smoking status: Former  "Smoker   Substance and Sexual Activity   • Alcohol use: Yes   • Drug use: Not on file   • Sexual activity: Not on file      Social History     Social History Narrative   • Not on file     History reviewed. No pertinent family history.    Review of Systems:  Constitutional: Negative.    Eyes: Negative.    Respiratory: Negative.    Cardiovascular: Negative.    Endocrine: Negative.    Musculoskeletal: Positive for arthralgias and joint swelling.   Skin: Negative.    Allergic/Immunologic: Negative.    Neurological: Negative.    Hematological: Negative.    Psychiatric/Behavioral: Negative.            Wt Readings from Last 3 Encounters:   08/20/19 99.3 kg (219 lb)     Ht Readings from Last 3 Encounters:   08/20/19 170.2 cm (67\")     Body mass index is 34.3 kg/m².  Facility age limit for growth percentiles is 20 years.  Vitals:    08/20/19 0948   Temp: 97.9 °F (36.6 °C)       Physical Exam:  Constitutional: Patient is oriented to person, place, and time. Appears well-developed and well-nourished.   HENT:   Head: Normocephalic and atraumatic.   Eyes: Conjunctivae and EOM are normal. Pupils are equal, round, and reactive to light.   Cardiovascular: Normal rate and intact distal pulses.   Pulmonary/Chest: Effort normal and breath sounds normal.   Musculoskeletal:   See detailed exam below   Neurological: Alert and oriented to person, place, and time. No sensory deficit. Coordination normal.   Skin: Skin is warm and dry. Capillary refill takes less than 2 seconds. No rash noted. No erythema.   Psychiatric: Patient has a normal mood and affect. His behavior is normal. Judgment and thought content normal.   Nursing note and vitals reviewed.      Musculoskeletal Exam:    Affected Knee(s): Bilateral knee.   Patient has crepitus throughout range of motion.   Mild effusion on right, no effusion on left.   Mild joint line tenderness is noted on the medial aspect of the knees.   Patient has a varus orientation of the knee.   There is " fullness and tenderness in the Popliteal fossa. Mild distention of a Popliteal cyst is noted in this location.   Range of motion: Left knee: Flexion 0- 120 degrees Right knee: Flexion 0-100 degrees  Neurovascular status is intact.  Dorsalis pedis and posterior tibial artery pulses are palpable. Common peroneal nerve function is well preserved.   Patient's gait is cautious and antalgic. Skin and soft tissues are mildly swollen, consistent with synovitis and effusion. The patient has a significant limp with the first few steps after starting the gait cycle.       Diagnostics:  xrays obtained last on 8/13/2019   Right knee: KL scale grade 4  Left knee: KL scale grade 3      Procedure:  Large Joint Arthrocentesis: R knee  Date/Time: 8/20/2019 10:15 AM  Consent given by: patient  Site marked: site marked  Timeout: Immediately prior to procedure a time out was called to verify the correct patient, procedure, equipment, support staff and site/side marked as required   Supporting Documentation  Indications: pain   Procedure Details  Location: knee - R knee  Preparation: Patient was prepped and draped in the usual sterile fashion  Needle size: 22 G  Approach: anteromedial  Medications administered: 88 mg Hyaluronan 88 MG/4ML; 2 mL lidocaine PF 1% 1 %  Patient tolerance: patient tolerated the procedure well with no immediate complications    Large Joint Arthrocentesis: L knee  Date/Time: 8/20/2019 10:44 AM  Consent given by: patient  Site marked: site marked  Timeout: Immediately prior to procedure a time out was called to verify the correct patient, procedure, equipment, support staff and site/side marked as required   Supporting Documentation  Indications: pain   Procedure Details  Location: knee - L knee  Preparation: Patient was prepped and draped in the usual sterile fashion  Needle size: 22 G  Approach: anteromedial  Medications administered: 2 mL lidocaine PF 1% 1 %; 88 mg Hyaluronan 88 MG/4ML  Patient tolerance:  patient tolerated the procedure well with no immediate complications          Assessment:   Jeyson was seen today for follow-up, injections, follow-up and injections.    Diagnoses and all orders for this visit:    Primary osteoarthritis of both knees  -     Large Joint Arthrocentesis  -     Large Joint Arthrocentesis          Plan:   · Injected patient's bilateral knee joint(s)with Monovisc from an anteromedial approach   · Compression/brace   · Rest, ice, compression, and elevation (RICE) therapy  · OTC Alternate Ibuprofen and Tylenol as needed  · Follow up in 6 month(s)    Date of encounter: 08/20/2019   Angel Russo PA-C    Electronically signed by Angel Russo PA-C, 08/20/19, 10:40 AM.     DISPLAY PLAN FREE TEXT

## 2022-12-27 NOTE — PROGRESS NOTE ADULT - PROBLEM SELECTOR PROBLEM 5
Chronic atrial fibrillation
Leg swelling
Chronic atrial fibrillation
Leg swelling

## 2022-12-27 NOTE — DIETITIAN INITIAL EVALUATION ADULT - OTHER INFO
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 Patient was recently hospitalized at [facility] from 11/22 -12/06/22 for AMS and UTI. Urine cultures at the time grew ESBL Klebsiella and patient was treated with ertapenem. He was discharged to [facility] with chronic indwelling kennedy catheter.  patient weight 220# this admit and bedscale. patient states weight 265#   12/26 BM     History of Present Illness:  Reason for Admission: Nephrolithiasis  History of Present Illness:   68 y/o male PMH of asthma, HTN, kidney cancer in remission, glaucoma, afib on eliquis, s/p L total knee replacement in September presents to the ED for increased confusion with lower abdominal pain. Patient was recently hospitalized at [facility] from 11/22 -12/06/22 for AMS and UTI. Urine cultures at the time grew ESBL Klebsiella and patient was treated with ertapenem. He was discharged to [facility] with chronic indwelling kennedy catheter.  patient weight 220# this admit and bedscale. patient states weight 265#   12/26 BM     History of Present Illness:  Reason for Admission: Nephrolithiasis  History of Present Illness:   68 y/o male PMH of asthma, HTN, kidney cancer in remission, glaucoma, afib on eliquis, s/p L total knee replacement in September presents to the ED for increased confusion with lower abdominal pain. Patient was recently hospitalized at [facility] from 11/22 -12/06/22 for AMS and UTI. Urine cultures at the time grew ESBL Klebsiella and patient was treated with ertapenem. He was discharged to [facility] with chronic indwelling kennedy catheter.  patient weight 220# this admit and bedscale. patient states weight 265#   12/26 BM

## 2022-12-27 NOTE — PROGRESS NOTE ADULT - SUBJECTIVE AND OBJECTIVE BOX
Neurology Follow up note    KENNETH [id number]    HPI:  68 y/o male PMH of asthma, HTN, kidney cancer in remission, glaucoma, afib on eliquis, s/p L total knee replacement in September presents to the ED for increased confusion with lower abdominal pain. Patient was recently hospitalized at [facility] from 11/22 -12/06/22 for AMS and UTI. Urine cultures at the time grew ESBL Klebsiella and patient was treated with ertapenem. He was discharged to [facility] with chronic indwelling kennedy catheter. Patient's family wanted to bring him home after 3 days, however, they soon felt they were unable to properly care for him at home.  At time of exam, patient denies having any pain. He is slightly anxious about being in the hospital but was A&Ox3.   Patient denies having any pain, nausea, fevers, chills, sob, vomiting, diarrhea, constipation.    ED Course  T 97.6F -> 98.6 F HR 74 /60 -> 155/61 RR 19 SpO2 99% on RA  Labs significant for H/H 10.4/36.2 (bl ~8-9)  BUN/Cr 38/1.30 (bl 1-1.2) Albumin 2.4 AST 46  UA: mod leuk esterase, 11-25 wbc, 25-50 rbc, occasional bacteria    CXR: grossly normal , f/u official read  EKG: Afib HR 70 BPM    CT Head: Limited by streak artifact.  Subcentimeter bilateral hypodensities present in the bilateral basal   ganglia, likely related to age-indeterminate lacunar infarcts. Findings   are superimposed upon chronic microangiopathic white matter changes  To the visualized extent, no intracranial hemorrhage or mass effect.     CT Abdomen/Pelvis: 9.4 x 6.4 mm in left mid ureteral stone present. Dilated left renal pelvis without mario hydronephrosis. Additional bilateral non obstructing renal stones are present measuring 5.1 mm in the right and 7.0 mm on the left. Bilateral renal cysts are present.    In the ED, patient received Rocephin x1 dose, LR bolus x1   (20 Dec 2022 00:38)      Interval History -no new weakness    Patient is seen, chart was reviewed and case was discussed with the treatment team.  Pt is not in any distress.   Lying on bed comfortably.       Vital Signs Last 24 Hrs  T(C): 37.4 (27 Dec 2022 11:45), Max: 37.4 (27 Dec 2022 11:45)  T(F): 99.3 (27 Dec 2022 11:45), Max: 99.3 (27 Dec 2022 11:45)  HR: 82 (27 Dec 2022 11:45) (76 - 100)  BP: 133/81 (27 Dec 2022 11:45) (126/77 - 151/88)  BP(mean): --  RR: 19 (27 Dec 2022 11:45) (19 - 20)  SpO2: 94% (27 Dec 2022 11:45) (93% - 95%)    Parameters below as of 27 Dec 2022 11:45  Patient On (Oxygen Delivery Method): room air            REVIEW OF SYSTEMS:    Constitutional: No fever,   Eyes: No eye pain, visual disturbances, or discharge  ENT:  No difficulty hearing, tinnitus, vertigo; No sinus or throat pain  Neck: No pain or stiffness  Respiratory: No cough, wheezing, chills or hemoptysis  Cardiovascular: No chest pain, palpitations, shortness of breath, dizziness or leg swelling  Gastrointestinal: No abdominal or epigastric pain. No nausea, vomiting.  Genitourinary: No dysuria,  hematuria   Neurological: No headaches, memory loss,  numbness or tremors  Psychiatric: No depression, anxiety, mood swings or difficulty sleeping  Musculoskeletal: No joint pain or swelling; No muscle, back or extremity pain  Skin: No itching, burning, rashes or lesions   Lymph Nodes: No enlarged glands  Endocrine: No heat or cold intolerance; No hair loss,   Allergy and Immunologic: No hives or eczema    On Neurological Examination:    Mental Status - Pt is alert, awake, oriented X3.  Follows commands well and able to answer questions appropriately.Mood and affect  normal    Speech -  Normal.     Cranial Nerves - Pupils 3 mm equal and reactive to light, extraocular eye movements intact. Pt has no visual field deficit.  Pt has no  facial asymmetry. Facial sensation is intact.Tongue - is in midline.    Muscle tone - is normal    Motor Exam -right hand grasp; elbow 4+/5 ; shoulder 2//5  RLE 4/5  Left hand grasp 4+5 elbow 3-4/5(old weakness) ; shoulder 1/5      Sensory Exam - Pt withdraws all extremities equally on stimulation. No asymmetry seen.          Deep tendon Reflexes - 2 plus all over.         Neck Supple -  Yes.     MEDICATIONS    acetaminophen     Tablet .. 650 milliGRAM(s) Oral every 6 hours PRN  albuterol    90 MICROgram(s) HFA Inhaler 2 Puff(s) Inhalation every 6 hours PRN  allopurinol 100 milliGRAM(s) Oral daily  amoxicillin 500 milliGRAM(s) Oral three times a day  apixaban 5 milliGRAM(s) Oral every 12 hours  atenolol  Tablet 25 milliGRAM(s) Oral daily  cholecalciferol 1000 Unit(s) Oral daily  famotidine    Tablet 20 milliGRAM(s) Oral daily  furosemide    Tablet 40 milliGRAM(s) Oral daily  influenza  Vaccine (HIGH DOSE) 0.7 milliLiter(s) IntraMuscular once  polyethylene glycol 3350 17 Gram(s) Oral daily PRN  senna 2 Tablet(s) Oral at bedtime PRN  tamsulosin 0.4 milliGRAM(s) Oral daily  timolol 0.5% Solution 1 Drop(s) Both EYES two times a day  tiotropium 2.5 MICROgram(s) Inhaler 2 Puff(s) Inhalation daily  zinc sulfate 220 milliGRAM(s) Oral daily      Allergies    No Known Allergies    Intolerances        LABS:  CBC Full  -  ( 27 Dec 2022 05:54 )  WBC Count : 9.25 K/uL  RBC Count : 4.40 M/uL  Hemoglobin : 10.3 g/dL  Hematocrit : 35.0 %  Platelet Count - Automated : 338 K/uL  Mean Cell Volume : 79.5 fl  Mean Cell Hemoglobin : 23.4 pg  Mean Cell Hemoglobin Concentration : 29.4 gm/dL  Auto Neutrophil # : 6.58 K/uL  Auto Lymphocyte # : 1.60 K/uL  Auto Monocyte # : 0.77 K/uL  Auto Eosinophil # : 0.22 K/uL  %      12-27    144  |  108  |  34<H>  ----------------------------<  100<H>  3.6   |  27  |  1.20    Ca    8.8      27 Dec 2022 05:54    TPro  6.4  /  Alb  2.2<L>  /  TBili  0.9  /  DBili  x   /  AST  11<L>  /  ALT  12  /  AlkPhos  77  12-27    Hemoglobin A1C:   Lipid Panel 12-27 @ 05:54  Total Cholesterol, Serum 135  LDL --  Triglycerides 63    Vitamin B12     RADIOLOGY    ASSESSMENT AND PLAN:      seen for ams/abnormal  ct head  likely ME  old bilateral BG Lacunar infarct  bilateral weakness of shoulder ?cervical radiculopathy    mri of c- spine  continue ac  management dw wife  Physical therapy evaluation.  Pain is accessed and addressed.  Would continue to follow.               Neurology Follow up note    KENNETH [id number]    HPI:  66 y/o male PMH of asthma, HTN, kidney cancer in remission, glaucoma, afib on eliquis, s/p L total knee replacement in September presents to the ED for increased confusion with lower abdominal pain. Patient was recently hospitalized at [facility] from 11/22 -12/06/22 for AMS and UTI. Urine cultures at the time grew ESBL Klebsiella and patient was treated with ertapenem. He was discharged to [facility] with chronic indwelling kennedy catheter. Patient's family wanted to bring him home after 3 days, however, they soon felt they were unable to properly care for him at home.  At time of exam, patient denies having any pain. He is slightly anxious about being in the hospital but was A&Ox3.   Patient denies having any pain, nausea, fevers, chills, sob, vomiting, diarrhea, constipation.    ED Course  T 97.6F -> 98.6 F HR 74 /60 -> 155/61 RR 19 SpO2 99% on RA  Labs significant for H/H 10.4/36.2 (bl ~8-9)  BUN/Cr 38/1.30 (bl 1-1.2) Albumin 2.4 AST 46  UA: mod leuk esterase, 11-25 wbc, 25-50 rbc, occasional bacteria    CXR: grossly normal , f/u official read  EKG: Afib HR 70 BPM    CT Head: Limited by streak artifact.  Subcentimeter bilateral hypodensities present in the bilateral basal   ganglia, likely related to age-indeterminate lacunar infarcts. Findings   are superimposed upon chronic microangiopathic white matter changes  To the visualized extent, no intracranial hemorrhage or mass effect.     CT Abdomen/Pelvis: 9.4 x 6.4 mm in left mid ureteral stone present. Dilated left renal pelvis without mario hydronephrosis. Additional bilateral non obstructing renal stones are present measuring 5.1 mm in the right and 7.0 mm on the left. Bilateral renal cysts are present.    In the ED, patient received Rocephin x1 dose, LR bolus x1   (20 Dec 2022 00:38)      Interval History -no new weakness    Patient is seen, chart was reviewed and case was discussed with the treatment team.  Pt is not in any distress.   Lying on bed comfortably.       Vital Signs Last 24 Hrs  T(C): 37.4 (27 Dec 2022 11:45), Max: 37.4 (27 Dec 2022 11:45)  T(F): 99.3 (27 Dec 2022 11:45), Max: 99.3 (27 Dec 2022 11:45)  HR: 82 (27 Dec 2022 11:45) (76 - 100)  BP: 133/81 (27 Dec 2022 11:45) (126/77 - 151/88)  BP(mean): --  RR: 19 (27 Dec 2022 11:45) (19 - 20)  SpO2: 94% (27 Dec 2022 11:45) (93% - 95%)    Parameters below as of 27 Dec 2022 11:45  Patient On (Oxygen Delivery Method): room air            REVIEW OF SYSTEMS:    Constitutional: No fever,   Eyes: No eye pain, visual disturbances, or discharge  ENT:  No difficulty hearing, tinnitus, vertigo; No sinus or throat pain  Neck: No pain or stiffness  Respiratory: No cough, wheezing, chills or hemoptysis  Cardiovascular: No chest pain, palpitations, shortness of breath, dizziness or leg swelling  Gastrointestinal: No abdominal or epigastric pain. No nausea, vomiting.  Genitourinary: No dysuria,  hematuria   Neurological: No headaches, memory loss,  numbness or tremors  Psychiatric: No depression, anxiety, mood swings or difficulty sleeping  Musculoskeletal: No joint pain or swelling; No muscle, back or extremity pain  Skin: No itching, burning, rashes or lesions   Lymph Nodes: No enlarged glands  Endocrine: No heat or cold intolerance; No hair loss,   Allergy and Immunologic: No hives or eczema    On Neurological Examination:    Mental Status - Pt is alert, awake, oriented X3.  Follows commands well and able to answer questions appropriately.Mood and affect  normal    Speech -  Normal.     Cranial Nerves - Pupils 3 mm equal and reactive to light, extraocular eye movements intact. Pt has no visual field deficit.  Pt has no  facial asymmetry. Facial sensation is intact.Tongue - is in midline.    Muscle tone - is normal    Motor Exam -right hand grasp; elbow 4+/5 ; shoulder 2//5  RLE 4/5  Left hand grasp 4+5 elbow 3-4/5(old weakness) ; shoulder 1/5      Sensory Exam - Pt withdraws all extremities equally on stimulation. No asymmetry seen.          Deep tendon Reflexes - 2 plus all over.         Neck Supple -  Yes.     MEDICATIONS    acetaminophen     Tablet .. 650 milliGRAM(s) Oral every 6 hours PRN  albuterol    90 MICROgram(s) HFA Inhaler 2 Puff(s) Inhalation every 6 hours PRN  allopurinol 100 milliGRAM(s) Oral daily  amoxicillin 500 milliGRAM(s) Oral three times a day  apixaban 5 milliGRAM(s) Oral every 12 hours  atenolol  Tablet 25 milliGRAM(s) Oral daily  cholecalciferol 1000 Unit(s) Oral daily  famotidine    Tablet 20 milliGRAM(s) Oral daily  furosemide    Tablet 40 milliGRAM(s) Oral daily  influenza  Vaccine (HIGH DOSE) 0.7 milliLiter(s) IntraMuscular once  polyethylene glycol 3350 17 Gram(s) Oral daily PRN  senna 2 Tablet(s) Oral at bedtime PRN  tamsulosin 0.4 milliGRAM(s) Oral daily  timolol 0.5% Solution 1 Drop(s) Both EYES two times a day  tiotropium 2.5 MICROgram(s) Inhaler 2 Puff(s) Inhalation daily  zinc sulfate 220 milliGRAM(s) Oral daily      Allergies    No Known Allergies    Intolerances        LABS:  CBC Full  -  ( 27 Dec 2022 05:54 )  WBC Count : 9.25 K/uL  RBC Count : 4.40 M/uL  Hemoglobin : 10.3 g/dL  Hematocrit : 35.0 %  Platelet Count - Automated : 338 K/uL  Mean Cell Volume : 79.5 fl  Mean Cell Hemoglobin : 23.4 pg  Mean Cell Hemoglobin Concentration : 29.4 gm/dL  Auto Neutrophil # : 6.58 K/uL  Auto Lymphocyte # : 1.60 K/uL  Auto Monocyte # : 0.77 K/uL  Auto Eosinophil # : 0.22 K/uL  %      12-27    144  |  108  |  34<H>  ----------------------------<  100<H>  3.6   |  27  |  1.20    Ca    8.8      27 Dec 2022 05:54    TPro  6.4  /  Alb  2.2<L>  /  TBili  0.9  /  DBili  x   /  AST  11<L>  /  ALT  12  /  AlkPhos  77  12-27    Hemoglobin A1C:   Lipid Panel 12-27 @ 05:54  Total Cholesterol, Serum 135  LDL --  Triglycerides 63    Vitamin B12     RADIOLOGY    ASSESSMENT AND PLAN:      seen for ams/abnormal  ct head  likely ME  old bilateral BG Lacunar infarct  bilateral weakness of shoulder ?cervical radiculopathy    mri of c- spine  continue ac  management dw wife  Physical therapy evaluation.  Pain is accessed and addressed.  Would continue to follow.               Neurology Follow up note    KENNETH [id number]    HPI:  66 y/o male PMH of asthma, HTN, kidney cancer in remission, glaucoma, afib on eliquis, s/p L total knee replacement in September presents to the ED for increased confusion with lower abdominal pain. Patient was recently hospitalized at [facility] from 11/22 -12/06/22 for AMS and UTI. Urine cultures at the time grew ESBL Klebsiella and patient was treated with ertapenem. He was discharged to [facility] with chronic indwelling kennedy catheter. Patient's family wanted to bring him home after 3 days, however, they soon felt they were unable to properly care for him at home.  At time of exam, patient denies having any pain. He is slightly anxious about being in the hospital but was A&Ox3.   Patient denies having any pain, nausea, fevers, chills, sob, vomiting, diarrhea, constipation.    ED Course  T 97.6F -> 98.6 F HR 74 /60 -> 155/61 RR 19 SpO2 99% on RA  Labs significant for H/H 10.4/36.2 (bl ~8-9)  BUN/Cr 38/1.30 (bl 1-1.2) Albumin 2.4 AST 46  UA: mod leuk esterase, 11-25 wbc, 25-50 rbc, occasional bacteria    CXR: grossly normal , f/u official read  EKG: Afib HR 70 BPM    CT Head: Limited by streak artifact.  Subcentimeter bilateral hypodensities present in the bilateral basal   ganglia, likely related to age-indeterminate lacunar infarcts. Findings   are superimposed upon chronic microangiopathic white matter changes  To the visualized extent, no intracranial hemorrhage or mass effect.     CT Abdomen/Pelvis: 9.4 x 6.4 mm in left mid ureteral stone present. Dilated left renal pelvis without mario hydronephrosis. Additional bilateral non obstructing renal stones are present measuring 5.1 mm in the right and 7.0 mm on the left. Bilateral renal cysts are present.    In the ED, patient received Rocephin x1 dose, LR bolus x1   (20 Dec 2022 00:38)      Interval History -no new weakness    Patient is seen, chart was reviewed and case was discussed with the treatment team.  Pt is not in any distress.   Lying on bed comfortably.       Vital Signs Last 24 Hrs  T(C): 37.4 (27 Dec 2022 11:45), Max: 37.4 (27 Dec 2022 11:45)  T(F): 99.3 (27 Dec 2022 11:45), Max: 99.3 (27 Dec 2022 11:45)  HR: 82 (27 Dec 2022 11:45) (76 - 100)  BP: 133/81 (27 Dec 2022 11:45) (126/77 - 151/88)  BP(mean): --  RR: 19 (27 Dec 2022 11:45) (19 - 20)  SpO2: 94% (27 Dec 2022 11:45) (93% - 95%)    Parameters below as of 27 Dec 2022 11:45  Patient On (Oxygen Delivery Method): room air            REVIEW OF SYSTEMS:    Constitutional: No fever,   Eyes: No eye pain, visual disturbances, or discharge  ENT:  No difficulty hearing, tinnitus, vertigo; No sinus or throat pain  Neck: No pain or stiffness  Respiratory: No cough, wheezing, chills or hemoptysis  Cardiovascular: No chest pain, palpitations, shortness of breath, dizziness or leg swelling  Gastrointestinal: No abdominal or epigastric pain. No nausea, vomiting.  Genitourinary: No dysuria,  hematuria   Neurological: No headaches, memory loss,  numbness or tremors  Psychiatric: No depression, anxiety, mood swings or difficulty sleeping  Musculoskeletal: No joint pain or swelling; No muscle, back or extremity pain  Skin: No itching, burning, rashes or lesions   Lymph Nodes: No enlarged glands  Endocrine: No heat or cold intolerance; No hair loss,   Allergy and Immunologic: No hives or eczema    On Neurological Examination:    Mental Status - Pt is alert, awake, oriented X3.  Follows commands well and able to answer questions appropriately.Mood and affect  normal    Speech -  Normal.     Cranial Nerves - Pupils 3 mm equal and reactive to light, extraocular eye movements intact. Pt has no visual field deficit.  Pt has no  facial asymmetry. Facial sensation is intact.Tongue - is in midline.    Muscle tone - is normal    Motor Exam -right hand grasp; elbow 4+/5 ; shoulder 2//5  RLE 4/5  Left hand grasp 4+5 elbow 3-4/5(old weakness) ; shoulder 1/5      Sensory Exam - Pt withdraws all extremities equally on stimulation. No asymmetry seen.          Deep tendon Reflexes - 2 plus all over.         Neck Supple -  Yes.     MEDICATIONS    acetaminophen     Tablet .. 650 milliGRAM(s) Oral every 6 hours PRN  albuterol    90 MICROgram(s) HFA Inhaler 2 Puff(s) Inhalation every 6 hours PRN  allopurinol 100 milliGRAM(s) Oral daily  amoxicillin 500 milliGRAM(s) Oral three times a day  apixaban 5 milliGRAM(s) Oral every 12 hours  atenolol  Tablet 25 milliGRAM(s) Oral daily  cholecalciferol 1000 Unit(s) Oral daily  famotidine    Tablet 20 milliGRAM(s) Oral daily  furosemide    Tablet 40 milliGRAM(s) Oral daily  influenza  Vaccine (HIGH DOSE) 0.7 milliLiter(s) IntraMuscular once  polyethylene glycol 3350 17 Gram(s) Oral daily PRN  senna 2 Tablet(s) Oral at bedtime PRN  tamsulosin 0.4 milliGRAM(s) Oral daily  timolol 0.5% Solution 1 Drop(s) Both EYES two times a day  tiotropium 2.5 MICROgram(s) Inhaler 2 Puff(s) Inhalation daily  zinc sulfate 220 milliGRAM(s) Oral daily      Allergies    No Known Allergies    Intolerances        LABS:  CBC Full  -  ( 27 Dec 2022 05:54 )  WBC Count : 9.25 K/uL  RBC Count : 4.40 M/uL  Hemoglobin : 10.3 g/dL  Hematocrit : 35.0 %  Platelet Count - Automated : 338 K/uL  Mean Cell Volume : 79.5 fl  Mean Cell Hemoglobin : 23.4 pg  Mean Cell Hemoglobin Concentration : 29.4 gm/dL  Auto Neutrophil # : 6.58 K/uL  Auto Lymphocyte # : 1.60 K/uL  Auto Monocyte # : 0.77 K/uL  Auto Eosinophil # : 0.22 K/uL  %      12-27    144  |  108  |  34<H>  ----------------------------<  100<H>  3.6   |  27  |  1.20    Ca    8.8      27 Dec 2022 05:54    TPro  6.4  /  Alb  2.2<L>  /  TBili  0.9  /  DBili  x   /  AST  11<L>  /  ALT  12  /  AlkPhos  77  12-27    Hemoglobin A1C:   Lipid Panel 12-27 @ 05:54  Total Cholesterol, Serum 135  LDL --  Triglycerides 63    Vitamin B12     RADIOLOGY    ASSESSMENT AND PLAN:      seen for ams/abnormal  ct head  likely ME  old bilateral BG Lacunar infarct  bilateral weakness of shoulder ?cervical radiculopathy    mri of c- spine  continue ac  management dw wife  Physical therapy evaluation.  Pain is accessed and addressed.  Would continue to follow.

## 2022-12-27 NOTE — PROGRESS NOTE ADULT - PROBLEM SELECTOR PLAN 6
BP elevated at time of admission 150/60 -> 155/61   - Continue home atenolol  - Monitor routine hemodynamics
- Continue home eliquis 5mg BID, atenolol
BP elevated at time of admission 150/60 -> 155/61   - Continue home atenolol  - Monitor routine hemodynamics
- Continue home eliquis 5mg BID, atenolol

## 2022-12-27 NOTE — PROGRESS NOTE ADULT - PROBLEM SELECTOR PLAN 8
- Continue home latanoprost 0.005% opthalamic soln 1 drop to each eye at bedtime  - Continue home timolol maleate 0.5% opthalmic soln 1 drop to each eye bid
- Continue home tiotropium  - Albuterol prn
- Continue home latanoprost 0.005% opthalamic soln 1 drop to each eye at bedtime  - Continue home timolol maleate 0.5% opthalmic soln 1 drop to each eye bid

## 2022-12-27 NOTE — PROGRESS NOTE ADULT - ASSESSMENT
66 y/o male PMH of asthma, HTN, kidney cancer in remission, glaucoma, afib on eliquis, s/p L total knee replacement in September presented to the ED and was admitted 12/20 for increased confusion with lower abdominal pain. Found to have 9.4 x 6.4 mm in left mid ureteral stone and bilateral non obstructing renal stones are present measuring 5.1 mm in the right and 7.0 mm on the left. sp   Cystoscopic insertion of ureteral stent 20-Dec-2022 12:24:54  Manuel Yates  XR pyelogram retrograde left 20-Dec-2022 12:25:08  Manuel Yates.  Growing amp sensitive VRE    RECOMMENDATIONS  PT with nephrolithiasis and urinary tract infection with VRE that is ampicillin sensitive. No benefit to beta-lactamase inhibitor rec  Amoxicillin 500mg PO TID with last day 12/29    From an ID standpoint no further requirement for inpatient status for the management of ID issues. Fine with discharge from ID standpoint when other medical issues no longer require inpatient care and social issues allow for a safe discharge plan. To schedule an outpatient ID follow up appointment please call our office at [phone]    Thank you for consulting us and involving us in the management of this most interesting and challenging case.  We will follow along in the care of this patient. Please call us at [phone] or text me directly on my cell# at [phone] with any concerns.   68 y/o male PMH of asthma, HTN, kidney cancer in remission, glaucoma, afib on eliquis, s/p L total knee replacement in September presented to the ED and was admitted 12/20 for increased confusion with lower abdominal pain. Found to have 9.4 x 6.4 mm in left mid ureteral stone and bilateral non obstructing renal stones are present measuring 5.1 mm in the right and 7.0 mm on the left. sp   Cystoscopic insertion of ureteral stent 20-Dec-2022 12:24:54  Manuel Yates  XR pyelogram retrograde left 20-Dec-2022 12:25:08  Manuel Yates.  Growing amp sensitive VRE    RECOMMENDATIONS  PT with nephrolithiasis and urinary tract infection with VRE that is ampicillin sensitive. No benefit to beta-lactamase inhibitor rec  Amoxicillin 500mg PO TID with last day 12/29    From an ID standpoint no further requirement for inpatient status for the management of ID issues. Fine with discharge from ID standpoint when other medical issues no longer require inpatient care and social issues allow for a safe discharge plan. To schedule an outpatient ID follow up appointment please call our office at [phone]    Thank you for consulting us and involving us in the management of this most interesting and challenging case.  We will follow along in the care of this patient. Please call us at [phone] or text me directly on my cell# at [phone] with any concerns.   66 y/o male PMH of asthma, HTN, kidney cancer in remission, glaucoma, afib on eliquis, s/p L total knee replacement in September presented to the ED and was admitted 12/20 for increased confusion with lower abdominal pain. Found to have 9.4 x 6.4 mm in left mid ureteral stone and bilateral non obstructing renal stones are present measuring 5.1 mm in the right and 7.0 mm on the left. sp   Cystoscopic insertion of ureteral stent 20-Dec-2022 12:24:54  Manuel Yates  XR pyelogram retrograde left 20-Dec-2022 12:25:08  Manuel Yates.  Growing amp sensitive VRE    RECOMMENDATIONS  PT with nephrolithiasis and urinary tract infection with VRE that is ampicillin sensitive. No benefit to beta-lactamase inhibitor rec  Amoxicillin 500mg PO TID with last day 12/29    From an ID standpoint no further requirement for inpatient status for the management of ID issues. Fine with discharge from ID standpoint when other medical issues no longer require inpatient care and social issues allow for a safe discharge plan. To schedule an outpatient ID follow up appointment please call our office at [phone]    Thank you for consulting us and involving us in the management of this most interesting and challenging case.  We will follow along in the care of this patient. Please call us at [phone] or text me directly on my cell# at [phone] with any concerns.

## 2022-12-27 NOTE — DIETITIAN INITIAL EVALUATION ADULT - ORAL INTAKE PTA/DIET HISTORY
patient reports from [facility]. no transfer papers found in chart.   patient reports with good PO here and at [facility] (home after [facility])  no food allergies  denies problems chewing swallowing  no education at this time on regular diet  patient reports from [facility]. no transfer papers found in chart.   patient reports with good PO here and at [facility] (home after [facility])  no food allergies  denies problems chewing swallowing  no education at this time on regular diet  patient reports from [facility]. no transfer papers found in chart.   patient reports with good PO here and at [facility] (home after [facility])  no food allergies  denies problems chewing swallowing  no education at this time on regular diet

## 2022-12-27 NOTE — PROGRESS NOTE ADULT - PROBLEM SELECTOR PLAN 3
Patient presented with AMS to ED likely 2/2 UTI. Hx of recurrent UTIs.  - UA showing mod leuk esterase, 11-25 wbc, 25-50 rbc, occasional bacteria  - Previous Urine Cx grew ESBL klebsiella, but this time VRE  - Chronic kennedy catheter in place  - F/u urine and blood cultures  - Continue Abx   - ID consulted
Hx of Kidney cancer, now in remission  - s/p nephrectomy (~2017/2018)  - follows with Dr. Duke (urology)
Patient presented with AMS to ED likely 2/2 UTI. Hx of recurrent UTIs.  - UA showing mod leuk esterase, 11-25 wbc, 25-50 rbc, occasional bacteria  - Previous Urine Cx grew ESBL klebsiella, but this time VRE  - Chronic kennedy catheter in place  - F/u urine and blood cultures  - Continue Abx   - ID consulted
Patient presented with AMS to ED likely 2/2 UTI. Hx of recurrent UTIs.  - UA showing mod leuk esterase, 11-25 wbc, 25-50 rbc, occasional bacteria  - Previous Urine Cx grew ESBL klebsiella, but this time VRE  - Chronic kennedy catheter in place  - F/u urine and blood cultures  - Continue Abx   - ID consulted
Patient presented with AMS to ED likely 2/2 UTI. Hx of recurrent UTIs.  - UA showing mod leuk esterase, 11-25 wbc, 25-50 rbc, occasional bacteria  - Previous Urine Cx grew ESBL klebsiella, treated with ertapenem. restarted on ertapenem   - Chronic kennedy catheter in place  - F/u urine and blood cultures
Hx of Kidney cancer, now in remission  - s/p nephrectomy (~2017/2018)  - follows with Dr. Duke (urology)

## 2022-12-27 NOTE — PROGRESS NOTE ADULT - SUBJECTIVE AND OBJECTIVE BOX
Patient is a 67y old  Male who presents with a chief complaint of Nephrolithiasis (27 Dec 2022 12:24)      INTERVAL HPI/OVERNIGHT EVENTS: Patient seen and examined at bedside. No overnight events occurred. Patient states he feels somewhat improved from yesterday. Denies fevers, chills, headache, lightheadedness, chest pain, dyspnea, abdominal pain, n/v/d. Admits constipation    MEDICATIONS  (STANDING):  allopurinol 100 milliGRAM(s) Oral daily  amoxicillin 500 milliGRAM(s) Oral three times a day  apixaban 5 milliGRAM(s) Oral every 12 hours  atenolol  Tablet 25 milliGRAM(s) Oral daily  cholecalciferol 1000 Unit(s) Oral daily  famotidine    Tablet 20 milliGRAM(s) Oral daily  furosemide    Tablet 40 milliGRAM(s) Oral daily  influenza  Vaccine (HIGH DOSE) 0.7 milliLiter(s) IntraMuscular once  tamsulosin 0.4 milliGRAM(s) Oral daily  timolol 0.5% Solution 1 Drop(s) Both EYES two times a day  tiotropium 2.5 MICROgram(s) Inhaler 2 Puff(s) Inhalation daily  zinc sulfate 220 milliGRAM(s) Oral daily    MEDICATIONS  (PRN):  acetaminophen     Tablet .. 650 milliGRAM(s) Oral every 6 hours PRN Temp greater or equal to 38C (100.4F), Mild Pain (1 - 3)  albuterol    90 MICROgram(s) HFA Inhaler 2 Puff(s) Inhalation every 6 hours PRN Shortness of Breath and/or Wheezing  polyethylene glycol 3350 17 Gram(s) Oral daily PRN Constipation  senna 2 Tablet(s) Oral at bedtime PRN Constipation      Allergies    No Known Allergies    Intolerances        REVIEW OF SYSTEMS:  CONSTITUTIONAL: admits generalized weakness, no fevers or chills  EYES/ENT: No visual changes;  No vertigo or throat pain   NECK: No pain or stiffness  RESPIRATORY: No cough, wheezing, hemoptysis; No shortness of breath  CARDIOVASCULAR: No chest pain or palpitations  GASTROINTESTINAL: No abdominal or epigastric pain. No nausea, vomiting, or hematemesis  GENITOURINARY: No dysuria, frequency or hematuria  NEUROLOGICAL: No numbness or tingling      Vital Signs Last 24 Hrs  T(C): 37.4 (27 Dec 2022 11:45), Max: 37.4 (27 Dec 2022 11:45)  T(F): 99.3 (27 Dec 2022 11:45), Max: 99.3 (27 Dec 2022 11:45)  HR: 82 (27 Dec 2022 11:45) (76 - 100)  BP: 133/81 (27 Dec 2022 11:45) (126/77 - 151/88)  BP(mean): --  RR: 19 (27 Dec 2022 11:45) (19 - 20)  SpO2: 94% (27 Dec 2022 11:45) (93% - 95%)    Parameters below as of 27 Dec 2022 11:45  Patient On (Oxygen Delivery Method): room air        PHYSICAL EXAM:  General: laying in bed, NAD  Neurology: A&Ox3, nonfocal  Respiratory: diminished breath sounds B/L  CV:  S1S2, no murmurs, rubs or gallops  Abdominal: Soft, NT, ND +BS  Extremities: 1+ pitting edema B/L LE, + peripheral pulses    LABS:                        10.3   9.25  )-----------( 338      ( 27 Dec 2022 05:54 )             35.0     CBC Full  -  ( 27 Dec 2022 05:54 )  WBC Count : 9.25 K/uL  Hemoglobin : 10.3 g/dL  Hematocrit : 35.0 %  Platelet Count - Automated : 338 K/uL  Mean Cell Volume : 79.5 fl  Mean Cell Hemoglobin : 23.4 pg  Mean Cell Hemoglobin Concentration : 29.4 gm/dL  Auto Neutrophil # : 6.58 K/uL  Auto Lymphocyte # : 1.60 K/uL  Auto Monocyte # : 0.77 K/uL  Auto Eosinophil # : 0.22 K/uL  Auto Basophil # : 0.05 K/uL  Auto Neutrophil % : 71.2 %  Auto Lymphocyte % : 17.3 %  Auto Monocyte % : 8.3 %  Auto Eosinophil % : 2.4 %  Auto Basophil % : 0.5 %    27 Dec 2022 05:54    144    |  108    |  34     ----------------------------<  100    3.6     |  27     |  1.20     Ca    8.8        27 Dec 2022 05:54    TPro  6.4    /  Alb  2.2    /  TBili  0.9    /  DBili  x      /  AST  11     /  ALT  12     /  AlkPhos  77     27 Dec 2022 05:54        CAPILLARY BLOOD GLUCOSE      POCT Blood Glucose.: 162 mg/dL (26 Dec 2022 16:44)          RADIOLOGY & ADDITIONAL TESTS:    Personally reviewed.     Consultant(s) Notes Reviewed:  [x] YES  [ ] NO

## 2022-12-27 NOTE — PROGRESS NOTE ADULT - PROBLEM SELECTOR PLAN 4
Patient with chronic b/l leg swelling, no known h/o heart failure   - holding am lasix due to renal function. unclear baseline. will restart as needed post cysto  - Continue supplemental KCl tablet  - monitor renal function, Cr stable currently  - recent tte - EF 56%, mild pulm htn, dilated L atrium
Patient with chronic b/l leg swelling, no known h/o heart failure   - holding am lasix due to renal function. unclear baseline. will restart as needed post cysto  - Continue supplemental KCl tablet  - monitor renal function, Cr stable currently  - recent tte - EF 56%, mild pulm htn, dilated L atrium
Patient presented with AMS to ED likely 2/2 UTI. Hx of recurrent UTIs.  - UA showing mod leuk esterase, 11-25 wbc, 25-50 rbc, occasional bacteria  - Previous Urine Cx grew ESBL klebsiella, but this time VRE  - Chronic kennedy catheter in place  - F/u urine and blood cultures  - Continue Abx   - ID consulted
Patient with chronic b/l leg swelling, no known h/o heart failure   - holding am lasix due to renal function. unclear baseline. will restart as needed post cysto  - Continue supplemental KCl tablet  - monitor renal function, Cr stable currently  - recent tte - EF 56%, mild pulm htn, dilated L atrium
Patient presented with AMS to ED likely 2/2 UTI. Hx of recurrent UTIs.  - UA showing mod leuk esterase, 11-25 wbc, 25-50 rbc, occasional bacteria  - Previous Urine Cx grew ESBL klebsiella, but this time VRE  - Chronic kennedy catheter in place  - F/u urine and blood cultures  - Continue Abx   - ID consulted
Patient with chronic b/l leg swelling, no known h/o heart failure   - holding am lasix due to renal function. unclear baseline. will restart as needed post cysto  - Continue supplemental KCl tablet  - monitor renal function, Cr stable currently  - recent tte - EF 56%, mild pulm htn, dilated L atrium

## 2022-12-27 NOTE — PROGRESS NOTE ADULT - TIME BILLING
note written by attending  see above
plan of care discussed with patient, RN, Neuro, SW, wife over phone at length
Note written by attending, see above.  Time spent: 42min. More than 50% of the visit was spent counseling the patient on medical condition and coordination of care

## 2022-12-27 NOTE — PROGRESS NOTE ADULT - PROBLEM SELECTOR PLAN 5
Patient with chronic b/l leg swelling, no known h/o heart failure   - continue lasix  - Continue supplemental KCl tablet  - monitor renal function, Cr stable currently  - recent tte - EF 56%, mild pulm htn, dilated L atrium
- Continue home eliquis 5mg BID
Patient with chronic b/l leg swelling, no known h/o heart failure   - continue lasix  - Continue supplemental KCl tablet  - monitor renal function, Cr stable currently  - recent tte - EF 56%, mild pulm htn, dilated L atrium
- Continue home eliquis 5mg BID

## 2022-12-27 NOTE — PROGRESS NOTE ADULT - PROBLEM SELECTOR PROBLEM 6
Hypertension
Hypertension
Chronic atrial fibrillation
Hypertension
Chronic atrial fibrillation
Hypertension

## 2022-12-27 NOTE — PROGRESS NOTE ADULT - PROBLEM SELECTOR PLAN 10
- Eliquis 5mg BID     Dispo: Pt with multiple recent hospitalization- was recently in rehab.  consult PT & SW for dispo planning
- Eliquis 5mg BID     Dispo: Pt with multiple recent hospitalization- was recently in rehab.  consult PT & SW for dispo planning

## 2022-12-27 NOTE — PROGRESS NOTE ADULT - SUBJECTIVE AND OBJECTIVE BOX
[facility] DIVISION of INFECTIOUS DISEASE  Aleksander Gomez MD PhD, Eunice Youngblood MD, Ana Hogan MD, Rickey Swenson MD, Bradly Dubon MD  and providing coverage with Linda Salmeron MD  Providing Infectious Disease Consultations at [facility], [facility] [place] [facility], [facility]'s    Office# [phone] to schedule follow up appointments  Answering Service for urgent calls or New Consults [phone]  Cell# to text for urgent issues Aleksander Gomez [phone]     infectious diseases progress note:    KENNETH MULLER is a 67y y. o. Male patient    Overnight and events of the last 24hrs reviewed    Allergies    No Known Allergies    Intolerances        ANTIBIOTICS/RELEVANT:  antimicrobials  amoxicillin 500 milliGRAM(s) Oral three times a day    immunologic:  influenza  Vaccine (HIGH DOSE) 0.7 milliLiter(s) IntraMuscular once    OTHER:  acetaminophen     Tablet .. 650 milliGRAM(s) Oral every 6 hours PRN  albuterol    90 MICROgram(s) HFA Inhaler 2 Puff(s) Inhalation every 6 hours PRN  allopurinol 100 milliGRAM(s) Oral daily  apixaban 5 milliGRAM(s) Oral every 12 hours  atenolol  Tablet 25 milliGRAM(s) Oral daily  cholecalciferol 1000 Unit(s) Oral daily  famotidine    Tablet 20 milliGRAM(s) Oral daily  furosemide    Tablet 40 milliGRAM(s) Oral daily  senna 2 Tablet(s) Oral at bedtime PRN  tamsulosin 0.4 milliGRAM(s) Oral daily  timolol 0.5% Solution 1 Drop(s) Both EYES two times a day  tiotropium 2.5 MICROgram(s) Inhaler 2 Puff(s) Inhalation daily  zinc sulfate 220 milliGRAM(s) Oral daily      Objective:  Vital Signs Last 24 Hrs  T(C): 37.4 (27 Dec 2022 11:45), Max: 37.4 (27 Dec 2022 11:45)  T(F): 99.3 (27 Dec 2022 11:45), Max: 99.3 (27 Dec 2022 11:45)  HR: 82 (27 Dec 2022 11:45) (57 - 100)  BP: 133/81 (27 Dec 2022 11:45) (117/73 - 151/88)  BP(mean): --  RR: 19 (27 Dec 2022 11:45) (18 - 20)  SpO2: 94% (27 Dec 2022 11:45) (93% - 95%)    Parameters below as of 27 Dec 2022 11:45  Patient On (Oxygen Delivery Method): room air        T(C): 37.4 (12-27-22 @ 11:45), Max: 37.4 (12-27-22 @ 11:45)  T(C): 37.4 (12-27-22 @ 11:45), Max: 37.4 (12-25-22 @ 05:12)  T(C): 37.4 (12-27-22 @ 11:45), Max: 37.4 (12-25-22 @ 05:12)    PHYSICAL EXAM:  HEENT: NC atraumatic  Neck: supple  Respiratory: no accessory muscle use, breathing comfortably  Cardiovascular: distant  Gastrointestinal: normal appearing, nondistended  Extremities: no clubbing, no cyanosis,        LABS:                          10.3   9.25  )-----------( 338      ( 27 Dec 2022 05:54 )             35.0       WBC  9.25 12-27 @ 05:54  8.01 12-25 @ 06:10  8.11 12-21 @ 06:04      12-27    144  |  108  |  34<H>  ----------------------------<  100<H>  3.6   |  27  |  1.20    Ca    8.8      27 Dec 2022 05:54    TPro  6.4  /  Alb  2.2<L>  /  TBili  0.9  /  DBili  x   /  AST  11<L>  /  ALT  12  /  AlkPhos  77  12-27      Creatinine, Serum: 1.20 mg/dL (12-27-22 @ 05:54)  Creatinine, Serum: 1.20 mg/dL (12-25-22 @ 06:10)  Creatinine, Serum: 1.20 mg/dL (12-21-22 @ 06:04)                INFLAMMATORY MARKERS      MICROBIOLOGY:              RADIOLOGY & ADDITIONAL STUDIES:   [facility] DIV[facility] of INFECTIOUS DISEASE  Aleksandre Gomez MD PhD, Eunice Youngblood MD, Ana Hogan MD, Rickey Swenson MD, Bradly Dubon MD  and providing coverage with Linda Salmeron MD  Providing Infectious Disease Consultations at [facility], [facility], [facility], [facility]'s    Office# [phone] to schedule follow up appointments  Answering Service for urgent calls or New Consults [phone]  Cell# to text for urgent issues Aleksander Gomez [phone]     infectious diseases progress note:    KENNETH MULLER is a 67y y. o. Male patient    Overnight and events of the last 24hrs reviewed    Allergies    No Known Allergies    Intolerances        ANTIBIOTICS/RELEVANT:  antimicrobials  amoxicillin 500 milliGRAM(s) Oral three times a day    immunologic:  influenza  Vaccine (HIGH DOSE) 0.7 milliLiter(s) IntraMuscular once    OTHER:  acetaminophen     Tablet .. 650 milliGRAM(s) Oral every 6 hours PRN  albuterol    90 MICROgram(s) HFA Inhaler 2 Puff(s) Inhalation every 6 hours PRN  allopurinol 100 milliGRAM(s) Oral daily  apixaban 5 milliGRAM(s) Oral every 12 hours  atenolol  Tablet 25 milliGRAM(s) Oral daily  cholecalciferol 1000 Unit(s) Oral daily  famotidine    Tablet 20 milliGRAM(s) Oral daily  furosemide    Tablet 40 milliGRAM(s) Oral daily  senna 2 Tablet(s) Oral at bedtime PRN  tamsulosin 0.4 milliGRAM(s) Oral daily  timolol 0.5% Solution 1 Drop(s) Both EYES two times a day  tiotropium 2.5 MICROgram(s) Inhaler 2 Puff(s) Inhalation daily  zinc sulfate 220 milliGRAM(s) Oral daily      Objective:  Vital Signs Last 24 Hrs  T(C): 37.4 (27 Dec 2022 11:45), Max: 37.4 (27 Dec 2022 11:45)  T(F): 99.3 (27 Dec 2022 11:45), Max: 99.3 (27 Dec 2022 11:45)  HR: 82 (27 Dec 2022 11:45) (57 - 100)  BP: 133/81 (27 Dec 2022 11:45) (117/73 - 151/88)  BP(mean): --  RR: 19 (27 Dec 2022 11:45) (18 - 20)  SpO2: 94% (27 Dec 2022 11:45) (93% - 95%)    Parameters below as of 27 Dec 2022 11:45  Patient On (Oxygen Delivery Method): room air        T(C): 37.4 (12-27-22 @ 11:45), Max: 37.4 (12-27-22 @ 11:45)  T(C): 37.4 (12-27-22 @ 11:45), Max: 37.4 (12-25-22 @ 05:12)  T(C): 37.4 (12-27-22 @ 11:45), Max: 37.4 (12-25-22 @ 05:12)    PHYSICAL EXAM:  HEENT: NC atraumatic  Neck: supple  Respiratory: no accessory muscle use, breathing comfortably  Cardiovascular: distant  Gastrointestinal: normal appearing, nondistended  Extremities: no clubbing, no cyanosis,        LABS:                          10.3   9.25  )-----------( 338      ( 27 Dec 2022 05:54 )             35.0       WBC  9.25 12-27 @ 05:54  8.01 12-25 @ 06:10  8.11 12-21 @ 06:04      12-27    144  |  108  |  34<H>  ----------------------------<  100<H>  3.6   |  27  |  1.20    Ca    8.8      27 Dec 2022 05:54    TPro  6.4  /  Alb  2.2<L>  /  TBili  0.9  /  DBili  x   /  AST  11<L>  /  ALT  12  /  AlkPhos  77  12-27      Creatinine, Serum: 1.20 mg/dL (12-27-22 @ 05:54)  Creatinine, Serum: 1.20 mg/dL (12-25-22 @ 06:10)  Creatinine, Serum: 1.20 mg/dL (12-21-22 @ 06:04)                INFLAMMATORY MARKERS      MICROBIOLOGY:              RADIOLOGY & ADDITIONAL STUDIES:   [facility] DIV[facility] of INFECTIOUS DISEASE  Aleksander Gomez MD PhD, Eunice Youngblood MD, Ana Hogan MD, Rickey Swenson MD, Bradly Dubon MD  and providing coverage with Linda Salmeron MD  Providing Infectious Disease Consultations at [facility], [facility] [place] [facility], [facility]'s    Office# [phone] to schedule follow up appointments  Answering Service for urgent calls or New Consults [phone]  Cell# to text for urgent issues Aleksander Gomez [phone]     infectious diseases progress note:    KENNETH MULLER is a 67y y. o. Male patient    Overnight and events of the last 24hrs reviewed    Allergies    No Known Allergies    Intolerances        ANTIBIOTICS/RELEVANT:  antimicrobials  amoxicillin 500 milliGRAM(s) Oral three times a day    immunologic:  influenza  Vaccine (HIGH DOSE) 0.7 milliLiter(s) IntraMuscular once    OTHER:  acetaminophen     Tablet .. 650 milliGRAM(s) Oral every 6 hours PRN  albuterol    90 MICROgram(s) HFA Inhaler 2 Puff(s) Inhalation every 6 hours PRN  allopurinol 100 milliGRAM(s) Oral daily  apixaban 5 milliGRAM(s) Oral every 12 hours  atenolol  Tablet 25 milliGRAM(s) Oral daily  cholecalciferol 1000 Unit(s) Oral daily  famotidine    Tablet 20 milliGRAM(s) Oral daily  furosemide    Tablet 40 milliGRAM(s) Oral daily  senna 2 Tablet(s) Oral at bedtime PRN  tamsulosin 0.4 milliGRAM(s) Oral daily  timolol 0.5% Solution 1 Drop(s) Both EYES two times a day  tiotropium 2.5 MICROgram(s) Inhaler 2 Puff(s) Inhalation daily  zinc sulfate 220 milliGRAM(s) Oral daily      Objective:  Vital Signs Last 24 Hrs  T(C): 37.4 (27 Dec 2022 11:45), Max: 37.4 (27 Dec 2022 11:45)  T(F): 99.3 (27 Dec 2022 11:45), Max: 99.3 (27 Dec 2022 11:45)  HR: 82 (27 Dec 2022 11:45) (57 - 100)  BP: 133/81 (27 Dec 2022 11:45) (117/73 - 151/88)  BP(mean): --  RR: 19 (27 Dec 2022 11:45) (18 - 20)  SpO2: 94% (27 Dec 2022 11:45) (93% - 95%)    Parameters below as of 27 Dec 2022 11:45  Patient On (Oxygen Delivery Method): room air        T(C): 37.4 (12-27-22 @ 11:45), Max: 37.4 (12-27-22 @ 11:45)  T(C): 37.4 (12-27-22 @ 11:45), Max: 37.4 (12-25-22 @ 05:12)  T(C): 37.4 (12-27-22 @ 11:45), Max: 37.4 (12-25-22 @ 05:12)    PHYSICAL EXAM:  HEENT: NC atraumatic  Neck: supple  Respiratory: no accessory muscle use, breathing comfortably  Cardiovascular: distant  Gastrointestinal: normal appearing, nondistended  Extremities: no clubbing, no cyanosis,        LABS:                          10.3   9.25  )-----------( 338      ( 27 Dec 2022 05:54 )             35.0       WBC  9.25 12-27 @ 05:54  8.01 12-25 @ 06:10  8.11 12-21 @ 06:04      12-27    144  |  108  |  34<H>  ----------------------------<  100<H>  3.6   |  27  |  1.20    Ca    8.8      27 Dec 2022 05:54    TPro  6.4  /  Alb  2.2<L>  /  TBili  0.9  /  DBili  x   /  AST  11<L>  /  ALT  12  /  AlkPhos  77  12-27      Creatinine, Serum: 1.20 mg/dL (12-27-22 @ 05:54)  Creatinine, Serum: 1.20 mg/dL (12-25-22 @ 06:10)  Creatinine, Serum: 1.20 mg/dL (12-21-22 @ 06:04)                INFLAMMATORY MARKERS      MICROBIOLOGY:              RADIOLOGY & ADDITIONAL STUDIES:

## 2022-12-27 NOTE — PROGRESS NOTE ADULT - PROBLEM SELECTOR PLAN 9
- Eliquis 5mg BID (patient bedbound, afib, remote hx of dvt,  high risk for dvt/pe, will not stop AC for cystoscopy     Dispo: Pt with multiple recent hospitalization- was recently in rehab.  consult PT & SW for dispo planning
- Continue home tiotropium  - Albuterol prn
- Eliquis 5mg BID (patient bedbound, afib, remote hx of dvt,  high risk for dvt/pe, will not stop AC for cystoscopy     Dispo: Pt with multiple recent hospitalization- was recently in rehab.  consult PT & SW for dispo planning
- Eliquis 5mg BID (patient bedbound, afib, remote hx of dvt,  high risk for dvt/pe, will not stop AC for cystoscopy     Dispo: Pt with multiple recent hospitalization- was recently in rehab.  consult PT & SW for dispo planning
- Continue home tiotropium  - Albuterol prn
- Eliquis 5mg BID (patient bedbound, afib, remote hx of dvt,  high risk for dvt/pe, will not stop AC for cystoscopy     Dispo: Pt with multiple recent hospitalization- was recently in rehab.  consult PT & SW for dispo planning

## 2022-12-27 NOTE — PROGRESS NOTE ADULT - PROBLEM SELECTOR PLAN 2
-Patient initially sent from [facility] to hospital for increasing confusion and abd pain- noted to have UTI due to kidney stone, had cystoscopy with stent placement  -Patient had CT head on admission showing age indeterminate lacunar infarcts, as per patient and wife no history of CVA in past, and patient has been compliant with eliquis (was prescribed for afib)  -Neuro Dr. Ibrahim consulted   - MR head No acute intracranial hemorrhage, acute infarction, extra-axial fluid   collection or hydrocephalus.Chronic lacunar infarctions of the bilateral basal ganglia.  - continue statin -Patient initially sent from [facility] to hospital for increasing confusion and abd pain- noted to have UTI due to kidney stone, had cystoscopy with stent placement  -Patient had CT head on admission showing age indeterminate lacunar infarcts, as per patient and wife no history of CVA in past, and patient has been compliant with eliquis (was prescribed for afib)  -Neuro Dr. Ibrahim consulted   - MR head No acute intracranial hemorrhage, acute infarction, extra-axial fluid   collection or hydrocephalus.Chronic lacunar infarctions of the bilateral basal ganglia.  - continue statin -Patient initially sent from [facility] to hospital for increasing confusion and abd pain- noted to have UTI due to kidney stone, had cystoscopy with stent placement  -Patient had CT head on admission showing age indeterminate lacunar infarcts, as per patient and wife no history of CVA in past, and patient has been compliant with eliquis (was prescribed for afib)  -Neuro Dr. Ibrahim consulted   - MR head No acute intracranial hemorrhage, acute infarction, extra-axial fluid   collection or hydrocephalus.Chronic lacunar infarctions of the bilateral basal ganglia.  - continue statin

## 2022-12-27 NOTE — DIETITIAN INITIAL EVALUATION ADULT - PERTINENT LABORATORY DATA
12-27    144  |  108  |  34<H>  ----------------------------<  100<H>  3.6   |  27  |  1.20    Ca    8.8      27 Dec 2022 05:54    TPro  6.4  /  Alb  2.2<L>  /  TBili  0.9  /  DBili  x   /  AST  11<L>  /  ALT  12  /  AlkPhos  77  12-27  POCT Blood Glucose.: 162 mg/dL (12-26-22 @ 16:44)

## 2022-12-28 LAB
ALBUMIN SERPL ELPH-MCNC: 2.3 G/DL — LOW (ref 3.3–5)
ALP SERPL-CCNC: 74 U/L — SIGNIFICANT CHANGE UP (ref 40–120)
ALT FLD-CCNC: 12 U/L — SIGNIFICANT CHANGE UP (ref 12–78)
ANION GAP SERPL CALC-SCNC: 7 MMOL/L — SIGNIFICANT CHANGE UP (ref 5–17)
AST SERPL-CCNC: 9 U/L — LOW (ref 15–37)
BASOPHILS # BLD AUTO: 0.04 K/UL — SIGNIFICANT CHANGE UP (ref 0–0.2)
BASOPHILS NFR BLD AUTO: 0.5 % — SIGNIFICANT CHANGE UP (ref 0–2)
BILIRUB SERPL-MCNC: 0.9 MG/DL — SIGNIFICANT CHANGE UP (ref 0.2–1.2)
BUN SERPL-MCNC: 34 MG/DL — HIGH (ref 7–23)
CALCIUM SERPL-MCNC: 9.1 MG/DL — SIGNIFICANT CHANGE UP (ref 8.5–10.1)
CHLORIDE SERPL-SCNC: 108 MMOL/L — SIGNIFICANT CHANGE UP (ref 96–108)
CO2 SERPL-SCNC: 29 MMOL/L — SIGNIFICANT CHANGE UP (ref 22–31)
CREAT SERPL-MCNC: 1.2 MG/DL — SIGNIFICANT CHANGE UP (ref 0.5–1.3)
EGFR: 67 ML/MIN/1.73M2 — SIGNIFICANT CHANGE UP
EOSINOPHIL # BLD AUTO: 0.23 K/UL — SIGNIFICANT CHANGE UP (ref 0–0.5)
EOSINOPHIL NFR BLD AUTO: 3 % — SIGNIFICANT CHANGE UP (ref 0–6)
GLUCOSE SERPL-MCNC: 96 MG/DL — SIGNIFICANT CHANGE UP (ref 70–99)
HCT VFR BLD CALC: 32.3 % — LOW (ref 39–50)
HGB BLD-MCNC: 9.5 G/DL — LOW (ref 13–17)
IMM GRANULOCYTES NFR BLD AUTO: 0.4 % — SIGNIFICANT CHANGE UP (ref 0–0.9)
LYMPHOCYTES # BLD AUTO: 1.59 K/UL — SIGNIFICANT CHANGE UP (ref 1–3.3)
LYMPHOCYTES # BLD AUTO: 21 % — SIGNIFICANT CHANGE UP (ref 13–44)
MCHC RBC-ENTMCNC: 23.5 PG — LOW (ref 27–34)
MCHC RBC-ENTMCNC: 29.4 GM/DL — LOW (ref 32–36)
MCV RBC AUTO: 79.8 FL — LOW (ref 80–100)
MONOCYTES # BLD AUTO: 0.57 K/UL — SIGNIFICANT CHANGE UP (ref 0–0.9)
MONOCYTES NFR BLD AUTO: 7.5 % — SIGNIFICANT CHANGE UP (ref 2–14)
NEUTROPHILS # BLD AUTO: 5.12 K/UL — SIGNIFICANT CHANGE UP (ref 1.8–7.4)
NEUTROPHILS NFR BLD AUTO: 67.6 % — SIGNIFICANT CHANGE UP (ref 43–77)
NRBC # BLD: 0 /100 WBCS — SIGNIFICANT CHANGE UP (ref 0–0)
PLATELET # BLD AUTO: 345 K/UL — SIGNIFICANT CHANGE UP (ref 150–400)
POTASSIUM SERPL-MCNC: 3.6 MMOL/L — SIGNIFICANT CHANGE UP (ref 3.5–5.3)
POTASSIUM SERPL-SCNC: 3.6 MMOL/L — SIGNIFICANT CHANGE UP (ref 3.5–5.3)
PROT SERPL-MCNC: 6.2 G/DL — SIGNIFICANT CHANGE UP (ref 6–8.3)
RBC # BLD: 4.05 M/UL — LOW (ref 4.2–5.8)
RBC # FLD: 18.3 % — HIGH (ref 10.3–14.5)
SODIUM SERPL-SCNC: 144 MMOL/L — SIGNIFICANT CHANGE UP (ref 135–145)
WBC # BLD: 7.58 K/UL — SIGNIFICANT CHANGE UP (ref 3.8–10.5)
WBC # FLD AUTO: 7.58 K/UL — SIGNIFICANT CHANGE UP (ref 3.8–10.5)

## 2022-12-28 PROCEDURE — 99232 SBSQ HOSP IP/OBS MODERATE 35: CPT

## 2022-12-28 RX ORDER — SENNA PLUS 8.6 MG/1
2 TABLET ORAL
Qty: 0 | Refills: 0 | DISCHARGE
Start: 2022-12-28

## 2022-12-28 RX ORDER — AMOXICILLIN 250 MG/5ML
1 SUSPENSION, RECONSTITUTED, ORAL (ML) ORAL
Qty: 0 | Refills: 0 | DISCHARGE
Start: 2022-12-28

## 2022-12-28 RX ORDER — SENNA PLUS 8.6 MG/1
2 TABLET ORAL AT BEDTIME
Refills: 0 | Status: DISCONTINUED | OUTPATIENT
Start: 2022-12-28 | End: 2022-12-30

## 2022-12-28 RX ORDER — POLYETHYLENE GLYCOL 3350 17 G/17G
17 POWDER, FOR SOLUTION ORAL
Qty: 0 | Refills: 0 | DISCHARGE
Start: 2022-12-28

## 2022-12-28 RX ADMIN — Medication 1000 UNIT(S): at 12:04

## 2022-12-28 RX ADMIN — APIXABAN 5 MILLIGRAM(S): 2.5 TABLET, FILM COATED ORAL at 22:20

## 2022-12-28 RX ADMIN — Medication 1 DROP(S): at 18:04

## 2022-12-28 RX ADMIN — FAMOTIDINE 20 MILLIGRAM(S): 10 INJECTION INTRAVENOUS at 12:04

## 2022-12-28 RX ADMIN — TIOTROPIUM BROMIDE 2 PUFF(S): 18 CAPSULE ORAL; RESPIRATORY (INHALATION) at 06:41

## 2022-12-28 RX ADMIN — Medication 500 MILLIGRAM(S): at 05:15

## 2022-12-28 RX ADMIN — Medication 1 DROP(S): at 05:15

## 2022-12-28 RX ADMIN — Medication 100 MILLIGRAM(S): at 12:05

## 2022-12-28 RX ADMIN — Medication 500 MILLIGRAM(S): at 14:09

## 2022-12-28 RX ADMIN — TAMSULOSIN HYDROCHLORIDE 0.4 MILLIGRAM(S): 0.4 CAPSULE ORAL at 12:03

## 2022-12-28 RX ADMIN — APIXABAN 5 MILLIGRAM(S): 2.5 TABLET, FILM COATED ORAL at 09:48

## 2022-12-28 RX ADMIN — Medication 500 MILLIGRAM(S): at 22:20

## 2022-12-28 RX ADMIN — ATENOLOL 25 MILLIGRAM(S): 25 TABLET ORAL at 05:15

## 2022-12-28 RX ADMIN — SENNA PLUS 2 TABLET(S): 8.6 TABLET ORAL at 22:20

## 2022-12-28 RX ADMIN — Medication 40 MILLIGRAM(S): at 05:15

## 2022-12-28 RX ADMIN — ZINC SULFATE TAB 220 MG (50 MG ZINC EQUIVALENT) 220 MILLIGRAM(S): 220 (50 ZN) TAB at 12:05

## 2022-12-28 NOTE — PROGRESS NOTE ADULT - SUBJECTIVE AND OBJECTIVE BOX
CHIEF COMPLAINT/INTERVAL HISTORY:  Pt. seen and evaluated for nephrolithiasis and UTI.  Pt. is in no distress.  Tolerating oral antibiotics.  Awaiting MELANIE placement.      REVIEW OF SYSTEMS:  No fever, CP, SOB, or abdominal pain    Vital Signs Last 24 Hrs  T(C): 36.8 (28 Dec 2022 05:12), Max: 37.4 (27 Dec 2022 11:45)  T(F): 98.3 (28 Dec 2022 05:12), Max: 99.3 (27 Dec 2022 11:45)  HR: 87 (28 Dec 2022 05:12) (71 - 87)  BP: 147/99 (28 Dec 2022 05:12) (124/84 - 159/80)  BP(mean): --  RR: 18 (28 Dec 2022 05:12) (18 - 19)  SpO2: 95% (28 Dec 2022 05:12) (92% - 95%)    Parameters below as of 28 Dec 2022 05:12  Patient On (Oxygen Delivery Method): room air        PHYSICAL EXAM:  GENERAL: NAD  HEENT: EOMI, hearing normal, conjunctiva and sclera clear  Chest: diminished BS at bases, no wheezing  CV: S1S2, RRR,   GI: soft, +BS, NT/ND  Musculoskeletal: 1+ LE edema  Psychiatric: affect nL, mood nL  Skin: warm and dry    LABS:                        9.5    7.58  )-----------( 345      ( 28 Dec 2022 05:45 )             32.3     12-28    144  |  108  |  34<H>  ----------------------------<  96  3.6   |  29  |  1.20    Ca    9.1      28 Dec 2022 05:45    TPro  6.2  /  Alb  2.3<L>  /  TBili  0.9  /  DBili  x   /  AST  9<L>  /  ALT  12  /  AlkPhos  74  12-28

## 2022-12-28 NOTE — PROGRESS NOTE ADULT - ASSESSMENT
66 y/o male PMH of asthma, HTN, kidney cancer in remission, glaucoma, afib on eliquis, s/p L total knee replacement in September presented to the ED and was admitted 12/20 for increased confusion with lower abdominal pain. Found to have 9.4 x 6.4 mm in left mid ureteral stone and bilateral non obstructing renal stones are present measuring 5.1 mm in the right and 7.0 mm on the left. sp   Cystoscopic insertion of ureteral stent 20-Dec-2022 12:24:54  Manuel Yates  XR pyelogram retrograde left 20-Dec-2022 12:25:08  Manuel Yates.  Growing amp sensitive VRE    RECOMMENDATIONS  PT with nephrolithiasis and urinary tract infection with VRE that is ampicillin sensitive. No benefit to beta-lactamase inhibitor rec    Amoxicillin 500mg PO TID with last day 12/29    From an ID standpoint no further requirement for inpatient status for the management of ID issues. Fine with discharge from ID standpoint when other medical issues no longer require inpatient care and social issues allow for a safe discharge plan. To schedule an outpatient ID follow up appointment please call our office at [phone]    Thank you for consulting us and involving us in the management of this most interesting and challenging case.  We will follow along in the care of this patient. Please call us at [phone] or text me directly on my cell# at [phone] with any concerns.   66 y/o male PMH of asthma, HTN, kidney cancer in remission, glaucoma, afib on eliquis, s/p L total knee replacement in September presented to the ED and was admitted 12/20 for increased confusion with lower abdominal pain. Found to have 9.4 x 6.4 mm in left mid ureteral stone and bilateral non obstructing renal stones are present measuring 5.1 mm in the right and 7.0 mm on the left. sp   Cystoscopic insertion of ureteral stent 20-Dec-2022 12:24:54  Manuel Yates  XR pyelogram retrograde left 20-Dec-2022 12:25:08  Manuel Yates.  Growing amp sensitive VRE    RECOMMENDATIONS  PT with nephrolithiasis and urinary tract infection with VRE that is ampicillin sensitive. No benefit to beta-lactamase inhibitor rec    Amoxicillin 500mg PO TID with last day 12/29    From an ID standpoint no further requirement for inpatient status for the management of ID issues. Fine with discharge from ID standpoint when other medical issues no longer require inpatient care and social issues allow for a safe discharge plan. To schedule an outpatient ID follow up appointment please call our office at [phone]    Thank you for consulting us and involving us in the management of this most interesting and challenging case.  We will follow along in the care of this patient. Please call us at [phone] or text me directly on my cell# at [phone] with any concerns.   68 y/o male PMH of asthma, HTN, kidney cancer in remission, glaucoma, afib on eliquis, s/p L total knee replacement in September presented to the ED and was admitted 12/20 for increased confusion with lower abdominal pain. Found to have 9.4 x 6.4 mm in left mid ureteral stone and bilateral non obstructing renal stones are present measuring 5.1 mm in the right and 7.0 mm on the left. sp   Cystoscopic insertion of ureteral stent 20-Dec-2022 12:24:54  Manuel Yates  XR pyelogram retrograde left 20-Dec-2022 12:25:08  Manuel Yates.  Growing amp sensitive VRE    RECOMMENDATIONS  PT with nephrolithiasis and urinary tract infection with VRE that is ampicillin sensitive. No benefit to beta-lactamase inhibitor rec    Amoxicillin 500mg PO TID with last day 12/29    From an ID standpoint no further requirement for inpatient status for the management of ID issues. Fine with discharge from ID standpoint when other medical issues no longer require inpatient care and social issues allow for a safe discharge plan. To schedule an outpatient ID follow up appointment please call our office at [phone]    Thank you for consulting us and involving us in the management of this most interesting and challenging case.  We will follow along in the care of this patient. Please call us at [phone] or text me directly on my cell# at [phone] with any concerns.

## 2022-12-28 NOTE — PROGRESS NOTE ADULT - SUBJECTIVE AND OBJECTIVE BOX
Neurology Follow up note    KENNETH [id number]    HPI:  66 y/o male PMH of asthma, HTN, kidney cancer in remission, glaucoma, afib on eliquis, s/p L total knee replacement in September presents to the ED for increased confusion with lower abdominal pain. Patient was recently hospitalized at [facility] from 11/22 -12/06/22 for AMS and UTI. Urine cultures at the time grew ESBL Klebsiella and patient was treated with ertapenem. He was discharged to [facility] with chronic indwelling kennedy catheter. Patient's family wanted to bring him home after 3 days, however, they soon felt they were unable to properly care for him at home.  At time of exam, patient denies having any pain. He is slightly anxious about being in the hospital but was A&Ox3.   Patient denies having any pain, nausea, fevers, chills, sob, vomiting, diarrhea, constipation.    ED Course  T 97.6F -> 98.6 F HR 74 /60 -> 155/61 RR 19 SpO2 99% on RA  Labs significant for H/H 10.4/36.2 (bl ~8-9)  BUN/Cr 38/1.30 (bl 1-1.2) Albumin 2.4 AST 46  UA: mod leuk esterase, 11-25 wbc, 25-50 rbc, occasional bacteria    CXR: grossly normal , f/u official read  EKG: Afib HR 70 BPM    CT Head: Limited by streak artifact.  Subcentimeter bilateral hypodensities present in the bilateral basal   ganglia, likely related to age-indeterminate lacunar infarcts. Findings   are superimposed upon chronic microangiopathic white matter changes  To the visualized extent, no intracranial hemorrhage or mass effect.     CT Abdomen/Pelvis: 9.4 x 6.4 mm in left mid ureteral stone present. Dilated left renal pelvis without mario hydronephrosis. Additional bilateral non obstructing renal stones are present measuring 5.1 mm in the right and 7.0 mm on the left. Bilateral renal cysts are present.    In the ED, patient received Rocephin x1 dose, LR bolus x1   (20 Dec 2022 00:38)      Interval History -no complaints    Patient is seen, chart was reviewed and case was discussed with the treatment team.  Pt is not in any distress.   Lying on bed comfortably.       Vital Signs Last 24 Hrs  T(C): 36.8 (28 Dec 2022 11:42), Max: 36.8 (28 Dec 2022 04:08)  T(F): 98.2 (28 Dec 2022 11:42), Max: 98.3 (28 Dec 2022 05:12)  HR: 76 (28 Dec 2022 11:42) (71 - 87)  BP: 130/85 (28 Dec 2022 11:42) (124/84 - 159/80)  BP(mean): --  RR: 18 (28 Dec 2022 11:42) (18 - 19)  SpO2: 97% (28 Dec 2022 11:42) (92% - 97%)    Parameters below as of 28 Dec 2022 11:42  Patient On (Oxygen Delivery Method): room air                REVIEW OF SYSTEMS:    Constitutional: No fever,   Eyes: No eye pain, visual disturbances, or discharge  ENT:  No difficulty hearing, tinnitus, vertigo; No sinus or throat pain  Neck: No pain or stiffness  Respiratory: No cough, wheezing, chills or hemoptysis  Cardiovascular: No chest pain, palpitations, shortness of breath, dizziness or leg swelling  Gastrointestinal: No abdominal or epigastric pain. No nausea, vomiting.  Genitourinary: No dysuria,  hematuria   Neurological: No headaches, memory loss,  numbness or tremors  Psychiatric: No depression, anxiety, mood swings or difficulty sleeping  Musculoskeletal: No joint pain or swelling; No muscle, back or extremity pain  Skin: No itching, burning, rashes or lesions   Lymph Nodes: No enlarged glands  Endocrine: No heat or cold intolerance; No hair loss,   Allergy and Immunologic: No hives or eczema    On Neurological Examination:    Mental Status - Pt is alert, awake, oriented X3.  Follows commands well and able to answer questions appropriately.Mood and affect  normal    Speech -  Normal.     Cranial Nerves - Pupils 3 mm equal and reactive to light, extraocular eye movements intact. Pt has no visual field deficit.  Pt has no  facial asymmetry. Facial sensation is intact.Tongue - is in midline.    Muscle tone - is normal    Motor Exam -right hand grasp; elbow 4+/5 ; shoulder 2//5  RLE 4/5  Left hand grasp 4+5 elbow 3-4/5(old weakness) ; shoulder 1/5      Sensory Exam - Pt withdraws all extremities equally on stimulation. No asymmetry seen.        Deep tendon Reflexes - 2 plus all over.         Neck Supple -  Yes.     MEDICATIONS  (STANDING):  allopurinol 100 milliGRAM(s) Oral daily  amoxicillin 500 milliGRAM(s) Oral three times a day  apixaban 5 milliGRAM(s) Oral every 12 hours  atenolol  Tablet 25 milliGRAM(s) Oral daily  cholecalciferol 1000 Unit(s) Oral daily  famotidine    Tablet 20 milliGRAM(s) Oral daily  furosemide    Tablet 40 milliGRAM(s) Oral daily  influenza  Vaccine (HIGH DOSE) 0.7 milliLiter(s) IntraMuscular once  senna 2 Tablet(s) Oral at bedtime  tamsulosin 0.4 milliGRAM(s) Oral daily  timolol 0.5% Solution 1 Drop(s) Both EYES two times a day  tiotropium 2.5 MICROgram(s) Inhaler 2 Puff(s) Inhalation daily  zinc sulfate 220 milliGRAM(s) Oral daily    MEDICATIONS  (PRN):  acetaminophen     Tablet .. 650 milliGRAM(s) Oral every 6 hours PRN Temp greater or equal to 38C (100.4F), Mild Pain (1 - 3)  albuterol    90 MICROgram(s) HFA Inhaler 2 Puff(s) Inhalation every 6 hours PRN Shortness of Breath and/or Wheezing  polyethylene glycol 3350 17 Gram(s) Oral daily PRN Constipation      Allergies    No Known Allergies    Intolerances      12-28    144  |  108  |  34<H>  ----------------------------<  96  3.6   |  29  |  1.20    Ca    9.1      28 Dec 2022 05:45    TPro  6.2  /  Alb  2.3<L>  /  TBili  0.9  /  DBili  x   /  AST  9<L>  /  ALT  12  /  AlkPhos  74  12-28      Hemoglobin A1C:   Lipid Panel 12-27 @ 05:54  Total Cholesterol, Serum 135  LDL --  Triglycerides 63    Vitamin B12     RADIOLOGY    ASSESSMENT AND PLAN:      seen for ams/abnormal  ct head  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  Pain is accessed and addressed.  Would continue to follow.         Neurology Follow up note    KENNETH [id number]    HPI:  68 y/o male PMH of asthma, HTN, kidney cancer in remission, glaucoma, afib on eliquis, s/p L total knee replacement in September presents to the ED for increased confusion with lower abdominal pain. Patient was recently hospitalized at [facility] from 11/22 -12/06/22 for AMS and UTI. Urine cultures at the time grew ESBL Klebsiella and patient was treated with ertapenem. He was discharged to [facility] with chronic indwelling kennedy catheter. Patient's family wanted to bring him home after 3 days, however, they soon felt they were unable to properly care for him at home.  At time of exam, patient denies having any pain. He is slightly anxious about being in the hospital but was A&Ox3.   Patient denies having any pain, nausea, fevers, chills, sob, vomiting, diarrhea, constipation.    ED Course  T 97.6F -> 98.6 F HR 74 /60 -> 155/61 RR 19 SpO2 99% on RA  Labs significant for H/H 10.4/36.2 (bl ~8-9)  BUN/Cr 38/1.30 (bl 1-1.2) Albumin 2.4 AST 46  UA: mod leuk esterase, 11-25 wbc, 25-50 rbc, occasional bacteria    CXR: grossly normal , f/u official read  EKG: Afib HR 70 BPM    CT Head: Limited by streak artifact.  Subcentimeter bilateral hypodensities present in the bilateral basal   ganglia, likely related to age-indeterminate lacunar infarcts. Findings   are superimposed upon chronic microangiopathic white matter changes  To the visualized extent, no intracranial hemorrhage or mass effect.     CT Abdomen/Pelvis: 9.4 x 6.4 mm in left mid ureteral stone present. Dilated left renal pelvis without mario hydronephrosis. Additional bilateral non obstructing renal stones are present measuring 5.1 mm in the right and 7.0 mm on the left. Bilateral renal cysts are present.    In the ED, patient received Rocephin x1 dose, LR bolus x1   (20 Dec 2022 00:38)      Interval History -no complaints    Patient is seen, chart was reviewed and case was discussed with the treatment team.  Pt is not in any distress.   Lying on bed comfortably.       Vital Signs Last 24 Hrs  T(C): 36.8 (28 Dec 2022 11:42), Max: 36.8 (28 Dec 2022 04:08)  T(F): 98.2 (28 Dec 2022 11:42), Max: 98.3 (28 Dec 2022 05:12)  HR: 76 (28 Dec 2022 11:42) (71 - 87)  BP: 130/85 (28 Dec 2022 11:42) (124/84 - 159/80)  BP(mean): --  RR: 18 (28 Dec 2022 11:42) (18 - 19)  SpO2: 97% (28 Dec 2022 11:42) (92% - 97%)    Parameters below as of 28 Dec 2022 11:42  Patient On (Oxygen Delivery Method): room air                REVIEW OF SYSTEMS:    Constitutional: No fever,   Eyes: No eye pain, visual disturbances, or discharge  ENT:  No difficulty hearing, tinnitus, vertigo; No sinus or throat pain  Neck: No pain or stiffness  Respiratory: No cough, wheezing, chills or hemoptysis  Cardiovascular: No chest pain, palpitations, shortness of breath, dizziness or leg swelling  Gastrointestinal: No abdominal or epigastric pain. No nausea, vomiting.  Genitourinary: No dysuria,  hematuria   Neurological: No headaches, memory loss,  numbness or tremors  Psychiatric: No depression, anxiety, mood swings or difficulty sleeping  Musculoskeletal: No joint pain or swelling; No muscle, back or extremity pain  Skin: No itching, burning, rashes or lesions   Lymph Nodes: No enlarged glands  Endocrine: No heat or cold intolerance; No hair loss,   Allergy and Immunologic: No hives or eczema    On Neurological Examination:    Mental Status - Pt is alert, awake, oriented X3.  Follows commands well and able to answer questions appropriately.Mood and affect  normal    Speech -  Normal.     Cranial Nerves - Pupils 3 mm equal and reactive to light, extraocular eye movements intact. Pt has no visual field deficit.  Pt has no  facial asymmetry. Facial sensation is intact.Tongue - is in midline.    Muscle tone - is normal    Motor Exam -right hand grasp; elbow 4+/5 ; shoulder 2//5  RLE 4/5  Left hand grasp 4+5 elbow 3-4/5(old weakness) ; shoulder 1/5      Sensory Exam - Pt withdraws all extremities equally on stimulation. No asymmetry seen.        Deep tendon Reflexes - 2 plus all over.         Neck Supple -  Yes.     MEDICATIONS  (STANDING):  allopurinol 100 milliGRAM(s) Oral daily  amoxicillin 500 milliGRAM(s) Oral three times a day  apixaban 5 milliGRAM(s) Oral every 12 hours  atenolol  Tablet 25 milliGRAM(s) Oral daily  cholecalciferol 1000 Unit(s) Oral daily  famotidine    Tablet 20 milliGRAM(s) Oral daily  furosemide    Tablet 40 milliGRAM(s) Oral daily  influenza  Vaccine (HIGH DOSE) 0.7 milliLiter(s) IntraMuscular once  senna 2 Tablet(s) Oral at bedtime  tamsulosin 0.4 milliGRAM(s) Oral daily  timolol 0.5% Solution 1 Drop(s) Both EYES two times a day  tiotropium 2.5 MICROgram(s) Inhaler 2 Puff(s) Inhalation daily  zinc sulfate 220 milliGRAM(s) Oral daily    MEDICATIONS  (PRN):  acetaminophen     Tablet .. 650 milliGRAM(s) Oral every 6 hours PRN Temp greater or equal to 38C (100.4F), Mild Pain (1 - 3)  albuterol    90 MICROgram(s) HFA Inhaler 2 Puff(s) Inhalation every 6 hours PRN Shortness of Breath and/or Wheezing  polyethylene glycol 3350 17 Gram(s) Oral daily PRN Constipation      Allergies    No Known Allergies    Intolerances      12-28    144  |  108  |  34<H>  ----------------------------<  96  3.6   |  29  |  1.20    Ca    9.1      28 Dec 2022 05:45    TPro  6.2  /  Alb  2.3<L>  /  TBili  0.9  /  DBili  x   /  AST  9<L>  /  ALT  12  /  AlkPhos  74  12-28      Hemoglobin A1C:   Lipid Panel 12-27 @ 05:54  Total Cholesterol, Serum 135  LDL --  Triglycerides 63    Vitamin B12     RADIOLOGY    ASSESSMENT AND PLAN:      seen for ams/abnormal  ct head  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  Pain is accessed and addressed.  Would continue to follow.         Neurology Follow up note    KENNETH [id number]    HPI:  66 y/o male PMH of asthma, HTN, kidney cancer in remission, glaucoma, afib on eliquis, s/p L total knee replacement in September presents to the ED for increased confusion with lower abdominal pain. Patient was recently hospitalized at [facility] [place] from 11/22 -12/06/22 for AMS and UTI. Urine cultures at the time grew ESBL Klebsiella and patient was treated with ertapenem. He was discharged to [facility] with chronic indwelling kennedy catheter. Patient's family wanted to bring him home after 3 days, however, they soon felt they were unable to properly care for him at home.  At time of exam, patient denies having any pain. He is slightly anxious about being in the hospital but was A&Ox3.   Patient denies having any pain, nausea, fevers, chills, sob, vomiting, diarrhea, constipation.    ED Course  T 97.6F -> 98.6 F HR 74 /60 -> 155/61 RR 19 SpO2 99% on RA  Labs significant for H/H 10.4/36.2 (bl ~8-9)  BUN/Cr 38/1.30 (bl 1-1.2) Albumin 2.4 AST 46  UA: mod leuk esterase, 11-25 wbc, 25-50 rbc, occasional bacteria    CXR: grossly normal , f/u official read  EKG: Afib HR 70 BPM    CT Head: Limited by streak artifact.  Subcentimeter bilateral hypodensities present in the bilateral basal   ganglia, likely related to age-indeterminate lacunar infarcts. Findings   are superimposed upon chronic microangiopathic white matter changes  To the visualized extent, no intracranial hemorrhage or mass effect.     CT Abdomen/Pelvis: 9.4 x 6.4 mm in left mid ureteral stone present. Dilated left renal pelvis without mario hydronephrosis. Additional bilateral non obstructing renal stones are present measuring 5.1 mm in the right and 7.0 mm on the left. Bilateral renal cysts are present.    In the ED, patient received Rocephin x1 dose, LR bolus x1   (20 Dec 2022 00:38)      Interval History -no complaints    Patient is seen, chart was reviewed and case was discussed with the treatment team.  Pt is not in any distress.   Lying on bed comfortably.       Vital Signs Last 24 Hrs  T(C): 36.8 (28 Dec 2022 11:42), Max: 36.8 (28 Dec 2022 04:08)  T(F): 98.2 (28 Dec 2022 11:42), Max: 98.3 (28 Dec 2022 05:12)  HR: 76 (28 Dec 2022 11:42) (71 - 87)  BP: 130/85 (28 Dec 2022 11:42) (124/84 - 159/80)  BP(mean): --  RR: 18 (28 Dec 2022 11:42) (18 - 19)  SpO2: 97% (28 Dec 2022 11:42) (92% - 97%)    Parameters below as of 28 Dec 2022 11:42  Patient On (Oxygen Delivery Method): room air                REVIEW OF SYSTEMS:    Constitutional: No fever,   Eyes: No eye pain, visual disturbances, or discharge  ENT:  No difficulty hearing, tinnitus, vertigo; No sinus or throat pain  Neck: No pain or stiffness  Respiratory: No cough, wheezing, chills or hemoptysis  Cardiovascular: No chest pain, palpitations, shortness of breath, dizziness or leg swelling  Gastrointestinal: No abdominal or epigastric pain. No nausea, vomiting.  Genitourinary: No dysuria,  hematuria   Neurological: No headaches, memory loss,  numbness or tremors  Psychiatric: No depression, anxiety, mood swings or difficulty sleeping  Musculoskeletal: No joint pain or swelling; No muscle, back or extremity pain  Skin: No itching, burning, rashes or lesions   Lymph Nodes: No enlarged glands  Endocrine: No heat or cold intolerance; No hair loss,   Allergy and Immunologic: No hives or eczema    On Neurological Examination:    Mental Status - Pt is alert, awake, oriented X3.  Follows commands well and able to answer questions appropriately.Mood and affect  normal    Speech -  Normal.     Cranial Nerves - Pupils 3 mm equal and reactive to light, extraocular eye movements intact. Pt has no visual field deficit.  Pt has no  facial asymmetry. Facial sensation is intact.Tongue - is in midline.    Muscle tone - is normal    Motor Exam -right hand grasp; elbow 4+/5 ; shoulder 2//5  RLE 4/5  Left hand grasp 4+5 elbow 3-4/5(old weakness) ; shoulder 1/5      Sensory Exam - Pt withdraws all extremities equally on stimulation. No asymmetry seen.        Deep tendon Reflexes - 2 plus all over.         Neck Supple -  Yes.     MEDICATIONS  (STANDING):  allopurinol 100 milliGRAM(s) Oral daily  amoxicillin 500 milliGRAM(s) Oral three times a day  apixaban 5 milliGRAM(s) Oral every 12 hours  atenolol  Tablet 25 milliGRAM(s) Oral daily  cholecalciferol 1000 Unit(s) Oral daily  famotidine    Tablet 20 milliGRAM(s) Oral daily  furosemide    Tablet 40 milliGRAM(s) Oral daily  influenza  Vaccine (HIGH DOSE) 0.7 milliLiter(s) IntraMuscular once  senna 2 Tablet(s) Oral at bedtime  tamsulosin 0.4 milliGRAM(s) Oral daily  timolol 0.5% Solution 1 Drop(s) Both EYES two times a day  tiotropium 2.5 MICROgram(s) Inhaler 2 Puff(s) Inhalation daily  zinc sulfate 220 milliGRAM(s) Oral daily    MEDICATIONS  (PRN):  acetaminophen     Tablet .. 650 milliGRAM(s) Oral every 6 hours PRN Temp greater or equal to 38C (100.4F), Mild Pain (1 - 3)  albuterol    90 MICROgram(s) HFA Inhaler 2 Puff(s) Inhalation every 6 hours PRN Shortness of Breath and/or Wheezing  polyethylene glycol 3350 17 Gram(s) Oral daily PRN Constipation      Allergies    No Known Allergies    Intolerances      12-28    144  |  108  |  34<H>  ----------------------------<  96  3.6   |  29  |  1.20    Ca    9.1      28 Dec 2022 05:45    TPro  6.2  /  Alb  2.3<L>  /  TBili  0.9  /  DBili  x   /  AST  9<L>  /  ALT  12  /  AlkPhos  74  12-28      Hemoglobin A1C:   Lipid Panel 12-27 @ 05:54  Total Cholesterol, Serum 135  LDL --  Triglycerides 63    Vitamin B12     RADIOLOGY    ASSESSMENT AND PLAN:      seen for ams/abnormal  ct head  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  Pain is accessed and addressed.  Would continue to follow.

## 2022-12-28 NOTE — PROGRESS NOTE ADULT - SUBJECTIVE AND OBJECTIVE BOX
[facility] DIV[facility] of INFECTIOUS DISEASE  Aleksander Gomez MD PhD, Eunice Youngblood MD, Ana Hogan MD, Rickey Swenson MD, Bradly Dubon MD  and providing coverage with Linda Salmeron MD  Providing Infectious Disease Consultations at [facility], [facility], [facility], [facility]'s    Office# [phone] to schedule follow up appointments  Answering Service for urgent calls or New Consults [phone]  Cell# to text for urgent issues Aleksander Gomez [phone]     infectious diseases progress note:    KENNETH MULLER is a 67y y. o. Male patient    Overnight and events of the last 24hrs reviewed    Allergies    No Known Allergies    Intolerances        ANTIBIOTICS/RELEVANT:  antimicrobials  amoxicillin 500 milliGRAM(s) Oral three times a day    immunologic:  influenza  Vaccine (HIGH DOSE) 0.7 milliLiter(s) IntraMuscular once    OTHER:  acetaminophen     Tablet .. 650 milliGRAM(s) Oral every 6 hours PRN  albuterol    90 MICROgram(s) HFA Inhaler 2 Puff(s) Inhalation every 6 hours PRN  allopurinol 100 milliGRAM(s) Oral daily  apixaban 5 milliGRAM(s) Oral every 12 hours  atenolol  Tablet 25 milliGRAM(s) Oral daily  cholecalciferol 1000 Unit(s) Oral daily  famotidine    Tablet 20 milliGRAM(s) Oral daily  furosemide    Tablet 40 milliGRAM(s) Oral daily  polyethylene glycol 3350 17 Gram(s) Oral daily PRN  senna 2 Tablet(s) Oral at bedtime  tamsulosin 0.4 milliGRAM(s) Oral daily  timolol 0.5% Solution 1 Drop(s) Both EYES two times a day  tiotropium 2.5 MICROgram(s) Inhaler 2 Puff(s) Inhalation daily  zinc sulfate 220 milliGRAM(s) Oral daily      Objective:  Vital Signs Last 24 Hrs  T(C): 36.8 (28 Dec 2022 11:42), Max: 36.8 (28 Dec 2022 04:08)  T(F): 98.2 (28 Dec 2022 11:42), Max: 98.3 (28 Dec 2022 05:12)  HR: 76 (28 Dec 2022 11:42) (71 - 87)  BP: 130/85 (28 Dec 2022 11:42) (124/84 - 159/80)  BP(mean): --  RR: 18 (28 Dec 2022 11:42) (18 - 19)  SpO2: 97% (28 Dec 2022 11:42) (92% - 97%)    Parameters below as of 28 Dec 2022 11:42  Patient On (Oxygen Delivery Method): room air        T(C): 36.8 (12-28-22 @ 11:42), Max: 37.4 (12-27-22 @ 11:45)  T(C): 36.8 (12-28-22 @ 11:42), Max: 37.4 (12-27-22 @ 11:45)  T(C): 36.8 (12-28-22 @ 11:42), Max: 37.4 (12-25-22 @ 05:12)    PHYSICAL EXAM:  HEENT: NC atraumatic  Neck: supple  Respiratory: no accessory muscle use, breathing comfortably  Cardiovascular: distant  Gastrointestinal: normal appearing, nondistended  Extremities: no clubbing, no cyanosis,        LABS:                          9.5    7.58  )-----------( 345      ( 28 Dec 2022 05:45 )             32.3       WBC  7.58 12-28 @ 05:45  9.25 12-27 @ 05:54  8.01 12-25 @ 06:10      12-28    144  |  108  |  34<H>  ----------------------------<  96  3.6   |  29  |  1.20    Ca    9.1      28 Dec 2022 05:45    TPro  6.2  /  Alb  2.3<L>  /  TBili  0.9  /  DBili  x   /  AST  9<L>  /  ALT  12  /  AlkPhos  74  12-28      Creatinine, Serum: 1.20 mg/dL (12-28-22 @ 05:45)  Creatinine, Serum: 1.20 mg/dL (12-27-22 @ 05:54)  Creatinine, Serum: 1.20 mg/dL (12-25-22 @ 06:10)                INFLAMMATORY MARKERS      MICROBIOLOGY:              RADIOLOGY & ADDITIONAL STUDIES:   [facility] DIV[facility] of INFECTIOUS DISEASE  Aleksander Gomez MD PhD, Eunice Youngblood MD, Ana Hogan MD, Rickey Swenson MD, Bradly Dubon MD  and providing coverage with Linda Salmeron MD  Providing Infectious Disease Consultations at [facility], [facility], [facility], [facility]'s    Office# [phone] to schedule follow up appointments  Answering Service for urgent calls or New Consults [phone]  Cell# to text for urgent issues Aleksander Gomez [phone]     infectious diseases progress note:    KENNETH MULLER is a 67y y. o. Male patient    Overnight and events of the last 24hrs reviewed    Allergies    No Known Allergies    Intolerances        ANTIBIOTICS/RELEVANT:  antimicrobials  amoxicillin 500 milliGRAM(s) Oral three times a day    immunologic:  influenza  Vaccine (HIGH DOSE) 0.7 milliLiter(s) IntraMuscular once    OTHER:  acetaminophen     Tablet .. 650 milliGRAM(s) Oral every 6 hours PRN  albuterol    90 MICROgram(s) HFA Inhaler 2 Puff(s) Inhalation every 6 hours PRN  allopurinol 100 milliGRAM(s) Oral daily  apixaban 5 milliGRAM(s) Oral every 12 hours  atenolol  Tablet 25 milliGRAM(s) Oral daily  cholecalciferol 1000 Unit(s) Oral daily  famotidine    Tablet 20 milliGRAM(s) Oral daily  furosemide    Tablet 40 milliGRAM(s) Oral daily  polyethylene glycol 3350 17 Gram(s) Oral daily PRN  senna 2 Tablet(s) Oral at bedtime  tamsulosin 0.4 milliGRAM(s) Oral daily  timolol 0.5% Solution 1 Drop(s) Both EYES two times a day  tiotropium 2.5 MICROgram(s) Inhaler 2 Puff(s) Inhalation daily  zinc sulfate 220 milliGRAM(s) Oral daily      Objective:  Vital Signs Last 24 Hrs  T(C): 36.8 (28 Dec 2022 11:42), Max: 36.8 (28 Dec 2022 04:08)  T(F): 98.2 (28 Dec 2022 11:42), Max: 98.3 (28 Dec 2022 05:12)  HR: 76 (28 Dec 2022 11:42) (71 - 87)  BP: 130/85 (28 Dec 2022 11:42) (124/84 - 159/80)  BP(mean): --  RR: 18 (28 Dec 2022 11:42) (18 - 19)  SpO2: 97% (28 Dec 2022 11:42) (92% - 97%)    Parameters below as of 28 Dec 2022 11:42  Patient On (Oxygen Delivery Method): room air        T(C): 36.8 (12-28-22 @ 11:42), Max: 37.4 (12-27-22 @ 11:45)  T(C): 36.8 (12-28-22 @ 11:42), Max: 37.4 (12-27-22 @ 11:45)  T(C): 36.8 (12-28-22 @ 11:42), Max: 37.4 (12-25-22 @ 05:12)    PHYSICAL EXAM:  HEENT: NC atraumatic  Neck: supple  Respiratory: no accessory muscle use, breathing comfortably  Cardiovascular: distant  Gastrointestinal: normal appearing, nondistended  Extremities: no clubbing, no cyanosis,        LABS:                          9.5    7.58  )-----------( 345      ( 28 Dec 2022 05:45 )             32.3       WBC  7.58 12-28 @ 05:45  9.25 12-27 @ 05:54  8.01 12-25 @ 06:10      12-28    144  |  108  |  34<H>  ----------------------------<  96  3.6   |  29  |  1.20    Ca    9.1      28 Dec 2022 05:45    TPro  6.2  /  Alb  2.3<L>  /  TBili  0.9  /  DBili  x   /  AST  9<L>  /  ALT  12  /  AlkPhos  74  12-28      Creatinine, Serum: 1.20 mg/dL (12-28-22 @ 05:45)  Creatinine, Serum: 1.20 mg/dL (12-27-22 @ 05:54)  Creatinine, Serum: 1.20 mg/dL (12-25-22 @ 06:10)                INFLAMMATORY MARKERS      MICROBIOLOGY:              RADIOLOGY & ADDITIONAL STUDIES:   [facility] DIV[facility] of INFECTIOUS DISEASE  Aleksander Gomez MD PhD, Eunice Youngblood MD, Ana Hogan MD, Rickey Swenson MD, Bradly Dubon MD  and providing coverage with Linda Salmeron MD  Providing Infectious Disease Consultations at [facility], [facility], [facility], [facility]'s    Office# [phone] to schedule follow up appointments  Answering Service for urgent calls or New Consults [phone]  Cell# to text for urgent issues Aleksander Gomez [phone]     infectious diseases progress note:    KENNETH MULLER is a 67y y. o. Male patient    Overnight and events of the last 24hrs reviewed    Allergies    No Known Allergies    Intolerances        ANTIBIOTICS/RELEVANT:  antimicrobials  amoxicillin 500 milliGRAM(s) Oral three times a day    immunologic:  influenza  Vaccine (HIGH DOSE) 0.7 milliLiter(s) IntraMuscular once    OTHER:  acetaminophen     Tablet .. 650 milliGRAM(s) Oral every 6 hours PRN  albuterol    90 MICROgram(s) HFA Inhaler 2 Puff(s) Inhalation every 6 hours PRN  allopurinol 100 milliGRAM(s) Oral daily  apixaban 5 milliGRAM(s) Oral every 12 hours  atenolol  Tablet 25 milliGRAM(s) Oral daily  cholecalciferol 1000 Unit(s) Oral daily  famotidine    Tablet 20 milliGRAM(s) Oral daily  furosemide    Tablet 40 milliGRAM(s) Oral daily  polyethylene glycol 3350 17 Gram(s) Oral daily PRN  senna 2 Tablet(s) Oral at bedtime  tamsulosin 0.4 milliGRAM(s) Oral daily  timolol 0.5% Solution 1 Drop(s) Both EYES two times a day  tiotropium 2.5 MICROgram(s) Inhaler 2 Puff(s) Inhalation daily  zinc sulfate 220 milliGRAM(s) Oral daily      Objective:  Vital Signs Last 24 Hrs  T(C): 36.8 (28 Dec 2022 11:42), Max: 36.8 (28 Dec 2022 04:08)  T(F): 98.2 (28 Dec 2022 11:42), Max: 98.3 (28 Dec 2022 05:12)  HR: 76 (28 Dec 2022 11:42) (71 - 87)  BP: 130/85 (28 Dec 2022 11:42) (124/84 - 159/80)  BP(mean): --  RR: 18 (28 Dec 2022 11:42) (18 - 19)  SpO2: 97% (28 Dec 2022 11:42) (92% - 97%)    Parameters below as of 28 Dec 2022 11:42  Patient On (Oxygen Delivery Method): room air        T(C): 36.8 (12-28-22 @ 11:42), Max: 37.4 (12-27-22 @ 11:45)  T(C): 36.8 (12-28-22 @ 11:42), Max: 37.4 (12-27-22 @ 11:45)  T(C): 36.8 (12-28-22 @ 11:42), Max: 37.4 (12-25-22 @ 05:12)    PHYSICAL EXAM:  HEENT: NC atraumatic  Neck: supple  Respiratory: no accessory muscle use, breathing comfortably  Cardiovascular: distant  Gastrointestinal: normal appearing, nondistended  Extremities: no clubbing, no cyanosis,        LABS:                          9.5    7.58  )-----------( 345      ( 28 Dec 2022 05:45 )             32.3       WBC  7.58 12-28 @ 05:45  9.25 12-27 @ 05:54  8.01 12-25 @ 06:10      12-28    144  |  108  |  34<H>  ----------------------------<  96  3.6   |  29  |  1.20    Ca    9.1      28 Dec 2022 05:45    TPro  6.2  /  Alb  2.3<L>  /  TBili  0.9  /  DBili  x   /  AST  9<L>  /  ALT  12  /  AlkPhos  74  12-28      Creatinine, Serum: 1.20 mg/dL (12-28-22 @ 05:45)  Creatinine, Serum: 1.20 mg/dL (12-27-22 @ 05:54)  Creatinine, Serum: 1.20 mg/dL (12-25-22 @ 06:10)                INFLAMMATORY MARKERS      MICROBIOLOGY:              RADIOLOGY & ADDITIONAL STUDIES:

## 2022-12-28 NOTE — PROGRESS NOTE ADULT - ASSESSMENT
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 Admitted for nephrolithiasis and acute UTI.     Problem/Plan - 1:  ·  Problem: Nephrolithiasis.   ·  Plan: CT Abdomen/Pelvis showing 9.4 x 6.4 mm in left mid ureteral stone present with additional bilateral non obstructing renal stones present measuring 5.1 mm in the right and 7.0 mm on the left. Bilateral renal cysts are present.  - s/p Stent Placed; Cultures showing VRE and switched to IV Unasyn and discharge on Amoxicillin 500mg PO TID with last day 12/29  - continue home tamsulosin 0.4mg po cap daily  - Urology (Dr. Yates).    - Pt. to follow up with his urologist Dr. BECCA Duke as outpatient     Problem/Plan - 2:  ·  Problem: Encephalopathy.   ·  Plan: -Patient initially sent from [facility] to hospital for increasing confusion and abd pain- noted to have UTI due to kidney stone, had cystoscopy with stent placement  -Patient had CT head on admission showing age indeterminate lacunar infarcts, as per patient and wife no history of CVA in past, and patient has been compliant with eliquis (was prescribed for afib)  -Neuro Dr. Ibrahim consulted   - MR head No acute intracranial hemorrhage, acute infarction, extra-axial fluid   collection or hydrocephalus. Chronic lacunar infarctions of the bilateral basal ganglia.  - continue Eliquis and statin.     Problem/Plan - 3:  ·  Problem: Cancer of kidney.   ·  Plan: Hx of Kidney cancer, now in remission  - s/p nephrectomy (~2017/2018)  - follows with Dr. Duke (urology).     Problem/Plan - 4:  ·  Problem: Acute UTI.   ·  Plan: Patient presented with AMS to ED likely 2/2 UTI. Hx of recurrent UTIs.  - UA showing mod leuk esterase, 11-25 wbc, 25-50 rbc, occasional bacteria  - Previous Urine Cx grew ESBL klebsiella, but this time VRE  - Chronic kennedy catheter in place  - urine culture +VRE and blood cultures no growth  - Continue Abx   - ID consulted.     Problem/Plan - 5:  ·  Problem: Leg swelling.   ·  Plan: Patient with chronic b/l leg swelling, no known h/o heart failure   - continue lasix 40mg PO daily  - monitor renal function, Cr stable currently  - recent tte - EF 56%, mild pulm htn, dilated L atrium.     Problem/Plan - 6:  ·  Problem: Chronic atrial fibrillation.   ·  Plan: - Continue home eliquis 5mg BID, atenolol.     Problem/Plan - 7:  ·  Problem: Hypertension.   ·  Plan: - Continue home atenolol  - Monitor routine hemodynamics.     Problem/Plan - 8:  ·  Problem: Glaucoma.   ·  Plan: - Continue home latanoprost 0.005% opthalamic soln 1 drop to each eye at bedtime  - Continue home timolol maleate 0.5% opthalmic soln 1 drop to each eye bid.     Problem/Plan - 9:  ·  Problem: Asthma with COPD.   ·  Plan: - Continue home tiotropium  - Albuterol prn.     Problem/Plan - 10:  ·  Problem: DVT prophylaxis.   ·  Plan; - Eliquis 5mg BID     Dispo: Pt with multiple recent hospitalization- was recently in rehab.  consult PT & SW for dispo planning.   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 Admitted for nephrolithiasis and acute UTI.     Problem/Plan - 1:  ·  Problem: Nephrolithiasis.   ·  Plan: CT Abdomen/Pelvis showing 9.4 x 6.4 mm in left mid ureteral stone present with additional bilateral non obstructing renal stones present measuring 5.1 mm in the right and 7.0 mm on the left. Bilateral renal cysts are present.  - s/p Stent Placed; Cultures showing VRE and switched to IV Unasyn and discharge on Amoxicillin 500mg PO TID with last day 12/29  - continue home tamsulosin 0.4mg po cap daily  - Urology (Dr. Yates).    - Pt. to follow up with his urologist Dr. BECCA Duke as outpatient     Problem/Plan - 2:  ·  Problem: Encephalopathy.   ·  Plan: -Patient initially sent from [facility] to hospital for increasing confusion and abd pain- noted to have UTI due to kidney stone, had cystoscopy with stent placement  -Patient had CT head on admission showing age indeterminate lacunar infarcts, as per patient and wife no history of CVA in past, and patient has been compliant with eliquis (was prescribed for afib)  -Neuro Dr. Ibrahim consulted   - MR head No acute intracranial hemorrhage, acute infarction, extra-axial fluid   collection or hydrocephalus. Chronic lacunar infarctions of the bilateral basal ganglia.  - continue Eliquis and statin.     Problem/Plan - 3:  ·  Problem: Cancer of kidney.   ·  Plan: Hx of Kidney cancer, now in remission  - s/p nephrectomy (~2017/2018)  - follows with Dr. Duke (urology).     Problem/Plan - 4:  ·  Problem: Acute UTI.   ·  Plan: Patient presented with AMS to ED likely 2/2 UTI. Hx of recurrent UTIs.  - UA showing mod leuk esterase, 11-25 wbc, 25-50 rbc, occasional bacteria  - Previous Urine Cx grew ESBL klebsiella, but this time VRE  - Chronic kennedy catheter in place  - urine culture +VRE and blood cultures no growth  - Continue Abx   - ID consulted.     Problem/Plan - 5:  ·  Problem: Leg swelling.   ·  Plan: Patient with chronic b/l leg swelling, no known h/o heart failure   - continue lasix 40mg PO daily  - monitor renal function, Cr stable currently  - recent tte - EF 56%, mild pulm htn, dilated L atrium.     Problem/Plan - 6:  ·  Problem: Chronic atrial fibrillation.   ·  Plan: - Continue home eliquis 5mg BID, atenolol.     Problem/Plan - 7:  ·  Problem: Hypertension.   ·  Plan: - Continue home atenolol  - Monitor routine hemodynamics.     Problem/Plan - 8:  ·  Problem: Glaucoma.   ·  Plan: - Continue home latanoprost 0.005% opthalamic soln 1 drop to each eye at bedtime  - Continue home timolol maleate 0.5% opthalmic soln 1 drop to each eye bid.     Problem/Plan - 9:  ·  Problem: Asthma with COPD.   ·  Plan: - Continue home tiotropium  - Albuterol prn.     Problem/Plan - 10:  ·  Problem: DVT prophylaxis.   ·  Plan; - Eliquis 5mg BID     Dispo: Pt with multiple recent hospitalization- was recently in rehab.  consult PT & SW for dispo planning.   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 Admitted for nephrolithiasis and acute UTI.     Problem/Plan - 1:  ·  Problem: Nephrolithiasis.   ·  Plan: CT Abdomen/Pelvis showing 9.4 x 6.4 mm in left mid ureteral stone present with additional bilateral non obstructing renal stones present measuring 5.1 mm in the right and 7.0 mm on the left. Bilateral renal cysts are present.  - s/p Stent Placed; Cultures showing VRE and switched to IV Unasyn and discharge on Amoxicillin 500mg PO TID with last day 12/29  - continue home tamsulosin 0.4mg po cap daily  - Urology (Dr. Yates).    - Pt. to follow up with his urologist Dr. BECCA Duke as outpatient     Problem/Plan - 2:  ·  Problem: Encephalopathy.   ·  Plan: -Patient initially sent from [facility] to hospital for increasing confusion and abd pain- noted to have UTI due to kidney stone, had cystoscopy with stent placement  -Patient had CT head on admission showing age indeterminate lacunar infarcts, as per patient and wife no history of CVA in past, and patient has been compliant with eliquis (was prescribed for afib)  -Neuro Dr. Ibrahim consulted   - MR head No acute intracranial hemorrhage, acute infarction, extra-axial fluid   collection or hydrocephalus. Chronic lacunar infarctions of the bilateral basal ganglia.  - continue Eliquis and statin.     Problem/Plan - 3:  ·  Problem: Cancer of kidney.   ·  Plan: Hx of Kidney cancer, now in remission  - s/p nephrectomy (~2017/2018)  - follows with Dr. Duke (urology).     Problem/Plan - 4:  ·  Problem: Acute UTI.   ·  Plan: Patient presented with AMS to ED likely 2/2 UTI. Hx of recurrent UTIs.  - UA showing mod leuk esterase, 11-25 wbc, 25-50 rbc, occasional bacteria  - Previous Urine Cx grew ESBL klebsiella, but this time VRE  - Chronic kennedy catheter in place  - urine culture +VRE and blood cultures no growth  - Continue Abx   - ID consulted.     Problem/Plan - 5:  ·  Problem: Leg swelling.   ·  Plan: Patient with chronic b/l leg swelling, no known h/o heart failure   - continue lasix 40mg PO daily  - monitor renal function, Cr stable currently  - recent tte - EF 56%, mild pulm htn, dilated L atrium.     Problem/Plan - 6:  ·  Problem: Chronic atrial fibrillation.   ·  Plan: - Continue home eliquis 5mg BID, atenolol.     Problem/Plan - 7:  ·  Problem: Hypertension.   ·  Plan: - Continue home atenolol  - Monitor routine hemodynamics.     Problem/Plan - 8:  ·  Problem: Glaucoma.   ·  Plan: - Continue home latanoprost 0.005% opthalamic soln 1 drop to each eye at bedtime  - Continue home timolol maleate 0.5% opthalmic soln 1 drop to each eye bid.     Problem/Plan - 9:  ·  Problem: Asthma with COPD.   ·  Plan: - Continue home tiotropium  - Albuterol prn.     Problem/Plan - 10:  ·  Problem: DVT prophylaxis.   ·  Plan; - Eliquis 5mg BID     Dispo: Pt with multiple recent hospitalization- was recently in rehab.  consult PT & SW for dispo planning.

## 2022-12-29 LAB
ANION GAP SERPL CALC-SCNC: 9 MMOL/L — SIGNIFICANT CHANGE UP (ref 5–17)
BASOPHILS # BLD AUTO: 0.05 K/UL — SIGNIFICANT CHANGE UP (ref 0–0.2)
BASOPHILS NFR BLD AUTO: 0.6 % — SIGNIFICANT CHANGE UP (ref 0–2)
BUN SERPL-MCNC: 39 MG/DL — HIGH (ref 7–23)
CALCIUM SERPL-MCNC: 9.1 MG/DL — SIGNIFICANT CHANGE UP (ref 8.5–10.1)
CHLORIDE SERPL-SCNC: 108 MMOL/L — SIGNIFICANT CHANGE UP (ref 96–108)
CO2 SERPL-SCNC: 28 MMOL/L — SIGNIFICANT CHANGE UP (ref 22–31)
CREAT SERPL-MCNC: 1.3 MG/DL — SIGNIFICANT CHANGE UP (ref 0.5–1.3)
EGFR: 61 ML/MIN/1.73M2 — SIGNIFICANT CHANGE UP
EOSINOPHIL # BLD AUTO: 0.22 K/UL — SIGNIFICANT CHANGE UP (ref 0–0.5)
EOSINOPHIL NFR BLD AUTO: 2.7 % — SIGNIFICANT CHANGE UP (ref 0–6)
GLUCOSE SERPL-MCNC: 92 MG/DL — SIGNIFICANT CHANGE UP (ref 70–99)
HCT VFR BLD CALC: 33.2 % — LOW (ref 39–50)
HGB BLD-MCNC: 9.6 G/DL — LOW (ref 13–17)
IMM GRANULOCYTES NFR BLD AUTO: 0.5 % — SIGNIFICANT CHANGE UP (ref 0–0.9)
LYMPHOCYTES # BLD AUTO: 1.67 K/UL — SIGNIFICANT CHANGE UP (ref 1–3.3)
LYMPHOCYTES # BLD AUTO: 20.4 % — SIGNIFICANT CHANGE UP (ref 13–44)
MAGNESIUM SERPL-MCNC: 2.3 MG/DL — SIGNIFICANT CHANGE UP (ref 1.6–2.6)
MCHC RBC-ENTMCNC: 23 PG — LOW (ref 27–34)
MCHC RBC-ENTMCNC: 28.9 GM/DL — LOW (ref 32–36)
MCV RBC AUTO: 79.4 FL — LOW (ref 80–100)
MONOCYTES # BLD AUTO: 0.63 K/UL — SIGNIFICANT CHANGE UP (ref 0–0.9)
MONOCYTES NFR BLD AUTO: 7.7 % — SIGNIFICANT CHANGE UP (ref 2–14)
NEUTROPHILS # BLD AUTO: 5.59 K/UL — SIGNIFICANT CHANGE UP (ref 1.8–7.4)
NEUTROPHILS NFR BLD AUTO: 68.1 % — SIGNIFICANT CHANGE UP (ref 43–77)
NRBC # BLD: 0 /100 WBCS — SIGNIFICANT CHANGE UP (ref 0–0)
PLATELET # BLD AUTO: 352 K/UL — SIGNIFICANT CHANGE UP (ref 150–400)
POTASSIUM SERPL-MCNC: 3.6 MMOL/L — SIGNIFICANT CHANGE UP (ref 3.5–5.3)
POTASSIUM SERPL-SCNC: 3.6 MMOL/L — SIGNIFICANT CHANGE UP (ref 3.5–5.3)
RBC # BLD: 4.18 M/UL — LOW (ref 4.2–5.8)
RBC # FLD: 18.4 % — HIGH (ref 10.3–14.5)
SODIUM SERPL-SCNC: 145 MMOL/L — SIGNIFICANT CHANGE UP (ref 135–145)
WBC # BLD: 8.2 K/UL — SIGNIFICANT CHANGE UP (ref 3.8–10.5)
WBC # FLD AUTO: 8.2 K/UL — SIGNIFICANT CHANGE UP (ref 3.8–10.5)

## 2022-12-29 PROCEDURE — 99221 1ST HOSP IP/OBS SF/LOW 40: CPT

## 2022-12-29 PROCEDURE — 99232 SBSQ HOSP IP/OBS MODERATE 35: CPT

## 2022-12-29 RX ORDER — HALOPERIDOL DECANOATE 100 MG/ML
2 INJECTION INTRAMUSCULAR EVERY 6 HOURS
Refills: 0 | Status: DISCONTINUED | OUTPATIENT
Start: 2022-12-29 | End: 2022-12-30

## 2022-12-29 RX ADMIN — APIXABAN 5 MILLIGRAM(S): 2.5 TABLET, FILM COATED ORAL at 10:42

## 2022-12-29 RX ADMIN — Medication 100 MILLIGRAM(S): at 11:16

## 2022-12-29 RX ADMIN — Medication 40 MILLIGRAM(S): at 05:57

## 2022-12-29 RX ADMIN — Medication 500 MILLIGRAM(S): at 21:22

## 2022-12-29 RX ADMIN — TIOTROPIUM BROMIDE 2 PUFF(S): 18 CAPSULE ORAL; RESPIRATORY (INHALATION) at 05:57

## 2022-12-29 RX ADMIN — Medication 1 DROP(S): at 05:57

## 2022-12-29 RX ADMIN — Medication 500 MILLIGRAM(S): at 14:37

## 2022-12-29 RX ADMIN — APIXABAN 5 MILLIGRAM(S): 2.5 TABLET, FILM COATED ORAL at 21:23

## 2022-12-29 RX ADMIN — Medication 1000 UNIT(S): at 11:16

## 2022-12-29 RX ADMIN — Medication 500 MILLIGRAM(S): at 05:57

## 2022-12-29 RX ADMIN — TAMSULOSIN HYDROCHLORIDE 0.4 MILLIGRAM(S): 0.4 CAPSULE ORAL at 11:15

## 2022-12-29 RX ADMIN — Medication 1 DROP(S): at 17:00

## 2022-12-29 RX ADMIN — ATENOLOL 25 MILLIGRAM(S): 25 TABLET ORAL at 05:57

## 2022-12-29 RX ADMIN — FAMOTIDINE 20 MILLIGRAM(S): 10 INJECTION INTRAVENOUS at 11:16

## 2022-12-29 RX ADMIN — SENNA PLUS 2 TABLET(S): 8.6 TABLET ORAL at 21:23

## 2022-12-29 RX ADMIN — ZINC SULFATE TAB 220 MG (50 MG ZINC EQUIVALENT) 220 MILLIGRAM(S): 220 (50 ZN) TAB at 11:15

## 2022-12-29 NOTE — BH CONSULTATION LIAISON ASSESSMENT NOTE - NSBHREFERDETAILS_PSY_A_CORE_FT
Patient Name:  Berta Baumann   YOB: 2014   MRN:  [id number]       Community Support - Individual Progress Note    Data and progress toward goals:  Spoke with Berta's mother (Elizabeth Baumann) after receiving an email to inquire about Berta's place on our wait list and to provide an update regarding Berta's presentation and worsening aggression. Berta's mother identified that Berta's primary care physician (Dr. Carrasco) increased Berta's medication to 10mg daily approximately two weeks ago. Since that time has been increasingly aggressive at school towards peers, has experienced more situations of not keeping appropriate physical contact with peers at school and [employer], and has been \"combative\" at home.    Intervention:  Consultation with Patient or Collaterals to increase Well-being, Patient Self-Advocacy and Use of Community Resources    Patient continues to be involved in service planning:  YES    Describe Intervention(s): Clinician provided resources, skill building, wellness ideas and resilience tools to assist in modifying behaviors to promote recovery.  Discussed Berta's recent functioning with her mother to identify ways that her family and existing supports can be utilized while Berta's awaits case assignment at [facility]. Provided recommendations of how Berta's family can continue to advocate for Berta and utilize services in place for her by the following: contacting Berta's  and teacher to review the behavioral management strategies in place, reaching out to Berta's primary care physician, and contacting out office if she is interested in obtaining a psychiatric evaluation at our office prior to starting therapy services.    Describe patient's response to today's intervention:  Elizabeth was in agreement with the aforementioned recommendations. She thanked me for the information provided.    Plan and follow-up: Continue to support patient's efforts and progress towards  rehabilitative, resiliency and recovery goals in the following ways:  Reach out to school (Ms. Hudson and Ms. Gomez); follow up with Berta's place on wait list; Mom encouraged to contact primary care physician to discuss medication management and concerns about possible side effects; contact Momwithin two weeks to follow up on today's conversation    Next appointment scheduled for:  None scheduled - patient on waitlist    Off-site:  No  This visit is being performed virtually via Telephonic Visit. Consent to treat includes permission to submit charges to the patient's insurance. It was shared that without being seen and evaluated in person, there is a risk that the information and/or assessment may be incomplete or inaccurate. This telephonic visit may be discontinued by patient or clinician, if it is felt that the telephonic connections are not adequate for her situation.   Clinical Location: [facility]  Berta's Mother's location Home and is physically present in   the [facility] at the time of this visit.     18 minutes were spent in this encounter.   Patient gets agitated and confused

## 2022-12-29 NOTE — BH CONSULTATION LIAISON ASSESSMENT NOTE - CURRENT MEDICATION
MEDICATIONS  (STANDING):  allopurinol 100 milliGRAM(s) Oral daily  amoxicillin 500 milliGRAM(s) Oral three times a day  apixaban 5 milliGRAM(s) Oral every 12 hours  atenolol  Tablet 25 milliGRAM(s) Oral daily  cholecalciferol 1000 Unit(s) Oral daily  famotidine    Tablet 20 milliGRAM(s) Oral daily  furosemide    Tablet 40 milliGRAM(s) Oral daily  influenza  Vaccine (HIGH DOSE) 0.7 milliLiter(s) IntraMuscular once  senna 2 Tablet(s) Oral at bedtime  tamsulosin 0.4 milliGRAM(s) Oral daily  timolol 0.5% Solution 1 Drop(s) Both EYES two times a day  tiotropium 2.5 MICROgram(s) Inhaler 2 Puff(s) Inhalation daily  zinc sulfate 220 milliGRAM(s) Oral daily    MEDICATIONS  (PRN):  acetaminophen     Tablet .. 650 milliGRAM(s) Oral every 6 hours PRN Temp greater or equal to 38C (100.4F), Mild Pain (1 - 3)  albuterol    90 MICROgram(s) HFA Inhaler 2 Puff(s) Inhalation every 6 hours PRN Shortness of Breath and/or Wheezing  polyethylene glycol 3350 17 Gram(s) Oral daily PRN Constipation

## 2022-12-29 NOTE — PROGRESS NOTE ADULT - ASSESSMENT
68 y/o male PMH of asthma, HTN, kidney cancer in remission, glaucoma, afib on eliquis, s/p L total knee replacement in September presented to the ED and was admitted 12/20 for increased confusion with lower abdominal pain. Found to have 9.4 x 6.4 mm in left mid ureteral stone and bilateral non obstructing renal stones are present measuring 5.1 mm in the right and 7.0 mm on the left. sp   Cystoscopic insertion of ureteral stent 20-Dec-2022 12:24:54  Manuel Yates  XR pyelogram retrograde left 20-Dec-2022 12:25:08  Manuel Yates.  Growing amp sensitive VRE    RECOMMENDATIONS  PT with nephrolithiasis and urinary tract infection with VRE that is ampicillin sensitive. No benefit to beta-lactamase inhibitor rec    Amoxicillin 500mg PO TID with last day 12/29-TODAY    From an ID standpoint no further requirement for inpatient status for the management of ID issues. Fine with discharge from ID standpoint when other medical issues no longer require inpatient care and social issues allow for a safe discharge plan. To schedule an outpatient ID follow up appointment please call our office at [phone]    Thank you for consulting us and involving us in the management of this most interesting and challenging case.  We will follow along in the care of this patient. Please call us at [phone] or text me directly on my cell# at [phone] with any concerns.   66 y/o male PMH of asthma, HTN, kidney cancer in remission, glaucoma, afib on eliquis, s/p L total knee replacement in September presented to the ED and was admitted 12/20 for increased confusion with lower abdominal pain. Found to have 9.4 x 6.4 mm in left mid ureteral stone and bilateral non obstructing renal stones are present measuring 5.1 mm in the right and 7.0 mm on the left. sp   Cystoscopic insertion of ureteral stent 20-Dec-2022 12:24:54  Manuel Yates  XR pyelogram retrograde left 20-Dec-2022 12:25:08  Manuel Yates.  Growing amp sensitive VRE    RECOMMENDATIONS  PT with nephrolithiasis and urinary tract infection with VRE that is ampicillin sensitive. No benefit to beta-lactamase inhibitor rec    Amoxicillin 500mg PO TID with last day 12/29-TODAY    From an ID standpoint no further requirement for inpatient status for the management of ID issues. Fine with discharge from ID standpoint when other medical issues no longer require inpatient care and social issues allow for a safe discharge plan. To schedule an outpatient ID follow up appointment please call our office at [phone]    Thank you for consulting us and involving us in the management of this most interesting and challenging case.  We will follow along in the care of this patient. Please call us at [phone] or text me directly on my cell# at [phone] with any concerns.   66 y/o male PMH of asthma, HTN, kidney cancer in remission, glaucoma, afib on eliquis, s/p L total knee replacement in September presented to the ED and was admitted 12/20 for increased confusion with lower abdominal pain. Found to have 9.4 x 6.4 mm in left mid ureteral stone and bilateral non obstructing renal stones are present measuring 5.1 mm in the right and 7.0 mm on the left. sp   Cystoscopic insertion of ureteral stent 20-Dec-2022 12:24:54  Manuel Yates  XR pyelogram retrograde left 20-Dec-2022 12:25:08  Manuel Yates.  Growing amp sensitive VRE    RECOMMENDATIONS  PT with nephrolithiasis and urinary tract infection with VRE that is ampicillin sensitive. No benefit to beta-lactamase inhibitor rec    Amoxicillin 500mg PO TID with last day 12/29-TODAY    From an ID standpoint no further requirement for inpatient status for the management of ID issues. Fine with discharge from ID standpoint when other medical issues no longer require inpatient care and social issues allow for a safe discharge plan. To schedule an outpatient ID follow up appointment please call our office at [phone]    Thank you for consulting us and involving us in the management of this most interesting and challenging case.  We will follow along in the care of this patient. Please call us at [phone] or text me directly on my cell# at [phone] with any concerns.

## 2022-12-29 NOTE — PROGRESS NOTE ADULT - SUBJECTIVE AND OBJECTIVE BOX
Neurology Follow up note    KENNETH [id number]    HPI:  68 y/o male PMH of asthma, HTN, kidney cancer in remission, glaucoma, afib on eliquis, s/p L total knee replacement in September presents to the ED for increased confusion with lower abdominal pain. Patient was recently hospitalized at [facility] from 11/22 -12/06/22 for AMS and UTI. Urine cultures at the time grew ESBL Klebsiella and patient was treated with ertapenem. He was discharged to [facility] with chronic indwelling kennedy catheter. Patient's family wanted to bring him home after 3 days, however, they soon felt they were unable to properly care for him at home.  At time of exam, patient denies having any pain. He is slightly anxious about being in the hospital but was A&Ox3.   Patient denies having any pain, nausea, fevers, chills, sob, vomiting, diarrhea, constipation.    ED Course  T 97.6F -> 98.6 F HR 74 /60 -> 155/61 RR 19 SpO2 99% on RA  Labs significant for H/H 10.4/36.2 (bl ~8-9)  BUN/Cr 38/1.30 (bl 1-1.2) Albumin 2.4 AST 46  UA: mod leuk esterase, 11-25 wbc, 25-50 rbc, occasional bacteria    CXR: grossly normal , f/u official read  EKG: Afib HR 70 BPM    CT Head: Limited by streak artifact.  Subcentimeter bilateral hypodensities present in the bilateral basal   ganglia, likely related to age-indeterminate lacunar infarcts. Findings   are superimposed upon chronic microangiopathic white matter changes  To the visualized extent, no intracranial hemorrhage or mass effect.     CT Abdomen/Pelvis: 9.4 x 6.4 mm in left mid ureteral stone present. Dilated left renal pelvis without mario hydronephrosis. Additional bilateral non obstructing renal stones are present measuring 5.1 mm in the right and 7.0 mm on the left. Bilateral renal cysts are present.    In the ED, patient received Rocephin x1 dose, LR bolus x1   (20 Dec 2022 00:38)      Interval History -no new weakness    Patient is seen, chart was reviewed and case was discussed with the treatment team.  Pt is not in any distress.   Lying on bed comfortably.       Vital Signs Last 24 Hrs  T(C): 36.7 (29 Dec 2022 04:04), Max: 37.1 (28 Dec 2022 22:10)  T(F): 98 (29 Dec 2022 04:04), Max: 98.7 (28 Dec 2022 22:10)  HR: 63 (29 Dec 2022 04:04) (63 - 76)  BP: 131/65 (29 Dec 2022 04:04) (130/85 - 131/65)  BP(mean): --  RR: 19 (29 Dec 2022 04:04) (18 - 19)  SpO2: 96% (29 Dec 2022 04:04) (95% - 97%)    Parameters below as of 29 Dec 2022 04:04  Patient On (Oxygen Delivery Method): room air                    REVIEW OF SYSTEMS:    Constitutional: No fever,   Eyes: No eye pain, visual disturbances, or discharge  ENT:  No difficulty hearing, tinnitus, vertigo; No sinus or throat pain  Neck: No pain or stiffness  Respiratory: No cough, wheezing, chills or hemoptysis  Cardiovascular: No chest pain, palpitations, shortness of breath, dizziness or leg swelling  Gastrointestinal: No abdominal or epigastric pain. No nausea, vomiting.  Genitourinary: No dysuria,  hematuria   Neurological: No headaches, memory loss,  numbness or tremors  Psychiatric: No depression, anxiety, mood swings or difficulty sleeping  Musculoskeletal: No joint pain or swelling; No muscle, back or extremity pain  Skin: No itching, burning, rashes or lesions   Lymph Nodes: No enlarged glands  Endocrine: No heat or cold intolerance; No hair loss,   Allergy and Immunologic: No hives or eczema    On Neurological Examination:    Mental Status - Pt is alert, awake, oriented X3.  Follows commands well and able to answer questions appropriately.Mood and affect  normal    Speech -  Normal.     Cranial Nerves - Pupils 3 mm equal and reactive to light, extraocular eye movements intact. Pt has no visual field deficit.  Pt has no  facial asymmetry. Facial sensation is intact.Tongue - is in midline.    Muscle tone - is normal    Motor Exam -right hand grasp; elbow 4+/5 ; shoulder 2//5  RLE 4/5  Left hand grasp 4+5 elbow 3-4/5(old weakness) ; shoulder 1/5      Sensory Exam - Pt withdraws all extremities equally on stimulation. No asymmetry seen.        Deep tendon Reflexes - 2 plus all over.         Neck Supple -  Yes.     MEDICATIONS  (STANDING):  allopurinol 100 milliGRAM(s) Oral daily  amoxicillin 500 milliGRAM(s) Oral three times a day  apixaban 5 milliGRAM(s) Oral every 12 hours  atenolol  Tablet 25 milliGRAM(s) Oral daily  cholecalciferol 1000 Unit(s) Oral daily  famotidine    Tablet 20 milliGRAM(s) Oral daily  furosemide    Tablet 40 milliGRAM(s) Oral daily  influenza  Vaccine (HIGH DOSE) 0.7 milliLiter(s) IntraMuscular once  senna 2 Tablet(s) Oral at bedtime  tamsulosin 0.4 milliGRAM(s) Oral daily  timolol 0.5% Solution 1 Drop(s) Both EYES two times a day  tiotropium 2.5 MICROgram(s) Inhaler 2 Puff(s) Inhalation daily  zinc sulfate 220 milliGRAM(s) Oral daily    MEDICATIONS  (PRN):  acetaminophen     Tablet .. 650 milliGRAM(s) Oral every 6 hours PRN Temp greater or equal to 38C (100.4F), Mild Pain (1 - 3)  albuterol    90 MICROgram(s) HFA Inhaler 2 Puff(s) Inhalation every 6 hours PRN Shortness of Breath and/or Wheezing  polyethylene glycol 3350 17 Gram(s) Oral daily PRN Constipation        Allergies    No Known Allergies    Intolerances    12-29    145  |  108  |  39<H>  ----------------------------<  92  3.6   |  28  |  1.30    Ca    9.1      29 Dec 2022 06:10  Mg     2.3     12-29    TPro  6.2  /  Alb  2.3<L>  /  TBili  0.9  /  DBili  x   /  AST  9<L>  /  ALT  12  /  AlkPhos  74  12-28      Hemoglobin A1C:   Lipid Panel 12-27 @ 05:54  Total Cholesterol, Serum 135  LDL --  Triglycerides 63    Vitamin B12     RADIOLOGY    ASSESSMENT AND PLAN:      seen for ams/abnormal  ct head  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  Pain is accessed and addressed.  Would continue to follow.       Neurology Follow up note    KENNETH [id number]    HPI:  66 y/o male PMH of asthma, HTN, kidney cancer in remission, glaucoma, afib on eliquis, s/p L total knee replacement in September presents to the ED for increased confusion with lower abdominal pain. Patient was recently hospitalized at [facility] from 11/22 -12/06/22 for AMS and UTI. Urine cultures at the time grew ESBL Klebsiella and patient was treated with ertapenem. He was discharged to [facility] with chronic indwelling kennedy catheter. Patient's family wanted to bring him home after 3 days, however, they soon felt they were unable to properly care for him at home.  At time of exam, patient denies having any pain. He is slightly anxious about being in the hospital but was A&Ox3.   Patient denies having any pain, nausea, fevers, chills, sob, vomiting, diarrhea, constipation.    ED Course  T 97.6F -> 98.6 F HR 74 /60 -> 155/61 RR 19 SpO2 99% on RA  Labs significant for H/H 10.4/36.2 (bl ~8-9)  BUN/Cr 38/1.30 (bl 1-1.2) Albumin 2.4 AST 46  UA: mod leuk esterase, 11-25 wbc, 25-50 rbc, occasional bacteria    CXR: grossly normal , f/u official read  EKG: Afib HR 70 BPM    CT Head: Limited by streak artifact.  Subcentimeter bilateral hypodensities present in the bilateral basal   ganglia, likely related to age-indeterminate lacunar infarcts. Findings   are superimposed upon chronic microangiopathic white matter changes  To the visualized extent, no intracranial hemorrhage or mass effect.     CT Abdomen/Pelvis: 9.4 x 6.4 mm in left mid ureteral stone present. Dilated left renal pelvis without mario hydronephrosis. Additional bilateral non obstructing renal stones are present measuring 5.1 mm in the right and 7.0 mm on the left. Bilateral renal cysts are present.    In the ED, patient received Rocephin x1 dose, LR bolus x1   (20 Dec 2022 00:38)      Interval History -no new weakness    Patient is seen, chart was reviewed and case was discussed with the treatment team.  Pt is not in any distress.   Lying on bed comfortably.       Vital Signs Last 24 Hrs  T(C): 36.7 (29 Dec 2022 04:04), Max: 37.1 (28 Dec 2022 22:10)  T(F): 98 (29 Dec 2022 04:04), Max: 98.7 (28 Dec 2022 22:10)  HR: 63 (29 Dec 2022 04:04) (63 - 76)  BP: 131/65 (29 Dec 2022 04:04) (130/85 - 131/65)  BP(mean): --  RR: 19 (29 Dec 2022 04:04) (18 - 19)  SpO2: 96% (29 Dec 2022 04:04) (95% - 97%)    Parameters below as of 29 Dec 2022 04:04  Patient On (Oxygen Delivery Method): room air                    REVIEW OF SYSTEMS:    Constitutional: No fever,   Eyes: No eye pain, visual disturbances, or discharge  ENT:  No difficulty hearing, tinnitus, vertigo; No sinus or throat pain  Neck: No pain or stiffness  Respiratory: No cough, wheezing, chills or hemoptysis  Cardiovascular: No chest pain, palpitations, shortness of breath, dizziness or leg swelling  Gastrointestinal: No abdominal or epigastric pain. No nausea, vomiting.  Genitourinary: No dysuria,  hematuria   Neurological: No headaches, memory loss,  numbness or tremors  Psychiatric: No depression, anxiety, mood swings or difficulty sleeping  Musculoskeletal: No joint pain or swelling; No muscle, back or extremity pain  Skin: No itching, burning, rashes or lesions   Lymph Nodes: No enlarged glands  Endocrine: No heat or cold intolerance; No hair loss,   Allergy and Immunologic: No hives or eczema    On Neurological Examination:    Mental Status - Pt is alert, awake, oriented X3.  Follows commands well and able to answer questions appropriately.Mood and affect  normal    Speech -  Normal.     Cranial Nerves - Pupils 3 mm equal and reactive to light, extraocular eye movements intact. Pt has no visual field deficit.  Pt has no  facial asymmetry. Facial sensation is intact.Tongue - is in midline.    Muscle tone - is normal    Motor Exam -right hand grasp; elbow 4+/5 ; shoulder 2//5  RLE 4/5  Left hand grasp 4+5 elbow 3-4/5(old weakness) ; shoulder 1/5      Sensory Exam - Pt withdraws all extremities equally on stimulation. No asymmetry seen.        Deep tendon Reflexes - 2 plus all over.         Neck Supple -  Yes.     MEDICATIONS  (STANDING):  allopurinol 100 milliGRAM(s) Oral daily  amoxicillin 500 milliGRAM(s) Oral three times a day  apixaban 5 milliGRAM(s) Oral every 12 hours  atenolol  Tablet 25 milliGRAM(s) Oral daily  cholecalciferol 1000 Unit(s) Oral daily  famotidine    Tablet 20 milliGRAM(s) Oral daily  furosemide    Tablet 40 milliGRAM(s) Oral daily  influenza  Vaccine (HIGH DOSE) 0.7 milliLiter(s) IntraMuscular once  senna 2 Tablet(s) Oral at bedtime  tamsulosin 0.4 milliGRAM(s) Oral daily  timolol 0.5% Solution 1 Drop(s) Both EYES two times a day  tiotropium 2.5 MICROgram(s) Inhaler 2 Puff(s) Inhalation daily  zinc sulfate 220 milliGRAM(s) Oral daily    MEDICATIONS  (PRN):  acetaminophen     Tablet .. 650 milliGRAM(s) Oral every 6 hours PRN Temp greater or equal to 38C (100.4F), Mild Pain (1 - 3)  albuterol    90 MICROgram(s) HFA Inhaler 2 Puff(s) Inhalation every 6 hours PRN Shortness of Breath and/or Wheezing  polyethylene glycol 3350 17 Gram(s) Oral daily PRN Constipation        Allergies    No Known Allergies    Intolerances    12-29    145  |  108  |  39<H>  ----------------------------<  92  3.6   |  28  |  1.30    Ca    9.1      29 Dec 2022 06:10  Mg     2.3     12-29    TPro  6.2  /  Alb  2.3<L>  /  TBili  0.9  /  DBili  x   /  AST  9<L>  /  ALT  12  /  AlkPhos  74  12-28      Hemoglobin A1C:   Lipid Panel 12-27 @ 05:54  Total Cholesterol, Serum 135  LDL --  Triglycerides 63    Vitamin B12     RADIOLOGY    ASSESSMENT AND PLAN:      seen for ams/abnormal  ct head  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  Pain is accessed and addressed.  Would continue to follow.       Neurology Follow up note    KENNETH [id number]    HPI:  66 y/o male PMH of asthma, HTN, kidney cancer in remission, glaucoma, afib on eliquis, s/p L total knee replacement in September presents to the ED for increased confusion with lower abdominal pain. Patient was recently hospitalized at [facility] from 11/22 -12/06/22 for AMS and UTI. Urine cultures at the time grew ESBL Klebsiella and patient was treated with ertapenem. He was discharged to [facility] with chronic indwelling kennedy catheter. Patient's family wanted to bring him home after 3 days, however, they soon felt they were unable to properly care for him at home.  At time of exam, patient denies having any pain. He is slightly anxious about being in the hospital but was A&Ox3.   Patient denies having any pain, nausea, fevers, chills, sob, vomiting, diarrhea, constipation.    ED Course  T 97.6F -> 98.6 F HR 74 /60 -> 155/61 RR 19 SpO2 99% on RA  Labs significant for H/H 10.4/36.2 (bl ~8-9)  BUN/Cr 38/1.30 (bl 1-1.2) Albumin 2.4 AST 46  UA: mod leuk esterase, 11-25 wbc, 25-50 rbc, occasional bacteria    CXR: grossly normal , f/u official read  EKG: Afib HR 70 BPM    CT Head: Limited by streak artifact.  Subcentimeter bilateral hypodensities present in the bilateral basal   ganglia, likely related to age-indeterminate lacunar infarcts. Findings   are superimposed upon chronic microangiopathic white matter changes  To the visualized extent, no intracranial hemorrhage or mass effect.     CT Abdomen/Pelvis: 9.4 x 6.4 mm in left mid ureteral stone present. Dilated left renal pelvis without mario hydronephrosis. Additional bilateral non obstructing renal stones are present measuring 5.1 mm in the right and 7.0 mm on the left. Bilateral renal cysts are present.    In the ED, patient received Rocephin x1 dose, LR bolus x1   (20 Dec 2022 00:38)      Interval History -no new weakness    Patient is seen, chart was reviewed and case was discussed with the treatment team.  Pt is not in any distress.   Lying on bed comfortably.       Vital Signs Last 24 Hrs  T(C): 36.7 (29 Dec 2022 04:04), Max: 37.1 (28 Dec 2022 22:10)  T(F): 98 (29 Dec 2022 04:04), Max: 98.7 (28 Dec 2022 22:10)  HR: 63 (29 Dec 2022 04:04) (63 - 76)  BP: 131/65 (29 Dec 2022 04:04) (130/85 - 131/65)  BP(mean): --  RR: 19 (29 Dec 2022 04:04) (18 - 19)  SpO2: 96% (29 Dec 2022 04:04) (95% - 97%)    Parameters below as of 29 Dec 2022 04:04  Patient On (Oxygen Delivery Method): room air                    REVIEW OF SYSTEMS:    Constitutional: No fever,   Eyes: No eye pain, visual disturbances, or discharge  ENT:  No difficulty hearing, tinnitus, vertigo; No sinus or throat pain  Neck: No pain or stiffness  Respiratory: No cough, wheezing, chills or hemoptysis  Cardiovascular: No chest pain, palpitations, shortness of breath, dizziness or leg swelling  Gastrointestinal: No abdominal or epigastric pain. No nausea, vomiting.  Genitourinary: No dysuria,  hematuria   Neurological: No headaches, memory loss,  numbness or tremors  Psychiatric: No depression, anxiety, mood swings or difficulty sleeping  Musculoskeletal: No joint pain or swelling; No muscle, back or extremity pain  Skin: No itching, burning, rashes or lesions   Lymph Nodes: No enlarged glands  Endocrine: No heat or cold intolerance; No hair loss,   Allergy and Immunologic: No hives or eczema    On Neurological Examination:    Mental Status - Pt is alert, awake, oriented X3.  Follows commands well and able to answer questions appropriately.Mood and affect  normal    Speech -  Normal.     Cranial Nerves - Pupils 3 mm equal and reactive to light, extraocular eye movements intact. Pt has no visual field deficit.  Pt has no  facial asymmetry. Facial sensation is intact.Tongue - is in midline.    Muscle tone - is normal    Motor Exam -right hand grasp; elbow 4+/5 ; shoulder 2//5  RLE 4/5  Left hand grasp 4+5 elbow 3-4/5(old weakness) ; shoulder 1/5      Sensory Exam - Pt withdraws all extremities equally on stimulation. No asymmetry seen.        Deep tendon Reflexes - 2 plus all over.         Neck Supple -  Yes.     MEDICATIONS  (STANDING):  allopurinol 100 milliGRAM(s) Oral daily  amoxicillin 500 milliGRAM(s) Oral three times a day  apixaban 5 milliGRAM(s) Oral every 12 hours  atenolol  Tablet 25 milliGRAM(s) Oral daily  cholecalciferol 1000 Unit(s) Oral daily  famotidine    Tablet 20 milliGRAM(s) Oral daily  furosemide    Tablet 40 milliGRAM(s) Oral daily  influenza  Vaccine (HIGH DOSE) 0.7 milliLiter(s) IntraMuscular once  senna 2 Tablet(s) Oral at bedtime  tamsulosin 0.4 milliGRAM(s) Oral daily  timolol 0.5% Solution 1 Drop(s) Both EYES two times a day  tiotropium 2.5 MICROgram(s) Inhaler 2 Puff(s) Inhalation daily  zinc sulfate 220 milliGRAM(s) Oral daily    MEDICATIONS  (PRN):  acetaminophen     Tablet .. 650 milliGRAM(s) Oral every 6 hours PRN Temp greater or equal to 38C (100.4F), Mild Pain (1 - 3)  albuterol    90 MICROgram(s) HFA Inhaler 2 Puff(s) Inhalation every 6 hours PRN Shortness of Breath and/or Wheezing  polyethylene glycol 3350 17 Gram(s) Oral daily PRN Constipation        Allergies    No Known Allergies    Intolerances    12-29    145  |  108  |  39<H>  ----------------------------<  92  3.6   |  28  |  1.30    Ca    9.1      29 Dec 2022 06:10  Mg     2.3     12-29    TPro  6.2  /  Alb  2.3<L>  /  TBili  0.9  /  DBili  x   /  AST  9<L>  /  ALT  12  /  AlkPhos  74  12-28      Hemoglobin A1C:   Lipid Panel 12-27 @ 05:54  Total Cholesterol, Serum 135  LDL --  Triglycerides 63    Vitamin B12     RADIOLOGY    ASSESSMENT AND PLAN:      seen for ams/abnormal  ct head  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  Pain is accessed and addressed.  Would continue to follow.

## 2022-12-29 NOTE — PROGRESS NOTE ADULT - SUBJECTIVE AND OBJECTIVE BOX
[facility]FECTIOUS DISE[facility]  Aleksander Gomez MD PhD, Eunice Youngblood MD, Ana Hogan MD, Rickey Swenson MD, Bradly Dubon MD  and providing coverage with Linda Salmeron MD  Providing Infectious Disease Consultations at [facility], [facility], [facility], [facility]'s    Office# [phone] to schedule follow up appointments  Answering Service for urgent calls or New Consults [phone]  Cell# to text for urgent issues Aleksander Gomez [phone]     infectious diseases progress note:    KENNETH MULLER is a 67y y. o. Male patient    Overnight and events of the last 24hrs reviewed    Allergies    No Known Allergies    Intolerances        ANTIBIOTICS/RELEVANT:  antimicrobials  amoxicillin 500 milliGRAM(s) Oral three times a day    immunologic:  influenza  Vaccine (HIGH DOSE) 0.7 milliLiter(s) IntraMuscular once    OTHER:  acetaminophen     Tablet .. 650 milliGRAM(s) Oral every 6 hours PRN  albuterol    90 MICROgram(s) HFA Inhaler 2 Puff(s) Inhalation every 6 hours PRN  allopurinol 100 milliGRAM(s) Oral daily  apixaban 5 milliGRAM(s) Oral every 12 hours  atenolol  Tablet 25 milliGRAM(s) Oral daily  cholecalciferol 1000 Unit(s) Oral daily  famotidine    Tablet 20 milliGRAM(s) Oral daily  furosemide    Tablet 40 milliGRAM(s) Oral daily  polyethylene glycol 3350 17 Gram(s) Oral daily PRN  senna 2 Tablet(s) Oral at bedtime  tamsulosin 0.4 milliGRAM(s) Oral daily  timolol 0.5% Solution 1 Drop(s) Both EYES two times a day  tiotropium 2.5 MICROgram(s) Inhaler 2 Puff(s) Inhalation daily  zinc sulfate 220 milliGRAM(s) Oral daily      Objective:  Vital Signs Last 24 Hrs  T(C): 36.6 (29 Dec 2022 11:42), Max: 37.1 (28 Dec 2022 22:10)  T(F): 97.8 (29 Dec 2022 11:42), Max: 98.7 (28 Dec 2022 22:10)  HR: 69 (29 Dec 2022 11:42) (63 - 73)  BP: 138/88 (29 Dec 2022 11:42) (130/89 - 138/88)  BP(mean): --  RR: 19 (29 Dec 2022 11:42) (18 - 19)  SpO2: 98% (29 Dec 2022 11:42) (95% - 98%)    Parameters below as of 29 Dec 2022 11:42  Patient On (Oxygen Delivery Method): room air        T(C): 36.6 (12-29-22 @ 11:42), Max: 37.1 (12-28-22 @ 22:10)  T(C): 36.6 (12-29-22 @ 11:42), Max: 37.4 (12-27-22 @ 11:45)  T(C): 36.6 (12-29-22 @ 11:42), Max: 37.4 (12-27-22 @ 11:45)    PHYSICAL EXAM:  HEENT: NC atraumatic  Neck: supple  Respiratory: no accessory muscle use, breathing comfortably  Cardiovascular: distant  Gastrointestinal: normal appearing, nondistended  Extremities: no clubbing, no cyanosis,        LABS:                          9.6    8.20  )-----------( 352      ( 29 Dec 2022 06:10 )             33.2       WBC  8.20 12-29 @ 06:10  7.58 12-28 @ 05:45  9.25 12-27 @ 05:54  8.01 12-25 @ 06:10      12-29    145  |  108  |  39<H>  ----------------------------<  92  3.6   |  28  |  1.30    Ca    9.1      29 Dec 2022 06:10  Mg     2.3     12-29    TPro  6.2  /  Alb  2.3<L>  /  TBili  0.9  /  DBili  x   /  AST  9<L>  /  ALT  12  /  AlkPhos  74  12-28      Creatinine, Serum: 1.30 mg/dL (12-29-22 @ 06:10)  Creatinine, Serum: 1.20 mg/dL (12-28-22 @ 05:45)  Creatinine, Serum: 1.20 mg/dL (12-27-22 @ 05:54)  Creatinine, Serum: 1.20 mg/dL (12-25-22 @ 06:10)                INFLAMMATORY MARKERS      MICROBIOLOGY:              RADIOLOGY & ADDITIONAL STUDIES:   [facility] INFECTIOUS DISEASE  Aleksander Gomez MD PhD, Eunice Youngblood MD, Ana Hogan MD, Rickey Swenson MD, Bradly Dubon MD  and providing coverage with Linda Salmeron MD  Providing Infectious Disease Consultations at [facility], [facility], [facility], [facility]'s    Office# [phone] to schedule follow up appointments  Answering Service for urgent calls or New Consults [phone]  Cell# to text for urgent issues Aleksander Gomez [phone]     infectious diseases progress note:    KENNETH MULLER is a 67y y. o. Male patient    Overnight and events of the last 24hrs reviewed    Allergies    No Known Allergies    Intolerances        ANTIBIOTICS/RELEVANT:  antimicrobials  amoxicillin 500 milliGRAM(s) Oral three times a day    immunologic:  influenza  Vaccine (HIGH DOSE) 0.7 milliLiter(s) IntraMuscular once    OTHER:  acetaminophen     Tablet .. 650 milliGRAM(s) Oral every 6 hours PRN  albuterol    90 MICROgram(s) HFA Inhaler 2 Puff(s) Inhalation every 6 hours PRN  allopurinol 100 milliGRAM(s) Oral daily  apixaban 5 milliGRAM(s) Oral every 12 hours  atenolol  Tablet 25 milliGRAM(s) Oral daily  cholecalciferol 1000 Unit(s) Oral daily  famotidine    Tablet 20 milliGRAM(s) Oral daily  furosemide    Tablet 40 milliGRAM(s) Oral daily  polyethylene glycol 3350 17 Gram(s) Oral daily PRN  senna 2 Tablet(s) Oral at bedtime  tamsulosin 0.4 milliGRAM(s) Oral daily  timolol 0.5% Solution 1 Drop(s) Both EYES two times a day  tiotropium 2.5 MICROgram(s) Inhaler 2 Puff(s) Inhalation daily  zinc sulfate 220 milliGRAM(s) Oral daily      Objective:  Vital Signs Last 24 Hrs  T(C): 36.6 (29 Dec 2022 11:42), Max: 37.1 (28 Dec 2022 22:10)  T(F): 97.8 (29 Dec 2022 11:42), Max: 98.7 (28 Dec 2022 22:10)  HR: 69 (29 Dec 2022 11:42) (63 - 73)  BP: 138/88 (29 Dec 2022 11:42) (130/89 - 138/88)  BP(mean): --  RR: 19 (29 Dec 2022 11:42) (18 - 19)  SpO2: 98% (29 Dec 2022 11:42) (95% - 98%)    Parameters below as of 29 Dec 2022 11:42  Patient On (Oxygen Delivery Method): room air        T(C): 36.6 (12-29-22 @ 11:42), Max: 37.1 (12-28-22 @ 22:10)  T(C): 36.6 (12-29-22 @ 11:42), Max: 37.4 (12-27-22 @ 11:45)  T(C): 36.6 (12-29-22 @ 11:42), Max: 37.4 (12-27-22 @ 11:45)    PHYSICAL EXAM:  HEENT: NC atraumatic  Neck: supple  Respiratory: no accessory muscle use, breathing comfortably  Cardiovascular: distant  Gastrointestinal: normal appearing, nondistended  Extremities: no clubbing, no cyanosis,        LABS:                          9.6    8.20  )-----------( 352      ( 29 Dec 2022 06:10 )             33.2       WBC  8.20 12-29 @ 06:10  7.58 12-28 @ 05:45  9.25 12-27 @ 05:54  8.01 12-25 @ 06:10      12-29    145  |  108  |  39<H>  ----------------------------<  92  3.6   |  28  |  1.30    Ca    9.1      29 Dec 2022 06:10  Mg     2.3     12-29    TPro  6.2  /  Alb  2.3<L>  /  TBili  0.9  /  DBili  x   /  AST  9<L>  /  ALT  12  /  AlkPhos  74  12-28      Creatinine, Serum: 1.30 mg/dL (12-29-22 @ 06:10)  Creatinine, Serum: 1.20 mg/dL (12-28-22 @ 05:45)  Creatinine, Serum: 1.20 mg/dL (12-27-22 @ 05:54)  Creatinine, Serum: 1.20 mg/dL (12-25-22 @ 06:10)                INFLAMMATORY MARKERS      MICROBIOLOGY:              RADIOLOGY & ADDITIONAL STUDIES:   [facility] INFECTIOUS DISEASE  Aleksander Gomez MD PhD, Eunice Youngblood MD, Ana Hogan MD, Rickey Swenson MD, Bradly Dubon MD  and providing coverage with Linda Salmeron MD  Providing Infectious Disease Consultations at [facility], [facility], [facility], [facility]'s    Office# [phone] to schedule follow up appointments  Answering Service for urgent calls or New Consults [phone]  Cell# to text for urgent issues Aleksander Gomez [phone]     infectious diseases progress note:    KENNETH MULLER is a 67y y. o. Male patient    Overnight and events of the last 24hrs reviewed    Allergies    No Known Allergies    Intolerances        ANTIBIOTICS/RELEVANT:  antimicrobials  amoxicillin 500 milliGRAM(s) Oral three times a day    immunologic:  influenza  Vaccine (HIGH DOSE) 0.7 milliLiter(s) IntraMuscular once    OTHER:  acetaminophen     Tablet .. 650 milliGRAM(s) Oral every 6 hours PRN  albuterol    90 MICROgram(s) HFA Inhaler 2 Puff(s) Inhalation every 6 hours PRN  allopurinol 100 milliGRAM(s) Oral daily  apixaban 5 milliGRAM(s) Oral every 12 hours  atenolol  Tablet 25 milliGRAM(s) Oral daily  cholecalciferol 1000 Unit(s) Oral daily  famotidine    Tablet 20 milliGRAM(s) Oral daily  furosemide    Tablet 40 milliGRAM(s) Oral daily  polyethylene glycol 3350 17 Gram(s) Oral daily PRN  senna 2 Tablet(s) Oral at bedtime  tamsulosin 0.4 milliGRAM(s) Oral daily  timolol 0.5% Solution 1 Drop(s) Both EYES two times a day  tiotropium 2.5 MICROgram(s) Inhaler 2 Puff(s) Inhalation daily  zinc sulfate 220 milliGRAM(s) Oral daily      Objective:  Vital Signs Last 24 Hrs  T(C): 36.6 (29 Dec 2022 11:42), Max: 37.1 (28 Dec 2022 22:10)  T(F): 97.8 (29 Dec 2022 11:42), Max: 98.7 (28 Dec 2022 22:10)  HR: 69 (29 Dec 2022 11:42) (63 - 73)  BP: 138/88 (29 Dec 2022 11:42) (130/89 - 138/88)  BP(mean): --  RR: 19 (29 Dec 2022 11:42) (18 - 19)  SpO2: 98% (29 Dec 2022 11:42) (95% - 98%)    Parameters below as of 29 Dec 2022 11:42  Patient On (Oxygen Delivery Method): room air        T(C): 36.6 (12-29-22 @ 11:42), Max: 37.1 (12-28-22 @ 22:10)  T(C): 36.6 (12-29-22 @ 11:42), Max: 37.4 (12-27-22 @ 11:45)  T(C): 36.6 (12-29-22 @ 11:42), Max: 37.4 (12-27-22 @ 11:45)    PHYSICAL EXAM:  HEENT: NC atraumatic  Neck: supple  Respiratory: no accessory muscle use, breathing comfortably  Cardiovascular: distant  Gastrointestinal: normal appearing, nondistended  Extremities: no clubbing, no cyanosis,        LABS:                          9.6    8.20  )-----------( 352      ( 29 Dec 2022 06:10 )             33.2       WBC  8.20 12-29 @ 06:10  7.58 12-28 @ 05:45  9.25 12-27 @ 05:54  8.01 12-25 @ 06:10      12-29    145  |  108  |  39<H>  ----------------------------<  92  3.6   |  28  |  1.30    Ca    9.1      29 Dec 2022 06:10  Mg     2.3     12-29    TPro  6.2  /  Alb  2.3<L>  /  TBili  0.9  /  DBili  x   /  AST  9<L>  /  ALT  12  /  AlkPhos  74  12-28      Creatinine, Serum: 1.30 mg/dL (12-29-22 @ 06:10)  Creatinine, Serum: 1.20 mg/dL (12-28-22 @ 05:45)  Creatinine, Serum: 1.20 mg/dL (12-27-22 @ 05:54)  Creatinine, Serum: 1.20 mg/dL (12-25-22 @ 06:10)                INFLAMMATORY MARKERS      MICROBIOLOGY:              RADIOLOGY & ADDITIONAL STUDIES:

## 2022-12-29 NOTE — BH CONSULTATION LIAISON ASSESSMENT NOTE - RISK ASSESSMENT
Patient has no significant prior psychiatric history, is future-oriented, and has good social support

## 2022-12-29 NOTE — PROGRESS NOTE ADULT - ASSESSMENT
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 Admitted for nephrolithiasis and acute UTI.     Problem/Plan - 1:  ·  Problem: Nephrolithiasis.   ·  Plan: CT Abdomen/Pelvis showing 9.4 x 6.4 mm in left mid ureteral stone present with additional bilateral non obstructing renal stones present measuring 5.1 mm in the right and 7.0 mm on the left. Bilateral renal cysts are present.  - s/p Stent Placed; Cultures showing VRE and switched to IV Unasyn.  Now transitioned to Amoxicillin 500mg PO TID with last day 12/29  - continue home tamsulosin 0.4mg po cap daily  - Urology (Dr. Yates).    - Pt. to follow up with his urologist Dr. BECCA Duke as outpatient     Problem/Plan - 2:  ·  Problem: Encephalopathy.   ·  Plan: -Patient initially sent from [facility] to hospital for increasing confusion and abd pain- noted to have UTI due to kidney stone, had cystoscopy with stent placement  -Patient had CT head on admission showing age indeterminate lacunar infarcts, as per patient and wife no history of CVA in past, and patient has been compliant with eliquis (was prescribed for afib)  -Neuro Dr. Ibrahim consulted   - MR head No acute intracranial hemorrhage, acute infarction, extra-axial fluid   collection or hydrocephalus. Chronic lacunar infarctions of the bilateral basal ganglia.  - continue Eliquis and statin.  - Psychiatry consult for delerium     Problem/Plan - 3:  ·  Problem: Cancer of kidney.   ·  Plan: Hx of Kidney cancer, now in remission  - s/p nephrectomy (~2017/2018)  - follows with Dr. Duke (urology).     Problem/Plan - 4:  ·  Problem: Acute UTI.   ·  Plan: Patient presented with AMS to ED likely 2/2 UTI. Hx of recurrent UTIs.  - UA showing mod leuk esterase, 11-25 wbc, 25-50 rbc, occasional bacteria  - Previous Urine Cx grew ESBL klebsiella, but this time VRE  - Chronic kennedy catheter in place  - urine culture +VRE and blood cultures no growth  - Continue Abx   - ID consulted.     Problem/Plan - 5:  ·  Problem: Leg swelling.   ·  Plan: Patient with chronic b/l leg swelling, no known h/o heart failure   - continue lasix 40mg PO daily  - monitor renal function, Cr stable currently  - recent tte - EF 56%, mild pulm htn, dilated L atrium.     Problem/Plan - 6:  ·  Problem: Chronic atrial fibrillation.   ·  Plan: - Continue home eliquis 5mg BID, atenolol.     Problem/Plan - 7:  ·  Problem: Hypertension.   ·  Plan: - Continue home atenolol  - Monitor routine hemodynamics.     Problem/Plan - 8:  ·  Problem: Glaucoma.   ·  Plan: - Continue home latanoprost 0.005% opthalamic soln 1 drop to each eye at bedtime  - Continue home timolol maleate 0.5% opthalmic soln 1 drop to each eye bid.     Problem/Plan - 9:  ·  Problem: Asthma with COPD.   ·  Plan: - Continue home tiotropium  - Albuterol prn.     Problem/Plan - 10:  ·  Problem: DVT prophylaxis.   ·  Plan; - Eliquis 5mg BID     Dispo: Pt with multiple recent hospitalization- was recently in rehab.  consult PT & SW for dispo planning.   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 Admitted for nephrolithiasis and acute UTI.     Problem/Plan - 1:  ·  Problem: Nephrolithiasis.   ·  Plan: CT Abdomen/Pelvis showing 9.4 x 6.4 mm in left mid ureteral stone present with additional bilateral non obstructing renal stones present measuring 5.1 mm in the right and 7.0 mm on the left. Bilateral renal cysts are present.  - s/p Stent Placed; Cultures showing VRE and switched to IV Unasyn.  Now transitioned to Amoxicillin 500mg PO TID with last day 12/29  - continue home tamsulosin 0.4mg po cap daily  - Urology (Dr. Yates).    - Pt. to follow up with his urologist Dr. BECCA Duke as outpatient     Problem/Plan - 2:  ·  Problem: Encephalopathy.   ·  Plan: -Patient initially sent from [facility] to hospital for increasing confusion and abd pain- noted to have UTI due to kidney stone, had cystoscopy with stent placement  -Patient had CT head on admission showing age indeterminate lacunar infarcts, as per patient and wife no history of CVA in past, and patient has been compliant with eliquis (was prescribed for afib)  -Neuro Dr. Ibrahim consulted   - MR head No acute intracranial hemorrhage, acute infarction, extra-axial fluid   collection or hydrocephalus. Chronic lacunar infarctions of the bilateral basal ganglia.  - continue Eliquis and statin.  - Psychiatry consult for delerium     Problem/Plan - 3:  ·  Problem: Cancer of kidney.   ·  Plan: Hx of Kidney cancer, now in remission  - s/p nephrectomy (~2017/2018)  - follows with Dr. Duke (urology).     Problem/Plan - 4:  ·  Problem: Acute UTI.   ·  Plan: Patient presented with AMS to ED likely 2/2 UTI. Hx of recurrent UTIs.  - UA showing mod leuk esterase, 11-25 wbc, 25-50 rbc, occasional bacteria  - Previous Urine Cx grew ESBL klebsiella, but this time VRE  - Chronic kennedy catheter in place  - urine culture +VRE and blood cultures no growth  - Continue Abx   - ID consulted.     Problem/Plan - 5:  ·  Problem: Leg swelling.   ·  Plan: Patient with chronic b/l leg swelling, no known h/o heart failure   - continue lasix 40mg PO daily  - monitor renal function, Cr stable currently  - recent tte - EF 56%, mild pulm htn, dilated L atrium.     Problem/Plan - 6:  ·  Problem: Chronic atrial fibrillation.   ·  Plan: - Continue home eliquis 5mg BID, atenolol.     Problem/Plan - 7:  ·  Problem: Hypertension.   ·  Plan: - Continue home atenolol  - Monitor routine hemodynamics.     Problem/Plan - 8:  ·  Problem: Glaucoma.   ·  Plan: - Continue home latanoprost 0.005% opthalamic soln 1 drop to each eye at bedtime  - Continue home timolol maleate 0.5% opthalmic soln 1 drop to each eye bid.     Problem/Plan - 9:  ·  Problem: Asthma with COPD.   ·  Plan: - Continue home tiotropium  - Albuterol prn.     Problem/Plan - 10:  ·  Problem: DVT prophylaxis.   ·  Plan; - Eliquis 5mg BID     Dispo: Pt with multiple recent hospitalization- was recently in rehab.  consult PT & SW for dispo planning.   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 Admitted for nephrolithiasis and acute UTI.     Problem/Plan - 1:  ·  Problem: Nephrolithiasis.   ·  Plan: CT Abdomen/Pelvis showing 9.4 x 6.4 mm in left mid ureteral stone present with additional bilateral non obstructing renal stones present measuring 5.1 mm in the right and 7.0 mm on the left. Bilateral renal cysts are present.  - s/p Stent Placed; Cultures showing VRE and switched to IV Unasyn.  Now transitioned to Amoxicillin 500mg PO TID with last day 12/29  - continue home tamsulosin 0.4mg po cap daily  - Urology (Dr. Yates).    - Pt. to follow up with his urologist Dr. BECCA Duke as outpatient     Problem/Plan - 2:  ·  Problem: Encephalopathy.   ·  Plan: -Patient initially sent from [facility] to hospital for increasing confusion and abd pain- noted to have UTI due to kidney stone, had cystoscopy with stent placement  -Patient had CT head on admission showing age indeterminate lacunar infarcts, as per patient and wife no history of CVA in past, and patient has been compliant with eliquis (was prescribed for afib)  -Neuro Dr. Ibrahim consulted   - MR head No acute intracranial hemorrhage, acute infarction, extra-axial fluid   collection or hydrocephalus. Chronic lacunar infarctions of the bilateral basal ganglia.  - continue Eliquis and statin.  - Psychiatry consult for delerium     Problem/Plan - 3:  ·  Problem: Cancer of kidney.   ·  Plan: Hx of Kidney cancer, now in remission  - s/p nephrectomy (~2017/2018)  - follows with Dr. Duke (urology).     Problem/Plan - 4:  ·  Problem: Acute UTI.   ·  Plan: Patient presented with AMS to ED likely 2/2 UTI. Hx of recurrent UTIs.  - UA showing mod leuk esterase, 11-25 wbc, 25-50 rbc, occasional bacteria  - Previous Urine Cx grew ESBL klebsiella, but this time VRE  - Chronic kennedy catheter in place  - urine culture +VRE and blood cultures no growth  - Continue Abx   - ID consulted.     Problem/Plan - 5:  ·  Problem: Leg swelling.   ·  Plan: Patient with chronic b/l leg swelling, no known h/o heart failure   - continue lasix 40mg PO daily  - monitor renal function, Cr stable currently  - recent tte - EF 56%, mild pulm htn, dilated L atrium.     Problem/Plan - 6:  ·  Problem: Chronic atrial fibrillation.   ·  Plan: - Continue home eliquis 5mg BID, atenolol.     Problem/Plan - 7:  ·  Problem: Hypertension.   ·  Plan: - Continue home atenolol  - Monitor routine hemodynamics.     Problem/Plan - 8:  ·  Problem: Glaucoma.   ·  Plan: - Continue home latanoprost 0.005% opthalamic soln 1 drop to each eye at bedtime  - Continue home timolol maleate 0.5% opthalmic soln 1 drop to each eye bid.     Problem/Plan - 9:  ·  Problem: Asthma with COPD.   ·  Plan: - Continue home tiotropium  - Albuterol prn.     Problem/Plan - 10:  ·  Problem: DVT prophylaxis.   ·  Plan; - Eliquis 5mg BID     Dispo: Pt with multiple recent hospitalization- was recently in rehab.  consult PT & SW for dispo planning.   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 Admitted for nephrolithiasis and acute UTI.     Problem/Plan - 1:  ·  Problem: Nephrolithiasis.   ·  Plan: CT Abdomen/Pelvis showing 9.4 x 6.4 mm in left mid ureteral stone present with additional bilateral non obstructing renal stones present measuring 5.1 mm in the right and 7.0 mm on the left. Bilateral renal cysts are present.  - s/p Stent Placed; Cultures showing VRE and switched to IV Unasyn.  Now transitioned to Amoxicillin 500mg PO TID with last day 12/29  - continue home tamsulosin 0.4mg po cap daily  - Urology (Dr. Yates).    - Pt. to follow up with his urologist Dr. BECCA Duke as outpatient     Problem/Plan - 2:  ·  Problem: Acute metabolic encephalopathy.   ·  Plan: -Patient initially sent from [facility] to hospital for increasing confusion and abd pain- noted to have UTI due to kidney stone, had cystoscopy with stent placement  -Patient had CT head on admission showing age indeterminate lacunar infarcts, as per patient and wife no history of CVA in past, and patient has been compliant with eliquis (was prescribed for afib)  -Neuro Dr. Ibrahim consulted   - MR head No acute intracranial hemorrhage, acute infarction, extra-axial fluid   collection or hydrocephalus. Chronic lacunar infarctions of the bilateral basal ganglia.  - continue Eliquis and statin.  - Psychiatry consult for delerium     Problem/Plan - 3:  ·  Problem: Cancer of kidney.   ·  Plan: Hx of Kidney cancer, now in remission  - s/p nephrectomy (~2017/2018)  - follows with Dr. Duke (urology).     Problem/Plan - 4:  ·  Problem: Acute UTI.   ·  Plan: Patient presented with AMS to ED likely 2/2 UTI. Hx of recurrent UTIs.  - UA showing mod leuk esterase, 11-25 wbc, 25-50 rbc, occasional bacteria  - Previous Urine Cx grew ESBL klebsiella, but this time VRE  - Chronic kennedy catheter in place  - urine culture +VRE and blood cultures no growth  - Continue Abx   - ID consulted.     Problem/Plan - 5:  ·  Problem: Leg swelling.   ·  Plan: Patient with chronic b/l leg swelling, no known h/o heart failure   - continue lasix 40mg PO daily  - monitor renal function, Cr stable currently  - recent tte - EF 56%, mild pulm htn, dilated L atrium.     Problem/Plan - 6:  ·  Problem: Chronic atrial fibrillation.   ·  Plan: - Continue home eliquis 5mg BID, atenolol.     Problem/Plan - 7:  ·  Problem: Hypertension.   ·  Plan: - Continue home atenolol  - Monitor routine hemodynamics.     Problem/Plan - 8:  ·  Problem: Glaucoma.   ·  Plan: - Continue home latanoprost 0.005% opthalamic soln 1 drop to each eye at bedtime  - Continue home timolol maleate 0.5% opthalmic soln 1 drop to each eye bid.     Problem/Plan - 9:  ·  Problem: Asthma with COPD.   ·  Plan: - Continue home tiotropium  - Albuterol prn.     Problem/Plan - 10:  ·  Problem: DVT prophylaxis.   ·  Plan; - Eliquis 5mg BID     Dispo: Pt with multiple recent hospitalization- was recently in rehab.  consult PT & SW for dispo planning.   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 Admitted for nephrolithiasis and acute UTI.     Problem/Plan - 1:  ·  Problem: Nephrolithiasis.   ·  Plan: CT Abdomen/Pelvis showing 9.4 x 6.4 mm in left mid ureteral stone present with additional bilateral non obstructing renal stones present measuring 5.1 mm in the right and 7.0 mm on the left. Bilateral renal cysts are present.  - s/p Stent Placed; Cultures showing VRE and switched to IV Unasyn.  Now transitioned to Amoxicillin 500mg PO TID with last day 12/29  - continue home tamsulosin 0.4mg po cap daily  - Urology (Dr. Yates).    - Pt. to follow up with his urologist Dr. BECCA Duke as outpatient     Problem/Plan - 2:  ·  Problem: Acute metabolic encephalopathy.   ·  Plan: -Patient initially sent from [facility] to hospital for increasing confusion and abd pain- noted to have UTI due to kidney stone, had cystoscopy with stent placement  -Patient had CT head on admission showing age indeterminate lacunar infarcts, as per patient and wife no history of CVA in past, and patient has been compliant with eliquis (was prescribed for afib)  -Neuro Dr. Ibrahim consulted   - MR head No acute intracranial hemorrhage, acute infarction, extra-axial fluid   collection or hydrocephalus. Chronic lacunar infarctions of the bilateral basal ganglia.  - continue Eliquis and statin.  - Psychiatry consult for delerium     Problem/Plan - 3:  ·  Problem: Cancer of kidney.   ·  Plan: Hx of Kidney cancer, now in remission  - s/p nephrectomy (~2017/2018)  - follows with Dr. Duke (urology).     Problem/Plan - 4:  ·  Problem: Acute UTI.   ·  Plan: Patient presented with AMS to ED likely 2/2 UTI. Hx of recurrent UTIs.  - UA showing mod leuk esterase, 11-25 wbc, 25-50 rbc, occasional bacteria  - Previous Urine Cx grew ESBL klebsiella, but this time VRE  - Chronic kennedy catheter in place  - urine culture +VRE and blood cultures no growth  - Continue Abx   - ID consulted.     Problem/Plan - 5:  ·  Problem: Leg swelling.   ·  Plan: Patient with chronic b/l leg swelling, no known h/o heart failure   - continue lasix 40mg PO daily  - monitor renal function, Cr stable currently  - recent tte - EF 56%, mild pulm htn, dilated L atrium.     Problem/Plan - 6:  ·  Problem: Chronic atrial fibrillation.   ·  Plan: - Continue home eliquis 5mg BID, atenolol.     Problem/Plan - 7:  ·  Problem: Hypertension.   ·  Plan: - Continue home atenolol  - Monitor routine hemodynamics.     Problem/Plan - 8:  ·  Problem: Glaucoma.   ·  Plan: - Continue home latanoprost 0.005% opthalamic soln 1 drop to each eye at bedtime  - Continue home timolol maleate 0.5% opthalmic soln 1 drop to each eye bid.     Problem/Plan - 9:  ·  Problem: Asthma with COPD.   ·  Plan: - Continue home tiotropium  - Albuterol prn.     Problem/Plan - 10:  ·  Problem: DVT prophylaxis.   ·  Plan; - Eliquis 5mg BID     Dispo: Pt with multiple recent hospitalization- was recently in rehab.  consult PT & SW for dispo planning.   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 Admitted for nephrolithiasis and acute UTI.     Problem/Plan - 1:  ·  Problem: Nephrolithiasis.   ·  Plan: CT Abdomen/Pelvis showing 9.4 x 6.4 mm in left mid ureteral stone present with additional bilateral non obstructing renal stones present measuring 5.1 mm in the right and 7.0 mm on the left. Bilateral renal cysts are present.  - s/p Stent Placed; Cultures showing VRE and switched to IV Unasyn.  Now transitioned to Amoxicillin 500mg PO TID with last day 12/29  - continue home tamsulosin 0.4mg po cap daily  - Urology (Dr. Yates).    - Pt. to follow up with his urologist Dr. BECCA Duke as outpatient     Problem/Plan - 2:  ·  Problem: Acute metabolic encephalopathy.   ·  Plan: -Patient initially sent from [facility] to hospital for increasing confusion and abd pain- noted to have UTI due to kidney stone, had cystoscopy with stent placement  -Patient had CT head on admission showing age indeterminate lacunar infarcts, as per patient and wife no history of CVA in past, and patient has been compliant with eliquis (was prescribed for afib)  -Neuro Dr. Ibrahim consulted   - MR head No acute intracranial hemorrhage, acute infarction, extra-axial fluid   collection or hydrocephalus. Chronic lacunar infarctions of the bilateral basal ganglia.  - continue Eliquis and statin.  - Psychiatry consult for delerium     Problem/Plan - 3:  ·  Problem: Cancer of kidney.   ·  Plan: Hx of Kidney cancer, now in remission  - s/p nephrectomy (~2017/2018)  - follows with Dr. Duke (urology).     Problem/Plan - 4:  ·  Problem: Acute UTI.   ·  Plan: Patient presented with AMS to ED likely 2/2 UTI. Hx of recurrent UTIs.  - UA showing mod leuk esterase, 11-25 wbc, 25-50 rbc, occasional bacteria  - Previous Urine Cx grew ESBL klebsiella, but this time VRE  - Chronic kennedy catheter in place  - urine culture +VRE and blood cultures no growth  - Continue Abx   - ID consulted.     Problem/Plan - 5:  ·  Problem: Leg swelling.   ·  Plan: Patient with chronic b/l leg swelling, no known h/o heart failure   - continue lasix 40mg PO daily  - monitor renal function, Cr stable currently  - recent tte - EF 56%, mild pulm htn, dilated L atrium.     Problem/Plan - 6:  ·  Problem: Chronic atrial fibrillation.   ·  Plan: - Continue home eliquis 5mg BID, atenolol.     Problem/Plan - 7:  ·  Problem: Hypertension.   ·  Plan: - Continue home atenolol  - Monitor routine hemodynamics.     Problem/Plan - 8:  ·  Problem: Glaucoma.   ·  Plan: - Continue home latanoprost 0.005% opthalamic soln 1 drop to each eye at bedtime  - Continue home timolol maleate 0.5% opthalmic soln 1 drop to each eye bid.     Problem/Plan - 9:  ·  Problem: Asthma with COPD.   ·  Plan: - Continue home tiotropium  - Albuterol prn.     Problem/Plan - 10:  ·  Problem: DVT prophylaxis.   ·  Plan; - Eliquis 5mg BID     Dispo: Pt with multiple recent hospitalization- was recently in rehab.  consult PT & SW for dispo planning.

## 2022-12-29 NOTE — PROGRESS NOTE ADULT - SUBJECTIVE AND OBJECTIVE BOX
CHIEF COMPLAINT/INTERVAL HISTORY:  Pt. seen and evaluated for UTI and encephalopathy.  Pt. is in no distress.  Tolerating oral antibiotic.  AAOx3.      REVIEW OF SYSTEMS:  No fever, CP, SOB, or abdominal pain    Vital Signs Last 24 Hrs  T(C): 36.7 (29 Dec 2022 04:04), Max: 37.1 (28 Dec 2022 22:10)  T(F): 98 (29 Dec 2022 04:04), Max: 98.7 (28 Dec 2022 22:10)  HR: 63 (29 Dec 2022 04:04) (63 - 76)  BP: 131/65 (29 Dec 2022 04:04) (130/85 - 131/65)  BP(mean): --  RR: 19 (29 Dec 2022 04:04) (18 - 19)  SpO2: 96% (29 Dec 2022 04:04) (95% - 97%)    Parameters below as of 29 Dec 2022 04:04  Patient On (Oxygen Delivery Method): room air        PHYSICAL EXAM:  GENERAL: NAD  HEENT: EOMI, hearing normal, conjunctiva and sclera clear  Chest: diminished BS at bases, no wheezing  CV: S1S2, RRR,   GI: soft, +BS, NT/ND  Musculoskeletal: 1+ LE edema  Psychiatric: affect nL, mood nL  Skin: warm and dry    LABS:                        9.6    8.20  )-----------( 352      ( 29 Dec 2022 06:10 )             33.2     12-28    144  |  108  |  34<H>  ----------------------------<  96  3.6   |  29  |  1.20    Ca    9.1      28 Dec 2022 05:45    TPro  6.2  /  Alb  2.3<L>  /  TBili  0.9  /  DBili  x   /  AST  9<L>  /  ALT  12  /  AlkPhos  74  12-28

## 2022-12-29 NOTE — BH CONSULTATION LIAISON ASSESSMENT NOTE - NSBHCHARTREVIEWVS_PSY_A_CORE FT
Vital Signs Last 24 Hrs  T(C): 36.6 (29 Dec 2022 11:42), Max: 37.1 (28 Dec 2022 22:10)  T(F): 97.8 (29 Dec 2022 11:42), Max: 98.7 (28 Dec 2022 22:10)  HR: 69 (29 Dec 2022 11:42) (63 - 73)  BP: 138/88 (29 Dec 2022 11:42) (130/89 - 138/88)  BP(mean): --  RR: 19 (29 Dec 2022 11:42) (18 - 19)  SpO2: 98% (29 Dec 2022 11:42) (95% - 98%)    Parameters below as of 29 Dec 2022 11:42  Patient On (Oxygen Delivery Method): room air

## 2022-12-29 NOTE — BH CONSULTATION LIAISON ASSESSMENT NOTE - HPI (INCLUDE ILLNESS QUALITY, SEVERITY, DURATION, TIMING, CONTEXT, MODIFYING FACTORS, ASSOCIATED SIGNS AND SYMPTOMS)
Patient seen, evaluated and chart reviewed. Patient is a 66 y/o MWM, with no prior psychiatric hospitalizations or significant treatment, PMH of asthma, HTN, kidney cancer in remission, glaucoma, afib on eliquis, s/p L total knee replacement in September presents to the ED for increased confusion with lower abdominal pain. Patient was recently hospitalized at [facility] from 11/22 -12/06/22 for AMS and UTI. Urine cultures at the time grew ESBL Klebsiella and patient was treated with ertapenem. He was discharged to [facility] with chronic indwelling kennedy catheter. Patient's family wanted to bring him home after 3 days, however, they soon felt they were unable to properly care for him at home. Patient was diagnosed with UTI, and has been treated for it. He gets agitated intermittently especially in the evenings. Patient seen, evaluated and chart reviewed. Patient is a 66 y/o MWM, with no prior psychiatric hospitalizations or significant treatment, PMH of asthma, HTN, kidney cancer in remission, glaucoma, afib on eliquis, s/p L total knee replacement in September presents to the ED for increased confusion with lower abdominal pain. Patient was recently hospitalized at [facility] from 11/22 -12/06/22 for AMS and UTI. Urine cultures at the time grew ESBL Klebsiella and patient was treated with ertapenem. He was discharged to [facility] with chronic indwelling kennedy catheter. Patient's family wanted to bring him home after 3 days, however, they soon felt they were unable to properly care for him at home. Patient was diagnosed with UTI, and has been treated for it. He gets agitated intermittently especially in the evenings. Patient seen, evaluated and chart reviewed. Patient is a 68 y/o MWM, with no prior psychiatric hospitalizations or significant treatment, PMH of asthma, HTN, kidney cancer in remission, glaucoma, afib on eliquis, s/p L total knee replacement in September presents to the ED for increased confusion with lower abdominal pain. Patient was recently hospitalized at [facility] from 11/22 -12/06/22 for AMS and UTI. Urine cultures at the time grew ESBL Klebsiella and patient was treated with ertapenem. He was discharged to [facility] with chronic indwelling kennedy catheter. Patient's family wanted to bring him home after 3 days, however, they soon felt they were unable to properly care for him at home. Patient was diagnosed with UTI, and has been treated for it. He gets agitated intermittently especially in the evenings.

## 2022-12-30 VITALS
SYSTOLIC BLOOD PRESSURE: 155 MMHG | TEMPERATURE: 98 F | OXYGEN SATURATION: 97 % | RESPIRATION RATE: 19 BRPM | HEART RATE: 83 BPM | DIASTOLIC BLOOD PRESSURE: 90 MMHG

## 2022-12-30 LAB
ANION GAP SERPL CALC-SCNC: 9 MMOL/L — SIGNIFICANT CHANGE UP (ref 5–17)
BASOPHILS # BLD AUTO: 0.05 K/UL — SIGNIFICANT CHANGE UP (ref 0–0.2)
BASOPHILS NFR BLD AUTO: 0.6 % — SIGNIFICANT CHANGE UP (ref 0–2)
BUN SERPL-MCNC: 39 MG/DL — HIGH (ref 7–23)
CALCIUM SERPL-MCNC: 9.3 MG/DL — SIGNIFICANT CHANGE UP (ref 8.5–10.1)
CHLORIDE SERPL-SCNC: 109 MMOL/L — HIGH (ref 96–108)
CO2 SERPL-SCNC: 28 MMOL/L — SIGNIFICANT CHANGE UP (ref 22–31)
CREAT SERPL-MCNC: 1.3 MG/DL — SIGNIFICANT CHANGE UP (ref 0.5–1.3)
EGFR: 61 ML/MIN/1.73M2 — SIGNIFICANT CHANGE UP
EOSINOPHIL # BLD AUTO: 0.18 K/UL — SIGNIFICANT CHANGE UP (ref 0–0.5)
EOSINOPHIL NFR BLD AUTO: 2.2 % — SIGNIFICANT CHANGE UP (ref 0–6)
GLUCOSE SERPL-MCNC: 96 MG/DL — SIGNIFICANT CHANGE UP (ref 70–99)
HCT VFR BLD CALC: 34 % — LOW (ref 39–50)
HGB BLD-MCNC: 9.9 G/DL — LOW (ref 13–17)
IMM GRANULOCYTES NFR BLD AUTO: 0.5 % — SIGNIFICANT CHANGE UP (ref 0–0.9)
LYMPHOCYTES # BLD AUTO: 1.6 K/UL — SIGNIFICANT CHANGE UP (ref 1–3.3)
LYMPHOCYTES # BLD AUTO: 19.5 % — SIGNIFICANT CHANGE UP (ref 13–44)
MCHC RBC-ENTMCNC: 23 PG — LOW (ref 27–34)
MCHC RBC-ENTMCNC: 29.1 GM/DL — LOW (ref 32–36)
MCV RBC AUTO: 79.1 FL — LOW (ref 80–100)
MONOCYTES # BLD AUTO: 0.62 K/UL — SIGNIFICANT CHANGE UP (ref 0–0.9)
MONOCYTES NFR BLD AUTO: 7.5 % — SIGNIFICANT CHANGE UP (ref 2–14)
NEUTROPHILS # BLD AUTO: 5.73 K/UL — SIGNIFICANT CHANGE UP (ref 1.8–7.4)
NEUTROPHILS NFR BLD AUTO: 69.7 % — SIGNIFICANT CHANGE UP (ref 43–77)
NRBC # BLD: 0 /100 WBCS — SIGNIFICANT CHANGE UP (ref 0–0)
PLATELET # BLD AUTO: 381 K/UL — SIGNIFICANT CHANGE UP (ref 150–400)
POTASSIUM SERPL-MCNC: 3.8 MMOL/L — SIGNIFICANT CHANGE UP (ref 3.5–5.3)
POTASSIUM SERPL-SCNC: 3.8 MMOL/L — SIGNIFICANT CHANGE UP (ref 3.5–5.3)
RBC # BLD: 4.3 M/UL — SIGNIFICANT CHANGE UP (ref 4.2–5.8)
RBC # FLD: 18.5 % — HIGH (ref 10.3–14.5)
SODIUM SERPL-SCNC: 146 MMOL/L — HIGH (ref 135–145)
WBC # BLD: 8.22 K/UL — SIGNIFICANT CHANGE UP (ref 3.8–10.5)
WBC # FLD AUTO: 8.22 K/UL — SIGNIFICANT CHANGE UP (ref 3.8–10.5)

## 2022-12-30 PROCEDURE — 96365 THER/PROPH/DIAG IV INF INIT: CPT

## 2022-12-30 PROCEDURE — 97162 PT EVAL MOD COMPLEX 30 MIN: CPT

## 2022-12-30 PROCEDURE — 87186 SC STD MICRODIL/AGAR DIL: CPT

## 2022-12-30 PROCEDURE — 93005 ELECTROCARDIOGRAM TRACING: CPT

## 2022-12-30 PROCEDURE — C2617: CPT

## 2022-12-30 PROCEDURE — 70551 MRI BRAIN STEM W/O DYE: CPT

## 2022-12-30 PROCEDURE — 87040 BLOOD CULTURE FOR BACTERIA: CPT

## 2022-12-30 PROCEDURE — C1758: CPT

## 2022-12-30 PROCEDURE — 83735 ASSAY OF MAGNESIUM: CPT

## 2022-12-30 PROCEDURE — 36415 COLL VENOUS BLD VENIPUNCTURE: CPT

## 2022-12-30 PROCEDURE — 81001 URINALYSIS AUTO W/SCOPE: CPT

## 2022-12-30 PROCEDURE — 94640 AIRWAY INHALATION TREATMENT: CPT

## 2022-12-30 PROCEDURE — 99285 EMERGENCY DEPT VISIT HI MDM: CPT

## 2022-12-30 PROCEDURE — 80048 BASIC METABOLIC PNL TOTAL CA: CPT

## 2022-12-30 PROCEDURE — 85730 THROMBOPLASTIN TIME PARTIAL: CPT

## 2022-12-30 PROCEDURE — 70450 CT HEAD/BRAIN W/O DYE: CPT | Mod: MA

## 2022-12-30 PROCEDURE — 74177 CT ABD & PELVIS W/CONTRAST: CPT | Mod: MA

## 2022-12-30 PROCEDURE — 84484 ASSAY OF TROPONIN QUANT: CPT

## 2022-12-30 PROCEDURE — 97110 THERAPEUTIC EXERCISES: CPT

## 2022-12-30 PROCEDURE — 82962 GLUCOSE BLOOD TEST: CPT

## 2022-12-30 PROCEDURE — C1769: CPT

## 2022-12-30 PROCEDURE — 80061 LIPID PANEL: CPT

## 2022-12-30 PROCEDURE — 86803 HEPATITIS C AB TEST: CPT

## 2022-12-30 PROCEDURE — 87077 CULTURE AEROBIC IDENTIFY: CPT

## 2022-12-30 PROCEDURE — 76000 FLUOROSCOPY <1 HR PHYS/QHP: CPT

## 2022-12-30 PROCEDURE — 80053 COMPREHEN METABOLIC PANEL: CPT

## 2022-12-30 PROCEDURE — 87637 SARSCOV2&INF A&B&RSV AMP PRB: CPT

## 2022-12-30 PROCEDURE — 72141 MRI NECK SPINE W/O DYE: CPT

## 2022-12-30 PROCEDURE — 85027 COMPLETE CBC AUTOMATED: CPT

## 2022-12-30 PROCEDURE — 87086 URINE CULTURE/COLONY COUNT: CPT

## 2022-12-30 PROCEDURE — 71045 X-RAY EXAM CHEST 1 VIEW: CPT

## 2022-12-30 PROCEDURE — 99239 HOSP IP/OBS DSCHRG MGMT >30: CPT

## 2022-12-30 PROCEDURE — 83605 ASSAY OF LACTIC ACID: CPT

## 2022-12-30 PROCEDURE — 85025 COMPLETE CBC W/AUTO DIFF WBC: CPT

## 2022-12-30 PROCEDURE — 97112 NEUROMUSCULAR REEDUCATION: CPT

## 2022-12-30 PROCEDURE — 87635 SARS-COV-2 COVID-19 AMP PRB: CPT

## 2022-12-30 PROCEDURE — 85610 PROTHROMBIN TIME: CPT

## 2022-12-30 PROCEDURE — 97116 GAIT TRAINING THERAPY: CPT

## 2022-12-30 RX ADMIN — ATENOLOL 25 MILLIGRAM(S): 25 TABLET ORAL at 05:35

## 2022-12-30 RX ADMIN — Medication 100 MILLIGRAM(S): at 12:51

## 2022-12-30 RX ADMIN — Medication 40 MILLIGRAM(S): at 05:35

## 2022-12-30 RX ADMIN — FAMOTIDINE 20 MILLIGRAM(S): 10 INJECTION INTRAVENOUS at 12:51

## 2022-12-30 RX ADMIN — ZINC SULFATE TAB 220 MG (50 MG ZINC EQUIVALENT) 220 MILLIGRAM(S): 220 (50 ZN) TAB at 12:51

## 2022-12-30 RX ADMIN — TIOTROPIUM BROMIDE 2 PUFF(S): 18 CAPSULE ORAL; RESPIRATORY (INHALATION) at 05:36

## 2022-12-30 RX ADMIN — TAMSULOSIN HYDROCHLORIDE 0.4 MILLIGRAM(S): 0.4 CAPSULE ORAL at 12:50

## 2022-12-30 RX ADMIN — Medication 1 DROP(S): at 05:35

## 2022-12-30 RX ADMIN — Medication 500 MILLIGRAM(S): at 05:35

## 2022-12-30 RX ADMIN — Medication 1000 UNIT(S): at 12:51

## 2022-12-30 RX ADMIN — APIXABAN 5 MILLIGRAM(S): 2.5 TABLET, FILM COATED ORAL at 09:41

## 2022-12-30 NOTE — PROGRESS NOTE ADULT - REASON FOR ADMISSION
Nephrolithiasis

## 2022-12-30 NOTE — PROGRESS NOTE ADULT - SUBJECTIVE AND OBJECTIVE BOX
CHIEF COMPLAINT/INTERVAL HISTORY:  Pt. seen and evaluated for UTI and acute metabolic encephalopathy.  Pt. is in no distress.  AAO x 3.  Completed course of antibiotics.      REVIEW OF SYSTEMS:  No fever, CP, SOB, or abdominal pain    Vital Signs Last 24 Hrs  T(C): 36.8 (30 Dec 2022 04:32), Max: 36.8 (29 Dec 2022 20:27)  T(F): 98.2 (30 Dec 2022 04:32), Max: 98.3 (29 Dec 2022 20:27)  HR: 95 (30 Dec 2022 04:32) (69 - 95)  BP: 146/95 (30 Dec 2022 04:32) (130/91 - 146/95)  BP(mean): --  RR: 19 (30 Dec 2022 04:32) (18 - 19)  SpO2: 93% (30 Dec 2022 04:32) (93% - 98%)    Parameters below as of 30 Dec 2022 04:32  Patient On (Oxygen Delivery Method): room air        PHYSICAL EXAM:  GENERAL: NAD  HEENT: EOMI, hearing normal, conjunctiva and sclera clear  Chest: diminished BS at bases, no wheezing  CV: S1S2, RRR,   GI: soft, +BS, NT/ND  Musculoskeletal: trace LE edema  Psychiatric: affect nL, mood nL  Skin: warm and dry    LABS:                        9.9    8.22  )-----------( 381      ( 30 Dec 2022 06:36 )             34.0     12-30    146<H>  |  109<H>  |  39<H>  ----------------------------<  96  3.8   |  28  |  1.30    Ca    9.3      30 Dec 2022 06:36  Mg     2.3     12-29

## 2022-12-30 NOTE — PROGRESS NOTE ADULT - ASSESSMENT
Thank you for consulting us and involving us in the management of this most interesting and challenging case.  Please call us at [phone] or text me directly on my cell#[phone] with any concerns or further questions.  68 y/o male PMH of asthma, HTN, kidney cancer in remission, glaucoma, afib on eliquis, s/p L total knee replacement in September presented to the ED and was admitted 12/20 for increased confusion with lower abdominal pain. Found to have 9.4 x 6.4 mm in left mid ureteral stone and bilateral non obstructing renal stones are present measuring 5.1 mm in the right and 7.0 mm on the left. sp   Cystoscopic insertion of ureteral stent 20-Dec-2022 12:24:54  Manuel Yates  XR pyelogram retrograde left 20-Dec-2022 12:25:08  Manuel Yates.  Growing amp sensitive VRE    RECOMMENDATIONS  PT with nephrolithiasis and urinary tract infection with VRE that is ampicillin sensitive. No benefit to beta-lactamase inhibitor rec    sp Amoxicillin 500mg PO TID with last day 12/29    From an ID standpoint no further requirement for inpatient status for the management of ID issues. Fine with discharge from ID standpoint when other medical issues no longer require inpatient care and social issues allow for a safe discharge plan. To schedule an outpatient ID follow up appointment please call our office at [phone]       Thank you for consulting us and involving us in the management of this most interesting and challenging case.  Please call us at [phone] or text me directly on my cell#[phone] with any concerns or further questions.  66 y/o male PMH of asthma, HTN, kidney cancer in remission, glaucoma, afib on eliquis, s/p L total knee replacement in September presented to the ED and was admitted 12/20 for increased confusion with lower abdominal pain. Found to have 9.4 x 6.4 mm in left mid ureteral stone and bilateral non obstructing renal stones are present measuring 5.1 mm in the right and 7.0 mm on the left. sp   Cystoscopic insertion of ureteral stent 20-Dec-2022 12:24:54  Manuel Yates  XR pyelogram retrograde left 20-Dec-2022 12:25:08  Manuel Yates.  Growing amp sensitive VRE    RECOMMENDATIONS  PT with nephrolithiasis and urinary tract infection with VRE that is ampicillin sensitive. No benefit to beta-lactamase inhibitor rec    sp Amoxicillin 500mg PO TID with last day 12/29    From an ID standpoint no further requirement for inpatient status for the management of ID issues. Fine with discharge from ID standpoint when other medical issues no longer require inpatient care and social issues allow for a safe discharge plan. To schedule an outpatient ID follow up appointment please call our office at [phone]       Thank you for consulting us and involving us in the management of this most interesting and challenging case.  Please call us at [phone] or text me directly on my cell#[phone] with any concerns or further questions.  66 y/o male PMH of asthma, HTN, kidney cancer in remission, glaucoma, afib on eliquis, s/p L total knee replacement in September presented to the ED and was admitted 12/20 for increased confusion with lower abdominal pain. Found to have 9.4 x 6.4 mm in left mid ureteral stone and bilateral non obstructing renal stones are present measuring 5.1 mm in the right and 7.0 mm on the left. sp   Cystoscopic insertion of ureteral stent 20-Dec-2022 12:24:54  Manuel Yates  XR pyelogram retrograde left 20-Dec-2022 12:25:08  Manuel Yates.  Growing amp sensitive VRE    RECOMMENDATIONS  PT with nephrolithiasis and urinary tract infection with VRE that is ampicillin sensitive. No benefit to beta-lactamase inhibitor rec    sp Amoxicillin 500mg PO TID with last day 12/29    From an ID standpoint no further requirement for inpatient status for the management of ID issues. Fine with discharge from ID standpoint when other medical issues no longer require inpatient care and social issues allow for a safe discharge plan. To schedule an outpatient ID follow up appointment please call our office at [phone]

## 2022-12-30 NOTE — PROGRESS NOTE ADULT - ASSESSMENT
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 Admitted for nephrolithiasis and acute UTI.     Problem/Plan - 1:  ·  Problem: Nephrolithiasis.   ·  Plan: CT Abdomen/Pelvis showing 9.4 x 6.4 mm in left mid ureteral stone present with additional bilateral non obstructing renal stones present measuring 5.1 mm in the right and 7.0 mm on the left. Bilateral renal cysts are present.  - s/p Stent Placed; Cultures showing VRE and switched to IV Unasyn.  Now transitioned to Amoxicillin 500mg PO TID.  Completed course of antibiotics last day 12/29  - continue home tamsulosin 0.4mg po cap daily  - Urology (Dr. Yates).    - Pt. to follow up with his urologist Dr. BECCA Duke as outpatient     Problem/Plan - 2:  ·  Problem: Acute metabolic encephalopathy.   ·  Plan: -Patient initially sent from [facility] to hospital for increasing confusion and abd pain- noted to have UTI due to kidney stone, had cystoscopy with stent placement  -Patient had CT head on admission showing age indeterminate lacunar infarcts, as per patient and wife no history of CVA in past, and patient has been compliant with eliquis (was prescribed for afib)  -Neuro Dr. Ibrahim consulted   - MR head No acute intracranial hemorrhage, acute infarction, extra-axial fluid   collection or hydrocephalus. Chronic lacunar infarctions of the bilateral basal ganglia.  - continue Eliquis and statin.  - Psychiatry consult for delerium.  Haldol 2mg IM Q6h PRN     Problem/Plan - 3:  ·  Problem: Cancer of kidney.   ·  Plan: Hx of Kidney cancer, now in remission  - s/p nephrectomy (~2017/2018)  - follows with Dr. Duke (urology).     Problem/Plan - 4:  ·  Problem: Acute UTI.   ·  Plan: Patient presented with AMS to ED likely 2/2 UTI. Hx of recurrent UTIs.  - UA showing mod leuk esterase, 11-25 wbc, 25-50 rbc, occasional bacteria  - Previous Urine Cx grew ESBL klebsiella, but this time VRE  - Chronic kennedy catheter in place  - urine culture +VRE and blood cultures no growth  - Completed course of Abx   - ID consulted.     Problem/Plan - 5:  ·  Problem: Leg swelling.   ·  Plan: Patient with chronic b/l leg swelling, no known h/o heart failure   - continue lasix 40mg PO daily  - monitor renal function, Cr stable currently  - recent tte - EF 56%, mild pulm htn, dilated L atrium.     Problem/Plan - 6:  ·  Problem: Chronic atrial fibrillation.   ·  Plan: - Continue home eliquis 5mg BID, atenolol.     Problem/Plan - 7:  ·  Problem: Hypertension.   ·  Plan: - Continue home atenolol  - Monitor routine hemodynamics.     Problem/Plan - 8:  ·  Problem: Glaucoma.   ·  Plan: - Continue home latanoprost 0.005% opthalamic soln 1 drop to each eye at bedtime  - Continue home timolol maleate 0.5% opthalmic soln 1 drop to each eye bid.     Problem/Plan - 9:  ·  Problem: Asthma with COPD.   ·  Plan: - Continue home tiotropium  - Albuterol prn.     Problem/Plan - 10:  ·  Problem: DVT prophylaxis.   ·  Plan; - Eliquis 5mg BID     Dispo: Pt with multiple recent hospitalization- was recently in rehab.  consult PT & SW for dispo planning.  Awaiting MELANIE placement.      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 Admitted for nephrolithiasis and acute UTI.     Problem/Plan - 1:  ·  Problem: Nephrolithiasis.   ·  Plan: CT Abdomen/Pelvis showing 9.4 x 6.4 mm in left mid ureteral stone present with additional bilateral non obstructing renal stones present measuring 5.1 mm in the right and 7.0 mm on the left. Bilateral renal cysts are present.  - s/p Stent Placed; Cultures showing VRE and switched to IV Unasyn.  Now transitioned to Amoxicillin 500mg PO TID.  Completed course of antibiotics last day 12/29  - continue home tamsulosin 0.4mg po cap daily  - Urology (Dr. Yates).    - Pt. to follow up with his urologist Dr. BECCA Duke as outpatient     Problem/Plan - 2:  ·  Problem: Acute metabolic encephalopathy.   ·  Plan: -Patient initially sent from [facility] to hospital for increasing confusion and abd pain- noted to have UTI due to kidney stone, had cystoscopy with stent placement  -Patient had CT head on admission showing age indeterminate lacunar infarcts, as per patient and wife no history of CVA in past, and patient has been compliant with eliquis (was prescribed for afib)  -Neuro Dr. Ibrahim consulted   - MR head No acute intracranial hemorrhage, acute infarction, extra-axial fluid   collection or hydrocephalus. Chronic lacunar infarctions of the bilateral basal ganglia.  - continue Eliquis and statin.  - Psychiatry consult for delerium.  Haldol 2mg IM Q6h PRN     Problem/Plan - 3:  ·  Problem: Cancer of kidney.   ·  Plan: Hx of Kidney cancer, now in remission  - s/p nephrectomy (~2017/2018)  - follows with Dr. Duke (urology).     Problem/Plan - 4:  ·  Problem: Acute UTI.   ·  Plan: Patient presented with AMS to ED likely 2/2 UTI. Hx of recurrent UTIs.  - UA showing mod leuk esterase, 11-25 wbc, 25-50 rbc, occasional bacteria  - Previous Urine Cx grew ESBL klebsiella, but this time VRE  - Chronic kennedy catheter in place  - urine culture +VRE and blood cultures no growth  - Completed course of Abx   - ID consulted.     Problem/Plan - 5:  ·  Problem: Leg swelling.   ·  Plan: Patient with chronic b/l leg swelling, no known h/o heart failure   - continue lasix 40mg PO daily  - monitor renal function, Cr stable currently  - recent tte - EF 56%, mild pulm htn, dilated L atrium.     Problem/Plan - 6:  ·  Problem: Chronic atrial fibrillation.   ·  Plan: - Continue home eliquis 5mg BID, atenolol.     Problem/Plan - 7:  ·  Problem: Hypertension.   ·  Plan: - Continue home atenolol  - Monitor routine hemodynamics.     Problem/Plan - 8:  ·  Problem: Glaucoma.   ·  Plan: - Continue home latanoprost 0.005% opthalamic soln 1 drop to each eye at bedtime  - Continue home timolol maleate 0.5% opthalmic soln 1 drop to each eye bid.     Problem/Plan - 9:  ·  Problem: Asthma with COPD.   ·  Plan: - Continue home tiotropium  - Albuterol prn.     Problem/Plan - 10:  ·  Problem: DVT prophylaxis.   ·  Plan; - Eliquis 5mg BID     Dispo: Pt with multiple recent hospitalization- was recently in rehab.  consult PT & SW for dispo planning.  Awaiting MELANIE placement.      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 Admitted for nephrolithiasis and acute UTI.     Problem/Plan - 1:  ·  Problem: Nephrolithiasis.   ·  Plan: CT Abdomen/Pelvis showing 9.4 x 6.4 mm in left mid ureteral stone present with additional bilateral non obstructing renal stones present measuring 5.1 mm in the right and 7.0 mm on the left. Bilateral renal cysts are present.  - s/p Stent Placed; Cultures showing VRE and switched to IV Unasyn.  Now transitioned to Amoxicillin 500mg PO TID.  Completed course of antibiotics last day 12/29  - continue home tamsulosin 0.4mg po cap daily  - Urology (Dr. Yates).    - Pt. to follow up with his urologist Dr. BCECA Duke as outpatient     Problem/Plan - 2:  ·  Problem: Acute metabolic encephalopathy.   ·  Plan: -Patient initially sent from [facility] to hospital for increasing confusion and abd pain- noted to have UTI due to kidney stone, had cystoscopy with stent placement  -Patient had CT head on admission showing age indeterminate lacunar infarcts, as per patient and wife no history of CVA in past, and patient has been compliant with eliquis (was prescribed for afib)  -Neuro Dr. Ibrahim consulted   - MR head No acute intracranial hemorrhage, acute infarction, extra-axial fluid   collection or hydrocephalus. Chronic lacunar infarctions of the bilateral basal ganglia.  - continue Eliquis and statin.  - Psychiatry consult for delerium.  Haldol 2mg IM Q6h PRN     Problem/Plan - 3:  ·  Problem: Cancer of kidney.   ·  Plan: Hx of Kidney cancer, now in remission  - s/p nephrectomy (~2017/2018)  - follows with Dr. Duke (urology).     Problem/Plan - 4:  ·  Problem: Acute UTI.   ·  Plan: Patient presented with AMS to ED likely 2/2 UTI. Hx of recurrent UTIs.  - UA showing mod leuk esterase, 11-25 wbc, 25-50 rbc, occasional bacteria  - Previous Urine Cx grew ESBL klebsiella, but this time VRE  - Chronic kennedy catheter in place  - urine culture +VRE and blood cultures no growth  - Completed course of Abx   - ID consulted.     Problem/Plan - 5:  ·  Problem: Leg swelling.   ·  Plan: Patient with chronic b/l leg swelling, no known h/o heart failure   - continue lasix 40mg PO daily  - monitor renal function, Cr stable currently  - recent tte - EF 56%, mild pulm htn, dilated L atrium.     Problem/Plan - 6:  ·  Problem: Chronic atrial fibrillation.   ·  Plan: - Continue home eliquis 5mg BID, atenolol.     Problem/Plan - 7:  ·  Problem: Hypertension.   ·  Plan: - Continue home atenolol  - Monitor routine hemodynamics.     Problem/Plan - 8:  ·  Problem: Glaucoma.   ·  Plan: - Continue home latanoprost 0.005% opthalamic soln 1 drop to each eye at bedtime  - Continue home timolol maleate 0.5% opthalmic soln 1 drop to each eye bid.     Problem/Plan - 9:  ·  Problem: Asthma with COPD.   ·  Plan: - Continue home tiotropium  - Albuterol prn.     Problem/Plan - 10:  ·  Problem: DVT prophylaxis.   ·  Plan; - Eliquis 5mg BID     Dispo: Pt with multiple recent hospitalization- was recently in rehab.  consult PT & SW for dispo planning.  Awaiting MELANIE placement.

## 2022-12-30 NOTE — PROGRESS NOTE ADULT - PROVIDER SPECIALTY LIST ADULT
Hospitalist
Urology
Neurology
Neurology
Hospitalist
Infectious Disease
Neurology
Urology
Infectious Disease
Neurology
Hospitalist

## 2022-12-30 NOTE — PROGRESS NOTE ADULT - SUBJECTIVE AND OBJECTIVE BOX
[facility]FECTIOUS DISE[facility]  Aleksander Gomez MD PhD, Eunice Youngblood MD, Ana Hogan MD, Rickey Swenson MD, Bradly Dubon MD  and providing coverage with Linda Salmeron MD  Providing Infectious Disease Consultations at [facility], [facility], [facility]'s    Office# [phone] to schedule follow up appointments  Answering Service for urgent calls or New Consults [phone]  Cell# to text for urgent issues Aleksander Gomez [phone]     infectious diseases progress note:    KENNETH MULLER is a 67y y. o. Male patient    Overnight and events of the last 24hrs reviewed    Allergies    No Known Allergies    Intolerances        ANTIBIOTICS/RELEVANT:  antimicrobials    immunologic:  influenza  Vaccine (HIGH DOSE) 0.7 milliLiter(s) IntraMuscular once    OTHER:  acetaminophen     Tablet .. 650 milliGRAM(s) Oral every 6 hours PRN  albuterol    90 MICROgram(s) HFA Inhaler 2 Puff(s) Inhalation every 6 hours PRN  allopurinol 100 milliGRAM(s) Oral daily  apixaban 5 milliGRAM(s) Oral every 12 hours  atenolol  Tablet 25 milliGRAM(s) Oral daily  cholecalciferol 1000 Unit(s) Oral daily  famotidine    Tablet 20 milliGRAM(s) Oral daily  furosemide    Tablet 40 milliGRAM(s) Oral daily  haloperidol    Injectable 2 milliGRAM(s) IntraMuscular every 6 hours PRN  polyethylene glycol 3350 17 Gram(s) Oral daily PRN  senna 2 Tablet(s) Oral at bedtime  tamsulosin 0.4 milliGRAM(s) Oral daily  timolol 0.5% Solution 1 Drop(s) Both EYES two times a day  tiotropium 2.5 MICROgram(s) Inhaler 2 Puff(s) Inhalation daily  zinc sulfate 220 milliGRAM(s) Oral daily      Objective:  Vital Signs Last 24 Hrs  T(C): 36.8 (30 Dec 2022 04:32), Max: 36.8 (29 Dec 2022 20:27)  T(F): 98.2 (30 Dec 2022 04:32), Max: 98.3 (29 Dec 2022 20:27)  HR: 95 (30 Dec 2022 04:32) (69 - 95)  BP: 146/95 (30 Dec 2022 04:32) (130/91 - 146/95)  BP(mean): --  RR: 19 (30 Dec 2022 04:32) (18 - 19)  SpO2: 93% (30 Dec 2022 04:32) (93% - 98%)    Parameters below as of 30 Dec 2022 04:32  Patient On (Oxygen Delivery Method): room air        T(C): 36.8 (12-30-22 @ 04:32), Max: 37.1 (12-28-22 @ 22:10)  T(C): 36.8 (12-30-22 @ 04:32), Max: 37.4 (12-27-22 @ 11:45)  T(C): 36.8 (12-30-22 @ 04:32), Max: 37.4 (12-27-22 @ 11:45)    PHYSICAL EXAM:  HEENT: NC atraumatic  Neck: supple  Respiratory: no accessory muscle use, breathing comfortably  Cardiovascular: distant  Gastrointestinal: normal appearing, nondistended  Extremities: no clubbing, no cyanosis,        LABS:                          9.9    8.22  )-----------( 381      ( 30 Dec 2022 06:36 )             34.0       WBC  8.22 12-30 @ 06:36  8.20 12-29 @ 06:10  7.58 12-28 @ 05:45  9.25 12-27 @ 05:54  8.01 12-25 @ 06:10      12-30    146<H>  |  109<H>  |  39<H>  ----------------------------<  96  3.8   |  28  |  1.30    Ca    9.3      30 Dec 2022 06:36  Mg     2.3     12-29        Creatinine, Serum: 1.30 mg/dL (12-30-22 @ 06:36)  Creatinine, Serum: 1.30 mg/dL (12-29-22 @ 06:10)  Creatinine, Serum: 1.20 mg/dL (12-28-22 @ 05:45)  Creatinine, Serum: 1.20 mg/dL (12-27-22 @ 05:54)  Creatinine, Serum: 1.20 mg/dL (12-25-22 @ 06:10)                INFLAMMATORY MARKERS      MICROBIOLOGY:              RADIOLOGY & ADDITIONAL STUDIES:   [facility]OUS DISE[facility]  Aleksander Gomez MD PhD, Eunice Youngblood MD, Ana Hogan MD, Rickey Swenson MD, Bradly Dubon MD  and providing coverage with Linda Salmeron MD  Providing Infectious Disease Consultations at [facility], [facility], [facility]'s    Office# [phone] to schedule follow up appointments  Answering Service for urgent calls or New Consults [phone]  Cell# to text for urgent issues Aleksander Gomez [phone]     infectious diseases progress note:    KENNETH MULLER is a 67y y. o. Male patient    Overnight and events of the last 24hrs reviewed    Allergies    No Known Allergies    Intolerances        ANTIBIOTICS/RELEVANT:  antimicrobials    immunologic:  influenza  Vaccine (HIGH DOSE) 0.7 milliLiter(s) IntraMuscular once    OTHER:  acetaminophen     Tablet .. 650 milliGRAM(s) Oral every 6 hours PRN  albuterol    90 MICROgram(s) HFA Inhaler 2 Puff(s) Inhalation every 6 hours PRN  allopurinol 100 milliGRAM(s) Oral daily  apixaban 5 milliGRAM(s) Oral every 12 hours  atenolol  Tablet 25 milliGRAM(s) Oral daily  cholecalciferol 1000 Unit(s) Oral daily  famotidine    Tablet 20 milliGRAM(s) Oral daily  furosemide    Tablet 40 milliGRAM(s) Oral daily  haloperidol    Injectable 2 milliGRAM(s) IntraMuscular every 6 hours PRN  polyethylene glycol 3350 17 Gram(s) Oral daily PRN  senna 2 Tablet(s) Oral at bedtime  tamsulosin 0.4 milliGRAM(s) Oral daily  timolol 0.5% Solution 1 Drop(s) Both EYES two times a day  tiotropium 2.5 MICROgram(s) Inhaler 2 Puff(s) Inhalation daily  zinc sulfate 220 milliGRAM(s) Oral daily      Objective:  Vital Signs Last 24 Hrs  T(C): 36.8 (30 Dec 2022 04:32), Max: 36.8 (29 Dec 2022 20:27)  T(F): 98.2 (30 Dec 2022 04:32), Max: 98.3 (29 Dec 2022 20:27)  HR: 95 (30 Dec 2022 04:32) (69 - 95)  BP: 146/95 (30 Dec 2022 04:32) (130/91 - 146/95)  BP(mean): --  RR: 19 (30 Dec 2022 04:32) (18 - 19)  SpO2: 93% (30 Dec 2022 04:32) (93% - 98%)    Parameters below as of 30 Dec 2022 04:32  Patient On (Oxygen Delivery Method): room air        T(C): 36.8 (12-30-22 @ 04:32), Max: 37.1 (12-28-22 @ 22:10)  T(C): 36.8 (12-30-22 @ 04:32), Max: 37.4 (12-27-22 @ 11:45)  T(C): 36.8 (12-30-22 @ 04:32), Max: 37.4 (12-27-22 @ 11:45)    PHYSICAL EXAM:  HEENT: NC atraumatic  Neck: supple  Respiratory: no accessory muscle use, breathing comfortably  Cardiovascular: distant  Gastrointestinal: normal appearing, nondistended  Extremities: no clubbing, no cyanosis,        LABS:                          9.9    8.22  )-----------( 381      ( 30 Dec 2022 06:36 )             34.0       WBC  8.22 12-30 @ 06:36  8.20 12-29 @ 06:10  7.58 12-28 @ 05:45  9.25 12-27 @ 05:54  8.01 12-25 @ 06:10      12-30    146<H>  |  109<H>  |  39<H>  ----------------------------<  96  3.8   |  28  |  1.30    Ca    9.3      30 Dec 2022 06:36  Mg     2.3     12-29        Creatinine, Serum: 1.30 mg/dL (12-30-22 @ 06:36)  Creatinine, Serum: 1.30 mg/dL (12-29-22 @ 06:10)  Creatinine, Serum: 1.20 mg/dL (12-28-22 @ 05:45)  Creatinine, Serum: 1.20 mg/dL (12-27-22 @ 05:54)  Creatinine, Serum: 1.20 mg/dL (12-25-22 @ 06:10)                INFLAMMATORY MARKERS      MICROBIOLOGY:              RADIOLOGY & ADDITIONAL STUDIES:   [facility] INFECTIOUS DISEASE  Aleksander Gomez MD PhD, Eunice Youngblood MD, Ana Hogan MD, Rickey Swenson MD, Bradly Dubon MD  and providing coverage with Linda Salmeron MD  Providing Infectious Disease Consultations at [facility], [facility], [facility]'s    Office# [phone] to schedule follow up appointments  Answering Service for urgent calls or New Consults [phone]  Cell# to text for urgent issues Aleksander Gomez [phone]     infectious diseases progress note:    KENNETH MULLER is a 67y y. o. Male patient    Overnight and events of the last 24hrs reviewed    Allergies    No Known Allergies    Intolerances        ANTIBIOTICS/RELEVANT:  antimicrobials    immunologic:  influenza  Vaccine (HIGH DOSE) 0.7 milliLiter(s) IntraMuscular once    OTHER:  acetaminophen     Tablet .. 650 milliGRAM(s) Oral every 6 hours PRN  albuterol    90 MICROgram(s) HFA Inhaler 2 Puff(s) Inhalation every 6 hours PRN  allopurinol 100 milliGRAM(s) Oral daily  apixaban 5 milliGRAM(s) Oral every 12 hours  atenolol  Tablet 25 milliGRAM(s) Oral daily  cholecalciferol 1000 Unit(s) Oral daily  famotidine    Tablet 20 milliGRAM(s) Oral daily  furosemide    Tablet 40 milliGRAM(s) Oral daily  haloperidol    Injectable 2 milliGRAM(s) IntraMuscular every 6 hours PRN  polyethylene glycol 3350 17 Gram(s) Oral daily PRN  senna 2 Tablet(s) Oral at bedtime  tamsulosin 0.4 milliGRAM(s) Oral daily  timolol 0.5% Solution 1 Drop(s) Both EYES two times a day  tiotropium 2.5 MICROgram(s) Inhaler 2 Puff(s) Inhalation daily  zinc sulfate 220 milliGRAM(s) Oral daily      Objective:  Vital Signs Last 24 Hrs  T(C): 36.8 (30 Dec 2022 04:32), Max: 36.8 (29 Dec 2022 20:27)  T(F): 98.2 (30 Dec 2022 04:32), Max: 98.3 (29 Dec 2022 20:27)  HR: 95 (30 Dec 2022 04:32) (69 - 95)  BP: 146/95 (30 Dec 2022 04:32) (130/91 - 146/95)  BP(mean): --  RR: 19 (30 Dec 2022 04:32) (18 - 19)  SpO2: 93% (30 Dec 2022 04:32) (93% - 98%)    Parameters below as of 30 Dec 2022 04:32  Patient On (Oxygen Delivery Method): room air        T(C): 36.8 (12-30-22 @ 04:32), Max: 37.1 (12-28-22 @ 22:10)  T(C): 36.8 (12-30-22 @ 04:32), Max: 37.4 (12-27-22 @ 11:45)  T(C): 36.8 (12-30-22 @ 04:32), Max: 37.4 (12-27-22 @ 11:45)    PHYSICAL EXAM:  HEENT: NC atraumatic  Neck: supple  Respiratory: no accessory muscle use, breathing comfortably  Cardiovascular: distant  Gastrointestinal: normal appearing, nondistended  Extremities: no clubbing, no cyanosis,        LABS:                          9.9    8.22  )-----------( 381      ( 30 Dec 2022 06:36 )             34.0       WBC  8.22 12-30 @ 06:36  8.20 12-29 @ 06:10  7.58 12-28 @ 05:45  9.25 12-27 @ 05:54  8.01 12-25 @ 06:10      12-30    146<H>  |  109<H>  |  39<H>  ----------------------------<  96  3.8   |  28  |  1.30    Ca    9.3      30 Dec 2022 06:36  Mg     2.3     12-29        Creatinine, Serum: 1.30 mg/dL (12-30-22 @ 06:36)  Creatinine, Serum: 1.30 mg/dL (12-29-22 @ 06:10)  Creatinine, Serum: 1.20 mg/dL (12-28-22 @ 05:45)  Creatinine, Serum: 1.20 mg/dL (12-27-22 @ 05:54)  Creatinine, Serum: 1.20 mg/dL (12-25-22 @ 06:10)                INFLAMMATORY MARKERS      MICROBIOLOGY:              RADIOLOGY & ADDITIONAL STUDIES:

## 2022-12-30 NOTE — PROGRESS NOTE ADULT - SUBJECTIVE AND OBJECTIVE BOX
Neurology Follow up note    KENNETH [id number]    HPI:  66 y/o male PMH of asthma, HTN, kidney cancer in remission, glaucoma, afib on eliquis, s/p L total knee replacement in September presents to the ED for increased confusion with lower abdominal pain. Patient was recently hospitalized at [facility] from 11/22 -12/06/22 for AMS and UTI. Urine cultures at the time grew ESBL Klebsiella and patient was treated with ertapenem. He was discharged to [facility] with chronic indwelling kennedy catheter. Patient's family wanted to bring him home after 3 days, however, they soon felt they were unable to properly care for him at home.  At time of exam, patient denies having any pain. He is slightly anxious about being in the hospital but was A&Ox3.   Patient denies having any pain, nausea, fevers, chills, sob, vomiting, diarrhea, constipation.    ED Course  T 97.6F -> 98.6 F HR 74 /60 -> 155/61 RR 19 SpO2 99% on RA  Labs significant for H/H 10.4/36.2 (bl ~8-9)  BUN/Cr 38/1.30 (bl 1-1.2) Albumin 2.4 AST 46  UA: mod leuk esterase, 11-25 wbc, 25-50 rbc, occasional bacteria    CXR: grossly normal , f/u official read  EKG: Afib HR 70 BPM    CT Head: Limited by streak artifact.  Subcentimeter bilateral hypodensities present in the bilateral basal   ganglia, likely related to age-indeterminate lacunar infarcts. Findings   are superimposed upon chronic microangiopathic white matter changes  To the visualized extent, no intracranial hemorrhage or mass effect.     CT Abdomen/Pelvis: 9.4 x 6.4 mm in left mid ureteral stone present. Dilated left renal pelvis without mario hydronephrosis. Additional bilateral non obstructing renal stones are present measuring 5.1 mm in the right and 7.0 mm on the left. Bilateral renal cysts are present.    In the ED, patient received Rocephin x1 dose, LR bolus x1   (20 Dec 2022 00:38)      Interval History -no complaints    Patient is seen, chart was reviewed and case was discussed with the treatment team.  Pt is not in any distress.   Lying on bed comfortably.       Vital Signs Last 24 Hrs  T(C): 36.5 (30 Dec 2022 12:05), Max: 36.8 (29 Dec 2022 20:27)  T(F): 97.7 (30 Dec 2022 12:05), Max: 98.3 (29 Dec 2022 20:27)  HR: 83 (30 Dec 2022 12:05) (83 - 95)  BP: 155/90 (30 Dec 2022 12:05) (130/91 - 155/90)  BP(mean): --  RR: 19 (30 Dec 2022 12:05) (18 - 19)  SpO2: 97% (30 Dec 2022 12:05) (93% - 97%)    Parameters below as of 30 Dec 2022 12:05  Patient On (Oxygen Delivery Method): room air                        REVIEW OF SYSTEMS:    Constitutional: No fever,   Eyes: No eye pain, visual disturbances, or discharge  ENT:  No difficulty hearing, tinnitus, vertigo; No sinus or throat pain  Neck: No pain or stiffness  Respiratory: No cough, wheezing, chills or hemoptysis  Cardiovascular: No chest pain, palpitations, shortness of breath, dizziness or leg swelling  Gastrointestinal: No abdominal or epigastric pain. No nausea, vomiting.  Genitourinary: No dysuria,  hematuria   Neurological: No headaches, memory loss,  numbness or tremors  Psychiatric: No depression, anxiety, mood swings or difficulty sleeping  Musculoskeletal: No joint pain or swelling; No muscle, back or extremity pain  Skin: No itching, burning, rashes or lesions   Lymph Nodes: No enlarged glands  Endocrine: No heat or cold intolerance; No hair loss,   Allergy and Immunologic: No hives or eczema    On Neurological Examination:    Mental Status - Pt is alert, awake, oriented X3.  Follows commands well and able to answer questions appropriately.Mood and affect  normal    Speech -  Normal.     Cranial Nerves - Pupils 3 mm equal and reactive to light, extraocular eye movements intact. Pt has no visual field deficit.  Pt has no  facial asymmetry. Facial sensation is intact.Tongue - is in midline.    Muscle tone - is normal    Motor Exam -right hand grasp; elbow 4+/5 ; shoulder 2//5  RLE 4/5  Left hand grasp 4+5 elbow 3-4/5(old weakness) ; shoulder 1/5      Sensory Exam - Pt withdraws all extremities equally on stimulation. No asymmetry seen.        Deep tendon Reflexes - 2 plus all over.         Neck Supple -  Yes.     MEDICATIONS  (STANDING):  allopurinol 100 milliGRAM(s) Oral daily  amoxicillin 500 milliGRAM(s) Oral three times a day  apixaban 5 milliGRAM(s) Oral every 12 hours  atenolol  Tablet 25 milliGRAM(s) Oral daily  cholecalciferol 1000 Unit(s) Oral daily  famotidine    Tablet 20 milliGRAM(s) Oral daily  furosemide    Tablet 40 milliGRAM(s) Oral daily  influenza  Vaccine (HIGH DOSE) 0.7 milliLiter(s) IntraMuscular once  senna 2 Tablet(s) Oral at bedtime  tamsulosin 0.4 milliGRAM(s) Oral daily  timolol 0.5% Solution 1 Drop(s) Both EYES two times a day  tiotropium 2.5 MICROgram(s) Inhaler 2 Puff(s) Inhalation daily  zinc sulfate 220 milliGRAM(s) Oral daily    MEDICATIONS  (PRN):  acetaminophen     Tablet .. 650 milliGRAM(s) Oral every 6 hours PRN Temp greater or equal to 38C (100.4F), Mild Pain (1 - 3)  albuterol    90 MICROgram(s) HFA Inhaler 2 Puff(s) Inhalation every 6 hours PRN Shortness of Breath and/or Wheezing  polyethylene glycol 3350 17 Gram(s) Oral daily PRN Constipation        Allergies    No Known Allergies    Intolerances    12-29    145  |  108  |  39<H>  ----------------------------<  92  3.6   |  28  |  1.30    Ca    9.1      29 Dec 2022 06:10  Mg     2.3     12-29    TPro  6.2  /  Alb  2.3<L>  /  TBili  0.9  /  DBili  x   /  AST  9<L>  /  ALT  12  /  AlkPhos  74  12-28      Hemoglobin A1C:   Lipid Panel 12-27 @ 05:54  Total Cholesterol, Serum 135  LDL --  Triglycerides 63    Vitamin B12     RADIOLOGY    ASSESSMENT AND PLAN:      seen for ams/abnormal  ct head  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  Pain is accessed and addressed.  Would continue to follow.     Neurology Follow up note    KENNETH [id number]    HPI:  68 y/o male PMH of asthma, HTN, kidney cancer in remission, glaucoma, afib on eliquis, s/p L total knee replacement in September presents to the ED for increased confusion with lower abdominal pain. Patient was recently hospitalized at [facility] from 11/22 -12/06/22 for AMS and UTI. Urine cultures at the time grew ESBL Klebsiella and patient was treated with ertapenem. He was discharged to [facility] with chronic indwelling kennedy catheter. Patient's family wanted to bring him home after 3 days, however, they soon felt they were unable to properly care for him at home.  At time of exam, patient denies having any pain. He is slightly anxious about being in the hospital but was A&Ox3.   Patient denies having any pain, nausea, fevers, chills, sob, vomiting, diarrhea, constipation.    ED Course  T 97.6F -> 98.6 F HR 74 /60 -> 155/61 RR 19 SpO2 99% on RA  Labs significant for H/H 10.4/36.2 (bl ~8-9)  BUN/Cr 38/1.30 (bl 1-1.2) Albumin 2.4 AST 46  UA: mod leuk esterase, 11-25 wbc, 25-50 rbc, occasional bacteria    CXR: grossly normal , f/u official read  EKG: Afib HR 70 BPM    CT Head: Limited by streak artifact.  Subcentimeter bilateral hypodensities present in the bilateral basal   ganglia, likely related to age-indeterminate lacunar infarcts. Findings   are superimposed upon chronic microangiopathic white matter changes  To the visualized extent, no intracranial hemorrhage or mass effect.     CT Abdomen/Pelvis: 9.4 x 6.4 mm in left mid ureteral stone present. Dilated left renal pelvis without mario hydronephrosis. Additional bilateral non obstructing renal stones are present measuring 5.1 mm in the right and 7.0 mm on the left. Bilateral renal cysts are present.    In the ED, patient received Rocephin x1 dose, LR bolus x1   (20 Dec 2022 00:38)      Interval History -no complaints    Patient is seen, chart was reviewed and case was discussed with the treatment team.  Pt is not in any distress.   Lying on bed comfortably.       Vital Signs Last 24 Hrs  T(C): 36.5 (30 Dec 2022 12:05), Max: 36.8 (29 Dec 2022 20:27)  T(F): 97.7 (30 Dec 2022 12:05), Max: 98.3 (29 Dec 2022 20:27)  HR: 83 (30 Dec 2022 12:05) (83 - 95)  BP: 155/90 (30 Dec 2022 12:05) (130/91 - 155/90)  BP(mean): --  RR: 19 (30 Dec 2022 12:05) (18 - 19)  SpO2: 97% (30 Dec 2022 12:05) (93% - 97%)    Parameters below as of 30 Dec 2022 12:05  Patient On (Oxygen Delivery Method): room air                        REVIEW OF SYSTEMS:    Constitutional: No fever,   Eyes: No eye pain, visual disturbances, or discharge  ENT:  No difficulty hearing, tinnitus, vertigo; No sinus or throat pain  Neck: No pain or stiffness  Respiratory: No cough, wheezing, chills or hemoptysis  Cardiovascular: No chest pain, palpitations, shortness of breath, dizziness or leg swelling  Gastrointestinal: No abdominal or epigastric pain. No nausea, vomiting.  Genitourinary: No dysuria,  hematuria   Neurological: No headaches, memory loss,  numbness or tremors  Psychiatric: No depression, anxiety, mood swings or difficulty sleeping  Musculoskeletal: No joint pain or swelling; No muscle, back or extremity pain  Skin: No itching, burning, rashes or lesions   Lymph Nodes: No enlarged glands  Endocrine: No heat or cold intolerance; No hair loss,   Allergy and Immunologic: No hives or eczema    On Neurological Examination:    Mental Status - Pt is alert, awake, oriented X3.  Follows commands well and able to answer questions appropriately.Mood and affect  normal    Speech -  Normal.     Cranial Nerves - Pupils 3 mm equal and reactive to light, extraocular eye movements intact. Pt has no visual field deficit.  Pt has no  facial asymmetry. Facial sensation is intact.Tongue - is in midline.    Muscle tone - is normal    Motor Exam -right hand grasp; elbow 4+/5 ; shoulder 2//5  RLE 4/5  Left hand grasp 4+5 elbow 3-4/5(old weakness) ; shoulder 1/5      Sensory Exam - Pt withdraws all extremities equally on stimulation. No asymmetry seen.        Deep tendon Reflexes - 2 plus all over.         Neck Supple -  Yes.     MEDICATIONS  (STANDING):  allopurinol 100 milliGRAM(s) Oral daily  amoxicillin 500 milliGRAM(s) Oral three times a day  apixaban 5 milliGRAM(s) Oral every 12 hours  atenolol  Tablet 25 milliGRAM(s) Oral daily  cholecalciferol 1000 Unit(s) Oral daily  famotidine    Tablet 20 milliGRAM(s) Oral daily  furosemide    Tablet 40 milliGRAM(s) Oral daily  influenza  Vaccine (HIGH DOSE) 0.7 milliLiter(s) IntraMuscular once  senna 2 Tablet(s) Oral at bedtime  tamsulosin 0.4 milliGRAM(s) Oral daily  timolol 0.5% Solution 1 Drop(s) Both EYES two times a day  tiotropium 2.5 MICROgram(s) Inhaler 2 Puff(s) Inhalation daily  zinc sulfate 220 milliGRAM(s) Oral daily    MEDICATIONS  (PRN):  acetaminophen     Tablet .. 650 milliGRAM(s) Oral every 6 hours PRN Temp greater or equal to 38C (100.4F), Mild Pain (1 - 3)  albuterol    90 MICROgram(s) HFA Inhaler 2 Puff(s) Inhalation every 6 hours PRN Shortness of Breath and/or Wheezing  polyethylene glycol 3350 17 Gram(s) Oral daily PRN Constipation        Allergies    No Known Allergies    Intolerances    12-29    145  |  108  |  39<H>  ----------------------------<  92  3.6   |  28  |  1.30    Ca    9.1      29 Dec 2022 06:10  Mg     2.3     12-29    TPro  6.2  /  Alb  2.3<L>  /  TBili  0.9  /  DBili  x   /  AST  9<L>  /  ALT  12  /  AlkPhos  74  12-28      Hemoglobin A1C:   Lipid Panel 12-27 @ 05:54  Total Cholesterol, Serum 135  LDL --  Triglycerides 63    Vitamin B12     RADIOLOGY    ASSESSMENT AND PLAN:      seen for ams/abnormal  ct head  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  Pain is accessed and addressed.  Would continue to follow.     Neurology Follow up note    KENNETH [id number]    HPI:  66 y/o male PMH of asthma, HTN, kidney cancer in remission, glaucoma, afib on eliquis, s/p L total knee replacement in September presents to the ED for increased confusion with lower abdominal pain. Patient was recently hospitalized at [facility] from 11/22 -12/06/22 for AMS and UTI. Urine cultures at the time grew ESBL Klebsiella and patient was treated with ertapenem. He was discharged to [facility] with chronic indwelling kennedy catheter. Patient's family wanted to bring him home after 3 days, however, they soon felt they were unable to properly care for him at home.  At time of exam, patient denies having any pain. He is slightly anxious about being in the hospital but was A&Ox3.   Patient denies having any pain, nausea, fevers, chills, sob, vomiting, diarrhea, constipation.    ED Course  T 97.6F -> 98.6 F HR 74 /60 -> 155/61 RR 19 SpO2 99% on RA  Labs significant for H/H 10.4/36.2 (bl ~8-9)  BUN/Cr 38/1.30 (bl 1-1.2) Albumin 2.4 AST 46  UA: mod leuk esterase, 11-25 wbc, 25-50 rbc, occasional bacteria    CXR: grossly normal , f/u official read  EKG: Afib HR 70 BPM    CT Head: Limited by streak artifact.  Subcentimeter bilateral hypodensities present in the bilateral basal   ganglia, likely related to age-indeterminate lacunar infarcts. Findings   are superimposed upon chronic microangiopathic white matter changes  To the visualized extent, no intracranial hemorrhage or mass effect.     CT Abdomen/Pelvis: 9.4 x 6.4 mm in left mid ureteral stone present. Dilated left renal pelvis without mario hydronephrosis. Additional bilateral non obstructing renal stones are present measuring 5.1 mm in the right and 7.0 mm on the left. Bilateral renal cysts are present.    In the ED, patient received Rocephin x1 dose, LR bolus x1   (20 Dec 2022 00:38)      Interval History -no complaints    Patient is seen, chart was reviewed and case was discussed with the treatment team.  Pt is not in any distress.   Lying on bed comfortably.       Vital Signs Last 24 Hrs  T(C): 36.5 (30 Dec 2022 12:05), Max: 36.8 (29 Dec 2022 20:27)  T(F): 97.7 (30 Dec 2022 12:05), Max: 98.3 (29 Dec 2022 20:27)  HR: 83 (30 Dec 2022 12:05) (83 - 95)  BP: 155/90 (30 Dec 2022 12:05) (130/91 - 155/90)  BP(mean): --  RR: 19 (30 Dec 2022 12:05) (18 - 19)  SpO2: 97% (30 Dec 2022 12:05) (93% - 97%)    Parameters below as of 30 Dec 2022 12:05  Patient On (Oxygen Delivery Method): room air                        REVIEW OF SYSTEMS:    Constitutional: No fever,   Eyes: No eye pain, visual disturbances, or discharge  ENT:  No difficulty hearing, tinnitus, vertigo; No sinus or throat pain  Neck: No pain or stiffness  Respiratory: No cough, wheezing, chills or hemoptysis  Cardiovascular: No chest pain, palpitations, shortness of breath, dizziness or leg swelling  Gastrointestinal: No abdominal or epigastric pain. No nausea, vomiting.  Genitourinary: No dysuria,  hematuria   Neurological: No headaches, memory loss,  numbness or tremors  Psychiatric: No depression, anxiety, mood swings or difficulty sleeping  Musculoskeletal: No joint pain or swelling; No muscle, back or extremity pain  Skin: No itching, burning, rashes or lesions   Lymph Nodes: No enlarged glands  Endocrine: No heat or cold intolerance; No hair loss,   Allergy and Immunologic: No hives or eczema    On Neurological Examination:    Mental Status - Pt is alert, awake, oriented X3.  Follows commands well and able to answer questions appropriately.Mood and affect  normal    Speech -  Normal.     Cranial Nerves - Pupils 3 mm equal and reactive to light, extraocular eye movements intact. Pt has no visual field deficit.  Pt has no  facial asymmetry. Facial sensation is intact.Tongue - is in midline.    Muscle tone - is normal    Motor Exam -right hand grasp; elbow 4+/5 ; shoulder 2//5  RLE 4/5  Left hand grasp 4+5 elbow 3-4/5(old weakness) ; shoulder 1/5      Sensory Exam - Pt withdraws all extremities equally on stimulation. No asymmetry seen.        Deep tendon Reflexes - 2 plus all over.         Neck Supple -  Yes.     MEDICATIONS  (STANDING):  allopurinol 100 milliGRAM(s) Oral daily  amoxicillin 500 milliGRAM(s) Oral three times a day  apixaban 5 milliGRAM(s) Oral every 12 hours  atenolol  Tablet 25 milliGRAM(s) Oral daily  cholecalciferol 1000 Unit(s) Oral daily  famotidine    Tablet 20 milliGRAM(s) Oral daily  furosemide    Tablet 40 milliGRAM(s) Oral daily  influenza  Vaccine (HIGH DOSE) 0.7 milliLiter(s) IntraMuscular once  senna 2 Tablet(s) Oral at bedtime  tamsulosin 0.4 milliGRAM(s) Oral daily  timolol 0.5% Solution 1 Drop(s) Both EYES two times a day  tiotropium 2.5 MICROgram(s) Inhaler 2 Puff(s) Inhalation daily  zinc sulfate 220 milliGRAM(s) Oral daily    MEDICATIONS  (PRN):  acetaminophen     Tablet .. 650 milliGRAM(s) Oral every 6 hours PRN Temp greater or equal to 38C (100.4F), Mild Pain (1 - 3)  albuterol    90 MICROgram(s) HFA Inhaler 2 Puff(s) Inhalation every 6 hours PRN Shortness of Breath and/or Wheezing  polyethylene glycol 3350 17 Gram(s) Oral daily PRN Constipation        Allergies    No Known Allergies    Intolerances    12-29    145  |  108  |  39<H>  ----------------------------<  92  3.6   |  28  |  1.30    Ca    9.1      29 Dec 2022 06:10  Mg     2.3     12-29    TPro  6.2  /  Alb  2.3<L>  /  TBili  0.9  /  DBili  x   /  AST  9<L>  /  ALT  12  /  AlkPhos  74  12-28      Hemoglobin A1C:   Lipid Panel 12-27 @ 05:54  Total Cholesterol, Serum 135  LDL --  Triglycerides 63    Vitamin B12     RADIOLOGY    ASSESSMENT AND PLAN:      seen for ams/abnormal  ct head  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  Pain is accessed and addressed.  Would continue to follow.

## 2023-02-14 NOTE — BH CONSULTATION LIAISON ASSESSMENT NOTE - NSBHCHARTREVIEWLAB_PSY_A_CORE FT
9.6    8.20  )-----------( 352      ( 29 Dec 2022 06:10 )             33.2   12-29    145  |  108  |  39<H>  ----------------------------<  92  3.6   |  28  |  1.30    Ca    9.1      29 Dec 2022 06:10  Mg     2.3     12-29    TPro  6.2  /  Alb  2.3<L>  /  TBili  0.9  /  DBili  x   /  AST  9<L>  /  ALT  12  /  AlkPhos  74  12-28  
Mikaela Rosen)

## 2023-08-24 NOTE — DISCHARGE NOTE PROVIDER - NSRESEARCHGRANT_PROPHYLAXISRECOMFT_GEN_A_CORE
Daily Note     Today's date: 2023  Patient name: Christian Chapman  : 1959  MRN: [id number]  Referring provider: Oscar Mariano MD  Dx:   Encounter Diagnosis     ICD-10-CM    1. Acute pain of right knee  M25.561       2. Presence of right artificial knee joint  Z96.651           Start Time: 1400  Stop Time: 1510  Total time in clinic (min): 70 minutes    Subjective: I was on my feet all day Wednesday and I felt good . I have not had to ice for 2 days. Objective: See treatment diary below      Assessment: Tolerated treatment well. Patient would benefit from continued PT  Pt continues to improve with her strength and motion. She is doing better with stairs and everyday activities. We will continue to work with her motion and strength. Pt still has some pain with passive ext and over her incision site. We ended with a cold pack post to her right knee. We reviewed getting up from the floor . Plan: Continue per plan of care.       Precautions: N/A    Date 8/21 8/24 8/10 8/15 8/17   FOTO        Manuals        Passive knee ext 5 min JF 5 min ds 5 min 5 min   Passive knee flexion Quad stretch off table Quad  5 x  ds  5 min   Gastroc/ ham / quad stretch Jf 4x  30 x JF 30" 4x & ham ds  JF   5 min JF 5 min   Neuro Re-Ed        SLR 20 x 20x  2 #  1# 20x     SLB-foam Foam 20 " 5 x 5x 10" foam 3x 15" 5 " 10 x 10 " 5 x   Tandem stance-foam 10 " 3 x each 5x 10" ea 5x 10" 5 " 10 x 10" 5 x   Tandem walk-foam 5 laps airex 5 laps airex 5 laps  F/B 5 laps 5 laps   Clamshell  10 X 5 "  10x 5" 10 x 5 "  10 x 5 "           Ther Ex        Heel slide  20x 5" np 20 x 5 " 20 x 5 "    Towel crush  20x 5" 20x 5"     Leg Ext 15 #  20 x 15 #   2 x 20  15# 2/15 15  # 20 x 15 #  2 x 15    Leg press 40 #  2x 20 40 #  2 x 20  40# 2/15  40 # 2 x 20  40# 2 x 20    Bike for strengthening L 5 8 min L 5  8 min 8m L5 8 min bike L 5 L5 8 min   Hip machine abd 15 #  20 x both 15 #  20 x both 15# 20x ea 15 # 20 x  15 # 20 x   Squats 20 x 20 x 20x 20 x 20 x   Hamstring curls 35 # x  2 x20   35 #  2 x 20   35# 2/15  35  #  20 x 35# 3x10   Ther Activity        Step downs 6 " 20 x 6 " 20 x 6" 20 6 " 20 x 6 " 20 x   Lateral step up 6" 20 x 6 " 20x  6 " 15 x  6 " 15 x   Step Ups 8 "  30 x 30 x 8 "  8" 30x 8 ' 30 x 8" 20x   Bridge w/ LAQ 20x  20 x 20x   15x   Gait Training                Modalities IMPROVE-DD Application Not Available

## 2023-12-03 RX ORDER — ZINC SULFATE TAB 220 MG (50 MG ZINC EQUIVALENT) 220 (50 ZN) MG
0 TAB ORAL
Qty: 0 | Refills: 0 | DISCHARGE

## 2023-12-03 RX ORDER — TAMSULOSIN HYDROCHLORIDE 0.4 MG/1
1 CAPSULE ORAL
Qty: 0 | Refills: 0 | DISCHARGE

## 2023-12-03 RX ORDER — APIXABAN 2.5 MG/1
1 TABLET, FILM COATED ORAL
Qty: 0 | Refills: 0 | DISCHARGE

## 2023-12-03 RX ORDER — ALLOPURINOL 300 MG
0 TABLET ORAL
Qty: 0 | Refills: 0 | DISCHARGE

## 2023-12-03 RX ORDER — LATANOPROST 0.05 MG/ML
0 SOLUTION/ DROPS OPHTHALMIC; TOPICAL
Qty: 0 | Refills: 0 | DISCHARGE

## 2023-12-03 RX ORDER — CHOLECALCIFEROL (VITAMIN D3) 125 MCG
1 CAPSULE ORAL
Qty: 0 | Refills: 0 | DISCHARGE

## 2023-12-03 RX ORDER — TIOTROPIUM BROMIDE 18 UG/1
1 CAPSULE ORAL; RESPIRATORY (INHALATION)
Qty: 0 | Refills: 0 | DISCHARGE

## 2023-12-03 RX ORDER — POTASSIUM CHLORIDE 20 MEQ
0 PACKET (EA) ORAL
Qty: 0 | Refills: 0 | DISCHARGE

## 2023-12-03 RX ORDER — FAMOTIDINE 10 MG/ML
0 INJECTION INTRAVENOUS
Qty: 0 | Refills: 0 | DISCHARGE

## 2023-12-03 RX ORDER — TIMOLOL 0.5 %
1 DROPS OPHTHALMIC (EYE)
Qty: 0 | Refills: 0 | DISCHARGE

## 2023-12-03 RX ORDER — ATENOLOL 25 MG/1
1 TABLET ORAL
Qty: 0 | Refills: 0 | DISCHARGE

## 2023-12-03 RX ORDER — ALBUTEROL 90 UG/1
2 AEROSOL, METERED ORAL
Qty: 0 | Refills: 0 | DISCHARGE

## 2023-12-03 RX ORDER — FUROSEMIDE 40 MG
1 TABLET ORAL
Qty: 0 | Refills: 0 | DISCHARGE

## 2024-01-26 NOTE — PROGRESS NOTE ADULT - PROBLEM SELECTOR PLAN 4
Detail Level: Detailed Sunscreen Recommendation Label Override: Broad spectrum SPF 30+ RESOLVED  - Patient with known UTI from rehab, was being treated on abx (unknown which), as well as new COVID infection  - UA on admission negative, likely due to recent abx use  - WBC 19 on admission, improving on antibiotics  - CT chest neg for PNA  - afebrile but tachycardic, tachypneic and hypotensive with known UTI and new COVID infection, fulfilling sepsis criteria  - Hold remdesivir given acute renal failure  - S/p short course of CTX (5d)  - Blood and urine Cx NGTD  - COVID- on RA

## 2024-03-27 NOTE — DIETITIAN INITIAL EVALUATION ADULT - ENTER TO (ML/KG)
JENII:    Who called: Eduin    Reason for call: Billing    Extra information:   Patient had a question regarding billing, patient's call was disconnected and he had another call coming in. Patient stated he received a letter but could not tell me what it said.    TIFFANY and informed patient to contact Billing at [phone].    Looking further, his insurance is requesting him to pay $30 for his last appointment with Dr. Leger.    Callback number:   [phone]     
Message to PSRs:    Should patient call back, please provide patient with the number for billing below. The office is unable to assist with billing. Thank you  
TIFFANY for pt with billing department's phone number [phone]   
27

## 2024-08-06 NOTE — ED PROVIDER NOTE - CADM POA CENTRAL LINE
It was a pleasure seeing you today, please reach out to our office directly if you have any concerns or questions about your heart.    Dr. Kramer would like you to return to the clinic in: 6 months     What to bring to your next appointment:   Please make sure to bring your medication bottles to your next scheduled visit, this ensures that your medication list is up to date and accurate.  If you have a home blood pressure cuff that has never been checked for accuracy, please bring to appointment for staff to ensure cuff is accurate.       Please call us if you would like to be seen sooner for any reason. Please call our office with questions or concerns      Following items have been ordered:   NO tests or medication changes today                 No

## 2024-10-11 NOTE — ED ADULT NURSE REASSESSMENT NOTE - NS ED NURSE REASSESS COMMENT FT1
Mom calling requesting refill on loaded med sent to loaded pharmacy.     Please advise.    VINITA montes MD at bedside placing new IV

## 2025-06-02 NOTE — PATIENT PROFILE ADULT - NSPRESCRALCFREQ_GEN_A_NUR
Patient with Macular Edema, left eye.  OCT of the Macula to be done for today's visit.    
occasional

## (undated) DEVICE — DRAPE DRAINAGE BAG URO CATCH II

## (undated) DEVICE — GOWN LG

## (undated) DEVICE — Device

## (undated) DEVICE — VENODYNE/SCD SLEEVE CALF LARGE

## (undated) DEVICE — SYR LUER LOK 10CC

## (undated) DEVICE — PACK CYSTO

## (undated) DEVICE — DRAINAGE BAG URINARY 2L

## (undated) DEVICE — SOL IRR BAG H2O 3000ML

## (undated) DEVICE — VENODYNE/SCD SLEEVE CALF MEDIUM

## (undated) DEVICE — GLV 8 PROTEXIS (WHITE)

## (undated) DEVICE — PLV-SCD MACHINE: Type: DURABLE MEDICAL EQUIPMENT

## (undated) DEVICE — FOLEY HOLDER STATLOCK 2 WAY ADULT

## (undated) DEVICE — WARMING BLANKET UPPER ADULT

## (undated) DEVICE — SYR LUER LOK 30CC

## (undated) DEVICE — DRAPE C ARM UNIVERSAL

## (undated) DEVICE — SOL INJ NS 0.9% 1000ML